# Patient Record
Sex: FEMALE | Race: BLACK OR AFRICAN AMERICAN | Employment: FULL TIME | URBAN - METROPOLITAN AREA
[De-identification: names, ages, dates, MRNs, and addresses within clinical notes are randomized per-mention and may not be internally consistent; named-entity substitution may affect disease eponyms.]

---

## 2017-10-27 ENCOUNTER — APPOINTMENT (OUTPATIENT)
Dept: GENERAL RADIOLOGY | Age: 32
End: 2017-10-27
Attending: EMERGENCY MEDICINE
Payer: SELF-PAY

## 2017-10-27 ENCOUNTER — HOSPITAL ENCOUNTER (EMERGENCY)
Age: 32
Discharge: HOME | End: 2017-10-27
Attending: EMERGENCY MEDICINE
Payer: SELF-PAY

## 2017-10-27 VITALS
HEART RATE: 83 BPM | WEIGHT: 260.58 LBS | SYSTOLIC BLOOD PRESSURE: 103 MMHG | BODY MASS INDEX: 40.9 KG/M2 | TEMPERATURE: 97.9 F | DIASTOLIC BLOOD PRESSURE: 71 MMHG | HEIGHT: 67 IN | RESPIRATION RATE: 16 BRPM | OXYGEN SATURATION: 100 %

## 2017-10-27 DIAGNOSIS — K59.00 CONSTIPATION, UNSPECIFIED CONSTIPATION TYPE: Primary | ICD-10-CM

## 2017-10-27 DIAGNOSIS — N30.00 ACUTE CYSTITIS WITHOUT HEMATURIA: ICD-10-CM

## 2017-10-27 DIAGNOSIS — D64.9 ANEMIA, UNSPECIFIED TYPE: ICD-10-CM

## 2017-10-27 DIAGNOSIS — R10.84 ABDOMINAL PAIN, GENERALIZED: ICD-10-CM

## 2017-10-27 LAB
ALBUMIN SERPL-MCNC: 3.7 G/DL (ref 3.5–5)
ALBUMIN/GLOB SERPL: 0.7 {RATIO} (ref 1.1–2.2)
ALP SERPL-CCNC: 68 U/L (ref 45–117)
ALT SERPL-CCNC: 25 U/L (ref 12–78)
AMORPH CRY URNS QL MICRO: ABNORMAL
ANION GAP SERPL CALC-SCNC: 5 MMOL/L (ref 5–15)
APPEARANCE UR: ABNORMAL
AST SERPL-CCNC: 17 U/L (ref 15–37)
BACTERIA URNS QL MICRO: ABNORMAL /HPF
BASOPHILS # BLD: 0.1 K/UL (ref 0–0.1)
BASOPHILS NFR BLD: 1 % (ref 0–1)
BILIRUB SERPL-MCNC: 0.8 MG/DL (ref 0.2–1)
BILIRUB UR QL: NEGATIVE
BUN SERPL-MCNC: 11 MG/DL (ref 6–20)
BUN/CREAT SERPL: 14 (ref 12–20)
CALCIUM SERPL-MCNC: 8.8 MG/DL (ref 8.5–10.1)
CHLORIDE SERPL-SCNC: 103 MMOL/L (ref 97–108)
CO2 SERPL-SCNC: 31 MMOL/L (ref 21–32)
COLOR UR: ABNORMAL
CREAT SERPL-MCNC: 0.77 MG/DL (ref 0.55–1.02)
DIFFERENTIAL METHOD BLD: ABNORMAL
EOSINOPHIL # BLD: 0.3 K/UL (ref 0–0.4)
EOSINOPHIL NFR BLD: 5 % (ref 0–7)
EPITH CASTS URNS QL MICRO: ABNORMAL /LPF
ERYTHROCYTE [DISTWIDTH] IN BLOOD BY AUTOMATED COUNT: 18.9 % (ref 11.5–14.5)
GLOBULIN SER CALC-MCNC: 5 G/DL (ref 2–4)
GLUCOSE SERPL-MCNC: 91 MG/DL (ref 65–100)
GLUCOSE UR STRIP.AUTO-MCNC: NEGATIVE MG/DL
HCG UR QL: NEGATIVE
HCT VFR BLD AUTO: 25.7 % (ref 35–47)
HGB BLD-MCNC: 7.4 G/DL (ref 11.5–16)
HGB UR QL STRIP: NEGATIVE
KETONES UR QL STRIP.AUTO: NEGATIVE MG/DL
LEUKOCYTE ESTERASE UR QL STRIP.AUTO: ABNORMAL
LIPASE SERPL-CCNC: 202 U/L (ref 73–393)
LYMPHOCYTES # BLD: 2.6 K/UL (ref 0.8–3.5)
LYMPHOCYTES NFR BLD: 40 % (ref 12–49)
MCH RBC QN AUTO: 19.3 PG (ref 26–34)
MCHC RBC AUTO-ENTMCNC: 28.8 G/DL (ref 30–36.5)
MCV RBC AUTO: 66.9 FL (ref 80–99)
MONOCYTES # BLD: 0.6 K/UL (ref 0–1)
MONOCYTES NFR BLD: 10 % (ref 5–13)
NEUTS SEG # BLD: 2.8 K/UL (ref 1.8–8)
NEUTS SEG NFR BLD: 44 % (ref 32–75)
NITRITE UR QL STRIP.AUTO: NEGATIVE
PH UR STRIP: 6 [PH] (ref 5–8)
PLATELET # BLD AUTO: 306 K/UL (ref 150–400)
POTASSIUM SERPL-SCNC: 3.6 MMOL/L (ref 3.5–5.1)
PROT SERPL-MCNC: 8.7 G/DL (ref 6.4–8.2)
PROT UR STRIP-MCNC: NEGATIVE MG/DL
RBC # BLD AUTO: 3.84 M/UL (ref 3.8–5.2)
RBC #/AREA URNS HPF: ABNORMAL /HPF (ref 0–5)
RBC MORPH BLD: ABNORMAL
SODIUM SERPL-SCNC: 139 MMOL/L (ref 136–145)
SP GR UR REFRACTOMETRY: 1.02 (ref 1–1.03)
UA: UC IF INDICATED,UAUC: ABNORMAL
UROBILINOGEN UR QL STRIP.AUTO: 0.2 EU/DL (ref 0.2–1)
WBC # BLD AUTO: 6.4 K/UL (ref 3.6–11)
WBC URNS QL MICRO: ABNORMAL /HPF (ref 0–4)

## 2017-10-27 PROCEDURE — 80053 COMPREHEN METABOLIC PANEL: CPT | Performed by: EMERGENCY MEDICINE

## 2017-10-27 PROCEDURE — 74011000250 HC RX REV CODE- 250: Performed by: EMERGENCY MEDICINE

## 2017-10-27 PROCEDURE — 74011250636 HC RX REV CODE- 250/636: Performed by: EMERGENCY MEDICINE

## 2017-10-27 PROCEDURE — 77030029684 HC NEB SM VOL KT MONA -A

## 2017-10-27 PROCEDURE — 94640 AIRWAY INHALATION TREATMENT: CPT

## 2017-10-27 PROCEDURE — 74022 RADEX COMPL AQT ABD SERIES: CPT

## 2017-10-27 PROCEDURE — 96360 HYDRATION IV INFUSION INIT: CPT

## 2017-10-27 PROCEDURE — 36415 COLL VENOUS BLD VENIPUNCTURE: CPT | Performed by: EMERGENCY MEDICINE

## 2017-10-27 PROCEDURE — 83690 ASSAY OF LIPASE: CPT | Performed by: EMERGENCY MEDICINE

## 2017-10-27 PROCEDURE — 87086 URINE CULTURE/COLONY COUNT: CPT | Performed by: EMERGENCY MEDICINE

## 2017-10-27 PROCEDURE — 74011250637 HC RX REV CODE- 250/637: Performed by: EMERGENCY MEDICINE

## 2017-10-27 PROCEDURE — 85025 COMPLETE CBC W/AUTO DIFF WBC: CPT | Performed by: EMERGENCY MEDICINE

## 2017-10-27 PROCEDURE — 99284 EMERGENCY DEPT VISIT MOD MDM: CPT

## 2017-10-27 PROCEDURE — 81025 URINE PREGNANCY TEST: CPT | Performed by: EMERGENCY MEDICINE

## 2017-10-27 PROCEDURE — 81001 URINALYSIS AUTO W/SCOPE: CPT | Performed by: EMERGENCY MEDICINE

## 2017-10-27 RX ORDER — PROCHLORPERAZINE MALEATE 10 MG
10 TABLET ORAL
Qty: 15 TAB | Refills: 0 | Status: SHIPPED | OUTPATIENT
Start: 2017-10-27 | End: 2017-11-01

## 2017-10-27 RX ORDER — IPRATROPIUM BROMIDE AND ALBUTEROL SULFATE 2.5; .5 MG/3ML; MG/3ML
3 SOLUTION RESPIRATORY (INHALATION)
Status: COMPLETED | OUTPATIENT
Start: 2017-10-27 | End: 2017-10-27

## 2017-10-27 RX ORDER — FERROUS SULFATE 325(65) MG
325 TABLET, DELAYED RELEASE (ENTERIC COATED) ORAL
Qty: 42 TAB | Refills: 0 | Status: SHIPPED | OUTPATIENT
Start: 2017-10-27 | End: 2017-11-10

## 2017-10-27 RX ORDER — CEPHALEXIN 500 MG/1
500 CAPSULE ORAL 3 TIMES DAILY
Qty: 21 CAP | Refills: 0 | Status: SHIPPED | OUTPATIENT
Start: 2017-10-27 | End: 2017-11-03

## 2017-10-27 RX ORDER — POLYETHYLENE GLYCOL 3350 17 G/17G
17 POWDER, FOR SOLUTION ORAL DAILY
Qty: 510 G | Refills: 0 | Status: SHIPPED | OUTPATIENT
Start: 2017-10-27 | End: 2019-05-28

## 2017-10-27 RX ORDER — SODIUM CHLORIDE 9 MG/ML
1000 INJECTION, SOLUTION INTRAVENOUS ONCE
Status: COMPLETED | OUTPATIENT
Start: 2017-10-27 | End: 2017-10-27

## 2017-10-27 RX ORDER — CEPHALEXIN 250 MG/1
500 CAPSULE ORAL
Status: COMPLETED | OUTPATIENT
Start: 2017-10-27 | End: 2017-10-27

## 2017-10-27 RX ADMIN — CEPHALEXIN 500 MG: 250 CAPSULE ORAL at 22:31

## 2017-10-27 RX ADMIN — IPRATROPIUM BROMIDE AND ALBUTEROL SULFATE 3 ML: .5; 3 SOLUTION RESPIRATORY (INHALATION) at 20:28

## 2017-10-27 RX ADMIN — SODIUM CHLORIDE 1000 ML: 900 INJECTION, SOLUTION INTRAVENOUS at 20:48

## 2017-10-27 NOTE — ED NOTES
Pt arrived ambulation to room #39 from triage. Pt with c/o of constipation. Patient stated that she has not had a bowel movement successfully in \"almost a month. \" Patient has has small BM that has experienced this past month are hard and \"looks like derrell. \" Patient states she is having abdominal pain that radiates up to throat. Patient is distended and experiencing nausea and vomiting. Patient stated that she is sometimes not able to button pants due to being so distended. Pt resting in position of comfort. Call bell within reach.

## 2017-10-28 NOTE — DISCHARGE INSTRUCTIONS
Abdominal Pain: Care Instructions  Your Care Instructions    Abdominal pain has many possible causes. Some aren't serious and get better on their own in a few days. Others need more testing and treatment. If your pain continues or gets worse, you need to be rechecked and may need more tests to find out what is wrong. You may need surgery to correct the problem. Don't ignore new symptoms, such as fever, nausea and vomiting, urination problems, pain that gets worse, and dizziness. These may be signs of a more serious problem. Your doctor may have recommended a follow-up visit in the next 8 to 12 hours. If you are not getting better, you may need more tests or treatment. The doctor has checked you carefully, but problems can develop later. If you notice any problems or new symptoms, get medical treatment right away. Follow-up care is a key part of your treatment and safety. Be sure to make and go to all appointments, and call your doctor if you are having problems. It's also a good idea to know your test results and keep a list of the medicines you take. How can you care for yourself at home? · Rest until you feel better. · To prevent dehydration, drink plenty of fluids, enough so that your urine is light yellow or clear like water. Choose water and other caffeine-free clear liquids until you feel better. If you have kidney, heart, or liver disease and have to limit fluids, talk with your doctor before you increase the amount of fluids you drink. · If your stomach is upset, eat mild foods, such as rice, dry toast or crackers, bananas, and applesauce. Try eating several small meals instead of two or three large ones. · Wait until 48 hours after all symptoms have gone away before you have spicy foods, alcohol, and drinks that contain caffeine. · Do not eat foods that are high in fat. · Avoid anti-inflammatory medicines such as aspirin, ibuprofen (Advil, Motrin), and naproxen (Aleve).  These can cause stomach upset. Talk to your doctor if you take daily aspirin for another health problem. When should you call for help? Call 911 anytime you think you may need emergency care. For example, call if:  ? · You passed out (lost consciousness). ? · You pass maroon or very bloody stools. ? · You vomit blood or what looks like coffee grounds. ? · You have new, severe belly pain. ?Call your doctor now or seek immediate medical care if:  ? · Your pain gets worse, especially if it becomes focused in one area of your belly. ? · You have a new or higher fever. ? · Your stools are black and look like tar, or they have streaks of blood. ? · You have unexpected vaginal bleeding. ? · You have symptoms of a urinary tract infection. These may include:  ¨ Pain when you urinate. ¨ Urinating more often than usual.  ¨ Blood in your urine. ? · You are dizzy or lightheaded, or you feel like you may faint. ? Watch closely for changes in your health, and be sure to contact your doctor if:  ? · You are not getting better after 1 day (24 hours). Where can you learn more? Go to http://merry-joseluis.info/. Enter X756 in the search box to learn more about \"Abdominal Pain: Care Instructions. \"  Current as of: March 20, 2017  Content Version: 11.4  © 4865-8081 [employer]. Care instructions adapted under license by [employer] (which disclaims liability or warranty for this information). If you have questions about a medical condition or this instruction, always ask your healthcare professional. [place] [address] any warranty or liability for your use of this information. Constipation: Care Instructions  Your Care Instructions    Constipation means that you have a hard time passing stools (bowel movements). People pass stools from 3 times a day to once every 3 days. What is normal for you may be different.  Constipation may occur with pain in the rectum and cramping. The pain may get worse when you try to pass stools. Sometimes there are small amounts of bright red blood on toilet paper or the surface of stools. This is because of enlarged veins near the rectum (hemorrhoids). A few changes in your diet and lifestyle may help you avoid ongoing constipation. Your doctor may also prescribe medicine to help loosen your stool. Some medicines can cause constipation. These include pain medicines and antidepressants. Tell your doctor about all the medicines you take. Your doctor may want to make a medicine change to ease your symptoms. Follow-up care is a key part of your treatment and safety. Be sure to make and go to all appointments, and call your doctor if you are having problems. It's also a good idea to know your test results and keep a list of the medicines you take. How can you care for yourself at home? · Drink plenty of fluids, enough so that your urine is light yellow or clear like water. If you have kidney, heart, or liver disease and have to limit fluids, talk with your doctor before you increase the amount of fluids you drink. · Include high-fiber foods in your diet each day. These include fruits, vegetables, beans, and whole grains. · Get at least 30 minutes of exercise on most days of the week. Walking is a good choice. You also may want to do other activities, such as running, swimming, cycling, or playing tennis or team sports. · Take a fiber supplement, such as Citrucel or Metamucil, every day. Read and follow all instructions on the label. · Schedule time each day for a bowel movement. A daily routine may help. Take your time having your bowel movement. · Support your feet with a small step stool when you sit on the toilet. This helps flex your hips and places your pelvis in a squatting position. · Your doctor may recommend an over-the-counter laxative to relieve your constipation. Examples are Milk of Magnesia and MiraLax.  Read and follow all instructions on the label. Do not use laxatives on a long-term basis. When should you call for help? Call your doctor now or seek immediate medical care if:  ? · You have new or worse belly pain. ? · You have new or worse nausea or vomiting. ? · You have blood in your stools. ? Watch closely for changes in your health, and be sure to contact your doctor if:  ? · Your constipation is getting worse. ? · You do not get better as expected. Where can you learn more? Go to http://merry-joseluis.info/. Enter 21  in the search box to learn more about \"Constipation: Care Instructions. \"  Current as of: March 20, 2017  Content Version: 11.4  © 9454-1055 [employer]. Care instructions adapted under license by [employer] (which disclaims liability or warranty for this information). If you have questions about a medical condition or this instruction, always ask your healthcare professional. [place] [address] any warranty or liability for your use of this information. Anemia: Care Instructions  Your Care Instructions    Anemia is a low level of red blood cells, which carry oxygen throughout your body. Many things can cause anemia. Lack of iron is one of the most common causes. Your body needs iron to make hemoglobin, a substance in red blood cells that carries oxygen from the lungs to your body's cells. Without enough iron, the body produces fewer and smaller red blood cells. As a result, your body's cells do not get enough oxygen, and you feel tired and weak. And you may have trouble concentrating. Bleeding is the most common cause of a lack of iron. You may have heavy menstrual bleeding or bleeding caused by conditions such as ulcers, hemorrhoids, or cancer. Regular use of aspirin or other anti-inflammatory medicines (such as ibuprofen) also can cause bleeding in some people.  A lack of iron in your diet also can cause anemia, especially at times when the body needs more iron, such as during pregnancy, infancy, and the teen years. Your doctor may have prescribed iron pills. It may take several months of treatment for your iron levels to return to normal. Your doctor also may suggest that you eat foods that are rich in iron, such as meat and beans. There are many other causes of anemia. It is not always due to a lack of iron. Finding the specific cause of your anemia will help your doctor find the right treatment for you. Follow-up care is a key part of your treatment and safety. Be sure to make and go to all appointments, and call your doctor if you are having problems. It's also a good idea to know your test results and keep a list of the medicines you take. How can you care for yourself at home? · Take your medicines exactly as prescribed. Call your doctor if you think you are having a problem with your medicine. · If your doctor recommends iron pills, take them as directed:  ¨ Try to take the pills on an empty stomach about 1 hour before or 2 hours after meals. But you may need to take iron with food to avoid an upset stomach. ¨ Do not take antacids or drink milk or caffeine drinks (such as coffee, tea, or cola) at the same time or within 2 hours of the time that you take your iron. They can make it hard for your body to absorb the iron. ¨ Vitamin C (from food or supplements) helps your body absorb iron. Try taking iron pills with a glass of orange juice or some other food that is high in vitamin C, such as citrus fruits. ¨ Iron pills may cause stomach problems, such as heartburn, nausea, diarrhea, constipation, and cramps. Be sure to drink plenty of fluids, and include fruits, vegetables, and fiber in your diet each day. Iron pills often make your bowel movements dark or green. ¨ If you forget to take an iron pill, do not take a double dose of iron the next time you take a pill. ¨ Keep iron pills out of the reach of small children.  An overdose of iron can be very dangerous. · Follow your doctor's advice about eating iron-rich foods. These include red meat, shellfish, poultry, eggs, beans, raisins, whole-grain bread, and leafy green vegetables. · Steam vegetables to help them keep their iron content. When should you call for help? Call 911 anytime you think you may need emergency care. For example, call if:  ? · You have symptoms of a heart attack. These may include:  ¨ Chest pain or pressure, or a strange feeling in the chest.  ¨ Sweating. ¨ Shortness of breath. ¨ Nausea or vomiting. ¨ Pain, pressure, or a strange feeling in the back, neck, jaw, or upper belly or in one or both shoulders or arms. ¨ Lightheadedness or sudden weakness. ¨ A fast or irregular heartbeat. After you call 911, the  may tell you to chew 1 adult-strength or 2 to 4 low-dose aspirin. Wait for an ambulance. Do not try to drive yourself. ? · You passed out (lost consciousness). ?Call your doctor now or seek immediate medical care if:  ? · You have new or increased shortness of breath. ? · You are dizzy or lightheaded, or you feel like you may faint. ? · Your fatigue and weakness continue or get worse. ? · You have any abnormal bleeding, such as:  ¨ Nosebleeds. ¨ Vaginal bleeding that is different (heavier, more frequent, at a different time of the month) than what you are used to. ¨ Bloody or black stools, or rectal bleeding. ¨ Bloody or pink urine. ? Watch closely for changes in your health, and be sure to contact your doctor if:  ? · You do not get better as expected. Where can you learn more? Go to http://merry-joseluis.info/. Enter R301 in the search box to learn more about \"Anemia: Care Instructions. \"  Current as of: October 13, 2016  Content Version: 11.4  © 9308-5343 [employer]. Care instructions adapted under license by [employer] (which disclaims liability or warranty for this information).  If you have questions about a medical condition or this instruction, always ask your healthcare professional. [place] [address] any warranty or liability for your use of this information.

## 2017-10-28 NOTE — ED PROVIDER NOTES
[facility]  EMERGENCY DEPARTMENT HISTORY AND PHYSICAL EXAM       Date of Service: 10/27/2017   Patient Name: Kerry Londono   YOB: 1985  Medical Record Number: [id number]    History of Presenting Illness     Chief Complaint   Patient presents with    Constipation     pt reports to ed complaining of constipation \"for awhile\", pt reports small bowel movement yesterday         History Provided By:  patient    Additional History:   Kerry Londono is a 28 y.o. female with PMhx significant for asthma, and anemia, who presents ambulatory to the ED with cc of acute on chronic constipation. Pt states few BM's is her baseline, but notes recently her constipation has worsened. She states the last BM she had was several days ago and it was \"small derrell\". Pt notes when she has her menses, she usually has normal BM. Pt reports additional sx's of flatulence, dysuria, productive cough with mucous. Pt notes she eats lots of fruits and vegetables but states she needs to drink more water. Of note, pt states her LMP was \"the second week of October\". Pt specifically denies fever, chills, blood in stool, CP, hematuria. Social Hx: - Tobacco, - EtOH, - Illicit Drugs    There are no other complaints, changes or physical findings at this time. Primary Care Provider: None     Past History     Past Medical History:   Past Medical History:   Diagnosis Date    Anemia     Anemia     Anemia NEC     ASCUS (atypical squamous cells of undetermined significance) on Pap smear 6/1/2012    Asthma     extrinsic as a child     Psychiatric disorder     Pt reports having anxiety.         Past Surgical History:   Past Surgical History:   Procedure Laterality Date    BREAST SURGERY PROCEDURE UNLISTED  11/27/12    Incision and Drainage Left Breast Abscess    HX BREAST REDUCTION  2003    HX OTHER SURGICAL      abscess removed from left breast        Family History:   Family History Problem Relation Age of Onset    Hypertension Mother     Elevated Lipids Mother     Diabetes Sister         Social History:   Social History   Substance Use Topics    Smoking status: Never Smoker    Smokeless tobacco: Never Used    Alcohol use Yes      Comment: occasional        Allergies: Allergies   Allergen Reactions    Bactrim [Sulfamethoprim Ds] Nausea Only    Zofran [Ondansetron Hcl (Pf)] Rash         Review of Systems   Review of Systems   Constitutional: Negative for chills, diaphoresis, fever and unexpected weight change. HENT: Negative for rhinorrhea and sore throat. Eyes: Negative for pain. Respiratory: Positive for cough (mucous). Negative for shortness of breath, wheezing and stridor. Cardiovascular: Negative for chest pain and leg swelling. Gastrointestinal: Positive for constipation. Negative for abdominal pain, blood in stool, diarrhea, nausea and vomiting. Positive for flatulence   Genitourinary: Positive for dysuria. Negative for difficulty urinating, flank pain and hematuria. Musculoskeletal: Negative for back pain and neck stiffness. Skin: Negative for rash. Neurological: Negative for seizures, syncope, weakness, light-headedness and headaches. Psychiatric/Behavioral: Negative for confusion. Physical Exam  Physical Exam  Nursing note and vitals reviewed.   General appearance: non-toxic, NAD  Eyes: PERRL, EOMI, conjunctiva normal, anicteric sclera  HEENT: mucous membranes moist, oropharynx is clear  Pulmonary: scant expiratory wheezes, normal cough with FEV1  Cardiac: normal rate and regular rhythm, no murmurs, gallops, or rubs, 2+DP pulses, 2+ radial pulses  Abdomen: soft, mildly tender to palpation to upper abd, nondistended, bowel sounds present, negative Calix's sign, negative McBurney's sign, no rebound, no guarding  MSK: no pre-tibial edema  Neuro: Alert, answers questions appropriately  Skin: capillary refill brisk     Medical Decision Making   I am the first provider for this patient. I reviewed the vital signs, available nursing notes, past medical history, past surgical history, family history and social history. Old Medical Records: Old medical records. Provider Notes:   DDx: constipation, dehydration, URI, PNA, bronchitis, low suspicion for intraabdominal catastrophe      ED Course:  8:00 PM   Initial assessment performed. The patients presenting problems have been discussed, and they are in agreement with the care plan formulated and outlined with them. I have encouraged them to ask questions as they arise throughout their visit. Progress Notes:   9:50 PM   Pt has been re-evaluated. Updated pt on lab results. Will perform rectal exam.      Procedures:   PROCEDURE NOTE - RECTAL EXAM:   10:15 PM  Performed by: Joo Arroyo. Adriana Sutherland MD  Rectal exam performed. brown stool was collected. No blood, no melena, no fecal impaction. She notes heavy menstrual periods, suspects menstrual bloodloss as source of anemia, advised close follow-up with Gokul VILLALOBOS (OBGYN). The procedure took 1-15 minutes, and pt tolerated well. Written by Tom Oneil ED Scribe, as dictated by Brannon Sutherland MD.       Diagnostic Study Results     Labs -      Recent Results (from the past 12 hour(s))   URINALYSIS W/ REFLEX CULTURE    Collection Time: 10/27/17  6:20 PM   Result Value Ref Range    Color YELLOW/STRAW      Appearance CLOUDY (A) CLEAR      Specific gravity 1.022 1.003 - 1.030      pH (UA) 6.0 5.0 - 8.0      Protein NEGATIVE  NEG mg/dL    Glucose NEGATIVE  NEG mg/dL    Ketone NEGATIVE  NEG mg/dL    Bilirubin NEGATIVE  NEG      Blood NEGATIVE  NEG      Urobilinogen 0.2 0.2 - 1.0 EU/dL    Nitrites NEGATIVE  NEG      Leukocyte Esterase SMALL (A) NEG      WBC 0-4 0 - 4 /hpf    RBC 0-5 0 - 5 /hpf    Epithelial cells MODERATE (A) FEW /lpf    Bacteria 3+ (A) NEG /hpf    UA:UC IF INDICATED URINE CULTURE ORDERED (A) CNI      Amorphous Crystals FEW (A) NEG HCG URINE, QL    Collection Time: 10/27/17  6:20 PM   Result Value Ref Range    HCG urine, Ql. NEGATIVE  NEG     CBC WITH AUTOMATED DIFF    Collection Time: 10/27/17  8:28 PM   Result Value Ref Range    WBC 6.4 3.6 - 11.0 K/uL    RBC 3.84 3.80 - 5.20 M/uL    HGB 7.4 (L) 11.5 - 16.0 g/dL    HCT 25.7 (L) 35.0 - 47.0 %    MCV 66.9 (L) 80.0 - 99.0 FL    MCH 19.3 (L) 26.0 - 34.0 PG    MCHC 28.8 (L) 30.0 - 36.5 g/dL    RDW 18.9 (H) 11.5 - 14.5 %    PLATELET 013 138 - 781 K/uL    NEUTROPHILS 44 32 - 75 %    LYMPHOCYTES 40 12 - 49 %    MONOCYTES 10 5 - 13 %    EOSINOPHILS 5 0 - 7 %    BASOPHILS 1 0 - 1 %    ABS. NEUTROPHILS 2.8 1.8 - 8.0 K/UL    ABS. LYMPHOCYTES 2.6 0.8 - 3.5 K/UL    ABS. MONOCYTES 0.6 0.0 - 1.0 K/UL    ABS. EOSINOPHILS 0.3 0.0 - 0.4 K/UL    ABS. BASOPHILS 0.1 0.0 - 0.1 K/UL    DF SMEAR SCANNED      RBC COMMENTS ANISOCYTOSIS  1+        RBC COMMENTS OVALOCYTES  PRESENT        RBC COMMENTS POIKILOCYTOSIS  PRESENT        RBC COMMENTS RBC FRAGMENTS  MICROCYTOSIS  2+        RBC COMMENTS HYPOCHROMIA  2+       METABOLIC PANEL, COMPREHENSIVE    Collection Time: 10/27/17  8:28 PM   Result Value Ref Range    Sodium 139 136 - 145 mmol/L    Potassium 3.6 3.5 - 5.1 mmol/L    Chloride 103 97 - 108 mmol/L    CO2 31 21 - 32 mmol/L    Anion gap 5 5 - 15 mmol/L    Glucose 91 65 - 100 mg/dL    BUN 11 6 - 20 MG/DL    Creatinine 0.77 0.55 - 1.02 MG/DL    BUN/Creatinine ratio 14 12 - 20      GFR est AA >60 >60 ml/min/1.73m2    GFR est non-AA >60 >60 ml/min/1.73m2    Calcium 8.8 8.5 - 10.1 MG/DL    Bilirubin, total 0.8 0.2 - 1.0 MG/DL    ALT (SGPT) 25 12 - 78 U/L    AST (SGOT) 17 15 - 37 U/L    Alk.  phosphatase 68 45 - 117 U/L    Protein, total 8.7 (H) 6.4 - 8.2 g/dL    Albumin 3.7 3.5 - 5.0 g/dL    Globulin 5.0 (H) 2.0 - 4.0 g/dL    A-G Ratio 0.7 (L) 1.1 - 2.2     LIPASE    Collection Time: 10/27/17  8:28 PM   Result Value Ref Range    Lipase 202 73 - 393 U/L       Radiologic Studies -  The following have been ordered and reviewed:  XR ABD ACUTE W 1 V CHEST   Final Result   EXAM:  XR ABD ACUTE W 1 V CHEST     INDICATION:  cough and constipation     COMPARISON: 8/25/2016.     FINDINGS: The upright chest radiograph demonstrates clear lungs and normal  cardiac and mediastinal contours. There is no pleural effusion or free air under  the diaphragm.     Supine and upright views of the abdomen demonstrate a nonobstructive bowel gas  pattern. There is no free intraperitoneal air. Mild fecal stasis is noted. The  bones are within normal limits.     IMPRESSION  IMPRESSION: No acute abnormality. CXR Results  (Last 48 hours)               10/27/17 2110  XR ABD ACUTE W 1 V CHEST Final result    Impression:  IMPRESSION: No acute abnormality. Narrative:  EXAM:  XR ABD ACUTE W 1 V CHEST       INDICATION:  cough and constipation       COMPARISON: 8/25/2016. FINDINGS: The upright chest radiograph demonstrates clear lungs and normal   cardiac and mediastinal contours. There is no pleural effusion or free air under   the diaphragm. Supine and upright views of the abdomen demonstrate a nonobstructive bowel gas   pattern. There is no free intraperitoneal air. Mild fecal stasis is noted. The   bones are within normal limits. Vital Signs-Reviewed the patient's vital signs. Patient Vitals for the past 12 hrs:   Temp Pulse Resp BP SpO2   10/27/17 1815 97.9 °F (36.6 °C) 83 16 131/76 100 %       Medications Given in the ED:  Medications   albuterol-ipratropium (DUO-NEB) 2.5 MG-0.5 MG/3 ML (3 mL Nebulization Given 10/27/17 2028)   0.9% sodium chloride infusion 1,000 mL (0 mL IntraVENous IV Completed 10/27/17 2149)   cephALEXin (KEFLEX) capsule 500 mg (500 mg Oral Given 10/27/17 2231)         Diagnosis   Clinical Impression:   1. Constipation, unspecified constipation type    2. Abdominal pain, generalized    3. Acute cystitis without hematuria    4.  Anemia, unspecified type         Plan:  1: Follow-up Information     Follow up With Details Comments MD Pj Schedule an appointment as soon as possible for a visit in 4 days FOR FOLLOW UP OF YOUR ANEMIA RELATED TO MENSTRUATION [address]  [facility] [place]  [phone]      [facility] EMERGENCY DEPT Go in 1 day If symptoms worsen [address] [facility] [address]  [phone]    PRIMARY CARE DOCTOR Schedule an appointment as soon as possible for a visit in 1 week  SEE ATTACHED LIST        2:   Current Discharge Medication List      START taking these medications    Details   polyethylene glycol (MIRALAX) 17 gram/dose powder Take 17 g by mouth daily. 1 tablespoon with 8 oz of water daily  Qty: 510 g, Refills: 0      cephALEXin (KEFLEX) 500 mg capsule Take 1 Cap by mouth three (3) times daily for 7 days. Indications: BACTERIAL URINARY TRACT INFECTION  Qty: 21 Cap, Refills: 0      prochlorperazine (COMPAZINE) 10 mg tablet Take 1 Tab by mouth every eight (8) hours as needed for Nausea for up to 5 days. Indications: Nausea and Vomiting, STOP TAKING IF YOU DEVELOP UNCONTROLLABLE MOVEMENTS  Qty: 15 Tab, Refills: 0      ferrous sulfate (IRON) 325 mg (65 mg iron) EC tablet Take 1 Tab by mouth three (3) times daily (with meals) for 14 days. Indications: May cause abdominal pain, constipation  Qty: 42 Tab, Refills: 0           Return to ED if Worse    Disposition Note:  DISCHARGE NOTE  10:22 PM  The patient has been re-evaluated and is ready for discharge. Reviewed available results with patient. Counseled pt on diagnosis and care plan. Pt has expressed understanding, and all questions have been answered. Pt agrees with plan and agrees to F/U as recommended, or return to the ED if their sxs worsen. Discharge instructions have been provided and explained to the pt, along with reasons to return to the ED. Written by EMILY Castañeda, as dictated by Mayte Gibbs MD. _______________________________   Attestations: This note is prepared by Georges Davila, acting as Scribe for Tony Torres. MD Minda Horne MD: The scribe's documentation has been prepared under my direction and personally reviewed by me in its entirety.  I confirm that the note above accurately reflects all work, treatment, procedures, and medical decision making performed by me.  _______________________________

## 2017-10-28 NOTE — ED NOTES
Discharge instructions given to patient by Aníbal Ott. Adriana Sutherland MD. patient verbalized understanding of discharge instructions. Pt discharged without difficulty. Pt discharged in stable condition via ambulation, accompanied by .

## 2017-10-29 LAB
BACTERIA SPEC CULT: NORMAL
CC UR VC: NORMAL
SERVICE CMNT-IMP: NORMAL

## 2017-11-09 ENCOUNTER — APPOINTMENT (OUTPATIENT)
Dept: CT IMAGING | Age: 32
End: 2017-11-09
Attending: EMERGENCY MEDICINE
Payer: COMMERCIAL

## 2017-11-09 ENCOUNTER — HOSPITAL ENCOUNTER (EMERGENCY)
Age: 32
Discharge: HOME | End: 2017-11-09
Attending: EMERGENCY MEDICINE
Payer: COMMERCIAL

## 2017-11-09 VITALS
HEART RATE: 81 BPM | OXYGEN SATURATION: 100 % | DIASTOLIC BLOOD PRESSURE: 70 MMHG | TEMPERATURE: 98.6 F | SYSTOLIC BLOOD PRESSURE: 105 MMHG | BODY MASS INDEX: 41.73 KG/M2 | HEIGHT: 67 IN | RESPIRATION RATE: 16 BRPM | WEIGHT: 265.88 LBS

## 2017-11-09 DIAGNOSIS — K59.00 CONSTIPATION, UNSPECIFIED CONSTIPATION TYPE: ICD-10-CM

## 2017-11-09 DIAGNOSIS — R10.84 ABDOMINAL PAIN, GENERALIZED: Primary | ICD-10-CM

## 2017-11-09 DIAGNOSIS — D64.9 ANEMIA, UNSPECIFIED TYPE: ICD-10-CM

## 2017-11-09 DIAGNOSIS — N63.0 BREAST MASS IN FEMALE: ICD-10-CM

## 2017-11-09 DIAGNOSIS — R91.1 LUNG NODULE: ICD-10-CM

## 2017-11-09 LAB
ALBUMIN SERPL-MCNC: 3.8 G/DL (ref 3.5–5)
ALBUMIN/GLOB SERPL: 0.7 {RATIO} (ref 1.1–2.2)
ALP SERPL-CCNC: 69 U/L (ref 45–117)
ALT SERPL-CCNC: 19 U/L (ref 12–78)
ANION GAP SERPL CALC-SCNC: 5 MMOL/L (ref 5–15)
APPEARANCE UR: ABNORMAL
AST SERPL-CCNC: 15 U/L (ref 15–37)
BACTERIA URNS QL MICRO: ABNORMAL /HPF
BASOPHILS # BLD: 0 K/UL (ref 0–0.1)
BASOPHILS NFR BLD: 0 % (ref 0–1)
BILIRUB SERPL-MCNC: 0.8 MG/DL (ref 0.2–1)
BILIRUB UR QL: NEGATIVE
BUN SERPL-MCNC: 9 MG/DL (ref 6–20)
BUN/CREAT SERPL: 11 (ref 12–20)
CALCIUM SERPL-MCNC: 8.8 MG/DL (ref 8.5–10.1)
CHLORIDE SERPL-SCNC: 103 MMOL/L (ref 97–108)
CO2 SERPL-SCNC: 28 MMOL/L (ref 21–32)
COLOR UR: ABNORMAL
CREAT SERPL-MCNC: 0.79 MG/DL (ref 0.55–1.02)
EOSINOPHIL # BLD: 0.5 K/UL (ref 0–0.4)
EOSINOPHIL NFR BLD: 7 % (ref 0–7)
EPITH CASTS URNS QL MICRO: ABNORMAL /LPF
ERYTHROCYTE [DISTWIDTH] IN BLOOD BY AUTOMATED COUNT: 19.2 % (ref 11.5–14.5)
GLOBULIN SER CALC-MCNC: 5.2 G/DL (ref 2–4)
GLUCOSE SERPL-MCNC: 100 MG/DL (ref 65–100)
GLUCOSE UR STRIP.AUTO-MCNC: NEGATIVE MG/DL
HCG UR QL: NEGATIVE
HCT VFR BLD AUTO: 25.8 % (ref 35–47)
HEMOCCULT STL QL: NEGATIVE
HGB BLD-MCNC: 7.6 G/DL (ref 11.5–16)
HGB UR QL STRIP: NEGATIVE
HYALINE CASTS URNS QL MICRO: ABNORMAL /LPF (ref 0–5)
KETONES UR QL STRIP.AUTO: NEGATIVE MG/DL
LEUKOCYTE ESTERASE UR QL STRIP.AUTO: NEGATIVE
LIPASE SERPL-CCNC: 226 U/L (ref 73–393)
LYMPHOCYTES # BLD: 3.2 K/UL (ref 0.8–3.5)
LYMPHOCYTES NFR BLD: 42 % (ref 12–49)
MCH RBC QN AUTO: 19.5 PG (ref 26–34)
MCHC RBC AUTO-ENTMCNC: 29.5 G/DL (ref 30–36.5)
MCV RBC AUTO: 66.3 FL (ref 80–99)
MONOCYTES # BLD: 0.6 K/UL (ref 0–1)
MONOCYTES NFR BLD: 8 % (ref 5–13)
NEUTS SEG # BLD: 3.3 K/UL (ref 1.8–8)
NEUTS SEG NFR BLD: 43 % (ref 32–75)
NITRITE UR QL STRIP.AUTO: NEGATIVE
PH UR STRIP: 6 [PH] (ref 5–8)
PLATELET # BLD AUTO: 340 K/UL (ref 150–400)
POTASSIUM SERPL-SCNC: 3.5 MMOL/L (ref 3.5–5.1)
PROT SERPL-MCNC: 9 G/DL (ref 6.4–8.2)
PROT UR STRIP-MCNC: NEGATIVE MG/DL
RBC # BLD AUTO: 3.89 M/UL (ref 3.8–5.2)
RBC #/AREA URNS HPF: ABNORMAL /HPF (ref 0–5)
SODIUM SERPL-SCNC: 136 MMOL/L (ref 136–145)
SP GR UR REFRACTOMETRY: 1.01 (ref 1–1.03)
UROBILINOGEN UR QL STRIP.AUTO: 1 EU/DL (ref 0.2–1)
WBC # BLD AUTO: 7.6 K/UL (ref 3.6–11)
WBC URNS QL MICRO: ABNORMAL /HPF (ref 0–4)

## 2017-11-09 PROCEDURE — 83690 ASSAY OF LIPASE: CPT | Performed by: EMERGENCY MEDICINE

## 2017-11-09 PROCEDURE — 74011000250 HC RX REV CODE- 250: Performed by: EMERGENCY MEDICINE

## 2017-11-09 PROCEDURE — 81001 URINALYSIS AUTO W/SCOPE: CPT | Performed by: EMERGENCY MEDICINE

## 2017-11-09 PROCEDURE — 96361 HYDRATE IV INFUSION ADD-ON: CPT

## 2017-11-09 PROCEDURE — 74011250636 HC RX REV CODE- 250/636: Performed by: EMERGENCY MEDICINE

## 2017-11-09 PROCEDURE — 85025 COMPLETE CBC W/AUTO DIFF WBC: CPT | Performed by: EMERGENCY MEDICINE

## 2017-11-09 PROCEDURE — 74011636320 HC RX REV CODE- 636/320: Performed by: EMERGENCY MEDICINE

## 2017-11-09 PROCEDURE — 74177 CT ABD & PELVIS W/CONTRAST: CPT

## 2017-11-09 PROCEDURE — 82272 OCCULT BLD FECES 1-3 TESTS: CPT | Performed by: EMERGENCY MEDICINE

## 2017-11-09 PROCEDURE — 80053 COMPREHEN METABOLIC PANEL: CPT | Performed by: EMERGENCY MEDICINE

## 2017-11-09 PROCEDURE — 74011250637 HC RX REV CODE- 250/637: Performed by: EMERGENCY MEDICINE

## 2017-11-09 PROCEDURE — 36415 COLL VENOUS BLD VENIPUNCTURE: CPT | Performed by: EMERGENCY MEDICINE

## 2017-11-09 PROCEDURE — 99284 EMERGENCY DEPT VISIT MOD MDM: CPT

## 2017-11-09 PROCEDURE — 81025 URINE PREGNANCY TEST: CPT | Performed by: EMERGENCY MEDICINE

## 2017-11-09 PROCEDURE — 96374 THER/PROPH/DIAG INJ IV PUSH: CPT

## 2017-11-09 RX ORDER — SODIUM CHLORIDE 9 MG/ML
50 INJECTION, SOLUTION INTRAVENOUS
Status: COMPLETED | OUTPATIENT
Start: 2017-11-09 | End: 2017-11-09

## 2017-11-09 RX ORDER — PANTOPRAZOLE SODIUM 40 MG/1
40 TABLET, DELAYED RELEASE ORAL
Status: COMPLETED | OUTPATIENT
Start: 2017-11-09 | End: 2017-11-09

## 2017-11-09 RX ORDER — SODIUM CHLORIDE 9 MG/ML
1000 INJECTION, SOLUTION INTRAVENOUS ONCE
Status: COMPLETED | OUTPATIENT
Start: 2017-11-09 | End: 2017-11-09

## 2017-11-09 RX ORDER — LACTULOSE 10 G/15ML
10 SOLUTION ORAL; RECTAL 2 TIMES DAILY
Qty: 236 ML | Refills: 0 | Status: SHIPPED | OUTPATIENT
Start: 2017-11-09 | End: 2017-11-17

## 2017-11-09 RX ORDER — SODIUM CHLORIDE 0.9 % (FLUSH) 0.9 %
10 SYRINGE (ML) INJECTION
Status: COMPLETED | OUTPATIENT
Start: 2017-11-09 | End: 2017-11-09

## 2017-11-09 RX ADMIN — SODIUM CHLORIDE 50 ML/HR: 900 INJECTION, SOLUTION INTRAVENOUS at 21:25

## 2017-11-09 RX ADMIN — SODIUM CHLORIDE 1000 ML: 900 INJECTION, SOLUTION INTRAVENOUS at 19:40

## 2017-11-09 RX ADMIN — LACTULOSE 20 G: 20 SOLUTION ORAL at 22:34

## 2017-11-09 RX ADMIN — IOPAMIDOL 100 ML: 755 INJECTION, SOLUTION INTRAVENOUS at 21:25

## 2017-11-09 RX ADMIN — PANTOPRAZOLE SODIUM 40 MG: 40 TABLET, DELAYED RELEASE ORAL at 19:42

## 2017-11-09 RX ADMIN — Medication 10 ML: at 21:25

## 2017-11-09 RX ADMIN — DIATRIZOATE MEGLUMINE AND DIATRIZOATE SODIUM 30 ML: 600; 100 SOLUTION ORAL; RECTAL at 19:39

## 2017-11-09 RX ADMIN — FAMOTIDINE 20 MG: 10 INJECTION, SOLUTION INTRAVENOUS at 19:40

## 2017-11-09 NOTE — LETTER
[address] 
[facility] EMERGENCY DEPT 
[address] [place] [address] [address] [id number] [phone] Work/School Note Date: 11/9/2017 To Whom It May concern: Kerry Londono was seen and treated today in the emergency room by the following provider(s): 
No providers found. Kerry Londono may return to work on 11/11/17. Sincerely, [employer].  Agusto Aldana MD

## 2017-11-10 NOTE — ED NOTES
Assumed care of pt from triage. Pt c/o bright red blood in stools x \"months\". Pt reports she was recently seen her for same c/c. Pt also reports diffuse abdominal pain. Pt placed on monitor x2. VSS. Pt in position of comfort with call bell within reach and no signs of acute distress noted.

## 2017-11-10 NOTE — ED PROVIDER NOTES
[facility].  EMERGENCY DEPARTMENT HISTORY AND PHYSICAL EXAM         Date of Service: 11/9/2017   Patient Name: Brittany Gutierrez   YOB: 1985  Medical Record Number: [id number]    History of Presenting Illness     Chief Complaint   Patient presents with    Melena     x 2 days; reports that she was seen in ED several weeks ago for constipation        History Provided By:  patient    Additional History:   Brittany Gutierrez is a 28 y.o. female with PMhx significant for Asthma and Anemia who presents ambulatory to the ED with cc of constant blood in the stool since 11/7/2017. She describes that she's also been passing clots of blood that she's noticed when she wipes after a BM. She notes it's similar to when she's on her menstrual cycle. She reports associated sx's of generalized abd pain and sharp pain towards to her rectum with nausea and vomiting. She states she was concerned she had hemorrhoids so she came to the ED for further evaluation. She reports taking Aleve for the pain, noting mild relief from this medication. She reports additional sx's of constipation for about the last two weeks. She notes she was seen in the ED for this sx on onset and states she was prescribed Miralax. However she denies any relief from this medication. She adds that she can't remember the last time she had a normal BM. She reports her LMP was a month ago. She denies experiencing similar sx's in the past. She denies taking any new medications. She denies being on birth control. She denies sx of fever. Social Hx: - Tobacco, - EtOH, - Illicit Drugs    There are no other complaints, changes or physical findings at this time.     Primary Care Provider: None     Past History     Past Medical History:   Past Medical History:   Diagnosis Date    Anemia     Anemia     Anemia NEC     ASCUS (atypical squamous cells of undetermined significance) on Pap smear 6/1/2012    Asthma     extrinsic as a child     Psychiatric disorder     Pt reports having anxiety. Past Surgical History:   Past Surgical History:   Procedure Laterality Date    BREAST SURGERY PROCEDURE UNLISTED  11/27/12    Incision and Drainage Left Breast Abscess    HX BREAST REDUCTION  2003    HX OTHER SURGICAL      abscess removed from left breast        Family History:   Family History   Problem Relation Age of Onset    Hypertension Mother     Elevated Lipids Mother     Diabetes Sister         Social History:   Social History   Substance Use Topics    Smoking status: Never Smoker    Smokeless tobacco: Never Used    Alcohol use No        Allergies: Allergies   Allergen Reactions    Bactrim [Sulfamethoprim Ds] Nausea Only    Zofran [Ondansetron Hcl (Pf)] Rash        Review of Systems   Review of Systems   Constitutional: Negative for chills and fever. Eyes: Negative for visual disturbance. Respiratory: Negative for chest tightness. Cardiovascular: Negative for chest pain and leg swelling. Gastrointestinal: Positive for abdominal pain, blood in stool, constipation, nausea, rectal pain and vomiting. Endocrine: Negative for polyuria. Genitourinary: Negative for dysuria and frequency. Musculoskeletal: Negative for myalgias. Skin: Negative for color change. Allergic/Immunologic: Negative for immunocompromised state. Neurological: Negative for numbness. All other systems reviewed and are negative. Physical Exam  Physical Exam   Nursing note and vitals reviewed.   General appearance: non-toxic, lying on stretcher w/ normal respirations  Eyes: PERRL, EOMI, conjunctiva normal, anicteric sclera  HEENT: mucous membranes moist, oropharynx is clear  Pulmonary: clear to auscultation bilaterally  Cardiac: normal rate and regular rhythm, no murmurs, gallops, or rubs, 2+DP pulses, 2+ radial pulses  Abdomen: soft, minimal upper abdominal TTP, nondistended, no rebound, no guarding, bowel sounds present  MSK: no pre-tibial edema  Neuro: Alert, answers questions appropriately  Skin: capillary refill brisk      Medical Decision Making   I am the first provider for this patient. I reviewed the vital signs, available nursing notes, past medical history, past surgical history, family history and social history. Old Medical Records: Old medical records. Nursing notes. Provider Notes:   DDx: constipation, internal hemorrhoids, gastritis, pancreatitis, lower suspicion for intra abdominal catastrophe      Anemia stable, no drop in H&H from last evaluation. Occult stool negative. Suspect some degree of Fe deficiency. Pt unfortunately will likely benefit from Fe supplementation which may worsen constipation. Will give trial of lactulose and encouraged close GI f/u. Return precautions reviewed. Pt explicitly instructed in need for f/u for incidental findings of CT tonight. ED Course:  7:50 PM   Initial assessment performed. The patients presenting problems have been discussed, and they are in agreement with the care plan formulated and outlined with them. I have encouraged them to ask questions as they arise throughout their visit. Diagnostic Study Results   Labs -      Recent Results (from the past 12 hour(s))   CBC WITH AUTOMATED DIFF    Collection Time: 11/09/17  6:53 PM   Result Value Ref Range    WBC 7.6 3.6 - 11.0 K/uL    RBC 3.89 3.80 - 5.20 M/uL    HGB 7.6 (L) 11.5 - 16.0 g/dL    HCT 25.8 (L) 35.0 - 47.0 %    MCV 66.3 (L) 80.0 - 99.0 FL    MCH 19.5 (L) 26.0 - 34.0 PG    MCHC 29.5 (L) 30.0 - 36.5 g/dL    RDW 19.2 (H) 11.5 - 14.5 %    PLATELET 330 325 - 547 K/uL    NEUTROPHILS 43 32 - 75 %    LYMPHOCYTES 42 12 - 49 %    MONOCYTES 8 5 - 13 %    EOSINOPHILS 7 0 - 7 %    BASOPHILS 0 0 - 1 %    ABS. NEUTROPHILS 3.3 1.8 - 8.0 K/UL    ABS. LYMPHOCYTES 3.2 0.8 - 3.5 K/UL    ABS. MONOCYTES 0.6 0.0 - 1.0 K/UL    ABS. EOSINOPHILS 0.5 (H) 0.0 - 0.4 K/UL    ABS.  BASOPHILS 0.0 0.0 - 0.1 K/UL   METABOLIC PANEL, COMPREHENSIVE Collection Time: 11/09/17  6:53 PM   Result Value Ref Range    Sodium 136 136 - 145 mmol/L    Potassium 3.5 3.5 - 5.1 mmol/L    Chloride 103 97 - 108 mmol/L    CO2 28 21 - 32 mmol/L    Anion gap 5 5 - 15 mmol/L    Glucose 100 65 - 100 mg/dL    BUN 9 6 - 20 MG/DL    Creatinine 0.79 0.55 - 1.02 MG/DL    BUN/Creatinine ratio 11 (L) 12 - 20      GFR est AA >60 >60 ml/min/1.73m2    GFR est non-AA >60 >60 ml/min/1.73m2    Calcium 8.8 8.5 - 10.1 MG/DL    Bilirubin, total 0.8 0.2 - 1.0 MG/DL    ALT (SGPT) 19 12 - 78 U/L    AST (SGOT) 15 15 - 37 U/L    Alk. phosphatase 69 45 - 117 U/L    Protein, total 9.0 (H) 6.4 - 8.2 g/dL    Albumin 3.8 3.5 - 5.0 g/dL    Globulin 5.2 (H) 2.0 - 4.0 g/dL    A-G Ratio 0.7 (L) 1.1 - 2.2     LIPASE    Collection Time: 11/09/17  6:53 PM   Result Value Ref Range    Lipase 226 73 - 393 U/L   OCCULT BLOOD, STOOL    Collection Time: 11/09/17  6:53 PM   Result Value Ref Range    Occult blood, stool NEGATIVE  NEG     URINALYSIS W/MICROSCOPIC    Collection Time: 11/09/17  8:04 PM   Result Value Ref Range    Color YELLOW/STRAW      Appearance CLOUDY (A) CLEAR      Specific gravity 1.012 1.003 - 1.030      pH (UA) 6.0 5.0 - 8.0      Protein NEGATIVE  NEG mg/dL    Glucose NEGATIVE  NEG mg/dL    Ketone NEGATIVE  NEG mg/dL    Bilirubin NEGATIVE  NEG      Blood NEGATIVE  NEG      Urobilinogen 1.0 0.2 - 1.0 EU/dL    Nitrites NEGATIVE  NEG      Leukocyte Esterase NEGATIVE  NEG      WBC 0-4 0 - 4 /hpf    RBC 0-5 0 - 5 /hpf    Epithelial cells FEW FEW /lpf    Bacteria 1+ (A) NEG /hpf    Hyaline cast 0-2 0 - 5 /lpf   HCG URINE, QL    Collection Time: 11/09/17  8:04 PM   Result Value Ref Range    HCG urine, Ql. NEGATIVE  NEG         Radiologic Studies -  The following have been ordered and reviewed:  CT Results  (Last 48 hours)               11/09/17 2124  CT ABD PELV W CONT Final result    Impression:  IMPRESSION:   No acute abdominal or pelvic process seen. Stable left lower lobe nodule.     Right breast mass for which elective ultrasound is recommended. Guidelines for Management of Incidental Pulmonary Nodules Detected on CT Images:   from the Fleischner Society 2017       LOW-RISK PATIENTS:       LESS THAN OR EQUAL TO 6 MM:  No routine follow-up needed. GREATER THAN 6-8 MM: CT at 6-12 months, if multiple at 3-6 months, then consider   CT at 18-24 months. GREATER THAN 8 MM:  Consider CT at 3 months, PET/CT, or tissue sampling. If   multiple CT at 3-6 months, then consider CT at 18-24 months. HIGH-RISK PATIENTS:       LESS THAN OR EQUAL TO 6 MM:  Optional CT at 12 months. GREATER THAN 6-8 MM: CT at 6-12 months, if multiple at 3-6 months, then CT at   18-24 months. GREATER THAN 8 MM:  Consider CT at 3 months, PET/CT, or tissue sampling. If   multiple CT at 3-6 months, then at 18-24 months. Guidelines for Management of Incidental Pulmonary Nodules Detected on CT : A   Statement from the [address] [place] [address] 2017\" Sara July. Radiology   2017; 000:1-16                   Narrative:  EXAM:  CT ABD PELV W CONT       INDICATION: abd pain, blood in stool       COMPARISON: 2015       CONTRAST:  100 mL of Isovue-370. TECHNIQUE:    Following the uneventful intravenous administration of contrast, thin axial   images were obtained through the abdomen and pelvis. Coronal and sagittal   reconstructions were generated. Oral contrast wasadministered. CT dose reduction   was achieved through use of a standardized protocol tailored for this   examination and automatic exposure control for dose modulation. FINDINGS:    Chest wall: Right breast macrolobulated 2.6 cm mass (image 14). LUNG BASES: Left lower lobe 4.9 x 8.7 mm oval nodular structure (image 10),   probably stable in retrospect. INCIDENTALLY IMAGED HEART AND MEDIASTINUM: Unremarkable. LIVER: No mass or biliary dilatation. GALLBLADDER: Unremarkable. SPLEEN: No mass. PANCREAS: No mass or ductal dilatation.    ADRENALS: Unremarkable. KIDNEYS: No mass, calculus, or hydronephrosis. STOMACH: Unremarkable. SMALL BOWEL: No dilatation or wall thickening. COLON: No dilatation or wall thickening. APPENDIX: Unremarkable. Normal axial image 68   PERITONEUM: No ascites or pneumoperitoneum. RETROPERITONEUM: No lymphadenopathy or aortic aneurysm. REPRODUCTIVE ORGANS: Normal uterus   URINARY BLADDER: No mass or calculus. BONES: No destructive bone lesion. ADDITIONAL COMMENTS: N/A                 Vital Signs-Reviewed the patient's vital signs. Patient Vitals for the past 12 hrs:   Temp Pulse Resp BP SpO2   11/09/17 2230 - 81 16 105/70 100 %   11/09/17 2215 - - - 111/76 100 %   11/09/17 2200 - - - 108/69 100 %   11/09/17 2141 - - - - 100 %   11/09/17 2140 - - - 109/72 -   11/09/17 2030 - - - 122/79 100 %   11/09/17 2015 - - - (!) 125/95 100 %   11/09/17 1945 - - - 124/86 100 %   11/09/17 1930 - - - 118/68 100 %   11/09/17 1900 - - - 134/83 100 %   11/09/17 1757 98.6 °F (37 °C) 97 16 127/75 100 %       Medications Given in the ED:  Medications   0.9% sodium chloride infusion 1,000 mL (0 mL IntraVENous IV Completed 11/9/17 2234)   diatrizoate meglumine-d.sodium (MD-GASTROVIEW,GASTROGRAFIN) 66-10 % contrast solution 30 mL (30 mL Oral Given 11/9/17 1939)   famotidine (PF) (PEPCID) 20 mg in sodium chloride 0.9 % 10 mL injection (20 mg IntraVENous Given 11/9/17 1940)   pantoprazole (PROTONIX) tablet 40 mg (40 mg Oral Given 11/9/17 1942)   0.9% sodium chloride infusion (0 mL/hr IntraVENous IV Completed 11/9/17 2234)   iopamidol (ISOVUE-370) 76 % injection 100 mL (100 mL IntraVENous Given 11/9/17 2125)   sodium chloride (NS) flush 10 mL (10 mL IntraVENous Given 11/9/17 2125)   lactulose (CHRONULAC) solution 20 g (20 g Oral Given 11/9/17 2646)       Diagnosis:  Clinical Impression:   1. Abdominal pain, generalized    2. Constipation, unspecified constipation type    3. Anemia, unspecified type    4. Breast mass in female    5.  Lung nodule         Plan:  1:   Follow-up Information     Follow up With Details Comments Contact Info    PRIMARY CARE DOCTOR Schedule an appointment as soon as possible for a visit in 1 week  SEE ATTACHED LIST    [facility] EMER[facility]Y [facility]PT Go in 1 day If symptoms worsen [address] [facility] [address] Rudolph Stokes MD Schedule an appointment as soon as possible for a visit in 1 day [address]  [phone]      Ivin Leyden, MD Schedule an appointment as soon as possible for a visit in 1 week FOR FOLLOW UP OF YOUR LUNG NODULE SEEN ON CT SCAN [address] [facility]  [address]  Pulmonary [facility] [address]  [phone]      YOUR OB/GYN DOCTOR Schedule an appointment as soon as possible for a visit in 1 week FOR FOLLOW UP OF YOUR BREAST MASS SEEN ON CT           2:   Discharge Medication List as of 11/9/2017 10:33 PM      START taking these medications    Details   lactulose (CHRONULAC) 10 gram/15 mL solution Take 15 mL by mouth two (2) times a day for 8 days. Indications: constipation, Normal, Disp-236 mL, R-0         CONTINUE these medications which have NOT CHANGED    Details   polyethylene glycol (MIRALAX) 17 gram/dose powder Take 17 g by mouth daily. 1 tablespoon with 8 oz of water daily, Normal, Disp-510 g, R-0      ferrous sulfate (IRON) 325 mg (65 mg iron) EC tablet Take 1 Tab by mouth three (3) times daily (with meals) for 14 days. Indications: May cause abdominal pain, constipation, Normal, Disp-42 Tab, R-0           Return to ED if worse. Disposition:  Discharge Note:  10:33 PM  The patient is ready for discharge. The patient's signs, symptoms, diagnosis, and discharge instruction have been discussed and the patient has conveyed their understanding. The patient is to follow up as recommended or return to the ER should their symptoms worsen.  Plan has been discussed and the patient is in agreement. Written by EMILY Rodgerse, as dictated by Laura Jose. Rickey Membreno MD.    _______________________________   Attestations: This is note is prepared by Gely Yu, acting as Scribe for Laura Jose. Rickey Membreno MD.     Laura Jose. Rickey Membreno MD The scribe's documentation has been prepared under my direction and personally reviewed by me in its entirety.  I confirm that the note above accurately reflects all work, treatment, procedures, and medical decision making performed by me.   _______________________________

## 2017-11-10 NOTE — DISCHARGE INSTRUCTIONS
Abdominal Pain: Care Instructions  Your Care Instructions    Abdominal pain has many possible causes. Some aren't serious and get better on their own in a few days. Others need more testing and treatment. If your pain continues or gets worse, you need to be rechecked and may need more tests to find out what is wrong. You may need surgery to correct the problem. Don't ignore new symptoms, such as fever, nausea and vomiting, urination problems, pain that gets worse, and dizziness. These may be signs of a more serious problem. Your doctor may have recommended a follow-up visit in the next 8 to 12 hours. If you are not getting better, you may need more tests or treatment. The doctor has checked you carefully, but problems can develop later. If you notice any problems or new symptoms, get medical treatment right away. Follow-up care is a key part of your treatment and safety. Be sure to make and go to all appointments, and call your doctor if you are having problems. It's also a good idea to know your test results and keep a list of the medicines you take. How can you care for yourself at home? · Rest until you feel better. · To prevent dehydration, drink plenty of fluids, enough so that your urine is light yellow or clear like water. Choose water and other caffeine-free clear liquids until you feel better. If you have kidney, heart, or liver disease and have to limit fluids, talk with your doctor before you increase the amount of fluids you drink. · If your stomach is upset, eat mild foods, such as rice, dry toast or crackers, bananas, and applesauce. Try eating several small meals instead of two or three large ones. · Wait until 48 hours after all symptoms have gone away before you have spicy foods, alcohol, and drinks that contain caffeine. · Do not eat foods that are high in fat. · Avoid anti-inflammatory medicines such as aspirin, ibuprofen (Advil, Motrin), and naproxen (Aleve).  These can cause stomach upset. Talk to your doctor if you take daily aspirin for another health problem. When should you call for help? Call 911 anytime you think you may need emergency care. For example, call if:  ? · You passed out (lost consciousness). ? · You pass maroon or very bloody stools. ? · You vomit blood or what looks like coffee grounds. ? · You have new, severe belly pain. ?Call your doctor now or seek immediate medical care if:  ? · Your pain gets worse, especially if it becomes focused in one area of your belly. ? · You have a new or higher fever. ? · Your stools are black and look like tar, or they have streaks of blood. ? · You have unexpected vaginal bleeding. ? · You have symptoms of a urinary tract infection. These may include:  ¨ Pain when you urinate. ¨ Urinating more often than usual.  ¨ Blood in your urine. ? · You are dizzy or lightheaded, or you feel like you may faint. ? Watch closely for changes in your health, and be sure to contact your doctor if:  ? · You are not getting better after 1 day (24 hours). Where can you learn more? Go to http://merry-joseluis.info/. Enter Z166 in the search box to learn more about \"Abdominal Pain: Care Instructions. \"  Current as of: March 20, 2017  Content Version: 11.4  © 9821-1855 [employer]. Care instructions adapted under license by [employer] (which disclaims liability or warranty for this information). If you have questions about a medical condition or this instruction, always ask your healthcare professional. [place] [address] any warranty or liability for your use of this information. Anemia: Care Instructions  Your Care Instructions    Anemia is a low level of red blood cells, which carry oxygen throughout your body. Many things can cause anemia. Lack of iron is one of the most common causes.  Your body needs iron to make hemoglobin, a substance in red blood cells that carries oxygen from the lungs to your body's cells. Without enough iron, the body produces fewer and smaller red blood cells. As a result, your body's cells do not get enough oxygen, and you feel tired and weak. And you may have trouble concentrating. Bleeding is the most common cause of a lack of iron. You may have heavy menstrual bleeding or bleeding caused by conditions such as ulcers, hemorrhoids, or cancer. Regular use of aspirin or other anti-inflammatory medicines (such as ibuprofen) also can cause bleeding in some people. A lack of iron in your diet also can cause anemia, especially at times when the body needs more iron, such as during pregnancy, infancy, and the teen years. Your doctor may have prescribed iron pills. It may take several months of treatment for your iron levels to return to normal. Your doctor also may suggest that you eat foods that are rich in iron, such as meat and beans. There are many other causes of anemia. It is not always due to a lack of iron. Finding the specific cause of your anemia will help your doctor find the right treatment for you. Follow-up care is a key part of your treatment and safety. Be sure to make and go to all appointments, and call your doctor if you are having problems. It's also a good idea to know your test results and keep a list of the medicines you take. How can you care for yourself at home? · Take your medicines exactly as prescribed. Call your doctor if you think you are having a problem with your medicine. · If your doctor recommends iron pills, take them as directed:  ¨ Try to take the pills on an empty stomach about 1 hour before or 2 hours after meals. But you may need to take iron with food to avoid an upset stomach. ¨ Do not take antacids or drink milk or caffeine drinks (such as coffee, tea, or cola) at the same time or within 2 hours of the time that you take your iron. They can make it hard for your body to absorb the iron.   ¨ Vitamin C (from food or supplements) helps your body absorb iron. Try taking iron pills with a glass of orange juice or some other food that is high in vitamin C, such as citrus fruits. ¨ Iron pills may cause stomach problems, such as heartburn, nausea, diarrhea, constipation, and cramps. Be sure to drink plenty of fluids, and include fruits, vegetables, and fiber in your diet each day. Iron pills often make your bowel movements dark or green. ¨ If you forget to take an iron pill, do not take a double dose of iron the next time you take a pill. ¨ Keep iron pills out of the reach of small children. An overdose of iron can be very dangerous. · Follow your doctor's advice about eating iron-rich foods. These include red meat, shellfish, poultry, eggs, beans, raisins, whole-grain bread, and leafy green vegetables. · Steam vegetables to help them keep their iron content. When should you call for help? Call 911 anytime you think you may need emergency care. For example, call if:  ? · You have symptoms of a heart attack. These may include:  ¨ Chest pain or pressure, or a strange feeling in the chest.  ¨ Sweating. ¨ Shortness of breath. ¨ Nausea or vomiting. ¨ Pain, pressure, or a strange feeling in the back, neck, jaw, or upper belly or in one or both shoulders or arms. ¨ Lightheadedness or sudden weakness. ¨ A fast or irregular heartbeat. After you call 911, the  may tell you to chew 1 adult-strength or 2 to 4 low-dose aspirin. Wait for an ambulance. Do not try to drive yourself. ? · You passed out (lost consciousness). ?Call your doctor now or seek immediate medical care if:  ? · You have new or increased shortness of breath. ? · You are dizzy or lightheaded, or you feel like you may faint. ? · Your fatigue and weakness continue or get worse. ? · You have any abnormal bleeding, such as:  ¨ Nosebleeds.   ¨ Vaginal bleeding that is different (heavier, more frequent, at a different time of the month) than what you are used to. ¨ Bloody or black stools, or rectal bleeding. ¨ Bloody or pink urine. ? Watch closely for changes in your health, and be sure to contact your doctor if:  ? · You do not get better as expected. Where can you learn more? Go to http://merry-joseluis.info/. Enter R301 in the search box to learn more about \"Anemia: Care Instructions. \"  Current as of: October 13, 2016  Content Version: 11.4  © 3429-1231 [employer]. Care instructions adapted under license by [employer] (which disclaims liability or warranty for this information). If you have questions about a medical condition or this instruction, always ask your healthcare professional. [address] any warranty or liability for your use of this information.

## 2017-11-10 NOTE — ED NOTES
Dr. Palak Molina reviewed discharge instructions with the patient. The patient verbalized understanding. All questions and concerns were addressed. The patient declined a wheelchair and is discharged ambulatory in the care of family members with instructions and prescriptions in hand. Pt is alert and oriented x 4. Respirations are clear and unlabored.

## 2017-11-17 ENCOUNTER — HOSPITAL ENCOUNTER (OUTPATIENT)
Dept: MAMMOGRAPHY | Age: 32
Discharge: HOME | End: 2017-11-17
Attending: OBSTETRICS & GYNECOLOGY
Payer: COMMERCIAL

## 2017-11-17 DIAGNOSIS — R92.8 ABNORMAL MAMMOGRAM: ICD-10-CM

## 2017-11-17 DIAGNOSIS — N63.0 LUMP OR MASS IN BREAST: ICD-10-CM

## 2017-11-17 PROCEDURE — 77066 DX MAMMO INCL CAD BI: CPT

## 2017-11-17 PROCEDURE — 76642 ULTRASOUND BREAST LIMITED: CPT

## 2018-10-01 ENCOUNTER — HOSPITAL ENCOUNTER (EMERGENCY)
Age: 33
Discharge: HOME | End: 2018-10-01
Attending: EMERGENCY MEDICINE | Admitting: EMERGENCY MEDICINE
Payer: COMMERCIAL

## 2018-10-01 VITALS
WEIGHT: 268.52 LBS | HEART RATE: 83 BPM | BODY MASS INDEX: 42.15 KG/M2 | TEMPERATURE: 98.4 F | OXYGEN SATURATION: 100 % | SYSTOLIC BLOOD PRESSURE: 122 MMHG | DIASTOLIC BLOOD PRESSURE: 81 MMHG | HEIGHT: 67 IN | RESPIRATION RATE: 18 BRPM

## 2018-10-01 DIAGNOSIS — R35.0 URINARY FREQUENCY: Primary | ICD-10-CM

## 2018-10-01 LAB
APPEARANCE UR: CLEAR
BACTERIA URNS QL MICRO: NEGATIVE /HPF
BILIRUB UR QL: NEGATIVE
COLOR UR: NORMAL
EPITH CASTS URNS QL MICRO: NORMAL /LPF
GLUCOSE UR STRIP.AUTO-MCNC: NEGATIVE MG/DL
HGB UR QL STRIP: NEGATIVE
HYALINE CASTS URNS QL MICRO: NORMAL /LPF (ref 0–5)
KETONES UR QL STRIP.AUTO: NEGATIVE MG/DL
LEUKOCYTE ESTERASE UR QL STRIP.AUTO: NEGATIVE
NITRITE UR QL STRIP.AUTO: NEGATIVE
PH UR STRIP: 6 [PH] (ref 5–8)
PROT UR STRIP-MCNC: NEGATIVE MG/DL
RBC #/AREA URNS HPF: NORMAL /HPF (ref 0–5)
SP GR UR REFRACTOMETRY: 1.03 (ref 1–1.03)
UR CULT HOLD, URHOLD: NORMAL
UROBILINOGEN UR QL STRIP.AUTO: 1 EU/DL (ref 0.2–1)
WBC URNS QL MICRO: NORMAL /HPF (ref 0–4)

## 2018-10-01 PROCEDURE — 81001 URINALYSIS AUTO W/SCOPE: CPT | Performed by: EMERGENCY MEDICINE

## 2018-10-01 PROCEDURE — 99283 EMERGENCY DEPT VISIT LOW MDM: CPT

## 2018-10-01 RX ORDER — PHENAZOPYRIDINE HYDROCHLORIDE 200 MG/1
200 TABLET, FILM COATED ORAL 3 TIMES DAILY
Qty: 6 TAB | Refills: 0 | Status: SHIPPED | OUTPATIENT
Start: 2018-10-01 | End: 2018-10-03

## 2018-10-02 NOTE — ED PROVIDER NOTES
HPI Comments: 35 y.o. female with past medical history significant for anxiety, anemia, asthma, who presents to the ED accompanied by significant other, with chief complaint of persistent urinary urgency and frequency x 2 months. Pt states that she thought her sx might dissipate over time, but instead they persisted. Pt notes that she has experienced similar sx in the past as a result of UTI; these sx are similar but \"much worse\". Her insurance became active today, prompting her visit to the ED tonight. States that she has continued to have urinary urgency and frequency, as well as dysuria with each void. Pt additionally complains of 8/10 right flank pain the radiates to the right side of the abdomen. The quality of pain \"sharp\". She denies any history of kidney stones, and also denies hematuria. Patient specifically denies fevers, nausea, vomiting, or diarrhea. There are no other acute medical concerns at this time. Negative for Tobacco use; Negative for EtOH use; Negative for Illicit Drug Abuse   PCP: None    Note written by Chaitanya Cope. Palak Yu, [address] [place] [address], as dictated by Cash Bryan PA-C 9:14 PM      The history is provided by the patient. No  was used. Past Medical History:   Diagnosis Date    Anemia     Anemia     Anemia NEC     ASCUS (atypical squamous cells of undetermined significance) on Pap smear 6/1/2012    Asthma     extrinsic as a child     Psychiatric disorder     Pt reports having anxiety.        Past Surgical History:   Procedure Laterality Date    BREAST SURGERY PROCEDURE UNLISTED  11/27/12    Incision and Drainage Left Breast Abscess    HX BREAST BIOPSY Bilateral     abscesses removed 2014 and 2015    HX BREAST REDUCTION  2003    HX OTHER SURGICAL      abscess removed from left breast         Family History:   Problem Relation Age of Onset    Hypertension Mother     Elevated Lipids Mother     Diabetes Sister        Social History     Social History    Marital status: SINGLE     Spouse name: N/A    Number of children: N/A    Years of education: N/A     Occupational History    Not on file. Social History Main Topics    Smoking status: Never Smoker    Smokeless tobacco: Never Used    Alcohol use No    Drug use: No    Sexual activity: Yes     Partners: Male     Birth control/ protection: Condom     Other Topics Concern    Not on file     Social History Narrative         ALLERGIES: Bactrim [sulfamethoprim ds] and Zofran [ondansetron hcl (pf)]    Review of Systems   Constitutional: Negative for activity change, appetite change, diaphoresis and fever. HENT: Negative for ear discharge, ear pain, facial swelling, rhinorrhea, sore throat, tinnitus, trouble swallowing and voice change. Eyes: Negative for photophobia, pain, discharge, redness and visual disturbance. Respiratory: Negative for cough, chest tightness, shortness of breath, wheezing and stridor. Cardiovascular: Negative for chest pain and palpitations. Gastrointestinal: Positive for abdominal pain. Negative for constipation, diarrhea, nausea and vomiting. Endocrine: Negative for polydipsia and polyuria. Genitourinary: Positive for dysuria, flank pain, frequency and urgency. Negative for hematuria. Musculoskeletal: Negative for arthralgias, back pain and myalgias. Skin: Negative for color change and rash. Neurological: Negative for dizziness, syncope, speech difficulty, light-headedness and numbness. Psychiatric/Behavioral: Negative for behavioral problems. All other systems reviewed and are negative. Vitals:    10/01/18 2100   BP: 122/81   Pulse: 83   Resp: 18   Temp: 98.4 °F (36.9 °C)   SpO2: 100%   Weight: 121.8 kg (268 lb 8.3 oz)   Height: 5' 7\" (1.702 m)            Physical Exam   Constitutional: She is oriented to person, place, and time. She appears well-developed and well-nourished. HENT:   Head: Normocephalic and atraumatic.    Eyes: Conjunctivae are normal. Pupils are equal, round, and reactive to light. Right eye exhibits no discharge. Left eye exhibits no discharge. Neck: Normal range of motion. Neck supple. No thyromegaly present. Cardiovascular: Normal rate, regular rhythm and normal heart sounds. Exam reveals no gallop and no friction rub. No murmur heard. Pulmonary/Chest: Effort normal and breath sounds normal. No respiratory distress. She has no wheezes. Abdominal: Soft. Bowel sounds are normal. She exhibits no distension. There is no tenderness. There is no rebound. Abdomen NTTP. Musculoskeletal: Normal range of motion. No CVA tenderness. Neurological: She is alert and oriented to person, place, and time. Skin: Skin is warm. Psychiatric: She has a normal mood and affect. MDM  Number of Diagnoses or Management Options  Urinary frequency:   Diagnosis management comments: Pt afebrile and non toxic appearing. Tolerating PO. UA negative for infection. Will start pyridium for likely ureteral spasm. Pt advised to follow up with her family doctor for further evaluation of symptoms. Reviewed treatment plan with attending and they agree. Halina Dunbar PA-C        ED Course       Procedures    9:55 PM  Patient's results have been reviewed with them. Patient and/or family have verbally conveyed their understanding and agreement of the patient's signs, symptoms, diagnosis, treatment and prognosis and additionally agree to follow up as recommended or return to the Emergency Room should their condition change prior to follow-up. Discharge instructions have also been provided to the patient with some educational information regarding their diagnosis as well a list of reasons why they would want to return to the ER prior to their follow-up appointment should their condition change.

## 2018-10-02 NOTE — DISCHARGE INSTRUCTIONS
We hope that we have addressed all of your medical concerns. The examination and treatment you received in the Emergency Department were for an emergent problem and were not intended as complete care. It is important that you follow up with your healthcare provider(s) for ongoing care. If your symptoms worsen or do not improve as expected, and you are unable to reach your usual health care provider(s), you should return to the Emergency Department. Today's healthcare is undergoing tremendous change, and patient satisfaction surveys are one of the many tools to assess the quality of medical care. You may receive a survey from the [employer] regarding your experience in the Emergency Department. I hope that your experience has been completely positive, particularly the medical care that I provided. As such, please participate in the survey; anything less than excellent does not meet my expectations or intentions. [facility] and [address] [place] [address] participate in nationally recognized quality of care measures. If your blood pressure is greater than 120/80, as reported below, we urge that you seek medical care to address the potential of high blood pressure, commonly known as hypertension. Hypertension can be hereditary or can be caused by certain medical conditions, pain, stress, or \"white coat syndrome. \"       Please make an appointment with your health care provider(s) for follow up of your Emergency Department visit. VITALS:   Patient Vitals for the past 8 hrs:   Temp Pulse Resp BP SpO2   10/01/18 2100 98.4 °F (36.9 °C) 83 18 122/81 100 %          Thank you for allowing us to provide you with medical care today. We realize that you have many choices for your emergency care needs. Please choose us in the future for any continued health care needs. Johnny Rubio, [facility].   Office: [phone]            Recent Results (from the past 24 hour(s))   URINALYSIS W/MICROSCOPIC    Collection Time: 10/01/18  9:19 PM   Result Value Ref Range    Color YELLOW/STRAW      Appearance CLEAR CLEAR      Specific gravity 1.027 1.003 - 1.030      pH (UA) 6.0 5.0 - 8.0      Protein NEGATIVE  NEG mg/dL    Glucose NEGATIVE  NEG mg/dL    Ketone NEGATIVE  NEG mg/dL    Bilirubin NEGATIVE  NEG      Blood NEGATIVE  NEG      Urobilinogen 1.0 0.2 - 1.0 EU/dL    Nitrites NEGATIVE  NEG      Leukocyte Esterase NEGATIVE  NEG      WBC 0-4 0 - 4 /hpf    RBC 0-5 0 - 5 /hpf    Epithelial cells FEW FEW /lpf    Bacteria NEGATIVE  NEG /hpf    Hyaline cast 0-2 0 - 5 /lpf   URINE CULTURE HOLD SAMPLE    Collection Time: 10/01/18  9:19 PM   Result Value Ref Range    Urine culture hold        URINE ON HOLD IN MICROBIOLOGY DEPT FOR 3 DAYS. IF UNPRESERVED URINE IS SUBMITTED, IT CANNOT BE USED FOR ADDITIONAL TESTING AFTER 24 HRS, RECOLLECTION WILL BE REQUIRED. No results found.

## 2019-05-16 ENCOUNTER — APPOINTMENT (OUTPATIENT)
Dept: GENERAL RADIOLOGY | Age: 34
End: 2019-05-16
Attending: PHYSICIAN ASSISTANT
Payer: MEDICAID

## 2019-05-16 ENCOUNTER — HOSPITAL ENCOUNTER (EMERGENCY)
Age: 34
Discharge: HOME | End: 2019-05-16
Attending: EMERGENCY MEDICINE
Payer: MEDICAID

## 2019-05-16 VITALS
DIASTOLIC BLOOD PRESSURE: 84 MMHG | SYSTOLIC BLOOD PRESSURE: 117 MMHG | WEIGHT: 270.5 LBS | TEMPERATURE: 98.4 F | OXYGEN SATURATION: 99 % | HEIGHT: 67 IN | RESPIRATION RATE: 14 BRPM | BODY MASS INDEX: 42.46 KG/M2 | HEART RATE: 100 BPM

## 2019-05-16 DIAGNOSIS — S39.012A STRAIN OF LUMBAR REGION, INITIAL ENCOUNTER: Primary | ICD-10-CM

## 2019-05-16 PROCEDURE — 74011250637 HC RX REV CODE- 250/637: Performed by: PHYSICIAN ASSISTANT

## 2019-05-16 PROCEDURE — 72100 X-RAY EXAM L-S SPINE 2/3 VWS: CPT

## 2019-05-16 PROCEDURE — 99283 EMERGENCY DEPT VISIT LOW MDM: CPT

## 2019-05-16 RX ORDER — MELOXICAM 15 MG/1
15 TABLET ORAL DAILY
Qty: 30 TAB | Refills: 0 | Status: SHIPPED | OUTPATIENT
Start: 2019-05-16 | End: 2019-05-29

## 2019-05-16 RX ORDER — OXYCODONE AND ACETAMINOPHEN 5; 325 MG/1; MG/1
2 TABLET ORAL
Status: COMPLETED | OUTPATIENT
Start: 2019-05-16 | End: 2019-05-16

## 2019-05-16 RX ORDER — CYCLOBENZAPRINE HCL 10 MG
10 TABLET ORAL
Qty: 20 TAB | Refills: 0 | Status: SHIPPED | OUTPATIENT
Start: 2019-05-16 | End: 2019-05-26

## 2019-05-16 RX ADMIN — OXYCODONE HYDROCHLORIDE AND ACETAMINOPHEN 2 TABLET: 5; 325 TABLET ORAL at 14:06

## 2019-05-16 NOTE — ED PROVIDER NOTES
EMERGENCY DEPARTMENT HISTORY AND PHYSICAL EXAM      Date: 5/16/2019  Patient Name: Terrance Beard    History of Presenting Illness     Chief Complaint   Patient presents with    Back Pain     Ambulatory with c/o diffuse back pain that started when she got out of bed this morning. Reports, \"I heard my back crack and now it hurts. \" Denies fall. History Provided By: Patient    HPI: Terrance Beard, 35 y.o. female with PMHx significant for anemia, anxiety, obesity, presents ambulatory to the ED with cc of diffuse back pain that is mostly in her lower back. 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 Patient denies any other known injury. Patient states that her pain mostly bothers her when she bends over or when she twists. Patient has not taken any medication prior to arrival for her symptoms. She states she has a history of muscle spasms in her back but that this pain feels differently. Pt denies any numbness, tingling, saddle paresthesia, incontinence. PCP: None    There are no other complaints, changes, or physical findings at this time. Current Outpatient Medications   Medication Sig Dispense Refill    cyclobenzaprine (FLEXERIL) 10 mg tablet Take 1 Tab by mouth three (3) times daily as needed for Muscle Spasm(s). 20 Tab 0    meloxicam (MOBIC) 15 mg tablet Take 1 Tab by mouth daily. 30 Tab 0    polyethylene glycol (MIRALAX) 17 gram/dose powder Take 17 g by mouth daily. 1 tablespoon with 8 oz of water daily 510 g 0       Past History     Past Medical History:  Past Medical History:   Diagnosis Date    Anemia     Anemia     Anemia NEC     ASCUS (atypical squamous cells of undetermined significance) on Pap smear 6/1/2012    Asthma     extrinsic as a child     Psychiatric disorder     Pt reports having anxiety.        Past Surgical History:  Past Surgical History:   Procedure Laterality Date    BREAST SURGERY PROCEDURE UNLISTED 11/27/12    Incision and Drainage Left Breast Abscess    HX BREAST BIOPSY Bilateral     abscesses removed 2014 and 2015    HX BREAST REDUCTION  2003    HX OTHER SURGICAL      abscess removed from left breast       Family History:  Family History   Problem Relation Age of Onset    Hypertension Mother     Elevated Lipids Mother     Diabetes Sister        Social History:  Social History     Tobacco Use    Smoking status: Never Smoker    Smokeless tobacco: Never Used   Substance Use Topics    Alcohol use: No    Drug use: No       Allergies: Allergies   Allergen Reactions    Bactrim [Sulfamethoprim Ds] Nausea Only    Zofran [Ondansetron Hcl (Pf)] Rash         Review of Systems   Review of Systems   Constitutional: Negative. Negative for activity change, appetite change, chills and fever. HENT: Negative for rhinorrhea and sore throat. Eyes: Negative for pain and visual disturbance. Respiratory: Negative for cough, shortness of breath and wheezing. Cardiovascular: Negative for chest pain, palpitations and leg swelling. Gastrointestinal: Negative for abdominal pain, diarrhea, nausea and vomiting. Genitourinary: Negative for dysuria and hematuria. Musculoskeletal: Positive for arthralgias and back pain. Negative for myalgias. Skin: Negative for color change, rash and wound. Neurological: Negative for dizziness and headaches. All other systems reviewed and are negative. Physical Exam   Physical Exam   Constitutional: She is oriented to person, place, and time. She appears well-developed and well-nourished. No distress. 35 y.o.  female in NAD  Communicates appropriately and in full sentences   HENT:   Head: Normocephalic and atraumatic. Eyes: Pupils are equal, round, and reactive to light. Conjunctivae are normal. Right eye exhibits no discharge. Left eye exhibits no discharge. Neck: Normal range of motion. Neck supple.    No nuchal rigidity or meningeal signs Pulmonary/Chest: Effort normal. No respiratory distress. Abdominal: Soft. She exhibits no distension. There is no tenderness. Musculoskeletal: Normal range of motion. She exhibits tenderness (Diffusely to the lumbar musculature. ). She exhibits no edema or deformity. No neurologic, motor, vascular, or compartment embarrassment observed on exam. No focal neurologic deficits. Neurological: She is alert and oriented to person, place, and time. No focal neuro deficits. NVI. Neurologically intact of UE and LE B/L  Sensation intact and symmetrical of UE and LE B/L. Strength 5/5 of UE B/L, Strength 5/5 of LE B/L. Skin: Skin is warm and dry. No rash noted. She is not diaphoretic. No erythema. Nursing note and vitals reviewed. Diagnostic Study Results     Labs -   No results found for this or any previous visit (from the past 12 hour(s)). Radiologic Studies -   XR SPINE LUMB 2 OR 3 V   Final Result   IMPRESSION: No acute fracture or subluxation. Medical Decision Making   I am the first provider for this patient. I reviewed the vital signs, available nursing notes, past medical history, past surgical history, family history and social history. Vital Signs-Reviewed the patient's vital signs. Patient Vitals for the past 12 hrs:   Temp Pulse Resp BP SpO2   05/16/19 1306 98.4 °F (36.9 °C) 100 14 117/84 99 %         Records Reviewed: Nursing Notes and Old Medical Records    Provider Notes (Medical Decision Making):   Differential diagnosis includes strain, sprain, spasm, DJD, DDD. ED Course:   Initial assessment performed. The patients presenting problems have been discussed, and they are in agreement with the care plan formulated and outlined with them. I have encouraged them to ask questions as they arise throughout their visit. DISCHARGE NOTE:  Ximena Mcgill's  results have been reviewed with her. She has been counseled regarding her diagnosis.   She verbally conveys understanding and agreement of the signs, symptoms, diagnosis, treatment and prognosis and additionally agrees to follow up as recommended with Dr. None in 24 - 48 hours. She also agrees with the care-plan and conveys that all of her questions have been answered. I have also put together some discharge instructions for her that include: 1) educational information regarding their diagnosis, 2) how to care for their diagnosis at home, as well a 3) list of reasons why they would want to return to the ED prior to their follow-up appointment, should their condition change. She and/or family's questions have been answered. I have encouraged them to see the official results in [facility]\" or to retrieve the specifics of their results from medical records. PLAN:  1. Return precautions as discussed  2. Follow-up with providers as directed  3. Medications as prescribed    Return to ED if worse     Diagnosis     Clinical Impression:   1. Strain of lumbar region, initial encounter        Discharge Medication List as of 5/16/2019  3:03 PM      START taking these medications    Details   cyclobenzaprine (FLEXERIL) 10 mg tablet Take 1 Tab by mouth three (3) times daily as needed for Muscle Spasm(s). , Normal, Disp-20 Tab, R-0      meloxicam (MOBIC) 15 mg tablet Take 1 Tab by mouth daily. , Normal, Disp-30 Tab, R-0         CONTINUE these medications which have NOT CHANGED    Details   polyethylene glycol (MIRALAX) 17 gram/dose powder Take 17 g by mouth daily. 1 tablespoon with 8 oz of water daily, Normal, Disp-510 g, R-0             Follow-up Information     Follow up With Specialties Details Why Contact Info    None  Call today  None [phone] Patient stated that they have no PCP      Rafaela Miller MD Orthopedic Surgery Call today Possible further evaluation and treatment [facility]  [address]  [facility].  [phone]                This note will not be viewable in [address]

## 2019-05-16 NOTE — DISCHARGE INSTRUCTIONS
Patient Education        Back Strain: Care Instructions  Overview    A back strain happens when you overstretch, or pull, a muscle in your back. You may hurt your back in an accident or when you exercise or lift something. Sometimes you may not know how you hurt your back. Most back pain will get better with rest and time. You can take care of yourself at home to help your back heal.  Follow-up care is a key part of your treatment and safety. Be sure to make and go to all appointments, and call your doctor if you are having problems. It's also a good idea to know your test results and keep a list of the medicines you take. How can you care for yourself at home? · Try to stay as active as you can, but stop or reduce any activity that causes pain. · Put ice or a cold pack on the sore muscle for 10 to 20 minutes at a time to stop swelling. Try this every 1 to 2 hours for 3 days (when you are awake) or until the swelling goes down. Put a thin cloth between the ice pack and your skin. · After 2 or 3 days, apply a heating pad on low or a warm cloth to your back. Some doctors suggest that you go back and forth between hot and cold treatments. · Take pain medicines exactly as directed. ? If the doctor gave you a prescription medicine for pain, take it as prescribed. ? If you are not taking a prescription pain medicine, ask your doctor if you can take an over-the-counter medicine. · Try sleeping on your side with a pillow between your legs. Or put a pillow under your knees when you lie on your back. These measures can ease pain in your lower back. · Return to your usual level of activity slowly. When should you call for help? Call 911 anytime you think you may need emergency care. For example, call if:    · You are unable to move a leg at all.   [facility] your doctor now or seek immediate medical care if:    · You have new or worse symptoms in your legs, belly, or buttocks.  Symptoms may include:  ? Numbness or tingling. ? Weakness. ? Pain.     · You lose bladder or bowel control.    Watch closely for changes in your health, and be sure to contact your doctor if:    · You have a fever, lose weight, or don't feel well.     · You are not getting better as expected. Where can you learn more? Go to http://merry-joseluis.info/. Enter I325 in the search box to learn more about \"Back Strain: Care Instructions. \"  Current as of: September 20, 2018  Content Version: 11.9  © 6555-6910 [employer]. Care instructions adapted under license by [employer] (which disclaims liability or warranty for this information). If you have questions about a medical condition or this instruction, always ask your healthcare professional. [address] any warranty or liability for your use of this information. Patient Education        Back Care and Preventing Injuries: Care Instructions  Your Care Instructions    You can hurt your back doing many everyday activities: lifting a heavy box, bending down to garden, exercising at the gym, and even getting out of bed. But you can keep your back strong and healthy by doing some exercises. You also can follow a few tips for sitting, sleeping, and lifting to avoid hurting your back again. Talk to your doctor before you start an exercise program. Ask for help if you want to learn more about keeping your back healthy. Follow-up care is a key part of your treatment and safety. Be sure to make and go to all appointments, and call your doctor if you are having problems. It's also a good idea to know your test results and keep a list of the medicines you take. How can you care for yourself at home? · Stay at a healthy weight to avoid strain on your lower back. · Do not smoke. Smoking increases the risk of osteoporosis, which weakens the spine. If you need help quitting, talk to your doctor about stop-smoking programs and medicines. These can increase your chances of quitting for good. · Make sure you sleep in a position that maintains your back's normal curves and on a mattress that feels comfortable. Sleep on your side with a pillow between your knees, or sleep on your back with a pillow under your knees. These positions can reduce strain on your back. · When you get out of bed, lie on your side and bend both knees. Drop your feet over the edge of the bed as you push up with both arms. Scoot to the edge of the bed. Make sure your feet are in line with your rear end (buttocks), and then stand up. · If you must stand for a long time, put one foot on a stool, ledge, or box. Exercise to strengthen your back and other muscles  · Get at least 30 minutes of exercise on most days of the week. Walking is a good choice. You also may want to do other activities, such as running, swimming, cycling, or playing tennis or team sports. · Stretch your back muscles. Here are few exercises to try:  ? Lie on your back with your knees bent and your feet flat on the floor. Gently pull one bent knee to your chest. Put that foot back on the floor, and then pull the other knee to your chest. Hold for 15 to 30 seconds. Repeat 2 to 4 times. ? Do pelvic tilts. Lie on your back with your knees bent. Tighten your stomach muscles. Pull your belly button (navel) in and up toward your ribs. You should feel like your back is pressing to the floor and your hips and pelvis are slightly lifting off the floor. Hold for 6 seconds while breathing smoothly. · Keep your core muscles strong. The muscles of your back, belly (abdomen), and buttocks support your spine. ? Pull in your belly, and imagine pulling your navel toward your spine. Hold this for 6 seconds, then relax. Remember to keep breathing normally as you tense your muscles. ? Do curl-ups. Always do them with your knees bent.  Keep your low back on the floor, and curl your shoulders toward your knees using a smooth, slow motion. Keep your arms folded across your chest. If this bothers your neck, try putting your hands behind your neck (not your head), with your elbows spread apart. ? Lie on your back with your knees bent and your feet flat on the floor. Tighten your belly muscles, and then push with your feet and raise your buttocks up a few inches. Hold this position 6 seconds as you continue to breathe normally, then lower yourself slowly to the floor. Repeat 8 to 12 times. ? If you like group exercise, try Pilates or yoga. These classes have poses that strengthen the core muscles. Protect your back when you sit  · Place a small pillow, a rolled-up towel, or a lumbar roll in the curve of your back if you need extra support. · Sit in a chair that is low enough to let you place both feet flat on the floor with both knees nearly level with your hips. If your chair or desk is too high, use a foot rest to raise your knees. · When driving, keep your knees nearly level with your hips. Sit straight, and drive with both hands on the steering wheel. Your arms should be in a slightly bent position. · Try a kneeling chair, which helps tilt your hips forward. This takes pressure off your lower back. · Try sitting on an exercise ball. It can rock from side to side, which helps keep your back loose. Lift properly  · Squat down, bending at the hips and knees only. If you need to, put one knee to the floor and extend your other knee in front of you, bent at a right angle (half kneeling). · Press your chest straight forward. This helps keep your upper back straight while keeping a slight arch in your low back. · Hold the load as close to your body as possible, at the level of your navel. · Use your feet to change direction, taking small steps. · Lead with your hips as you change direction. Keep your shoulders in line with your hips as you move. Do not twist your body.   · Set down your load carefully, squatting with your knees and hips only. When should you call for help? Watch closely for changes in your health, and be sure to contact your doctor if you have any problems. Where can you learn more? Go to http://merry-joseluis.info/. Enter S810 in the search box to learn more about \"Back Care and Preventing Injuries: Care Instructions. \"  Current as of: September 20, 2018  Content Version: 11.9  © 2006-2018 [employer]. Care instructions adapted under license by [employer] (which disclaims liability or warranty for this information). If you have questions about a medical condition or this instruction, always ask your healthcare professional. [place] [address] any warranty or liability for your use of this information. Patient Education        Strain or Sprain: Care Instructions  Your Care Instructions    A strain happens when you overstretch, or pull, a muscle. A sprain occurs when you stretch or tear a ligament, the tough tissue that connects one bone to another. These problems can happen when you exercise or lift something or when you are in an accident. Rest and other home care can help strains and sprains heal.  The doctor has checked you carefully, but problems can develop later. If you notice any problems or new symptoms,  get medical treatment right away. Follow-up care is a key part of your treatment and safety. Be sure to make and go to all appointments, and call your doctor if you are having problems. It's also a good idea to know your test results and keep a list of the medicines you take. How can you care for yourself at home? · If your doctor gave you a sling, splint, brace, or immobilizer, use it exactly as directed. · Rest the strained or sprained area, and follow your doctor's advice about when you can be active again. · Put ice or a cold pack on the sore area for 10 to 20 minutes at a time to stop swelling.  Try this every 1 to 2 hours for 3 days (when you are awake) or until the swelling goes down. Put a thin cloth between the ice pack and your skin. Keep your splint or brace dry. · Prop up a sore arm or leg on a pillow when you ice it or anytime you sit or lie down. Try to keep it higher than the level of your heart. This will help reduce swelling. · Take pain medicines exactly as directed. ? If the doctor gave you a prescription medicine for pain, take it as prescribed. ? If you are not taking a prescription pain medicine, ask your doctor if you can take an over-the-counter medicine. · Do exercises as directed by your doctor or physical therapist.  · Return to your usual level of activity slowly. · Do not do anything that makes the pain worse. When should you call for help? Call your doctor now or seek immediate medical care if:    · You have severe or increasing pain.     · You have tingling, weakness, or numbness in the area.     · The area turns cold or changes color.     · Your cast or splint feels too tight.     · You have symptoms of a blood clot, such as:  ? Pain in your calf, back of the knee, thigh, or groin. ? Redness and swelling in your leg or groin.     · You cannot move the strained part of your body.    Watch closely for changes in your health, and be sure to contact your doctor if:    · You do not get better as expected. Where can you learn more? Go to http://merry-joseluis.info/. Enter W222 in the search box to learn more about \"Strain or Sprain: Care Instructions. \"  Current as of: September 20, 2018  Content Version: 11.9  © 7599-0564 [employer]. Care instructions adapted under license by [employer] (which disclaims liability or warranty for this information). If you have questions about a medical condition or this instruction, always ask your healthcare professional. [address] any warranty or liability for your use of this information.

## 2019-05-16 NOTE — LETTER
[address] 
[facility] EMERGENCY DEPT 
[address] [place] [address] [address] [id number] [phone] Work/School Note Date: 5/16/2019 To Whom It May concern: Lance Crews was seen and treated today in the emergency room by the following provider(s): 
Attending Provider: Elba Liriano MD 
Physician Assistant: BESSIE Escoto. Lance Crews may return to work on 05/19/2019. Sincerely, 
 
 
 
 
Song Neil.  [place], [place]

## 2019-05-26 ENCOUNTER — HOSPITAL ENCOUNTER (EMERGENCY)
Age: 34
Discharge: HOME | End: 2019-05-26
Attending: EMERGENCY MEDICINE
Payer: MEDICAID

## 2019-05-26 ENCOUNTER — APPOINTMENT (OUTPATIENT)
Dept: GENERAL RADIOLOGY | Age: 34
End: 2019-05-26
Attending: EMERGENCY MEDICINE
Payer: MEDICAID

## 2019-05-26 VITALS
OXYGEN SATURATION: 100 % | WEIGHT: 280.87 LBS | HEART RATE: 84 BPM | BODY MASS INDEX: 44.08 KG/M2 | TEMPERATURE: 98.2 F | HEIGHT: 67 IN | SYSTOLIC BLOOD PRESSURE: 113 MMHG | DIASTOLIC BLOOD PRESSURE: 80 MMHG | RESPIRATION RATE: 16 BRPM

## 2019-05-26 DIAGNOSIS — N30.00 ACUTE CYSTITIS WITHOUT HEMATURIA: ICD-10-CM

## 2019-05-26 DIAGNOSIS — D64.9 ANEMIA, UNSPECIFIED TYPE: ICD-10-CM

## 2019-05-26 DIAGNOSIS — M54.6 ACUTE THORACIC BACK PAIN, UNSPECIFIED BACK PAIN LATERALITY: Primary | ICD-10-CM

## 2019-05-26 LAB
ALBUMIN SERPL-MCNC: 3.3 G/DL (ref 3.5–5)
ALBUMIN/GLOB SERPL: 0.8 {RATIO} (ref 1.1–2.2)
ALP SERPL-CCNC: 82 U/L (ref 45–117)
ALT SERPL-CCNC: 52 U/L (ref 12–78)
ANION GAP SERPL CALC-SCNC: 3 MMOL/L (ref 5–15)
APPEARANCE UR: ABNORMAL
AST SERPL-CCNC: 53 U/L (ref 15–37)
BACTERIA URNS QL MICRO: ABNORMAL /HPF
BASOPHILS # BLD: 0 K/UL (ref 0–0.1)
BASOPHILS NFR BLD: 0 % (ref 0–1)
BILIRUB SERPL-MCNC: 0.9 MG/DL (ref 0.2–1)
BILIRUB UR QL: NEGATIVE
BUN SERPL-MCNC: 10 MG/DL (ref 6–20)
BUN/CREAT SERPL: 12 (ref 12–20)
CALCIUM SERPL-MCNC: 7.8 MG/DL (ref 8.5–10.1)
CHLORIDE SERPL-SCNC: 108 MMOL/L (ref 97–108)
CO2 SERPL-SCNC: 29 MMOL/L (ref 21–32)
COLOR UR: ABNORMAL
CREAT SERPL-MCNC: 0.81 MG/DL (ref 0.55–1.02)
DIFFERENTIAL METHOD BLD: ABNORMAL
EOSINOPHIL # BLD: 0.5 K/UL (ref 0–0.4)
EOSINOPHIL NFR BLD: 7 % (ref 0–7)
EPITH CASTS URNS QL MICRO: ABNORMAL /LPF
ERYTHROCYTE [DISTWIDTH] IN BLOOD BY AUTOMATED COUNT: 20.8 % (ref 11.5–14.5)
GLOBULIN SER CALC-MCNC: 4.3 G/DL (ref 2–4)
GLUCOSE SERPL-MCNC: 91 MG/DL (ref 65–100)
GLUCOSE UR STRIP.AUTO-MCNC: NEGATIVE MG/DL
HCT VFR BLD AUTO: 22.8 % (ref 35–47)
HEMOCCULT STL QL: NEGATIVE
HGB BLD-MCNC: 6.1 G/DL (ref 11.5–16)
HGB UR QL STRIP: NEGATIVE
IMM GRANULOCYTES # BLD AUTO: 0 K/UL (ref 0–0.04)
IMM GRANULOCYTES NFR BLD AUTO: 0 % (ref 0–0.5)
KETONES UR QL STRIP.AUTO: NEGATIVE MG/DL
LEUKOCYTE ESTERASE UR QL STRIP.AUTO: NEGATIVE
LIPASE SERPL-CCNC: 96 U/L (ref 73–393)
LYMPHOCYTES # BLD: 2.3 K/UL (ref 0.8–3.5)
LYMPHOCYTES NFR BLD: 35 % (ref 12–49)
MCH RBC QN AUTO: 16.9 PG (ref 26–34)
MCHC RBC AUTO-ENTMCNC: 26.8 G/DL (ref 30–36.5)
MCV RBC AUTO: 63 FL (ref 80–99)
MONOCYTES # BLD: 0.7 K/UL (ref 0–1)
MONOCYTES NFR BLD: 10 % (ref 5–13)
NEUTS SEG # BLD: 3.2 K/UL (ref 1.8–8)
NEUTS SEG NFR BLD: 48 % (ref 32–75)
NITRITE UR QL STRIP.AUTO: NEGATIVE
NRBC # BLD: 0.02 K/UL (ref 0–0.01)
NRBC BLD-RTO: 0.3 PER 100 WBC
PH UR STRIP: 7 [PH] (ref 5–8)
PLATELET # BLD AUTO: 304 K/UL (ref 150–400)
PMV BLD AUTO: 9.7 FL (ref 8.9–12.9)
POTASSIUM SERPL-SCNC: 3.5 MMOL/L (ref 3.5–5.1)
PROT SERPL-MCNC: 7.6 G/DL (ref 6.4–8.2)
PROT UR STRIP-MCNC: ABNORMAL MG/DL
RBC # BLD AUTO: 3.62 M/UL (ref 3.8–5.2)
RBC #/AREA URNS HPF: ABNORMAL /HPF (ref 0–5)
RBC MORPH BLD: ABNORMAL
SODIUM SERPL-SCNC: 140 MMOL/L (ref 136–145)
SP GR UR REFRACTOMETRY: 1.03 (ref 1–1.03)
UROBILINOGEN UR QL STRIP.AUTO: 1 EU/DL (ref 0.2–1)
WBC # BLD AUTO: 6.7 K/UL (ref 3.6–11)
WBC URNS QL MICRO: ABNORMAL /HPF (ref 0–4)

## 2019-05-26 PROCEDURE — 83690 ASSAY OF LIPASE: CPT

## 2019-05-26 PROCEDURE — 74011250636 HC RX REV CODE- 250/636: Performed by: EMERGENCY MEDICINE

## 2019-05-26 PROCEDURE — 80053 COMPREHEN METABOLIC PANEL: CPT

## 2019-05-26 PROCEDURE — 85025 COMPLETE CBC W/AUTO DIFF WBC: CPT

## 2019-05-26 PROCEDURE — 36415 COLL VENOUS BLD VENIPUNCTURE: CPT

## 2019-05-26 PROCEDURE — 81001 URINALYSIS AUTO W/SCOPE: CPT

## 2019-05-26 PROCEDURE — 99283 EMERGENCY DEPT VISIT LOW MDM: CPT

## 2019-05-26 PROCEDURE — 82272 OCCULT BLD FECES 1-3 TESTS: CPT

## 2019-05-26 PROCEDURE — 74011250637 HC RX REV CODE- 250/637: Performed by: EMERGENCY MEDICINE

## 2019-05-26 PROCEDURE — 72072 X-RAY EXAM THORAC SPINE 3VWS: CPT

## 2019-05-26 PROCEDURE — 96360 HYDRATION IV INFUSION INIT: CPT

## 2019-05-26 RX ORDER — OXYCODONE AND ACETAMINOPHEN 5; 325 MG/1; MG/1
1 TABLET ORAL
Status: COMPLETED | OUTPATIENT
Start: 2019-05-26 | End: 2019-05-26

## 2019-05-26 RX ORDER — OXYCODONE AND ACETAMINOPHEN 5; 325 MG/1; MG/1
1 TABLET ORAL
Qty: 10 TAB | Refills: 0 | Status: SHIPPED | OUTPATIENT
Start: 2019-05-26 | End: 2019-05-28

## 2019-05-26 RX ORDER — SODIUM CHLORIDE 9 MG/ML
250 INJECTION, SOLUTION INTRAVENOUS AS NEEDED
Status: DISCONTINUED | OUTPATIENT
Start: 2019-05-26 | End: 2019-05-26 | Stop reason: HOSPADM

## 2019-05-26 RX ORDER — PROMETHAZINE HYDROCHLORIDE 25 MG/1
12.5 TABLET ORAL
Status: COMPLETED | OUTPATIENT
Start: 2019-05-26 | End: 2019-05-26

## 2019-05-26 RX ORDER — DIAZEPAM 10 MG/2ML
5 INJECTION INTRAMUSCULAR
Status: COMPLETED | OUTPATIENT
Start: 2019-05-26 | End: 2019-05-26

## 2019-05-26 RX ORDER — DIAZEPAM 10 MG/2ML
INJECTION INTRAMUSCULAR
Status: DISCONTINUED
Start: 2019-05-26 | End: 2019-05-26 | Stop reason: HOSPADM

## 2019-05-26 RX ORDER — LANOLIN ALCOHOL/MO/W.PET/CERES
325 CREAM (GRAM) TOPICAL
Qty: 60 TAB | Refills: 0 | Status: ON HOLD | OUTPATIENT
Start: 2019-05-26 | End: 2019-05-29 | Stop reason: SDUPTHER

## 2019-05-26 RX ORDER — CEPHALEXIN 500 MG/1
500 CAPSULE ORAL 3 TIMES DAILY
Qty: 15 CAP | Refills: 0 | Status: SHIPPED | OUTPATIENT
Start: 2019-05-26 | End: 2019-07-05

## 2019-05-26 RX ADMIN — SODIUM CHLORIDE 1000 ML: 900 INJECTION, SOLUTION INTRAVENOUS at 02:20

## 2019-05-26 RX ADMIN — DIAZEPAM 5 MG: 5 INJECTION, SOLUTION INTRAMUSCULAR; INTRAVENOUS at 02:21

## 2019-05-26 RX ADMIN — OXYCODONE AND ACETAMINOPHEN 1 TABLET: 5; 325 TABLET ORAL at 00:52

## 2019-05-26 RX ADMIN — PROMETHAZINE HYDROCHLORIDE 12.5 MG: 25 TABLET ORAL at 00:52

## 2019-05-26 NOTE — ED PROVIDER NOTES
EMERGENCY DEPARTMENT HISTORY AND PHYSICAL EXAM      Date: 5/26/2019  Patient Name: Марина Muñoz    History of Presenting Illness     Chief Complaint   Patient presents with    Back Pain     was seen in ED for same last week without relief       History Provided By: Patient    HPI: Марина Muñoz, 35 y.o. female with PMHx significant for asthma, presents    to the ED with cc of back pain. The patient was seen here on May 16 for back pain. She states that at the time, she had been lifting at work and felt a pop in her back. She received prescriptions for Flexeril and Mobic. She states that they helped somewhat, but not completely. She also indicates that her pain radiates up to the upper back primarily at night. The pain level is currently an 8 out of 10. She denies any numbness or tingling in her legs, she denies incontinence, but does states she has urinary frequency. She denies chest pain or shortness of breath. There are no other complaints, changes, or physical findings at this time. PCP: None    No current facility-administered medications on file prior to encounter. Current Outpatient Medications on File Prior to Encounter   Medication Sig Dispense Refill    cyclobenzaprine (FLEXERIL) 10 mg tablet Take 1 Tab by mouth three (3) times daily as needed for Muscle Spasm(s). 20 Tab 0    meloxicam (MOBIC) 15 mg tablet Take 1 Tab by mouth daily. 30 Tab 0    polyethylene glycol (MIRALAX) 17 gram/dose powder Take 17 g by mouth daily. 1 tablespoon with 8 oz of water daily 510 g 0       Past History     Past Medical History:  Past Medical History:   Diagnosis Date    Anemia     Anemia     Anemia NEC     ASCUS (atypical squamous cells of undetermined significance) on Pap smear 6/1/2012    Asthma     extrinsic as a child     Psychiatric disorder     Pt reports having anxiety.        Past Surgical History:  Past Surgical History:   Procedure Laterality Date    BREAST SURGERY PROCEDURE UNLISTED  11/27/12    Incision and Drainage Left Breast Abscess    HX BREAST BIOPSY Bilateral     abscesses removed 2014 and 2015    HX BREAST REDUCTION  2003    HX OTHER SURGICAL      abscess removed from left breast       Family History:  Family History   Problem Relation Age of Onset    Hypertension Mother     Elevated Lipids Mother     Diabetes Sister        Social History:  Social History     Tobacco Use    Smoking status: Never Smoker    Smokeless tobacco: Never Used   Substance Use Topics    Alcohol use: No    Drug use: No       Allergies: Allergies   Allergen Reactions    Bactrim [Sulfamethoprim Ds] Nausea Only    Zofran [Ondansetron Hcl (Pf)] Rash         Review of Systems   Review of Systems   Constitutional: Negative for chills and fever. Eyes: Negative. Respiratory: Negative for cough and shortness of breath. Cardiovascular: Negative for chest pain. Gastrointestinal: Negative for abdominal pain. Endocrine: Negative for heat intolerance. Genitourinary: Positive for frequency. Musculoskeletal: Positive for back pain. Skin: Negative for rash. Allergic/Immunologic: Negative for immunocompromised state. Neurological: Negative for light-headedness. Hematological: Does not bruise/bleed easily. Psychiatric/Behavioral: Negative. All other systems reviewed and are negative. Physical Exam   Physical Exam   Constitutional: She is oriented to person, place, and time. She appears well-developed and well-nourished. She appears distressed. HENT:   Head: Normocephalic. Eyes: Pupils are equal, round, and reactive to light. EOM are normal.   Neck: Normal range of motion. Neck supple. Cardiovascular: Normal rate, regular rhythm, normal heart sounds and intact distal pulses. Pulmonary/Chest: Effort normal and breath sounds normal.   Abdominal: Soft. Bowel sounds are normal. There is no tenderness.    Lumbar and thoracic tenderness   Musculoskeletal: Normal range of motion. She exhibits no edema or tenderness. Neurological: She is alert and oriented to person, place, and time. Sensory and motor intact   Skin: Skin is warm and dry. Psychiatric: She has a normal mood and affect. Her behavior is normal.   Nursing note and vitals reviewed. Diagnostic Study Results     Labs -   No results found for this or any previous visit (from the past 12 hour(s)). Radiologic Studies -   XR SPINE THORAC 3 V   Final Result   Normal thoracic spine. CT Results  (Last 48 hours)    None        CXR Results  (Last 48 hours)    None            Medical Decision Making   I am the first provider for this patient. I reviewed the vital signs, available nursing notes, past medical history, past surgical history, family history and social history. Vital Signs-Reviewed the patient's vital signs. Patient Vitals for the past 12 hrs:   Temp Pulse Resp BP SpO2   05/26/19 0006 98.2 °F (36.8 °C) 84 16 113/80 100 %       Pulse Oximetry Analysis - 100% on room air    Cardiac Monitor:   Rate: 84 bpm  Rhythm: Normal Sinus Rhythm          Records Reviewed: Nursing Notes, Old Medical Records, Previous Radiology Studies and Previous Laboratory Studies    Provider Notes (Medical Decision Making):   Sprain, fracture, contusion, UTI, herniated disc    ED Course:   Initial assessment performed. The patients presenting problems have been discussed, and they are in agreement with the care plan formulated and outlined with them. I have encouraged them to ask questions as they arise throughout their visit. Critical Care Time:   0    Disposition:  Home    PLAN:  1. Discharge Medication List as of 5/26/2019  3:44 AM      START taking these medications    Details   oxyCODONE-acetaminophen (PERCOCET) 5-325 mg per tablet Take 1 Tab by mouth every eight (8) hours as needed for Pain for up to 3 days. Max Daily Amount: 3 Tabs.  Indications: Pain, Print, Disp-10 Tab, R-0      cephALEXin (KEFLEX) 500 mg capsule Take 1 Cap by mouth three (3) times daily. , Normal, Disp-15 Cap, R-0      ferrous sulfate 325 mg (65 mg iron) tablet Take 1 Tab by mouth Daily (before breakfast). , Normal, Disp-60 Tab, R-0         CONTINUE these medications which have NOT CHANGED    Details   meloxicam (MOBIC) 15 mg tablet Take 1 Tab by mouth daily. , Normal, Disp-30 Tab, R-0      polyethylene glycol (MIRALAX) 17 gram/dose powder Take 17 g by mouth daily. 1 tablespoon with 8 oz of water daily, Normal, Disp-510 g, R-0         STOP taking these medications       cyclobenzaprine (FLEXERIL) 10 mg tablet Comments:   Reason for Stoppin.   Follow-up Information     Follow up With Specialties Details Why Contact Info    Duy Kraus MD Obstetrics & Gynecology, Gynecology, Obstetrics Call in 1 day  [facility]  [phone]      Debi Franklin MD Orthopedic Surgery In 2 days Regarding your back pain [address]  [address] [facility] [address]  [phone]      [facility] EMERGENCY DEPT Emergency Medicine  If symptoms worsen [address] [place] [address] Drive  [address] [facility]  [phone]        Return to ED if worse     Diagnosis     Clinical Impression:   1. Acute thoracic back pain, unspecified back pain laterality    2. Anemia, unspecified type    3. Acute cystitis without hematuria        Attestations:     This chart was completed by myself, Dr. Gerard Méndez

## 2019-05-26 NOTE — LETTER
[address] 
[facility] EMERGENCY DEPT 
[address] [place] [address] [address] [phone] Work/School Note Date: 5/25/2019 To Whom It May concern: Eduardo Salgado was seen and treated today in the emergency room by the following provider(s): 
Attending Provider: Katherine Reese MD. Eduardo Salgado {Return to work: 5/28/19 Sincerely, 
 
 
 
 
Lizz Up RN

## 2019-05-26 NOTE — ED NOTES
Patient reports that her back has been hurting since she came in last week, and that it has not improved. States she was prescribed flexeril and mobic which did not help her. Patient also states she was given percocet in the ER which later made her vomit. 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

## 2019-05-26 NOTE — DISCHARGE INSTRUCTIONS
Patient Education        Anemia: Care Instructions  Your Care Instructions    Anemia is a low level of red blood cells, which carry oxygen throughout your body. Many things can cause anemia. Lack of iron is one of the most common causes. Your body needs iron to make hemoglobin, a substance in red blood cells that carries oxygen from the lungs to your body's cells. Without enough iron, the body produces fewer and smaller red blood cells. As a result, your body's cells do not get enough oxygen, and you feel tired and weak. And you may have trouble concentrating. Bleeding is the most common cause of a lack of iron. You may have heavy menstrual bleeding or bleeding caused by conditions such as ulcers, hemorrhoids, or cancer. Regular use of aspirin or other anti-inflammatory medicines (such as ibuprofen) also can cause bleeding in some people. A lack of iron in your diet also can cause anemia, especially at times when the body needs more iron, such as during pregnancy, infancy, and the teen years. Your doctor may have prescribed iron pills. It may take several months of treatment for your iron levels to return to normal. Your doctor also may suggest that you eat foods that are rich in iron, such as meat and beans. There are many other causes of anemia. It is not always due to a lack of iron. Finding the specific cause of your anemia will help your doctor find the right treatment for you. Follow-up care is a key part of your treatment and safety. Be sure to make and go to all appointments, and call your doctor if you are having problems. It's also a good idea to know your test results and keep a list of the medicines you take. How can you care for yourself at home? · Take your medicines exactly as prescribed. Call your doctor if you think you are having a problem with your medicine.   · If your doctor recommends iron pills, take them as directed:  ? Try to take the pills on an empty stomach about 1 hour before or 2 hours after meals. But you may need to take iron with food to avoid an upset stomach. ? Do not take antacids or drink milk or caffeine drinks (such as coffee, tea, or cola) at the same time or within 2 hours of the time that you take your iron. They can make it hard for your body to absorb the iron. ? Vitamin C (from food or supplements) helps your body absorb iron. Try taking iron pills with a glass of orange juice or some other food that is high in vitamin C, such as citrus fruits. ? Iron pills may cause stomach problems, such as heartburn, nausea, diarrhea, constipation, and cramps. Be sure to drink plenty of fluids, and include fruits, vegetables, and fiber in your diet each day. Iron pills often make your bowel movements dark or green. ? If you forget to take an iron pill, do not take a double dose of iron the next time you take a pill. ? Keep iron pills out of the reach of small children. An overdose of iron can be very dangerous. · Follow your doctor's advice about eating iron-rich foods. These include red meat, shellfish, poultry, eggs, beans, raisins, whole-grain bread, and leafy green vegetables. · Steam vegetables to help them keep their iron content. When should you call for help? Call 911 anytime you think you may need emergency care. For example, call if:    · You have symptoms of a heart attack. These may include:  ? Chest pain or pressure, or a strange feeling in the chest.  ? Sweating. ? Shortness of breath. ? Nausea or vomiting. ? Pain, pressure, or a strange feeling in the back, neck, jaw, or upper belly or in one or both shoulders or arms. ? Lightheadedness or sudden weakness. ? A fast or irregular heartbeat. After you call 911, the  may tell you to chew 1 adult-strength or 2 to 4 low-dose aspirin. Wait for an ambulance.  Do not try to drive yourself.     · You passed out (lost consciousness).    Call your doctor now or seek immediate medical care if:    · You have new or increased shortness of breath.     · You are dizzy or lightheaded, or you feel like you may faint.     · Your fatigue and weakness continue or get worse.     · You have any abnormal bleeding, such as:  ? Nosebleeds. ? Vaginal bleeding that is different (heavier, more frequent, at a different time of the month) than what you are used to.  ? Bloody or black stools, or rectal bleeding. ? Bloody or pink urine.    Watch closely for changes in your health, and be sure to contact your doctor if:    · You do not get better as expected. Where can you learn more? Go to http://merryStaff Rankerjoseluis.info/. Enter R301 in the search box to learn more about \"Anemia: Care Instructions. \"  Current as of: May 6, 2018  Content Version: 11.9  © 8954-5195 [employer]. Care instructions adapted under license by [employer] (which disclaims liability or warranty for this information). If you have questions about a medical condition or this instruction, always ask your healthcare professional. [place] [address] any warranty or liability for your use of this information. Patient Education        Back Pain: Care Instructions  Your Care Instructions    Back pain has many possible causes. It is often related to problems with muscles and ligaments of the back. It may also be related to problems with the nerves, discs, or bones of the back. Moving, lifting, standing, sitting, or sleeping in an awkward way can strain the back. Sometimes you don't notice the injury until later. Arthritis is another common cause of back pain. Although it may hurt a lot, back pain usually improves on its own within several weeks. Most people recover in 12 weeks or less. Using good home treatment and being careful not to stress your back can help you feel better sooner. Follow-up care is a key part of your treatment and safety.  Be sure to make and go to all appointments, and call your doctor if you are having problems. It's also a good idea to know your test results and keep a list of the medicines you take. How can you care for yourself at home? · Sit or lie in positions that are most comfortable and reduce your pain. Try one of these positions when you lie down:  ? Lie on your back with your knees bent and supported by large pillows. ? Lie on the floor with your legs on the seat of a sofa or chair. ? Lie on your side with your knees and hips bent and a pillow between your legs. ? Lie on your stomach if it does not make pain worse. · Do not sit up in bed, and avoid soft couches and twisted positions. Bed rest can help relieve pain at first, but it delays healing. Avoid bed rest after the first day of back pain. · Change positions every 30 minutes. If you must sit for long periods of time, take breaks from sitting. Get up and walk around, or lie in a comfortable position. · Try using a heating pad on a low or medium setting for 15 to 20 minutes every 2 or 3 hours. Try a warm shower in place of one session with the heating pad. · You can also try an ice pack for 10 to 15 minutes every 2 to 3 hours. Put a thin cloth between the ice pack and your skin. · Take pain medicines exactly as directed. ? If the doctor gave you a prescription medicine for pain, take it as prescribed. ? If you are not taking a prescription pain medicine, ask your doctor if you can take an over-the-counter medicine. · Take short walks several times a day. You can start with 5 to 10 minutes, 3 or 4 times a day, and work up to longer walks. Walk on level surfaces and avoid hills and stairs until your back is better. · Return to work and other activities as soon as you can. Continued rest without activity is usually not good for your back. · To prevent future back pain, do exercises to stretch and strengthen your back and stomach. Learn how to use good posture, safe lifting techniques, and proper body mechanics.   When should you call for help? Call your doctor now or seek immediate medical care if:    · You have new or worsening numbness in your legs.     · You have new or worsening weakness in your legs. (This could make it hard to stand up.)     · You lose control of your bladder or bowels.    Watch closely for changes in your health, and be sure to contact your doctor if:    · You have a fever, lose weight, or don't feel well.     · You do not get better as expected. Where can you learn more? Go to http://merry-joseluis.info/. Enter P823 in the search box to learn more about \"Back Pain: Care Instructions. \"  Current as of: September 20, 2018  Content Version: 11.9  © 0246-0574 [employer]. Care instructions adapted under license by [employer] (which disclaims liability or warranty for this information). If you have questions about a medical condition or this instruction, always ask your healthcare professional. [place] [address] any warranty or liability for your use of this information. Patient Education        Urinary Tract Infection in Women: Care Instructions  Your Care Instructions    A urinary tract infection, or UTI, is a general term for an infection anywhere between the kidneys and the urethra (where urine comes out). Most UTIs are bladder infections. They often cause pain or burning when you urinate. UTIs are caused by bacteria and can be cured with antibiotics. Be sure to complete your treatment so that the infection goes away. Follow-up care is a key part of your treatment and safety. Be sure to make and go to all appointments, and call your doctor if you are having problems. It's also a good idea to know your test results and keep a list of the medicines you take. How can you care for yourself at home? · Take your antibiotics as directed. Do not stop taking them just because you feel better. You need to take the full course of antibiotics.   · Drink extra water and other fluids for the next day or two. This may help wash out the bacteria that are causing the infection. (If you have kidney, heart, or liver disease and have to limit fluids, talk with your doctor before you increase your fluid intake.)  · Avoid drinks that are carbonated or have caffeine. They can irritate the bladder. · Urinate often. Try to empty your bladder each time. · To relieve pain, take a hot bath or lay a heating pad set on low over your lower belly or genital area. Never go to sleep with a heating pad in place. To prevent UTIs  · Drink plenty of water each day. This helps you urinate often, which clears bacteria from your system. (If you have kidney, heart, or liver disease and have to limit fluids, talk with your doctor before you increase your fluid intake.)  · Urinate when you need to. · Urinate right after you have sex. · Change sanitary pads often. · Avoid douches, bubble baths, feminine hygiene sprays, and other feminine hygiene products that have deodorants. · After going to the bathroom, wipe from front to back. When should you call for help? Call your doctor now or seek immediate medical care if:    · Symptoms such as fever, chills, nausea, or vomiting get worse or appear for the first time.     · You have new pain in your back just below your rib cage. This is called flank pain.     · There is new blood or pus in your urine.     · You have any problems with your antibiotic medicine.    Watch closely for changes in your health, and be sure to contact your doctor if:    · You are not getting better after taking an antibiotic for 2 days.     · Your symptoms go away but then come back. Where can you learn more? Go to http://merry-joseluis.info/. Enter C229 in the search box to learn more about \"Urinary Tract Infection in Women: Care Instructions. \"  Current as of: March 20, 2018  Content Version: 11.9  © 5541-4670 [employer], [facility].  Care instructions adapted under license by [employer] (which disclaims liability or warranty for this information). If you have questions about a medical condition or this instruction, always ask your healthcare professional. [address] any warranty or liability for your use of this information.

## 2019-05-28 ENCOUNTER — HOSPITAL ENCOUNTER (INPATIENT)
Age: 34
LOS: 1 days | Discharge: HOME | DRG: 532 | End: 2019-05-29
Attending: EMERGENCY MEDICINE | Admitting: INTERNAL MEDICINE
Payer: MEDICAID

## 2019-05-28 DIAGNOSIS — E87.6 HYPOKALEMIA: Primary | ICD-10-CM

## 2019-05-28 DIAGNOSIS — D64.9 ANEMIA, UNSPECIFIED TYPE: ICD-10-CM

## 2019-05-28 PROBLEM — R55 SYNCOPE: Status: ACTIVE | Noted: 2019-05-28

## 2019-05-28 LAB
ALBUMIN SERPL-MCNC: 3.5 G/DL (ref 3.5–5)
ALBUMIN/GLOB SERPL: 0.8 {RATIO} (ref 1.1–2.2)
ALP SERPL-CCNC: 86 U/L (ref 45–117)
ALT SERPL-CCNC: 63 U/L (ref 12–78)
ANION GAP SERPL CALC-SCNC: 4 MMOL/L (ref 5–15)
AST SERPL-CCNC: 58 U/L (ref 15–37)
BASOPHILS # BLD: 0 K/UL (ref 0–0.1)
BASOPHILS NFR BLD: 0 % (ref 0–1)
BILIRUB SERPL-MCNC: 1.2 MG/DL (ref 0.2–1)
BUN SERPL-MCNC: 10 MG/DL (ref 6–20)
BUN/CREAT SERPL: 11 (ref 12–20)
CALCIUM SERPL-MCNC: 8 MG/DL (ref 8.5–10.1)
CHLORIDE SERPL-SCNC: 109 MMOL/L (ref 97–108)
CO2 SERPL-SCNC: 25 MMOL/L (ref 21–32)
CREAT SERPL-MCNC: 0.87 MG/DL (ref 0.55–1.02)
DIFFERENTIAL METHOD BLD: ABNORMAL
EOSINOPHIL # BLD: 0.5 K/UL (ref 0–0.4)
EOSINOPHIL NFR BLD: 8 % (ref 0–7)
ERYTHROCYTE [DISTWIDTH] IN BLOOD BY AUTOMATED COUNT: 20.5 % (ref 11.5–14.5)
FERRITIN SERPL-MCNC: 2 NG/ML (ref 26–388)
FOLATE SERPL-MCNC: 9.6 NG/ML (ref 5–21)
GLOBULIN SER CALC-MCNC: 4.4 G/DL (ref 2–4)
GLUCOSE SERPL-MCNC: 96 MG/DL (ref 65–100)
HAPTOGLOB SERPL-MCNC: 175 MG/DL (ref 30–200)
HCT VFR BLD AUTO: 23.1 % (ref 35–47)
HGB BLD-MCNC: 6.1 G/DL (ref 11.5–16)
IMM GRANULOCYTES # BLD AUTO: 0.1 K/UL (ref 0–0.04)
IMM GRANULOCYTES NFR BLD AUTO: 1 % (ref 0–0.5)
IRON SATN MFR SERPL: 3 % (ref 20–50)
IRON SERPL-MCNC: 15 UG/DL (ref 35–150)
LDH SERPL L TO P-CCNC: 213 U/L (ref 81–246)
LYMPHOCYTES # BLD: 1.4 K/UL (ref 0.8–3.5)
LYMPHOCYTES NFR BLD: 23 % (ref 12–49)
MCH RBC QN AUTO: 17.1 PG (ref 26–34)
MCHC RBC AUTO-ENTMCNC: 26.4 G/DL (ref 30–36.5)
MCV RBC AUTO: 64.7 FL (ref 80–99)
MONOCYTES # BLD: 0.7 K/UL (ref 0–1)
MONOCYTES NFR BLD: 11 % (ref 5–13)
NEUTS SEG # BLD: 3.4 K/UL (ref 1.8–8)
NEUTS SEG NFR BLD: 57 % (ref 32–75)
NRBC # BLD: 0.03 K/UL (ref 0–0.01)
NRBC BLD-RTO: 0.5 PER 100 WBC
PLATELET # BLD AUTO: 309 K/UL (ref 150–400)
PMV BLD AUTO: 9.4 FL (ref 8.9–12.9)
POTASSIUM SERPL-SCNC: 3.1 MMOL/L (ref 3.5–5.1)
PROT SERPL-MCNC: 7.9 G/DL (ref 6.4–8.2)
RBC # BLD AUTO: 3.57 M/UL (ref 3.8–5.2)
RBC MORPH BLD: ABNORMAL
RETICS # AUTO: 0.06 M/UL (ref 0.02–0.08)
RETICS/RBC NFR AUTO: 1.7 % (ref 0.7–2.1)
SODIUM SERPL-SCNC: 138 MMOL/L (ref 136–145)
TIBC SERPL-MCNC: 450 UG/DL (ref 250–450)
TROPONIN I SERPL-MCNC: <0.05 NG/ML
VIT B12 SERPL-MCNC: 99 PG/ML (ref 193–986)
WBC # BLD AUTO: 6.1 K/UL (ref 3.6–11)

## 2019-05-28 PROCEDURE — 81025 URINE PREGNANCY TEST: CPT

## 2019-05-28 PROCEDURE — 82607 VITAMIN B-12: CPT

## 2019-05-28 PROCEDURE — 86900 BLOOD TYPING SEROLOGIC ABO: CPT

## 2019-05-28 PROCEDURE — 83540 ASSAY OF IRON: CPT

## 2019-05-28 PROCEDURE — 82728 ASSAY OF FERRITIN: CPT

## 2019-05-28 PROCEDURE — 30233N1 TRANSFUSION OF NONAUTOLOGOUS RED BLOOD CELLS INTO PERIPHERAL VEIN, PERCUTANEOUS APPROACH: ICD-10-PCS | Performed by: INTERNAL MEDICINE

## 2019-05-28 PROCEDURE — 36415 COLL VENOUS BLD VENIPUNCTURE: CPT

## 2019-05-28 PROCEDURE — 99283 EMERGENCY DEPT VISIT LOW MDM: CPT

## 2019-05-28 PROCEDURE — 93005 ELECTROCARDIOGRAM TRACING: CPT

## 2019-05-28 PROCEDURE — 74011250636 HC RX REV CODE- 250/636: Performed by: EMERGENCY MEDICINE

## 2019-05-28 PROCEDURE — 80053 COMPREHEN METABOLIC PANEL: CPT

## 2019-05-28 PROCEDURE — 36430 TRANSFUSION BLD/BLD COMPNT: CPT

## 2019-05-28 PROCEDURE — 65270000029 HC RM PRIVATE

## 2019-05-28 PROCEDURE — 85045 AUTOMATED RETICULOCYTE COUNT: CPT

## 2019-05-28 PROCEDURE — 84484 ASSAY OF TROPONIN QUANT: CPT

## 2019-05-28 PROCEDURE — 86923 COMPATIBILITY TEST ELECTRIC: CPT

## 2019-05-28 PROCEDURE — 85025 COMPLETE CBC W/AUTO DIFF WBC: CPT

## 2019-05-28 PROCEDURE — 83010 ASSAY OF HAPTOGLOBIN QUANT: CPT

## 2019-05-28 PROCEDURE — P9016 RBC LEUKOCYTES REDUCED: HCPCS

## 2019-05-28 PROCEDURE — 82746 ASSAY OF FOLIC ACID SERUM: CPT

## 2019-05-28 PROCEDURE — 83615 LACTATE (LD) (LDH) ENZYME: CPT

## 2019-05-28 PROCEDURE — 74011250637 HC RX REV CODE- 250/637: Performed by: INTERNAL MEDICINE

## 2019-05-28 RX ORDER — IBUPROFEN 200 MG
200 TABLET ORAL
COMMUNITY
End: 2019-05-29

## 2019-05-28 RX ORDER — PROCHLORPERAZINE EDISYLATE 5 MG/ML
5 INJECTION INTRAMUSCULAR; INTRAVENOUS
Status: DISCONTINUED | OUTPATIENT
Start: 2019-05-28 | End: 2019-05-29 | Stop reason: HOSPADM

## 2019-05-28 RX ORDER — CYCLOBENZAPRINE HCL 10 MG
10 TABLET ORAL
COMMUNITY
End: 2019-09-09

## 2019-05-28 RX ORDER — SODIUM CHLORIDE 0.9 % (FLUSH) 0.9 %
5-40 SYRINGE (ML) INJECTION AS NEEDED
Status: DISCONTINUED | OUTPATIENT
Start: 2019-05-28 | End: 2019-05-29 | Stop reason: HOSPADM

## 2019-05-28 RX ORDER — DIPHENHYDRAMINE HCL 25 MG
25 CAPSULE ORAL
Status: DISCONTINUED | OUTPATIENT
Start: 2019-05-28 | End: 2019-05-29 | Stop reason: HOSPADM

## 2019-05-28 RX ORDER — ACETAMINOPHEN 325 MG/1
650 TABLET ORAL
Status: DISCONTINUED | OUTPATIENT
Start: 2019-05-28 | End: 2019-05-29 | Stop reason: HOSPADM

## 2019-05-28 RX ORDER — POTASSIUM CHLORIDE 750 MG/1
40 TABLET, FILM COATED, EXTENDED RELEASE ORAL EVERY 4 HOURS
Status: DISPENSED | OUTPATIENT
Start: 2019-05-28 | End: 2019-05-29

## 2019-05-28 RX ORDER — SODIUM CHLORIDE 9 MG/ML
250 INJECTION, SOLUTION INTRAVENOUS AS NEEDED
Status: DISCONTINUED | OUTPATIENT
Start: 2019-05-28 | End: 2019-05-29 | Stop reason: HOSPADM

## 2019-05-28 RX ORDER — NAPROXEN SODIUM 220 MG
220 TABLET ORAL
COMMUNITY
End: 2019-05-29

## 2019-05-28 RX ORDER — SODIUM CHLORIDE 0.9 % (FLUSH) 0.9 %
5-40 SYRINGE (ML) INJECTION EVERY 8 HOURS
Status: DISCONTINUED | OUTPATIENT
Start: 2019-05-28 | End: 2019-05-29 | Stop reason: HOSPADM

## 2019-05-28 RX ADMIN — POTASSIUM CHLORIDE 40 MEQ: 750 TABLET, FILM COATED, EXTENDED RELEASE ORAL at 18:55

## 2019-05-28 RX ADMIN — POTASSIUM CHLORIDE 40 MEQ: 750 TABLET, FILM COATED, EXTENDED RELEASE ORAL at 23:58

## 2019-05-28 RX ADMIN — SODIUM CHLORIDE 500 ML: 900 INJECTION, SOLUTION INTRAVENOUS at 18:57

## 2019-05-28 RX ADMIN — Medication 10 ML: at 23:58

## 2019-05-28 NOTE — PROGRESS NOTES
[facility]: MED RECONCILIATION    Comments/Recommendations:   Patient reports compliance with recently prescribed meloxicam and cyclobenzaprine for back pain. These were prescribed on 5/16/19 per [address] Patient also notes that she has been taking Aleve and Ibuprofen PRN in addition to the meloxicam which she now realizes is duplicate therapy and can cause bleeding. Per [address], she has prescriptions that were called in 5/26/19 for a 5-day supply of Keflex (for UTI) and ferrous sulfate. Patient says that she has not yet picked these up yet. Medications added:     Cyclobenzaprine 10 mg PO TID PRN - last dose was last night  Aleve PRN  Ibuprofen PRN    Medications removed:    Percocet - appears to be an old Rx (no recent fills per [facility])  Miralax - old Rx    Medications adjusted:    none    Information obtained from: patient, Rx query, [address] [place] [address], Prescription Monitoring Program ()    Significant PMH/Disease States:   Past Medical History:   Diagnosis Date    Anemia     Anxiety     ASCUS (atypical squamous cells of undetermined significance) on Pap smear 6/1/2012    Asthma     extrinsic as a child     Morbid obesity ([facility] [address]      Chief Complaint for this Admission:   Chief Complaint   Patient presents with    Back Pain    Fatigue     Allergies: Bactrim [sulfamethoprim ds] and Zofran [ondansetron hcl (pf)]    Prior to Admission Medications:     Medication Documentation Review Audit       Reviewed by YANETH EdmondsonD (Pharmacist) on 05/28/19 at [place] [id number]      Medication Sig Documenting Provider Last Dose Status Taking? cephALEXin (KEFLEX) 500 mg capsule Take 1 Cap by mouth three (3) times daily. Katherine Reese MD  Active            Med Note (April Sheerer   Tue May 28, 2019  6:32 PM) Prescribed 5/26/19 but has not yet picked up   cyclobenzaprine (FLEXERIL) 10 mg tablet Take 10 mg by mouth three (3) times daily as needed for Muscle Spasm(s). Provider, Historical 5/27/2019 Unknown time Active Yes   ferrous sulfate 325 mg (65 mg iron) tablet Take 1 Tab by mouth Daily (before breakfast). Margoth Cano MD  Active            Med Note (Marcel Holstein Tue May 28, 2019  6:33 PM) Prescribed 5/26/19 but has not yet picked up   ibuprofen (MOTRIN) 200 mg tablet Take 200 mg by mouth every eight (8) hours as needed for Pain. Provider, Historical 5/27/2019 pm Active Yes   meloxicam (MOBIC) 15 mg tablet Take 1 Tab by mouth daily. [address] [place] [address] 5/27/2019 am Active Yes   naproxen sodium (ALEVE) 220 mg tablet Take 220 mg by mouth two (2) times daily as needed. Provider, Historical 5/27/2019 pm Active Yes                  Thank you for the consult,  Maximiliano VILLALOBOS, [facility]

## 2019-05-28 NOTE — ED NOTES
Patient Throughput:  Charge nurse on 5th floor made aware of patient's room assignment, room [facility], RN  Shift Resource Nurse  Emergency Department

## 2019-05-28 NOTE — ED TRIAGE NOTES
The patient complains of lower back pain for the past two weeks, Seen at [facility] for same two weeks ago and was told her Hgb was low and she needed a blood transfusion. She states she became weak at work and called EMS.

## 2019-05-28 NOTE — H&P
[facility]    Hospitalist Admission Note      NAME:  Sonia Arias   :   1985   MRN:  [id number]     PCP:  None     Date/Time:  2019 6:21 PM          Subjective:     CHIEF COMPLAINT: syncope     HISTORY OF PRESENT ILLNESS:     Ms. Carolina Velez is a 35 y.o.  female who presented to the Emergency Department complaining of weakness and pre syncope. Worsening for days. Visited outside ER (due to fall and back pain) and found to be anemia, refused transfusion and left. Presents today with persistent anemia. She had been taking NSAIDS regularly for years. She has had heavy menstral period for years, and was on OCP and iron, until she lost insurance years ago. She has no PCP nor insurance now. We will admit her for management. Past Medical History:   Diagnosis Date    Anemia     Anxiety     ASCUS (atypical squamous cells of undetermined significance) on Pap smear 2012    Asthma     extrinsic as a child     Morbid obesity ([facility] [address]         Past Surgical History:   Procedure Laterality Date    BREAST SURGERY PROCEDURE UNLISTED  12    Incision and Drainage Left Breast Abscess    HX BREAST BIOPSY Bilateral     abscesses removed  and     HX BREAST REDUCTION      HX OTHER SURGICAL      abscess removed from left breast       Social History     Tobacco Use    Smoking status: Never Smoker    Smokeless tobacco: Never Used   Substance Use Topics    Alcohol use: No        Family History   Problem Relation Age of Onset    Hypertension Mother     Elevated Lipids Mother     Diabetes Sister       Allergies   Allergen Reactions    Bactrim [Sulfamethoprim Ds] Nausea Only    Zofran [Ondansetron Hcl (Pf)] Rash        Prior to Admission medications    Medication Sig Start Date End Date Taking? Authorizing Provider   oxyCODONE-acetaminophen (PERCOCET) 5-325 mg per tablet Take 1 Tab by mouth every eight (8) hours as needed for Pain for up to 3 days.  Max Daily Amount: 3 Tabs. Indications: Pain 5/26/19 5/29/19  Vanessa Shields MD   cephALEXin (KEFLEX) 500 mg capsule Take 1 Cap by mouth three (3) times daily. 5/26/19   Vanessa Shields MD   ferrous sulfate 325 mg (65 mg iron) tablet Take 1 Tab by mouth Daily (before breakfast). 5/26/19   Vanessa Shields MD   meloxicam (MOBIC) 15 mg tablet Take 1 Tab by mouth daily. 5/16/19   Jorge L Suarez PA   polyethylene glycol (MIRALAX) 17 gram/dose powder Take 17 g by mouth daily.  1 tablespoon with 8 oz of water daily 10/27/17   Severo Dark., MD       Review of Systems:  (bold if positive, if negative)    Gen:  fatigueEyes:  ENT:  CVS:  dizziness, syncope,Pulm:  GI:  :  MS:  Skin:  Psych:  Endo:  Hem:  Renal:  Neuro:        Objective:      VITALS:    Vital signs reviewed; most recent are:    Visit Vitals  /82 (BP 1 Location: Right arm, BP Patient Position: At rest)   Pulse (!) 103   Temp 99.3 °F (37.4 °C)   Resp 18   Ht 5' 7\" (1.702 m)   Wt 127 kg (279 lb 15.8 oz)   SpO2 100%   BMI 43.85 kg/m²     SpO2 Readings from Last 6 Encounters:   05/28/19 100%   05/26/19 100%   05/16/19 99%   10/01/18 100%   11/09/17 100%   10/27/17 100%        No intake or output data in the 24 hours ending 05/28/19 1821     Exam:     Physical Exam:    Gen:  Morbid obese, in no acute distress  HEENT:  Pale conjunctivae, PERRL, hearing intact to voice, moist mucous membranes  Neck:  Supple, without masses, thyroid non-tender  Resp:  No accessory muscle use, clear breath sounds without wheezes rales or rhonchi  Card:  No murmurs, tachycardic S1, S2 without thrills, bruits or peripheral edema  Abd:  Soft, non-tender, non-distended, normoactive bowel sounds are present, no mass  Lymph:  No cervical or inguinal adenopathy  Musc:  No cyanosis or clubbing  Skin:  No rashes or ulcers, skin turgor is good  Neuro:  Cranial nerves are grossly intact, no focal motor weakness, follows commands   Psych:  Poor insight, oriented to person, place and time, alert     Labs:    Recent Labs     05/28/19  1702   WBC 6.1   HGB 6.1*   HCT 23.1*        Recent Labs     05/28/19  1702      K 3.1*   *   CO2 25   GLU 96   BUN 10   CREA 0.87   CA 8.0*   ALB 3.5   TBILI 1.2*   SGOT 58*   ALT 63     Lab Results   Component Value Date/Time    Glucose (POC) 87 12/18/2014 06:34 PM    Glucose (POC) 93 09/23/2014 06:15 PM     No results for input(s): PH, PCO2, PO2, HCO3, FIO2 in the last 72 hours. No results for input(s): INR in the last 72 hours. No lab exists for component: INREXT  All Micro Results     None          I have reviewed previous records       Assessment and Plan:      Anemia - POA, unclear etiology, but likely menorrhagia, and/or gastric ulcer. Check serologies and hemoccult. Rx supplements based on serologies. OB consult. GI consult if needed. Syncope / Sinus tachycardia - POA, due to anemia, likely. After transfusion, check orthostatics and ECHO. Morbid obesity - Advise weight loss. Would benefit from outpatient BULMARO testing. Hypokalemia - Noted on admit. Replete and re check    Elevated cholesterol - Statin    Asthma - No symptoms. Unclear if true, may have GERD, etc.    Anxiety - Not on SSRI, would benefit from counseling and meds    Telemetry reviewed:   normal sinus rhythm    Risk of deterioration: high      Total time spent with patient: 79 Minutes I reviewed chart, notes, data and current medications in the medical record. I have examined and treated the patient at bedside during this period.                  Care Plan discussed with: Patient, Family, Nursing Staff and >50% of time spent in counseling and coordination of care    Discussed:  Care Plan       ___________________________________________________    Attending Physician: Michelle Banerjee MD

## 2019-05-28 NOTE — ED PROVIDER NOTES
4:53 PM  I have evaluated the patient as the Provider in Triage. I have reviewed Her vital signs and the triage nurse assessment. I have talked with the patient and any available family and advised that I am the provider in triage and have ordered the appropriate study to initiate their work up based on the clinical presentation during my assessment. I have advised that the patient will be accommodated in the Main ED as soon as possible. I have also requested to contact the triage nurse or myself immediately if the patient experiences any changes in their condition during this brief waiting period. Nick Carrera MD  -------------------------------------------------------------------------------------------------      C/o near syncope, back pain, lightheadedness. Denies n/v/d, abdominal pain, leg swelling, or any other acute sx. Pt denies any recent travel, known sick contacts, or recent illness. Pt states that a couple of weeks ago, she Pulled muscle, strained back a couple of weeks, heard noise in back. Seen Saturday at  [facility], did blood work, iron 6.1. Didn't get  transfused because parents not here. Weakness, back pain (worse with back pain). Hx of anemia, never been transfused. Pt reports that she has not been having normal bowel movements since her cycle. Pt reports that she had sudden onset generalized weakness while she was at work, and they called EMS. and states that she got very lightheaded and almost passed out. C/o near syncope, back pain, lightheadedness. Denies n/v/d, abdominal pain, leg swelling, or any other acute sx. Pt denies any recent travel, known sick contacts, or recent illness. Signed by: Gerardo Nation, scribjudy for Renee Carrera MD  on May 28th, 2019 at 4:53pm.      Bernardo Alfaro is a 35 y.o. female  who presents by EMS to ER with c/o Patient presents with:  Back Pain  Fatigue  Patient seen at [address] [place] [address] on Saturday and had hemoglobin of 6.1, patient was not willing to stay at that time. Patient comes to ED today with fatigue and pre-syncope after work. Patient reports history of heavy periods. Denies history of having a blood transfusion. She specifically denies any fevers, chills, nausea, vomiting, chest pain, shortness of breath, headache, rash, diarrhea, abdominal pain, urinary/bowel changes, sweating or weight loss. PCP: None   PMHx significant for: Past Medical History:  No date: Anemia  No date: Anemia  No date: Anemia NEC  6/1/2012: ASCUS (atypical squamous cells of undetermined   significance) on Pap smear  No date: Asthma      Comment:  extrinsic as a child   No date: Psychiatric disorder      Comment:  Pt reports having anxiety. PSHx significant for: Past Surgical History:  11/27/12: BREAST SURGERY PROCEDURE UNLISTED      Comment:  Incision and Drainage Left Breast Abscess  No date: HX BREAST BIOPSY; Bilateral      Comment:  abscesses removed 2014 and 2015  2003: HX BREAST REDUCTION  No date: HX OTHER SURGICAL      Comment:  abscess removed from left breast  Social Hx: Tobacco use: Social History    Tobacco Use      Smoking status: Never Smoker      Smokeless tobacco: Never Used  ; EtOH use: The patient states she drinks 0 per week.; Illicit Drug use: Allergies:   -- Bactrim (Sulfamethoprim Ds) -- Nausea Only   -- Zofran (Ondansetron Hcl (Pf)) -- Rash    There are no other complaints, changes or physical findings at this time. Past Medical History:   Diagnosis Date    Anemia     Anemia     Anemia NEC     ASCUS (atypical squamous cells of undetermined significance) on Pap smear 6/1/2012    Asthma     extrinsic as a child     Psychiatric disorder     Pt reports having anxiety.        Past Surgical History:   Procedure Laterality Date    BREAST SURGERY PROCEDURE UNLISTED  11/27/12    Incision and Drainage Left Breast Abscess    HX BREAST BIOPSY Bilateral     abscesses removed 2014 and 2015    HX BREAST REDUCTION  2003    HX OTHER SURGICAL      abscess removed from left breast         Family History:   Problem Relation Age of Onset    Hypertension Mother     Elevated Lipids Mother     Diabetes Sister        Social History     Socioeconomic History    Marital status: SINGLE     Spouse name: Not on file    Number of children: Not on file    Years of education: Not on file    Highest education level: Not on file   Occupational History    Not on file   Social Needs    Financial resource strain: Not on file    Food insecurity:     Worry: Not on file     Inability: Not on file    Transportation needs:     Medical: Not on file     Non-medical: Not on file   Tobacco Use    Smoking status: Never Smoker    Smokeless tobacco: Never Used   Substance and Sexual Activity    Alcohol use: No    Drug use: No    Sexual activity: Yes     Partners: Male     Birth control/protection: Condom   Lifestyle    Physical activity:     Days per week: Not on file     Minutes per session: Not on file    Stress: Not on file   Relationships    Social connections:     Talks on phone: Not on file     Gets together: Not on file     Attends Yarsani service: Not on file     Active member of club or organization: Not on file     Attends meetings of clubs or organizations: Not on file     Relationship status: Not on file    Intimate partner violence:     Fear of current or ex partner: Not on file     Emotionally abused: Not on file     Physically abused: Not on file     Forced sexual activity: Not on file   Other Topics Concern    Not on file   Social History Narrative    Not on file         ALLERGIES: Bactrim [sulfamethoprim ds] and Zofran [ondansetron hcl (pf)]    Review of Systems   Constitutional: Negative for chills and fever. Respiratory: Negative for cough and shortness of breath. Cardiovascular: Negative for chest pain. Gastrointestinal: Positive for constipation.  Negative for abdominal pain, diarrhea, nausea and vomiting. Musculoskeletal: Positive for back pain. Negative for neck pain. Neurological: Positive for weakness and light-headedness. All other systems reviewed and are negative. There were no vitals filed for this visit. Physical Exam   Constitutional: She is oriented to person, place, and time. She appears well-developed. She appears ill. HENT:   Head: Normocephalic and atraumatic. Right Ear: External ear normal.   Left Ear: External ear normal.   Nose: Nose normal.   Mouth/Throat: Oropharynx is clear and moist. Mucous membranes are pale. No oropharyngeal exudate. Eyes: Conjunctivae, EOM and lids are normal. Right eye exhibits no discharge. Left eye exhibits no discharge. Neck: Normal range of motion. No tracheal deviation present. No thyromegaly present. Cardiovascular: Normal rate, regular rhythm, normal heart sounds and intact distal pulses. Pulmonary/Chest: Effort normal and breath sounds normal.   Abdominal: Soft. Normal appearance and bowel sounds are normal.   Musculoskeletal: Normal range of motion. Neurological: She is alert and oriented to person, place, and time. Skin: Skin is warm and dry. Psychiatric: She has a normal mood and affect. Judgment normal.        MDM       Procedures        CONSULT NOTE:   6:20 PM  Emerita Alarcon PA-C spoke with Dr. Stephanie Robles,   Specialty: Hospitalist  Discussed pt's hx, disposition, and available diagnostic and imaging results. Reviewed care plans. Consultant agrees with plans as outlined. Will see for admission. 6:20 PM  Patient is being admitted to the hospital.  The results of their tests and reasons for their admission have been discussed with them and/or available family. They convey agreement and understanding for the need to be admitted and for their admission diagnosis. Consultation has been made with the inpatient physician specialist for hospitalization.     LABORATORY TESTS:  Recent Results (from the past 12 hour(s))   CBC WITH AUTOMATED DIFF    Collection Time: 05/28/19  5:02 PM   Result Value Ref Range    WBC 6.1 3.6 - 11.0 K/uL    RBC 3.57 (L) 3.80 - 5.20 M/uL    HGB 6.1 (L) 11.5 - 16.0 g/dL    HCT 23.1 (L) 35.0 - 47.0 %    MCV 64.7 (L) 80.0 - 99.0 FL    MCH 17.1 (L) 26.0 - 34.0 PG    MCHC 26.4 (L) 30.0 - 36.5 g/dL    RDW 20.5 (H) 11.5 - 14.5 %    PLATELET 617 488 - 422 K/uL    MPV 9.4 8.9 - 12.9 FL    NRBC 0.5 (H) 0  WBC    ABSOLUTE NRBC 0.03 (H) 0.00 - 0.01 K/uL    NEUTROPHILS 57 32 - 75 %    LYMPHOCYTES 23 12 - 49 %    MONOCYTES 11 5 - 13 %    EOSINOPHILS 8 (H) 0 - 7 %    BASOPHILS 0 0 - 1 %    IMMATURE GRANULOCYTES 1 (H) 0.0 - 0.5 %    ABS. NEUTROPHILS 3.4 1.8 - 8.0 K/UL    ABS. LYMPHOCYTES 1.4 0.8 - 3.5 K/UL    ABS. MONOCYTES 0.7 0.0 - 1.0 K/UL    ABS. EOSINOPHILS 0.5 (H) 0.0 - 0.4 K/UL    ABS. BASOPHILS 0.0 0.0 - 0.1 K/UL    ABS. IMM. GRANS. 0.1 (H) 0.00 - 0.04 K/UL    DF SMEAR SCANNED      RBC COMMENTS ANISOCYTOSIS  2+        RBC COMMENTS MICROCYTOSIS  PRESENT        RBC COMMENTS OVALOCYTES  PRESENT        RBC COMMENTS HYPOCHROMIA  3+       METABOLIC PANEL, COMPREHENSIVE    Collection Time: 05/28/19  5:02 PM   Result Value Ref Range    Sodium 138 136 - 145 mmol/L    Potassium 3.1 (L) 3.5 - 5.1 mmol/L    Chloride 109 (H) 97 - 108 mmol/L    CO2 25 21 - 32 mmol/L    Anion gap 4 (L) 5 - 15 mmol/L    Glucose 96 65 - 100 mg/dL    BUN 10 6 - 20 MG/DL    Creatinine 0.87 0.55 - 1.02 MG/DL    BUN/Creatinine ratio 11 (L) 12 - 20      GFR est AA >60 >60 ml/min/1.73m2    GFR est non-AA >60 >60 ml/min/1.73m2    Calcium 8.0 (L) 8.5 - 10.1 MG/DL    Bilirubin, total 1.2 (H) 0.2 - 1.0 MG/DL    ALT (SGPT) 63 12 - 78 U/L    AST (SGOT) 58 (H) 15 - 37 U/L    Alk.  phosphatase 86 45 - 117 U/L    Protein, total 7.9 6.4 - 8.2 g/dL    Albumin 3.5 3.5 - 5.0 g/dL    Globulin 4.4 (H) 2.0 - 4.0 g/dL    A-G Ratio 0.8 (L) 1.1 - 2.2     TROPONIN I    Collection Time: 05/28/19  5:02 PM   Result Value Ref Range    Troponin-I, Qt. <0.05 <0.05 ng/mL       IMAGING RESULTS:  none    MEDICATIONS GIVEN:  Medications   sodium chloride 0.9 % bolus infusion 500 mL (has no administration in time range)   0.9% sodium chloride infusion 250 mL (has no administration in time range)       IMPRESSION:  1. Hypokalemia    2. Anemia, unspecified type        PLAN:  1.  Admit to hospital

## 2019-05-29 ENCOUNTER — APPOINTMENT (OUTPATIENT)
Dept: GENERAL RADIOLOGY | Age: 34
DRG: 532 | End: 2019-05-29
Attending: INTERNAL MEDICINE
Payer: MEDICAID

## 2019-05-29 ENCOUNTER — APPOINTMENT (OUTPATIENT)
Dept: NON INVASIVE DIAGNOSTICS | Age: 34
DRG: 532 | End: 2019-05-29
Attending: INTERNAL MEDICINE
Payer: MEDICAID

## 2019-05-29 ENCOUNTER — TELEPHONE (OUTPATIENT)
Dept: OBGYN CLINIC | Age: 34
End: 2019-05-29

## 2019-05-29 VITALS
WEIGHT: 279 LBS | RESPIRATION RATE: 16 BRPM | HEART RATE: 61 BPM | BODY MASS INDEX: 43.79 KG/M2 | DIASTOLIC BLOOD PRESSURE: 70 MMHG | SYSTOLIC BLOOD PRESSURE: 112 MMHG | OXYGEN SATURATION: 99 % | HEIGHT: 67 IN | TEMPERATURE: 98.2 F

## 2019-05-29 LAB
ABO + RH BLD: NORMAL
ANION GAP SERPL CALC-SCNC: 4 MMOL/L (ref 5–15)
ATRIAL RATE: 71 BPM
ATRIAL RATE: 91 BPM
BASOPHILS # BLD: 0.1 K/UL (ref 0–0.1)
BASOPHILS NFR BLD: 1 % (ref 0–1)
BLD PROD TYP BPU: NORMAL
BLOOD BAG HEMOLYSIS,BLBAG: NORMAL
BUN SERPL-MCNC: 7 MG/DL (ref 6–20)
BUN/CREAT SERPL: 8 (ref 12–20)
CALCIUM SERPL-MCNC: 8.1 MG/DL (ref 8.5–10.1)
CALCULATED P AXIS, ECG09: 21 DEGREES
CALCULATED P AXIS, ECG09: 8 DEGREES
CALCULATED R AXIS, ECG10: 47 DEGREES
CALCULATED R AXIS, ECG10: 5 DEGREES
CALCULATED T AXIS, ECG11: -8 DEGREES
CALCULATED T AXIS, ECG11: 13 DEGREES
CHLORIDE SERPL-SCNC: 110 MMOL/L (ref 97–108)
CLERICAL ERRORS,CLERR: NORMAL
CO2 SERPL-SCNC: 25 MMOL/L (ref 21–32)
CREAT SERPL-MCNC: 0.84 MG/DL (ref 0.55–1.02)
DAT P TRANSF RBC QL: NORMAL
DAT RBC QL: NORMAL
DIAGNOSIS, 93000: NORMAL
DIAGNOSIS, 93000: NORMAL
DIFFERENTIAL METHOD BLD: ABNORMAL
ECHO AO ROOT DIAM: 3.35 CM
ECHO AV AREA PEAK VELOCITY: 4.5 CM2
ECHO AV AREA/BSA PEAK VELOCITY: 1.8 CM2/M2
ECHO AV PEAK GRADIENT: 5.7 MMHG
ECHO AV PEAK VELOCITY: 119.59 CM/S
ECHO LA MAJOR AXIS: 3.68 CM
ECHO LA TO AORTIC ROOT RATIO: 1.1
ECHO LA VOL 2C: 46.02 ML (ref 22–52)
ECHO LA VOL 4C: 59.33 ML (ref 22–52)
ECHO LA VOL BP: 63.54 ML (ref 22–52)
ECHO LA VOL/BSA BIPLANE: 27.28 ML/M2 (ref 16–28)
ECHO LA VOLUME INDEX A2C: 19.76 ML/M2 (ref 16–28)
ECHO LA VOLUME INDEX A4C: 25.47 ML/M2 (ref 16–28)
ECHO LV INTERNAL DIMENSION DIASTOLIC: 5.11 CM (ref 3.9–5.3)
ECHO LV INTERNAL DIMENSION SYSTOLIC: 3.23 CM
ECHO LV IVSD: 1.08 CM (ref 0.6–0.9)
ECHO LV MASS 2D: 202.1 G (ref 67–162)
ECHO LV MASS INDEX 2D: 70.8 G/M2 (ref 43–95)
ECHO LV POSTERIOR WALL DIASTOLIC: 0.8 CM (ref 0.6–0.9)
ECHO LVOT DIAM: 2.48 CM
ECHO LVOT PEAK GRADIENT: 4.9 MMHG
ECHO LVOT PEAK VELOCITY: 110.97 CM/S
ECHO MV A VELOCITY: 56.16 CM/S
ECHO MV E DECELERATION TIME (DT): 108.1 MS
ECHO MV E VELOCITY: 89.23 CM/S
ECHO MV E/A RATIO: 1.6
ECHO MV REGURGITANT PEAK GRADIENT: 60.6 MMHG
ECHO MV REGURGITANT PEAK VELOCITY: 389.38 CM/S
ECHO PV MAX VELOCITY: 82.4 CM/S
ECHO PV PEAK GRADIENT: 2.7 MMHG
ECHO RV INTERNAL DIMENSION: 4.89 CM
ECHO TV REGURGITANT MAX VELOCITY: 285.76 CM/S
ECHO TV REGURGITANT PEAK GRADIENT: 32.7 MMHG
EOSINOPHIL # BLD: 0.4 K/UL (ref 0–0.4)
EOSINOPHIL NFR BLD: 8 % (ref 0–7)
ERYTHROCYTE [DISTWIDTH] IN BLOOD BY AUTOMATED COUNT: 22.8 % (ref 11.5–14.5)
ERYTHROCYTE [DISTWIDTH] IN BLOOD BY AUTOMATED COUNT: 24.4 % (ref 11.5–14.5)
GLUCOSE SERPL-MCNC: 85 MG/DL (ref 65–100)
HCG UR QL: NEGATIVE
HCT VFR BLD AUTO: 25 % (ref 35–47)
HCT VFR BLD AUTO: 27.3 % (ref 35–47)
HEMOCCULT STL QL: NEGATIVE
HEMOLYSIS, POST TXN,PSTHE: NORMAL
HEMOLYSIS,PRE TXN,PREHE: NORMAL
HGB BLD-MCNC: 6.9 G/DL (ref 11.5–16)
HGB BLD-MCNC: 7.7 G/DL (ref 11.5–16)
IMM GRANULOCYTES # BLD AUTO: 0.1 K/UL (ref 0–0.04)
IMM GRANULOCYTES NFR BLD AUTO: 1 % (ref 0–0.5)
LYMPHOCYTES # BLD: 1.8 K/UL (ref 0.8–3.5)
LYMPHOCYTES NFR BLD: 32 % (ref 12–49)
MAGNESIUM SERPL-MCNC: 2.1 MG/DL (ref 1.6–2.4)
MCH RBC QN AUTO: 18.4 PG (ref 26–34)
MCH RBC QN AUTO: 19 PG (ref 26–34)
MCHC RBC AUTO-ENTMCNC: 27.6 G/DL (ref 30–36.5)
MCHC RBC AUTO-ENTMCNC: 28.2 G/DL (ref 30–36.5)
MCV RBC AUTO: 66.5 FL (ref 80–99)
MCV RBC AUTO: 67.4 FL (ref 80–99)
MD INTERPRETATION: NORMAL
MONOCYTES # BLD: 0.6 K/UL (ref 0–1)
MONOCYTES NFR BLD: 11 % (ref 5–13)
NEUTS SEG # BLD: 2.5 K/UL (ref 1.8–8)
NEUTS SEG NFR BLD: 47 % (ref 32–75)
NRBC # BLD: 0.02 K/UL (ref 0–0.01)
NRBC # BLD: 0.05 K/UL (ref 0–0.01)
NRBC BLD-RTO: 0.3 PER 100 WBC
NRBC BLD-RTO: 0.9 PER 100 WBC
P-R INTERVAL, ECG05: 166 MS
P-R INTERVAL, ECG05: 168 MS
PATH REV BLD -IMP: NORMAL
PLATELET # BLD AUTO: 285 K/UL (ref 150–400)
PLATELET # BLD AUTO: 295 K/UL (ref 150–400)
PMV BLD AUTO: 9.6 FL (ref 8.9–12.9)
PMV BLD AUTO: 9.6 FL (ref 8.9–12.9)
POTASSIUM SERPL-SCNC: 3.8 MMOL/L (ref 3.5–5.1)
Q-T INTERVAL, ECG07: 364 MS
Q-T INTERVAL, ECG07: 388 MS
QRS DURATION, ECG06: 78 MS
QRS DURATION, ECG06: 80 MS
QTC CALCULATION (BEZET), ECG08: 421 MS
QTC CALCULATION (BEZET), ECG08: 447 MS
RBC # BLD AUTO: 3.76 M/UL (ref 3.8–5.2)
RBC # BLD AUTO: 4.05 M/UL (ref 3.8–5.2)
RBC MORPH BLD: ABNORMAL
SODIUM SERPL-SCNC: 139 MMOL/L (ref 136–145)
UNIT NUMBER,UN: NORMAL
VENTRICULAR RATE, ECG03: 71 BPM
VENTRICULAR RATE, ECG03: 91 BPM
WBC # BLD AUTO: 5.5 K/UL (ref 3.6–11)
WBC # BLD AUTO: 7.6 K/UL (ref 3.6–11)

## 2019-05-29 PROCEDURE — 80048 BASIC METABOLIC PNL TOTAL CA: CPT

## 2019-05-29 PROCEDURE — 83735 ASSAY OF MAGNESIUM: CPT

## 2019-05-29 PROCEDURE — 74011250637 HC RX REV CODE- 250/637: Performed by: STUDENT IN AN ORGANIZED HEALTH CARE EDUCATION/TRAINING PROGRAM

## 2019-05-29 PROCEDURE — 36430 TRANSFUSION BLD/BLD COMPNT: CPT

## 2019-05-29 PROCEDURE — 93306 TTE W/DOPPLER COMPLETE: CPT

## 2019-05-29 PROCEDURE — 74011250637 HC RX REV CODE- 250/637: Performed by: INTERNAL MEDICINE

## 2019-05-29 PROCEDURE — 85027 COMPLETE CBC AUTOMATED: CPT

## 2019-05-29 PROCEDURE — 71045 X-RAY EXAM CHEST 1 VIEW: CPT

## 2019-05-29 PROCEDURE — 36415 COLL VENOUS BLD VENIPUNCTURE: CPT

## 2019-05-29 PROCEDURE — 85025 COMPLETE CBC W/AUTO DIFF WBC: CPT

## 2019-05-29 PROCEDURE — 86078 PHYS BLOOD BANK SERV REACTJ: CPT

## 2019-05-29 PROCEDURE — 74011250636 HC RX REV CODE- 250/636: Performed by: INTERNAL MEDICINE

## 2019-05-29 PROCEDURE — 86900 BLOOD TYPING SEROLOGIC ABO: CPT

## 2019-05-29 PROCEDURE — 86923 COMPATIBILITY TEST ELECTRIC: CPT

## 2019-05-29 PROCEDURE — 82272 OCCULT BLD FECES 1-3 TESTS: CPT

## 2019-05-29 PROCEDURE — P9016 RBC LEUKOCYTES REDUCED: HCPCS

## 2019-05-29 PROCEDURE — 93005 ELECTROCARDIOGRAM TRACING: CPT

## 2019-05-29 RX ORDER — SODIUM CHLORIDE 9 MG/ML
250 INJECTION, SOLUTION INTRAVENOUS AS NEEDED
Status: DISCONTINUED | OUTPATIENT
Start: 2019-05-29 | End: 2019-05-29 | Stop reason: HOSPADM

## 2019-05-29 RX ORDER — CYCLOBENZAPRINE HCL 10 MG
10 TABLET ORAL
Status: CANCELLED | OUTPATIENT
Start: 2019-05-29

## 2019-05-29 RX ORDER — HYDROCODONE BITARTRATE AND ACETAMINOPHEN 5; 325 MG/1; MG/1
1 TABLET ORAL
Status: DISCONTINUED | OUTPATIENT
Start: 2019-05-29 | End: 2019-05-29 | Stop reason: HOSPADM

## 2019-05-29 RX ORDER — DOCUSATE SODIUM 100 MG/1
100 CAPSULE, LIQUID FILLED ORAL 2 TIMES DAILY
Status: DISCONTINUED | OUTPATIENT
Start: 2019-05-29 | End: 2019-05-29 | Stop reason: HOSPADM

## 2019-05-29 RX ORDER — LANOLIN ALCOHOL/MO/W.PET/CERES
1 CREAM (GRAM) TOPICAL
Status: DISCONTINUED | OUTPATIENT
Start: 2019-05-29 | End: 2019-05-29 | Stop reason: HOSPADM

## 2019-05-29 RX ORDER — LANOLIN ALCOHOL/MO/W.PET/CERES
325 CREAM (GRAM) TOPICAL
Qty: 90 TAB | Refills: 0 | Status: SHIPPED
Start: 2019-05-29 | End: 2019-09-09

## 2019-05-29 RX ADMIN — FERROUS SULFATE TAB 325 MG (65 MG ELEMENTAL FE) 325 MG: 325 (65 FE) TAB at 17:06

## 2019-05-29 RX ADMIN — DOCUSATE SODIUM 100 MG: 100 CAPSULE, LIQUID FILLED ORAL at 17:06

## 2019-05-29 RX ADMIN — IRON SUCROSE 100 MG: 20 INJECTION, SOLUTION INTRAVENOUS at 17:06

## 2019-05-29 RX ADMIN — DOCUSATE SODIUM 100 MG: 100 CAPSULE, LIQUID FILLED ORAL at 10:09

## 2019-05-29 RX ADMIN — FERROUS SULFATE TAB 325 MG (65 MG ELEMENTAL FE) 325 MG: 325 (65 FE) TAB at 12:59

## 2019-05-29 RX ADMIN — FERROUS SULFATE TAB 325 MG (65 MG ELEMENTAL FE) 325 MG: 325 (65 FE) TAB at 10:09

## 2019-05-29 RX ADMIN — Medication 10 ML: at 13:00

## 2019-05-29 RX ADMIN — PROCHLORPERAZINE EDISYLATE 5 MG: 5 INJECTION INTRAMUSCULAR; INTRAVENOUS at 01:17

## 2019-05-29 RX ADMIN — ACETAMINOPHEN 650 MG: 325 TABLET ORAL at 00:50

## 2019-05-29 NOTE — ED NOTES
TRANSFER - OUT REPORT:    Verbal report given to joanna(name) on Kasie Leung  being transferred to 5th floor(unit) for routine progression of care       Report consisted of patients Situation, Background, Assessment and   Recommendations(SBAR). Information from the following report(s) SBAR, ED Summary and MAR was reviewed with the receiving nurse. Lines:   Peripheral IV 05/28/19 Left Antecubital (Active)   Site Assessment Clean, dry, & intact 5/28/2019  5:03 PM   Phlebitis Assessment 0 5/28/2019  5:03 PM   Infiltration Assessment 0 5/28/2019  5:03 PM   Dressing Status Clean, dry, & intact 5/28/2019  5:03 PM   Dressing Type Transparent;Tape 5/28/2019  5:03 PM   Hub Color/Line Status Pink;Flushed;Patent 5/28/2019  5:03 PM   Action Taken Blood drawn 5/28/2019  5:03 PM       Peripheral IV 05/28/19 Right Antecubital (Active)   Site Assessment Clean, dry, & intact 5/28/2019  7:06 PM   Phlebitis Assessment 0 5/28/2019  7:06 PM   Infiltration Assessment 0 5/28/2019  7:06 PM   Dressing Status Clean, dry, & intact 5/28/2019  7:06 PM   Dressing Type Transparent 5/28/2019  7:06 PM   Hub Color/Line Status Pink 5/28/2019  7:06 PM        Opportunity for questions and clarification was provided.       Patient transported with:   tech

## 2019-05-29 NOTE — TELEPHONE ENCOUNTER
ER MD calling to set up follow up appointment. See notes from ER    Currently on the schedule for visit only on 6/4/19 at 8:40am    ? Do you want the patient to have an ultrasound at that first appointment as well.     Please advise

## 2019-05-29 NOTE — PROGRESS NOTES
0730  Bedside and Verbal shift change report given to Abbie Kwong RN (oncoming nurse) by Carlitos Max RN (offgoing nurse). Report included the following information SBAR, Kardex, MAR, Accordion, Recent Results and Med Rec Status. 0234  Within 15 minutes patient began experiencing chest pain that was burning and radiating, pt's back pain that she stated she had been having from two weeks began worsening as well. Transfusion stopped. EKG obtained and showed NSR. Dr. Abilio Ramirez was notified by Gianluca Valencia, [address] [place] [address]. Nursing supervisor made aware. STAT chest x ray obtained. Transfusion reaction protocol followed. Unit, tubing, and pink slip sent to blood bank. Labs collected. NS KVO running at 20 with new tubing running per protocol. Patient repositioned to recliner and stated she is feeling a lot better. Will continue to monitor.

## 2019-05-29 NOTE — ROUTINE PROCESS
Pt experiencing chest pain during 2nd unit of blood. Transfusion stopped. Called Dr. Angel Pond to notify. EKG ordered.

## 2019-05-29 NOTE — ROUTINE PROCESS
Interdisciplinary team rounds were held 5/29/2019 with the following team members:Care Management, Nursing, Nutrition, Pharmacy, Physical Therapy and Physician. Plan of care discussed. See clinical pathway and/or care plan for interventions and desired outcomes. Plan: post transfusion CBC and monitor labs.

## 2019-05-29 NOTE — CONSULTS
Gynecology Consult    Name: Katy Orona MRN: [id number] SSN: [id number]    YOB: 1985  Age: 35 y.o. Sex: female       Subjective:      Chief complaint:  Anemia, Denisse Thorne is a 35 y.o.  female with a PMH of menorrhagia, anemia, and morbid obesity who was admitted 5/28/19 for symptomatic anemia. Pt went to ED on 5/26/19 due to back strain at work, was found to have low Hgb at 6.1. Pt declined transfusion at the time then came back to ED 5/28 \"to get transfusion taken care of\". Hgb 6.1 POA. Pt has never been transfused before, reports hx of needing iron and OCPs as teenager for heavy periods, has not been taking iron or OCPs for past 4 years \"I was told I didn't need iron anymore\". Pt with no GYN f/u since. Menstrual periods occur every 4 weeks on average, but per pt are unpredictable, last 4-5 days, needs 3-4 heavy pads on first day, required 1.5 boxes of heavy pads with each period. Family hx of anemia in mother, aunts, and uncles, pt does not know cause of anemia, denies any known FMHx of blood disorders. Reports intermitted blood in stool when \"straining\" over past 3 weeks, denies any history of GI bleeding or GI abnormalities in the past. Denies any hx of uterine abnormalities including fibroids, chest pain, abdominal pain, nausea, vomiting, constipation, diarrhea. Pt is feeling better this morning s/p 1 unit pRBCs, Hgb up from 6.1 to 6.9. The current method of family planning is none. Pt states she is trying to get pregnant. Pt has no biological children.      OB History    None       Past Medical History:   Diagnosis Date    Anemia     Anxiety     ASCUS (atypical squamous cells of undetermined significance) on Pap smear 6/1/2012    Asthma     extrinsic as a child     Morbid obesity ([address]      Past Surgical History:   Procedure Laterality Date    BREAST SURGERY PROCEDURE UNLISTED  11/27/12    Incision and Drainage Left Breast Abscess    HX BREAST BIOPSY Bilateral     abscesses removed 2014 and 2015    HX BREAST REDUCTION  2003    HX OTHER SURGICAL      abscess removed from left breast     Social History     Occupational History    Not on file   Tobacco Use    Smoking status: Never Smoker    Smokeless tobacco: Never Used   Substance and Sexual Activity    Alcohol use: No    Drug use: No    Sexual activity: Yes     Partners: Male     Birth control/protection: Condom     Family History   Problem Relation Age of Onset    Hypertension Mother     Elevated Lipids Mother     Diabetes Sister         Allergies   Allergen Reactions    Bactrim [Sulfamethoprim Ds] Nausea Only    Zofran [Ondansetron Hcl (Pf)] Rash     Prior to Admission medications    Medication Sig Start Date End Date Taking? Authorizing Provider   cyclobenzaprine (FLEXERIL) 10 mg tablet Take 10 mg by mouth three (3) times daily as needed for Muscle Spasm(s). Yes Provider, Historical   naproxen sodium (ALEVE) 220 mg tablet Take 220 mg by mouth two (2) times daily as needed. Yes Provider, Historical   ibuprofen (MOTRIN) 200 mg tablet Take 200 mg by mouth every eight (8) hours as needed for Pain. Yes Provider, Historical   meloxicam (MOBIC) 15 mg tablet Take 1 Tab by mouth daily. 5/16/19  Yes BESSIE Ramos   cephALEXin (KEFLEX) 500 mg capsule Take 1 Cap by mouth three (3) times daily. 5/26/19   Blinda Ahumada, MD   ferrous sulfate 325 mg (65 mg iron) tablet Take 1 Tab by mouth Daily (before breakfast). 5/26/19   Blinda Ahumada, MD        Review of Systems  A comprehensive review of systems was negative except for that written in the History of Present Illness.     Objective:     Vitals:    05/29/19 0233 05/29/19 0245 05/29/19 0330 05/29/19 0757   BP: 119/78 130/82 119/69 119/69   Pulse: 75 82 67    Resp: 16 18 18    Temp: 98 °F (36.7 °C) 98.2 °F (36.8 °C) 98.3 °F (36.8 °C)    SpO2: 97% 95% 99%    Weight:    126.6 kg (279 lb)   Height:    5' 7\" (1.702 m)       Physical Exam:  Patient without distress. Heart: Regular rate and rhythm, S1S2 present or without murmur or extra heart sounds  Lung: clear to auscultation throughout lung fields, no wheezes, no rales, no rhonchi and normal respiratory effort  Abdomen: soft, nontender, obese  Genitalia: normal general appearance, no blood or clots seen  Adnexa: normal bimanual exam  Extremities: no edema or tenderness      Assessment/Plan:     Dmitry Momin is a 35 y.o.  female with a PMH of menorrhagia, anemia, and morbid obesity admitted for symptomatic anemia. 1. Anemia: pt feeling better s/p 1 unit pRBC, Hgb up from 6.1 to 6.9  -Agree with second unit of blood and iron TID  -Will add colace BID  2.  Menorrhagia: Pt not actively bleeding at this time, menorrhagia likely cause of iron deficiency anemia  -Agree with UA and urine pregnancy test  -Will make f/u appointment with [facility] for outpatient endometrial biopsy and further medical management    Patient seen and discussed with Dr. Chrissy Chavis By:  Geno Palacio MD    Family Medicine Resident

## 2019-05-29 NOTE — PROGRESS NOTES
Reason for Admission:    anemia                   RRAT Score:    4-low                 Plan for utilizing home health:      No needs at this time. Pt is ambulatory and is independent in managing self care. Current Advanced Directive/Advance Care Plan:  Not on file at this time. Likelihood of Readmission:  low                         Transition of Care Plan:  Pt to discharge home with family. Pt is a 36 yo female admitted for anemia. Pt was accompanied by her boyfriend who was sitting at bedside. Pt was alert and oriented X4 during initial assessment. Pt lives in a 1 story home with 3-4 steps. Pt reports to get adequate support from her parents and boyfriend. No anticipated discharge needs at this time. Cm will continue to follow for discharge planning. Care Management Interventions  PCP Verified by CM: (Pt has no PCP due to being uninsured)  Mode of Transport at Discharge: Other (see comment)(private vehicle family)  Transition of Care Consult (CM Consult): Discharge Planning  Current Support Network:  Other(fiance and parents. )  Discharge Location  Discharge Placement: [address] [facility], [place], [place], [address] [facility]

## 2019-05-29 NOTE — PROGRESS NOTES
2040  TRANSFER - IN REPORT:    Verbal report received from [address] [place] [address], [address] [place] [address] (name) on [facility]  being received from ED (unit) for routine progression of care      Report consisted of patients Situation, Background, Assessment and   Recommendations(SBAR). Information from the following report(s) SBAR, Kardex, Intake/Output, MAR, Recent Results and Med Rec Status was reviewed with the receiving nurse. Opportunity for questions and clarification was provided. Assessment completed upon patients arrival to unit and care assumed.

## 2019-05-29 NOTE — DISCHARGE INSTRUCTIONS
HOSPITALIST DISCHARGE INSTRUCTIONS  NAME: Марина Muñoz   :  1985   MRN:  [id number]     Date/Time:  2019 4:34 PM    ADMIT DATE: 2019     DISCHARGE DATE: 2019     ADMITTING DIAGNOSIS:  Anemia     DISCHARGE DIAGNOSIS:  As above    MEDICATIONS:     · It is important that you take the medication exactly as they are prescribed. · Keep your medication in the bottles provided by the pharmacist and keep a list of the medication names, dosages, and times to be taken in your wallet. · Do not take other medications without consulting your doctor. Pain Management: per above medications    What to do at Home    Recommended diet:  Regular Diet    Recommended activity: Activity as tolerated    If you experience any of the following symptoms then please call your primary care physician or return to the emergency room if you cannot get hold of your doctor:  Fever, chills, nausea, vomiting, diarrhea, change in mentation, falling, bleeding, shortness of breath, chest pain     Follow Up:  Please f/u with GYN as advised within one week     Information obtained by :  I understand that if any problems occur once I am at home I am to contact my physician. I understand and acknowledge receipt of the instructions indicated above. Physician's or R.N.'s Signature                                                                  Date/Time                                                                                                                                              Patient or Representative Signature                                                          Date/Time      Patient Education        Anemia From Heavy Bleeding: Care Instructions  Your Care Instructions    Anemia means that your body does not have enough red blood cells.  Red blood cells carry oxygen around the body. When you have anemia, you may feel dizzy, tired, and weak. You may also feel your heart pounding. For some people, it's hard to focus and think clearly. One common cause of anemia is bleeding. Bleeding from ulcers, hemorrhoids, cancer, or other problems can cause anemia. It may also be caused by heavy menstrual periods. Your treatment may include iron pills. Iron helps your body make hemoglobin. Hemoglobin is the part of the red blood cell that carries oxygen. If you have severe anemia, you may need a blood transfusion to give you red blood cells as quickly as possible. Sometimes it takes several months to get iron levels back to normal.  Follow-up care is a key part of your treatment and safety. Be sure to make and go to all appointments, and call your doctor if you are having problems. It's also a good idea to know your test results and keep a list of the medicines you take. How can you care for yourself at home? · Be safe with medicines. Take your medicines exactly as prescribed. Call your doctor if you think you are having a problem with your medicine. · Follow your doctor's advice about eating foods that have a lot of iron in them. These include red meat, shellfish, poultry, and eggs. They also include beans, raisins, whole-grain bread, and leafy green vegetables. · Steam your vegetables. This is the best way to prepare them if you want to get as much iron as possible. · Iron pills can cause constipation. If you take them, there are things you can do to avoid constipation. Drink plenty of fluids, eat foods with a lot of fiber, and exercise every day. When should you call for help? Call 911 anytime you think you may need emergency care.  For example, call if:    · You passed out (lost consciousness).     · Your stools are maroon or very bloody.    Call your doctor now or seek immediate medical care if:    · You are short of breath.     · You have new or worse bleeding.     · You are dizzy or light-headed, or you feel like you may faint.    Watch closely for changes in your health, and be sure to contact your doctor if:    · You feel weaker or more tired than usual.     · You do not get better as expected. Where can you learn more? Go to http://merry-joseluis.info/. Enter G711 in the search box to learn more about \"Anemia From Heavy Bleeding: Care Instructions. \"  Current as of: May 6, 2018  Content Version: 11.9  © 6486-4883 [employer]. Care instructions adapted under license by [employer] (which disclaims liability or warranty for this information). If you have questions about a medical condition or this instruction, always ask your healthcare professional. [address] any warranty or liability for your use of this information. Patient Education        Anemia: Care Instructions  Your Care Instructions    Anemia is a low level of red blood cells, which carry oxygen throughout your body. Many things can cause anemia. Lack of iron is one of the most common causes. Your body needs iron to make hemoglobin, a substance in red blood cells that carries oxygen from the lungs to your body's cells. Without enough iron, the body produces fewer and smaller red blood cells. As a result, your body's cells do not get enough oxygen, and you feel tired and weak. And you may have trouble concentrating. Bleeding is the most common cause of a lack of iron. You may have heavy menstrual bleeding or bleeding caused by conditions such as ulcers, hemorrhoids, or cancer. Regular use of aspirin or other anti-inflammatory medicines (such as ibuprofen) also can cause bleeding in some people. A lack of iron in your diet also can cause anemia, especially at times when the body needs more iron, such as during pregnancy, infancy, and the teen years. Your doctor may have prescribed iron pills.  It may take several months of treatment for your iron levels to return to normal. Your doctor also may suggest that you eat foods that are rich in iron, such as meat and beans. There are many other causes of anemia. It is not always due to a lack of iron. Finding the specific cause of your anemia will help your doctor find the right treatment for you. Follow-up care is a key part of your treatment and safety. Be sure to make and go to all appointments, and call your doctor if you are having problems. It's also a good idea to know your test results and keep a list of the medicines you take. How can you care for yourself at home? · Take your medicines exactly as prescribed. Call your doctor if you think you are having a problem with your medicine. · If your doctor recommends iron pills, take them as directed:  ? Try to take the pills on an empty stomach about 1 hour before or 2 hours after meals. But you may need to take iron with food to avoid an upset stomach. ? Do not take antacids or drink milk or caffeine drinks (such as coffee, tea, or cola) at the same time or within 2 hours of the time that you take your iron. They can make it hard for your body to absorb the iron. ? Vitamin C (from food or supplements) helps your body absorb iron. Try taking iron pills with a glass of orange juice or some other food that is high in vitamin C, such as citrus fruits. ? Iron pills may cause stomach problems, such as heartburn, nausea, diarrhea, constipation, and cramps. Be sure to drink plenty of fluids, and include fruits, vegetables, and fiber in your diet each day. Iron pills often make your bowel movements dark or green. ? If you forget to take an iron pill, do not take a double dose of iron the next time you take a pill. ? Keep iron pills out of the reach of small children. An overdose of iron can be very dangerous. · Follow your doctor's advice about eating iron-rich foods.  These include red meat, shellfish, poultry, eggs, beans, raisins, whole-grain bread, and leafy green vegetables. · Steam vegetables to help them keep their iron content. When should you call for help? Call 911 anytime you think you may need emergency care. For example, call if:    · You have symptoms of a heart attack. These may include:  ? Chest pain or pressure, or a strange feeling in the chest.  ? Sweating. ? Shortness of breath. ? Nausea or vomiting. ? Pain, pressure, or a strange feeling in the back, neck, jaw, or upper belly or in one or both shoulders or arms. ? Lightheadedness or sudden weakness. ? A fast or irregular heartbeat. After you call 911, the  may tell you to chew 1 adult-strength or 2 to 4 low-dose aspirin. Wait for an ambulance. Do not try to drive yourself.     · You passed out (lost consciousness).    Call your doctor now or seek immediate medical care if:    · You have new or increased shortness of breath.     · You are dizzy or lightheaded, or you feel like you may faint.     · Your fatigue and weakness continue or get worse.     · You have any abnormal bleeding, such as:  ? Nosebleeds. ? Vaginal bleeding that is different (heavier, more frequent, at a different time of the month) than what you are used to.  ? Bloody or black stools, or rectal bleeding. ? Bloody or pink urine.    Watch closely for changes in your health, and be sure to contact your doctor if:    · You do not get better as expected. Where can you learn more? Go to http://merry-joseluis.info/. Enter R301 in the search box to learn more about \"Anemia: Care Instructions. \"  Current as of: May 6, 2018  Content Version: 11.9  © 7994-6455 [employer]. Care instructions adapted under license by [employer] (which disclaims liability or warranty for this information).  If you have questions about a medical condition or this instruction, always ask your healthcare professional. [address] any warranty or liability for your use of this information.

## 2019-05-30 LAB
ABO + RH BLD: NORMAL
ABO + RH BLD: NORMAL
BLD PROD TYP BPU: NORMAL
BLOOD GROUP ANTIBODIES SERPL: NORMAL
BLOOD GROUP ANTIBODIES SERPL: NORMAL
BPU ID: NORMAL
CROSSMATCH RESULT,%XM: NORMAL
SPECIMEN EXP DATE BLD: NORMAL
SPECIMEN EXP DATE BLD: NORMAL
STATUS OF UNIT,%ST: NORMAL
UNIT DIVISION, %UDIV: 0

## 2019-06-16 NOTE — DISCHARGE SUMMARY
[facility]    [address]     [place] [address]    Phone:  [phone]           Danielle Lucas   MRN: [id number]    Department:  [facility]   Date of Visit:  1/2/2017           Diagnosis     Calculus of gallbladder without cholecystitis without obstruction        You were seen by Fay L Fritsch, APNP.      Disclaimer     Follow-up Care:  It is your responsibility to arrange for follow-up care with your healthcare provider or as instructed. Call to get an appointment time.           Contact your doctor for follow-up appointment if not already scheduled.     Follow up with Peter Rodriguez MD.    Specialty:  General Surgery    Comments:  If symptoms worsen    Contact information    [address]  [place] [address]  [phone]        Preventive care and screening     Your blood pressure was 122/61 today. If your blood pressure is higher than 120/80, we recommend follow up with your primary care provider to obtain basic health screening, including reassessment of your blood pressure, within three months.          Medications you received while in the ED through 01/02/2017  3:26 PM     Date/Time Order Dose Route Action    01/02/2017  2:26 PM ondansetron (ZOFRAN) injection 4 mg 4 mg Intravenous Given    01/02/2017  2:28 PM morphine injection 4 mg 4 mg Intravenous Given         What to Do with Your Medications      Notice     No changes were made to your prescriptions during this visit.            Your To Do List     Future Appointments Provider Department Dept Phone    1/19/2017 2:00 PM Peter [facility] MD [facility] [phone]    1/27/2017 3:30 PM Derik Chinchilla MD [facility] [phone]    2/21/2017 1:00 PM [facility] OB NURSE [facility] [phone]    8/31/2017 2:00 PM Quin Schafer MD [facility] [phone]      Procedures  Physician Discharge Summary     Patient ID:  Bernardo Alfaro  [id number]  85 y.o.  1985    Admit date: 5/28/2019    Discharge date and time: 5/29/2019     Admission Diagnoses: Anemia [D64.9]    Discharge Diagnoses/Hospital Course   Anemia - likely 2/2 menorrhagia. Stool blood negative. Ferritin c/w IZAIAH. S/p 2u PRBC's while in house and IV iron. She will need PO iron at discharge and close GYN f/u. Was seen by GYN in house.      Syncope / Sinus tachycardia - POA, due to anemia. Improved after transfusions. TTE without WMA      Morbid obesity - Advise weight loss. Would benefit from outpatient BULMARO testing. Bacteruria - poor collection. No urine culture sent. On Keflex    PCP: None     Consults: Gyn    Discharge Exam:  Visit Vitals  /70 (BP 1 Location: Left arm, BP Patient Position: At rest)   Pulse 61   Temp 98.2 °F (36.8 °C)   Resp 16   Ht 5' 7\" (1.702 m)   Wt 126.6 kg (279 lb)   SpO2 99%   BMI 43.70 kg/m²     Gen:  Morbid obese, in no acute distress  HEENT:  Pale conjunctivae, PERRL, hearing intact to voice, moist mucous membranes  Neck:  Supple, without masses, thyroid non-tender  Resp:  No accessory muscle use, clear breath sounds without wheezes rales or rhonchi  Card:  No murmurs, tachycardic S1, S2 without thrills, bruits or peripheral edema  Abd:  Soft, non-tender, non-distended, normoactive bowel sounds are present, no mass  Lymph:  No cervical or inguinal adenopathy  Musc:  No cyanosis or clubbing  Skin:  No rashes or ulcers, skin turgor is good  Neuro:  Cranial nerves are grossly intact, no focal motor weakness, follows commands   Psych:  Good insight, oriented to person, place and time, alert    Disposition: home    Patient Instructions:   Discharge Medication List as of 5/29/2019  6:04 PM      CONTINUE these medications which have CHANGED    Details   ferrous sulfate 325 mg (65 mg iron) tablet Take 1 Tab by mouth three (3) times daily (with meals). , No Print, Disp-90 Tab, R-0 CONTINUE these medications which have NOT CHANGED    Details   cyclobenzaprine (FLEXERIL) 10 mg tablet Take 10 mg by mouth three (3) times daily as needed for Muscle Spasm(s). , Historical Med      cephALEXin (KEFLEX) 500 mg capsule Take 1 Cap by mouth three (3) times daily. , Normal, Disp-15 Cap, R-0         STOP taking these medications       naproxen sodium (ALEVE) 220 mg tablet Comments:   Reason for Stopping:         ibuprofen (MOTRIN) 200 mg tablet Comments:   Reason for Stopping:         meloxicam (MOBIC) 15 mg tablet Comments:   Reason for Stopping:             Activity: Activity as tolerated  Diet: Regular Diet  Wound Care: None needed    Follow-up with gyn as soon as possible    Approximate time spent in patient care on day of discharge: 35 minutes     Signed:  Terrell Addison MD  6/15/2019  8:14 PM and tests performed during your visit     CBC & Auto Differential    Comprehensive Metabolic Panel    Lipase Level      Procedures     None      Imaging Results     None        Discharge Instructions       If you develop an increase in your pain or nausea, you may use your pain medications and Zofran as needed.        Gallstones with Biliary Colic    You have abdominal pain due to irritation and spasm of the gallbladder. This is called biliary colic. The gallbladder is a small sac under the liver, which stores and releases a fluid that aids in the digestion of fat. A collection of crystals may form stones inside the gallbladder (gallstones). Gallstones can cause the gallbladder to spasm. If they block the duct out of the gallbladder, they can cause pain and even an infection.   A number of factors increase the risk for having gallstones:  · Being female  · Being severely overweight (obese)  · Older age  · Losing or gaining weight quickly  · Eating a high-calorie diet  · Being pregnant  · Taking hormone therapy  · Having diabetes  Home care  · Rest in bed.  · Drink only clear liquids until you feel better.  · You may have been prescribed medicine for pain or nausea. Take these as directed.  · Fat in your diet makes the gallbladder contract and may cause increased pain. Avoid foods that are high in fat (such as full-fat dairy, fried foods, and fatty meats) for at least two days.  · If you are overweight, talk to your healthcare provider about losing weight.  Follow-up care  Follow up with your healthcare provider or as advise. There is a chance that you will have another episode of pain from your gallstones at some point. Removal of the gallbladder is an option to prevent this. Talk with your healthcare provider about your treatment options.  When to seek medical advice  Call your healthcare provider if any of the following occur:  · Worsening pain or pain lasting for longer than 6 hours  · Pain moving to the right  lower abdomen  · Repeated vomiting  · Swollen abdomen  · Fever of 100.4ºF (38ºC) or higher, or as directed by your healthcare provider  · Very dark urine, light colored stools, or yellow color of the skin or eyes  · Chest, arm, back, neck or jaw pain  © 0100-0138 [employer]. [address] [place] [address]. All rights reserved. This information is not intended as a substitute for professional medical care. Always follow your healthcare professional's instructions.          Discharge References/Attachments     None

## 2019-07-05 ENCOUNTER — OFFICE VISIT (OUTPATIENT)
Dept: OBGYN CLINIC | Age: 34
End: 2019-07-05

## 2019-07-05 VITALS
BODY MASS INDEX: 43.16 KG/M2 | WEIGHT: 275 LBS | SYSTOLIC BLOOD PRESSURE: 132 MMHG | DIASTOLIC BLOOD PRESSURE: 93 MMHG | HEIGHT: 67 IN

## 2019-07-05 DIAGNOSIS — D64.9 ANEMIA, UNSPECIFIED TYPE: Primary | ICD-10-CM

## 2019-07-05 LAB — HGB BLD-MCNC: 7.6 G/DL

## 2019-07-05 RX ORDER — NORETHINDRONE ACETATE AND ETHINYL ESTRADIOL 1; .02 MG/1; MG/1
1 TABLET ORAL DAILY
Qty: 3 PACKAGE | Refills: 4 | Status: SHIPPED | OUTPATIENT
Start: 2019-07-05 | End: 2019-10-03

## 2019-07-05 NOTE — PROGRESS NOTES
Abnormal bleeding note      Alda Medina is a 35 y.o. female who complains of vaginal bleeding problems. Patient states her periods are irregular. She has skipped no more than 1 month before. Periods do not come sooner than they are supposed to either. She was seen in the ER for the bleeding and had an iron transfusion. Her current method of family planning is none. She developed this problem approximately several years ago. She has had vaginal bleeding which she describes as heavy lasting up to 6 days. Pad or tampon count: changes every 1 hours. Associated symptoms include pelvic pain. Patient states she has terrible cramps. Alleviating factors: none    Aggravating factors: none      The patient is sexually active. Last Pap smear:was normal \"many years ago\" per patient. Her past medical hx says ASCUS pap 2012, but it is not in CC. She is also concerned about her last mammo:  2017 IMPRESSION:     1. BI-RADS Assessment Category 3: Probably benign finding. Short-interval  follow-up suggested. Right breast mass and left breast asymmetry. The right  breast mass represents a solid mass, probable fibroadenoma, and explains the  recent CT scan finding. The left breast finding is stable dating back to a chest  CT from 2012, allowing for differences in modality.     A six-month follow-up bilateral breast ultrasound is recommended to assess for  short-term size stability. Alternatively, biopsy could be performed, but the  patient prefers imaging follow-up at this time. She lost her insurance and has not followed up.     Her relevant past medical history:   Past Medical History:   Diagnosis Date    Anemia     Anxiety     ASCUS (atypical squamous cells of undetermined significance) on Pap smear 6/1/2012    Asthma     extrinsic as a child     Morbid obesity ([facility] [address]         Past Surgical History:   Procedure Laterality Date    BREAST SURGERY PROCEDURE UNLISTED  11/27/12    Incision and Drainage Left Breast Abscess    HX BREAST BIOPSY Bilateral     abscesses removed 2014 and 2015    HX BREAST REDUCTION  2003    HX OTHER SURGICAL      abscess removed from left breast     Social History     Occupational History    Not on file   Tobacco Use    Smoking status: Never Smoker    Smokeless tobacco: Never Used   Substance and Sexual Activity    Alcohol use: No    Drug use: No    Sexual activity: Yes     Partners: Male     Birth control/protection: None     Family History   Problem Relation Age of Onset    Hypertension Mother     Elevated Lipids Mother     Diabetes Sister        Allergies   Allergen Reactions    Bactrim [Sulfamethoprim Ds] Nausea Only    Zofran [Ondansetron Hcl (Pf)] Rash     Prior to Admission medications    Medication Sig Start Date End Date Taking? Authorizing Provider   norethindrone-ethinyl estradiol (LOESTRIN 1/20, 21,) 1-20 mg-mcg tab Take 1 Tab by mouth daily for 90 days. 7/5/19 10/3/19 Yes Viktoriya Dewey MD   ferrous sulfate 325 mg (65 mg iron) tablet Take 1 Tab by mouth three (3) times daily (with meals). 5/29/19  Yes Kyle Goss MD   cyclobenzaprine (FLEXERIL) 10 mg tablet Take 10 mg by mouth three (3) times daily as needed for Muscle Spasm(s).    Yes Provider, Historical        Review of Systems - History obtained from the patient  Constitutional: negative for weight loss, fever, night sweats  HEENT: negative for hearing loss, earache, congestion, snoring, sorethroat  CV: negative for chest pain, palpitations, edema  Resp: negative for cough, shortness of breath, wheezing  Breast: negative for breast lumps, nipple discharge, galactorrhea  GI: negative for change in bowel habits, abdominal pain, black or bloody stools  : negative for frequency, dysuria, hematuria  MSK: negative for back pain, joint pain, muscle pain  Skin: negative for itching, rash, hives  Neuro: negative for dizziness, headache, confusion, weakness  Psych: negative for anxiety, depression, change in mood  Heme/lymph: negative for bleeding, bruising, pallor      Objective:    Visit Vitals  BP (!) 132/93 (BP 1 Location: Left arm, BP Patient Position: Sitting)   Ht 5' 7\" (1.702 m)   Wt 275 lb (124.7 kg)   LMP 06/25/2019 (Approximate)   BMI 43.07 kg/m²          PHYSICAL EXAMINATION    Constitutional  · Appearance: well-nourished, well developed, alert, in no acute distress    HENT  · Head and Face: appears normal    Neck  · Inspection/Palpation: normal appearance, no masses or tenderness  · Lymph Nodes: no lymphadenopathy present  · Thyroid: gland size normal, nontender, no nodules or masses present on palpation  ·   Breasts  · Inspection of Breasts: breasts symmetrical, no skin changes, no discharge present, nipple appearance normal, no skin retraction present  · Palpation of Breasts and Axillae: no masses present on palpation, no breast tenderness  · Axillary Lymph Nodes: no lymphadenopathy present    Gastrointestinal  · Abdominal Examination: abdomen non-tender to palpation, normal bowel sounds, no masses present  · Liver and spleen: no hepatomegaly present, spleen not palpable  · Hernias: no hernias identified    Genitourinary  · External Genitalia: normal appearance for age, no discharge present, no tenderness present, no inflammatory lesions present, no masses present, no atrophy present  · Vagina: normal vaginal vault without central or paravaginal defects, no discharge present, no inflammatory lesions present, no masses present  · Bladder: non-tender to palpation  · Urethra: appears normal  · Cervix: normal   · Uterus: normal size, shape and consistency  · Adnexa: no adnexal tenderness present, no adnexal masses present  · Perineum: perineum within normal limits, no evidence of trauma, no rashes or skin lesions present  · Anus: anus within normal limits, no hemorrhoids present  · Inguinal Lymph Nodes: no lymphadenopathy present    Skin  · General Inspection: no rash, no lesions identified    Neurologic/Psychiatric  · Mental Status:  · Orientation: grossly oriented to person, place and time  Mood and Affect: mood normal, affect appropriate  Results for orders placed or performed in visit on 07/05/19   AMB POC HEMOGLOBIN (HGB)   Result Value Ref Range    Hemoglobin (POC) 7.6        Assessment:   Menorrhagia to anemia  BP recheck 111/72  Wants conception in 6 months    Plan:   Loestrin 1/20  Fu in 3 months ae check BP  See hematology for iron infusions  End bx done      Instructions given to pt. Handouts given to pt. [facility]  OFFICE PROCEDURE PROGRESS NOTE        Chart reviewed for the following:   Flor Hutchins MD, have reviewed the History, Physical and updated the Allergic reactions for [address] [place] [address] performed immediately prior to start of procedure:   Flor Hutchins MD, have performed the following reviews on [facility] prior to the start of the procedure:            * Patient was identified by name and date of birth   * Agreement on procedure being performed was verified  * Risks and Benefits explained to the patient  * Procedure site verified and marked as necessary  * Patient was positioned for comfort  * Consent was signed and verified     Time: 1:37pm      Date of procedure: 7/5/2019    Procedure performed by:  Ravi Cherry MD    Patient assisted by: self    How tolerated by patient: tolerated the procedure well with no complications    Post Procedural Pain Scale: 2 - Hurts Little Bit      Procedure note: Endometrial biopsy    [facility] [de-identified] P5,  35 y.o. female [address] whose Patient's last menstrual period was 06/25/2019 (approximate). The patient has a history of The encounter diagnosis was Anemia, unspecified type. and presents for an endometrial biopsy.    Indications:   After the indications, risks, benefits, and alternatives to performing an endometrial biopsy were explained to the patient, her questions were answered and informed consent was obtained. Procedure: The patient was placed on the table in the dorsal lithotomy position. A bimanual exam showed the uterus to be anterior. The uterus was normal size. Suezanne Lupillo A speculum was placed in the vagina. The cervix was visualized and prepped with zephrin. A tenaculum not was placed on the anterior lip of the cervix for traction. It was not necessary to dilate the cervix. A pipelle was passed through the endocervical canal without difficulty. The uterus was sounded to 7 cm's. A moderate amount of tissue was returned. This tissue was placed in formalin and sent to pathology. It was felt that an adequate sample was obtained. The patient tolerated the procedure well and she reported mild cramping. The  speculum were removed. Post Procedural Status: The patient was observed for 10  minutes. She had mild cramping at the time of discharge. There were no complications. The patient was discharged in stable condition.

## 2019-07-05 NOTE — PATIENT INSTRUCTIONS

## 2019-07-06 LAB
FERRITIN SERPL-MCNC: 14 NG/ML (ref 15–150)
IRON SATN MFR SERPL: 6 % (ref 15–55)
IRON SERPL-MCNC: 26 UG/DL (ref 27–159)
TIBC SERPL-MCNC: 401 UG/DL (ref 250–450)
UIBC SERPL-MCNC: 375 UG/DL (ref 131–425)

## 2019-07-09 ENCOUNTER — HOSPITAL ENCOUNTER (OUTPATIENT)
Dept: LAB | Age: 34
Discharge: HOME | End: 2019-07-09

## 2019-07-09 NOTE — TELEPHONE ENCOUNTER
Patient sent [employer] message that she wants the prenatal vitamin sent as a rx to her pharmacy on file so her insurance will pay. I called and spoke to pharmacist at [facility] JAZLYN HALL. She said she has 'Prenatal tabs 78NS iron, 0.7UY folic acid' on her shelf and that is usually what people send in for Medicaid patients.

## 2019-07-09 NOTE — PROGRESS NOTES
She was notified through [facility] that she is scheduled for 7/23 with Dr. Jasmin Valencia.   I closed the referral.

## 2019-07-26 ENCOUNTER — OFFICE VISIT (OUTPATIENT)
Dept: ONCOLOGY | Age: 34
End: 2019-07-26

## 2019-07-26 VITALS
TEMPERATURE: 98.1 F | SYSTOLIC BLOOD PRESSURE: 120 MMHG | HEIGHT: 67 IN | OXYGEN SATURATION: 98 % | DIASTOLIC BLOOD PRESSURE: 79 MMHG | HEART RATE: 72 BPM | BODY MASS INDEX: 42.38 KG/M2 | WEIGHT: 270 LBS | RESPIRATION RATE: 16 BRPM

## 2019-07-26 DIAGNOSIS — N92.1 MENORRHAGIA WITH IRREGULAR CYCLE: ICD-10-CM

## 2019-07-26 DIAGNOSIS — D50.0 IRON DEFICIENCY ANEMIA DUE TO CHRONIC BLOOD LOSS: Primary | ICD-10-CM

## 2019-07-26 DIAGNOSIS — K59.00 CONSTIPATION, UNSPECIFIED CONSTIPATION TYPE: ICD-10-CM

## 2019-07-26 RX ORDER — DIPHENHYDRAMINE HYDROCHLORIDE 50 MG/ML
50 INJECTION, SOLUTION INTRAMUSCULAR; INTRAVENOUS AS NEEDED
Status: CANCELLED
Start: 2019-08-09

## 2019-07-26 RX ORDER — SODIUM CHLORIDE 9 MG/ML
25 INJECTION, SOLUTION INTRAVENOUS CONTINUOUS
Status: CANCELLED
Start: 2019-08-09

## 2019-07-26 RX ORDER — ACETAMINOPHEN 325 MG/1
650 TABLET ORAL AS NEEDED
Status: CANCELLED
Start: 2019-08-09

## 2019-07-26 RX ORDER — ALBUTEROL SULFATE 0.83 MG/ML
2.5 SOLUTION RESPIRATORY (INHALATION) AS NEEDED
Status: CANCELLED
Start: 2019-08-16

## 2019-07-26 RX ORDER — SODIUM CHLORIDE 9 MG/ML
25 INJECTION, SOLUTION INTRAVENOUS CONTINUOUS
Status: CANCELLED
Start: 2019-08-16

## 2019-07-26 RX ORDER — DIPHENHYDRAMINE HYDROCHLORIDE 50 MG/ML
50 INJECTION, SOLUTION INTRAMUSCULAR; INTRAVENOUS AS NEEDED
Status: CANCELLED
Start: 2019-08-16

## 2019-07-26 RX ORDER — HYDROCORTISONE SODIUM SUCCINATE 100 MG/2ML
100 INJECTION, POWDER, FOR SOLUTION INTRAMUSCULAR; INTRAVENOUS AS NEEDED
Status: CANCELLED | OUTPATIENT
Start: 2019-08-16

## 2019-07-26 RX ORDER — EPINEPHRINE 1 MG/ML
0.3 INJECTION, SOLUTION, CONCENTRATE INTRAVENOUS AS NEEDED
Status: CANCELLED | OUTPATIENT
Start: 2019-08-16

## 2019-07-26 RX ORDER — SODIUM CHLORIDE 9 MG/ML
10 INJECTION INTRAMUSCULAR; INTRAVENOUS; SUBCUTANEOUS AS NEEDED
Status: CANCELLED | OUTPATIENT
Start: 2019-08-16

## 2019-07-26 RX ORDER — HEPARIN 100 UNIT/ML
300-500 SYRINGE INTRAVENOUS AS NEEDED
Status: CANCELLED
Start: 2019-08-16

## 2019-07-26 RX ORDER — SODIUM CHLORIDE 0.9 % (FLUSH) 0.9 %
10 SYRINGE (ML) INJECTION AS NEEDED
Status: CANCELLED
Start: 2019-08-16

## 2019-07-26 RX ORDER — EPINEPHRINE 1 MG/ML
0.3 INJECTION, SOLUTION, CONCENTRATE INTRAVENOUS AS NEEDED
Status: CANCELLED | OUTPATIENT
Start: 2019-08-09

## 2019-07-26 RX ORDER — SODIUM CHLORIDE 9 MG/ML
10 INJECTION INTRAMUSCULAR; INTRAVENOUS; SUBCUTANEOUS AS NEEDED
Status: CANCELLED | OUTPATIENT
Start: 2019-08-09

## 2019-07-26 RX ORDER — HEPARIN 100 UNIT/ML
300-500 SYRINGE INTRAVENOUS AS NEEDED
Status: CANCELLED
Start: 2019-08-09

## 2019-07-26 RX ORDER — ACETAMINOPHEN 325 MG/1
650 TABLET ORAL AS NEEDED
Status: CANCELLED
Start: 2019-08-16

## 2019-07-26 RX ORDER — ONDANSETRON 2 MG/ML
8 INJECTION INTRAMUSCULAR; INTRAVENOUS AS NEEDED
Status: CANCELLED | OUTPATIENT
Start: 2019-08-09

## 2019-07-26 RX ORDER — HYDROCORTISONE SODIUM SUCCINATE 100 MG/2ML
100 INJECTION, POWDER, FOR SOLUTION INTRAMUSCULAR; INTRAVENOUS AS NEEDED
Status: CANCELLED | OUTPATIENT
Start: 2019-08-09

## 2019-07-26 RX ORDER — ALBUTEROL SULFATE 0.83 MG/ML
2.5 SOLUTION RESPIRATORY (INHALATION) AS NEEDED
Status: CANCELLED
Start: 2019-08-09

## 2019-07-26 RX ORDER — SODIUM CHLORIDE 0.9 % (FLUSH) 0.9 %
10 SYRINGE (ML) INJECTION AS NEEDED
Status: CANCELLED
Start: 2019-08-09

## 2019-07-26 RX ORDER — AMOXICILLIN 250 MG
2 CAPSULE ORAL DAILY
Qty: 60 TAB | Refills: 3 | Status: SHIPPED | OUTPATIENT
Start: 2019-07-26 | End: 2022-07-07

## 2019-07-26 RX ORDER — ONDANSETRON 2 MG/ML
8 INJECTION INTRAMUSCULAR; INTRAVENOUS AS NEEDED
Status: CANCELLED | OUTPATIENT
Start: 2019-08-16

## 2019-07-26 NOTE — PROGRESS NOTES
[id number] [facility] at [address] [place] [address]  [phone]    Hematology / Oncology Consult    Reason for Visit:   Valeria Zamudio is a 35 y.o. female who is seen in consultation at the request of Dr. Irish Torres for evaluation of anemia. History of Present Illness:   Valeria Zamudio is a 35 y.o. female who comes in for evaluation of anemia. She has irregular and heavy periods, lasting 6-7 days, requiring new pad every 1-2 hours. Patient underwent endometrial biopsy. Was started on oral birth control pills, prenatal vitamins, iron supplements. She received a blood transfusion in May 2019. She sees blood in her stools at times and thinks it is related to constipation and hemorrhoids. The blood is not mixed in with the stool. She has not ever seen a GI doctor. She is not currently taking any stools softeners. She denies ice cravings. She has fatigue, DOVE, but no CP. Past Medical History:   Diagnosis Date    Anemia     Anxiety     ASCUS (atypical squamous cells of undetermined significance) on Pap smear 6/1/2012    Asthma     extrinsic as a child     Morbid obesity ([facility] [address]       Past Surgical History:   Procedure Laterality Date    BREAST SURGERY PROCEDURE UNLISTED  11/27/12    Incision and Drainage Left Breast Abscess    HX BREAST BIOPSY Bilateral     abscesses removed 2014 and 2015    HX BREAST REDUCTION  2003    HX OTHER SURGICAL      abscess removed from left breast      Social History     Tobacco Use    Smoking status: Never Smoker    Smokeless tobacco: Never Used   Substance Use Topics    Alcohol use: No      Family History   Problem Relation Age of Onset    Hypertension Mother     Elevated Lipids Mother     Diabetes Sister      Current Outpatient Medications   Medication Sig    prenatal no.137-iron-folic acd 25MW iron- 0.8 mg tab Take 1 Tab by mouth daily for 90 days.  norethindrone-ethinyl estradiol (LOESTRIN 1/20, 21,) 1-20 mg-mcg tab Take 1 Tab by mouth daily for 90 days.  ferrous sulfate 325 mg (65 mg iron) tablet Take 1 Tab by mouth three (3) times daily (with meals).  cyclobenzaprine (FLEXERIL) 10 mg tablet Take 10 mg by mouth three (3) times daily as needed for Muscle Spasm(s). No current facility-administered medications for this visit. Allergies   Allergen Reactions    Bactrim [Sulfamethoprim Ds] Nausea Only    Zofran [Ondansetron Hcl (Pf)] Rash        Review of Systems: A complete review of systems was obtained, negative except as described above. Physical Exam:     Visit Vitals  /79   Pulse 72   Temp 98.1 °F (36.7 °C) (Oral)   Resp 16   Ht 5' 7\" (1.702 m)   Wt 270 lb (122.5 kg)   SpO2 98%   BMI 42.29 kg/m²     ECOG PS: 1  General: Well developed, no acute distress, obese  Eyes: PERRLA, EOMI, anicteric sclerae  HENT: Atraumatic, OP clear, TMs intact without erythema  Neck: Supple  Lymphatic: No cervical, supraclavicular, axillary or inguinal adenopathy  Respiratory: CTAB, normal respiratory effort  CV: Normal rate, regular rhythm, no murmurs, no peripheral edema  GI: Soft, nontender, nondistended, no masses, no hepatomegaly, no splenomegaly  MS: Normal gait and station. Digits without clubbing or cyanosis. Skin: No rashes, ecchymoses, or petechiae. Normal temperature, turgor, and texture. Neuro/Psych: Alert, oriented. 5/5 strength in all 4 extremities. Appropriate affect, normal judgment/insight. Results:     Lab Results   Component Value Date/Time    WBC 5.5 05/29/2019 03:03 PM    HGB 7.7 (L) 05/29/2019 03:03 PM    HCT 27.3 (L) 05/29/2019 03:03 PM    PLATELET 035 15/97/3743 03:03 PM    MCV 67.4 (L) 05/29/2019 03:03 PM    ABS.  NEUTROPHILS 2.5 05/29/2019 03:03 PM    Hemoglobin (POC) 7.6 07/05/2019 02:16 PM    Hemoglobin (POC) 13.3 12/18/2014 06:34 PM    Hematocrit (POC) 39 12/18/2014 06:34 PM     Lab Results   Component Value Date/Time    Sodium 139 05/29/2019 03:06 AM    Potassium 3.8 05/29/2019 03:06 AM    Chloride 110 (H) 05/29/2019 03:06 AM CO2 25 05/29/2019 03:06 AM    Glucose 85 05/29/2019 03:06 AM    BUN 7 05/29/2019 03:06 AM    Creatinine 0.84 05/29/2019 03:06 AM    GFR est AA >60 05/29/2019 03:06 AM    GFR est non-AA >60 05/29/2019 03:06 AM    Calcium 8.1 (L) 05/29/2019 03:06 AM    Sodium (POC) 142 12/18/2014 06:34 PM    Potassium (POC) 3.6 12/18/2014 06:34 PM    Chloride (POC) 104 12/18/2014 06:34 PM    Glucose (POC) 87 12/18/2014 06:34 PM    BUN (POC) 13 12/18/2014 06:34 PM    Creatinine (POC) 1.0 12/18/2014 06:34 PM    Calcium, ionized (POC) 1.16 12/18/2014 06:34 PM     Lab Results   Component Value Date/Time    Bilirubin, total 1.2 (H) 05/28/2019 05:02 PM    ALT (SGPT) 63 05/28/2019 05:02 PM    AST (SGOT) 58 (H) 05/28/2019 05:02 PM    Alk. phosphatase 86 05/28/2019 05:02 PM    Protein, total 7.9 05/28/2019 05:02 PM    Albumin 3.5 05/28/2019 05:02 PM    Globulin 4.4 (H) 05/28/2019 05:02 PM     Lab Results   Component Value Date/Time    Iron 26 (L) 07/05/2019 02:50 PM    TIBC 401 07/05/2019 02:50 PM    Iron % saturation 6 (LL) 07/05/2019 02:50 PM    Ferritin 14 (L) 07/05/2019 02:50 PM       Lab Results   Component Value Date/Time    Vitamin B12 99 (L) 05/28/2019 07:07 PM    Folate 9.6 05/28/2019 07:07 PM     Lab Results   Component Value Date/Time    TSH 2.680 06/01/2012 09:01 AM     No results found for: HAMAT, HAAB, HABT, HAAT, HBSAG, HBSB, HBSAT, HBABN, HBCM, HBCAB, HBCAT, Daina Horton, [address], [address] [place] [address], [facility], [id number], [address] [place] [address], [address] [place] [address] Drive, [address] [place] [address], [id number], [id number], [address] [place] [address], [id number], [facility], [id number], HCGAT      Imaging:     Radiology report(s) reviewed. Assessment & Plan:   Subhash Marquez is a 35 y.o. female     1. Iron deficiency anemia: 2/2 menstrual blood loss refractory to oral iron supplementation. Had required blood transfusion in 5/2019 for Hgb 6.8. Patient would benefit from parenteral iron to replete stores quickly. Pt knows to call us if she is not feeling improvement in fatigue, DOVE.  I recommend regular follow with us to ensure anemia does not drop this low in the future. -- Injectafer x 2  -- Return in 2 months with repeat labs and follow up    2. Menorrhagia: Working with  [facility] in [place]. Will start on OCPs soon after her next menstrual cycle. Hopefully, this will decrease her menstrual blood loss and improve her anemia as well. 3. Constipation: 2/2 iron supplements. -- Recommend docusate/senna    I appreciate the opportunity to participate in Ms. Ximena Mcgill's care.     Signed By: Prabhu Rosas MD     July 26, 2019

## 2019-08-09 ENCOUNTER — HOSPITAL ENCOUNTER (OUTPATIENT)
Dept: INFUSION THERAPY | Age: 34
Discharge: HOME | End: 2019-08-09
Payer: MEDICAID

## 2019-08-09 VITALS
DIASTOLIC BLOOD PRESSURE: 66 MMHG | TEMPERATURE: 97.1 F | HEART RATE: 65 BPM | SYSTOLIC BLOOD PRESSURE: 109 MMHG | RESPIRATION RATE: 16 BRPM

## 2019-08-09 DIAGNOSIS — D50.9 IRON DEFICIENCY ANEMIA, UNSPECIFIED IRON DEFICIENCY ANEMIA TYPE: Primary | ICD-10-CM

## 2019-08-09 PROCEDURE — 96365 THER/PROPH/DIAG IV INF INIT: CPT

## 2019-08-09 PROCEDURE — 74011250636 HC RX REV CODE- 250/636: Performed by: NURSE PRACTITIONER

## 2019-08-09 RX ORDER — SODIUM CHLORIDE 0.9 % (FLUSH) 0.9 %
10 SYRINGE (ML) INJECTION AS NEEDED
Status: DISCONTINUED | OUTPATIENT
Start: 2019-08-09 | End: 2019-08-10 | Stop reason: HOSPADM

## 2019-08-09 RX ORDER — DIPHENHYDRAMINE HYDROCHLORIDE 50 MG/ML
50 INJECTION, SOLUTION INTRAMUSCULAR; INTRAVENOUS AS NEEDED
Status: DISCONTINUED | OUTPATIENT
Start: 2019-08-09 | End: 2019-08-10 | Stop reason: HOSPADM

## 2019-08-09 RX ORDER — HYDROCORTISONE SODIUM SUCCINATE 100 MG/2ML
100 INJECTION, POWDER, FOR SOLUTION INTRAMUSCULAR; INTRAVENOUS AS NEEDED
Status: DISCONTINUED | OUTPATIENT
Start: 2019-08-09 | End: 2019-08-10 | Stop reason: HOSPADM

## 2019-08-09 RX ORDER — SODIUM CHLORIDE 9 MG/ML
25 INJECTION, SOLUTION INTRAVENOUS CONTINUOUS
Status: DISCONTINUED | OUTPATIENT
Start: 2019-08-09 | End: 2019-08-10 | Stop reason: HOSPADM

## 2019-08-09 RX ADMIN — FERRIC CARBOXYMALTOSE INJECTION 750 MG: 50 INJECTION, SOLUTION INTRAVENOUS at 13:34

## 2019-08-09 RX ADMIN — Medication 10 ML: at 14:12

## 2019-08-09 RX ADMIN — SODIUM CHLORIDE 25 ML/HR: 900 INJECTION, SOLUTION INTRAVENOUS at 13:28

## 2019-08-16 ENCOUNTER — HOSPITAL ENCOUNTER (OUTPATIENT)
Dept: INFUSION THERAPY | Age: 34
Discharge: HOME | End: 2019-08-16
Payer: MEDICAID

## 2019-08-16 VITALS
SYSTOLIC BLOOD PRESSURE: 129 MMHG | DIASTOLIC BLOOD PRESSURE: 79 MMHG | TEMPERATURE: 96.8 F | RESPIRATION RATE: 16 BRPM | OXYGEN SATURATION: 99 % | HEART RATE: 71 BPM

## 2019-08-16 DIAGNOSIS — D50.9 IRON DEFICIENCY ANEMIA, UNSPECIFIED IRON DEFICIENCY ANEMIA TYPE: Primary | ICD-10-CM

## 2019-08-16 LAB
BASOPHILS # BLD: 0 K/UL (ref 0–0.1)
BASOPHILS NFR BLD: 0 % (ref 0–1)
DIFFERENTIAL METHOD BLD: ABNORMAL
EOSINOPHIL # BLD: 0.4 K/UL (ref 0–0.4)
EOSINOPHIL NFR BLD: 8 % (ref 0–7)
ERYTHROCYTE [DISTWIDTH] IN BLOOD BY AUTOMATED COUNT: 26.8 % (ref 11.5–14.5)
HCT VFR BLD AUTO: 32.1 % (ref 35–47)
HGB BLD-MCNC: 9.8 G/DL (ref 11.5–16)
IMM GRANULOCYTES # BLD AUTO: 0 K/UL (ref 0–0.04)
IMM GRANULOCYTES NFR BLD AUTO: 0 % (ref 0–0.5)
LYMPHOCYTES # BLD: 1.3 K/UL (ref 0.8–3.5)
LYMPHOCYTES NFR BLD: 28 % (ref 12–49)
MCH RBC QN AUTO: 24.9 PG (ref 26–34)
MCHC RBC AUTO-ENTMCNC: 30.5 G/DL (ref 30–36.5)
MCV RBC AUTO: 81.5 FL (ref 80–99)
MONOCYTES # BLD: 0.3 K/UL (ref 0–1)
MONOCYTES NFR BLD: 7 % (ref 5–13)
NEUTS SEG # BLD: 2.7 K/UL (ref 1.8–8)
NEUTS SEG NFR BLD: 57 % (ref 32–75)
NRBC # BLD: 0 K/UL (ref 0–0.01)
NRBC BLD-RTO: 0 PER 100 WBC
PLATELET # BLD AUTO: 259 K/UL (ref 150–400)
PMV BLD AUTO: 9.3 FL (ref 8.9–12.9)
RBC # BLD AUTO: 3.94 M/UL (ref 3.8–5.2)
RBC MORPH BLD: ABNORMAL
WBC # BLD AUTO: 4.7 K/UL (ref 3.6–11)

## 2019-08-16 PROCEDURE — 85025 COMPLETE CBC W/AUTO DIFF WBC: CPT

## 2019-08-16 PROCEDURE — 74011250636 HC RX REV CODE- 250/636: Performed by: NURSE PRACTITIONER

## 2019-08-16 PROCEDURE — 36415 COLL VENOUS BLD VENIPUNCTURE: CPT

## 2019-08-16 PROCEDURE — 96365 THER/PROPH/DIAG IV INF INIT: CPT

## 2019-08-16 RX ORDER — SODIUM CHLORIDE 0.9 % (FLUSH) 0.9 %
10 SYRINGE (ML) INJECTION AS NEEDED
Status: ACTIVE | OUTPATIENT
Start: 2019-08-16 | End: 2019-08-16

## 2019-08-16 RX ORDER — SODIUM CHLORIDE 9 MG/ML
10 INJECTION INTRAMUSCULAR; INTRAVENOUS; SUBCUTANEOUS AS NEEDED
Status: ACTIVE | OUTPATIENT
Start: 2019-08-16 | End: 2019-08-16

## 2019-08-16 RX ORDER — SODIUM CHLORIDE 9 MG/ML
25 INJECTION, SOLUTION INTRAVENOUS CONTINUOUS
Status: DISCONTINUED | OUTPATIENT
Start: 2019-08-16 | End: 2019-08-20 | Stop reason: HOSPADM

## 2019-08-16 RX ORDER — HEPARIN 100 UNIT/ML
300-500 SYRINGE INTRAVENOUS AS NEEDED
Status: ACTIVE | OUTPATIENT
Start: 2019-08-16 | End: 2019-08-16

## 2019-08-16 RX ADMIN — FERRIC CARBOXYMALTOSE INJECTION 750 MG: 50 INJECTION, SOLUTION INTRAVENOUS at 09:57

## 2019-08-16 RX ADMIN — SODIUM CHLORIDE 25 ML/HR: 900 INJECTION, SOLUTION INTRAVENOUS at 09:36

## 2019-08-16 NOTE — PROGRESS NOTES
[facility] VISIT NOTE      2931: Pt arrived at [facility] ambulatory and in no distress for Kinsey Nicholson therapy. Assessment completed, pt had no complaints. 24G IV placed and positive blood return noted. Patient Vitals for the past 12 hrs:   Temp Pulse Resp BP SpO2   08/16/19 0927 96.8 °F (36 °C) 71 16 129/79 99 %       Medications received:  Medications Administered     0.9% sodium chloride infusion     Admin Date  08/16/2019 Action  New Bag Dose  25 mL/hr Rate  25 mL/hr Route  IntraVENous Administered By  Jessica Small RN          ferric carboxymaltose (INJECTAFER) 750 mg in 0.9% sodium chloride 250 mL IVPB     Admin Date  08/16/2019 Action  Given Dose  750 mg Rate  750 mL/hr Route  IntraVENous Administered By  Jessica Small RN              6770: Tolerated treatment well, no adverse reaction noted. IV removed per protocol. Positive blood return noted. D/C'd from [facility] ambulatory and in no distress accompanied by .

## 2019-08-16 NOTE — PROGRESS NOTES
Problem: Knowledge Deficit  Goal: *Verbalizes understanding of procedures and medications  Outcome: Progressing Towards Goal     Problem: Patient Education:  Go to Education Activity  Goal: Patient/Family Education  Outcome: Progressing Towards Goal

## 2019-08-16 NOTE — PROGRESS NOTES
Let patient know her hgb increased from 7.7 to 9.8 with just one unit of injectafer, should continue to increase over the next few weeks, will see her back in clinic with repeat labs.

## 2019-09-05 ENCOUNTER — HOSPITAL ENCOUNTER (OUTPATIENT)
Dept: MAMMOGRAPHY | Age: 34
Discharge: HOME | End: 2019-09-05
Attending: NURSE PRACTITIONER
Payer: MEDICAID

## 2019-09-05 DIAGNOSIS — Z12.31 VISIT FOR SCREENING MAMMOGRAM: ICD-10-CM

## 2019-09-05 PROCEDURE — 77067 SCR MAMMO BI INCL CAD: CPT

## 2019-09-09 ENCOUNTER — OFFICE VISIT (OUTPATIENT)
Dept: OBGYN CLINIC | Age: 34
End: 2019-09-09

## 2019-09-09 VITALS
WEIGHT: 264 LBS | BODY MASS INDEX: 41.44 KG/M2 | HEIGHT: 67 IN | DIASTOLIC BLOOD PRESSURE: 66 MMHG | SYSTOLIC BLOOD PRESSURE: 112 MMHG

## 2019-09-09 DIAGNOSIS — Z01.419 ENCOUNTER FOR GYNECOLOGICAL EXAMINATION (GENERAL) (ROUTINE) WITHOUT ABNORMAL FINDINGS: Primary | ICD-10-CM

## 2019-09-09 DIAGNOSIS — Z11.51 SPECIAL SCREENING EXAMINATION FOR HUMAN PAPILLOMAVIRUS (HPV): ICD-10-CM

## 2019-09-09 NOTE — PROGRESS NOTES
Eliel Almonte is a [de-identified] ,  29 y.o. female [address] whose No LMP recorded. (Menstrual status: Chemically Induced). was on  who presents for her annual checkup. She is having no significant problems. Still seeing Dr. Patti Lyle for iron infusions, next appointment 10/4/19. With regard to the Gardisil vaccine, she is older than the FDA approved age to receive it. Menstrual status:    Her periods are light in flow. She is using essentially none pads or tampons per day, normal to none using extended contraceptive cycling. She denies dysmenorrhea. She reports no premenstrual symptoms. Contraception:    The current method of family planning is OCP (estrogen/progesterone) and She declines contraception and counseling. Sexual history:    She  reports that she currently engages in sexual activity and has had partner(s) who are Male. She reports using the following method of birth control/protection: None. Medical conditions:    Since her last annual GYN exam about one year ago per patient, she has not the following changes in her health history: none. Pap and Mammogram History:    Her most recent Pap smear wasnormal obtained 1 year(s) ago, per patient. The patient has never had a mammogram.    The patient does not have a family history of breast cancer.     Past Medical History:   Diagnosis Date    Anemia     Anxiety     ASCUS (atypical squamous cells of undetermined significance) on Pap smear 6/1/2012    Asthma     extrinsic as a child     Morbid obesity ([facility] [address]      Past Surgical History:   Procedure Laterality Date    BREAST SURGERY PROCEDURE UNLISTED  11/27/12    Incision and Drainage Left Breast Abscess    HX BREAST BIOPSY Bilateral     abscesses removed 2014 and 2015    HX BREAST REDUCTION  2003    HX OTHER SURGICAL      abscess removed from left breast       Current Outpatient Medications   Medication Sig Dispense Refill    senna-docusate (PERICOLACE) 8.6-50 mg per tablet Take 2 Tabs by mouth daily. 60 Tab 3    prenatal no.137-iron-folic acd 39XC iron- 0.8 mg tab Take 1 Tab by mouth daily for 90 days. 90 Tab 4    norethindrone-ethinyl estradiol (LOESTRIN 1/20, 21,) 1-20 mg-mcg tab Take 1 Tab by mouth daily for 90 days. 3 Package 4    ferrous sulfate 325 mg (65 mg iron) tablet Take 1 Tab by mouth three (3) times daily (with meals). 90 Tab 0    cyclobenzaprine (FLEXERIL) 10 mg tablet Take 10 mg by mouth three (3) times daily as needed for Muscle Spasm(s).        Allergies: Bactrim [sulfamethoprim ds] and Zofran [ondansetron hcl (pf)]   Social History     Socioeconomic History    Marital status: SINGLE     Spouse name: Not on file    Number of children: Not on file    Years of education: Not on file    Highest education level: Not on file   Occupational History    Not on file   Social Needs    Financial resource strain: Not on file    Food insecurity:     Worry: Not on file     Inability: Not on file    Transportation needs:     Medical: Not on file     Non-medical: Not on file   Tobacco Use    Smoking status: Never Smoker    Smokeless tobacco: Never Used   Substance and Sexual Activity    Alcohol use: No    Drug use: No    Sexual activity: Yes     Partners: Male     Birth control/protection: None   Lifestyle    Physical activity:     Days per week: Not on file     Minutes per session: Not on file    Stress: Not on file   Relationships    Social connections:     Talks on phone: Not on file     Gets together: Not on file     Attends [employer] service: Not on file     Active member of club or organization: Not on file     Attends meetings of clubs or organizations: Not on file     Relationship status: Not on file    Intimate partner violence:     Fear of current or ex partner: Not on file     Emotionally abused: Not on file     Physically abused: Not on file     Forced sexual activity: Not on file   Other Topics Concern    Not on file   Social History Narrative    Not on file     Tobacco History:  reports that she has never smoked. She has never used smokeless tobacco.  Alcohol Abuse:  reports that she does not drink alcohol. Drug Abuse:  reports that she does not use drugs.     Patient Active Problem List   Diagnosis Code    Elevated cholesterol E78.00    Asthma J45.909    Hx of breast reduction, elective Z98.890    Anemia D64.9    Anxiety F41.9    Morbid obesity ([facility] [address] E66.01    Syncope R55    Hypokalemia E87.6       Review of Systems - History obtained from the patient  Constitutional: negative for weight loss, fever, night sweats  HEENT: negative for hearing loss, earache, congestion, snoring, sorethroat  CV: negative for chest pain, palpitations, edema  Resp: negative for cough, shortness of breath, wheezing  GI: negative for change in bowel habits, abdominal pain, black or bloody stools  : negative for frequency, dysuria, hematuria, vaginal discharge  MSK: negative for back pain, joint pain, muscle pain  Breast: negative for breast lumps, nipple discharge, galactorrhea  Skin :negative for itching, rash, hives  Neuro: negative for dizziness, headache, confusion, weakness  Psych: negative for anxiety, depression, change in mood  Heme/lymph: negative for bleeding, bruising, pallor    Physical Exam    Visit Vitals  /66   Ht 5' 7\" (1.702 m)   Wt 264 lb (119.7 kg)   BMI 41.35 kg/m²       Constitutional  · Appearance: well-nourished, well developed, alert, in no acute distress    HENT  · Head and Face: appears normal    Neck  · Inspection/Palpation: normal appearance, no masses or tenderness  · Lymph Nodes: no lymphadenopathy present  · Thyroid: gland size normal, nontender, no nodules or masses present on palpation    Chest  · Respiratory Effort: breathing normal  · Auscultation: normal breath sounds    Cardiovascular  · Heart:  · Auscultation: regular rate and rhythm without murmur    Breasts  · Inspection of Breasts: breasts symmetrical, no skin changes, no discharge present, nipple appearance normal, no skin retraction present  · Palpation of Breasts and Axillae: no masses present on palpation, no breast tenderness  · Axillary Lymph Nodes: no lymphadenopathy present    Gastrointestinal  · Abdominal Examination: abdomen non-tender to palpation, normal bowel sounds, no masses present  · Liver and spleen: no hepatomegaly present, spleen not palpable  · Hernias: no hernias identified    Genitourinary  · External Genitalia: normal appearance for age, no discharge present, no tenderness present, no inflammatory lesions present, no masses present, no atrophy present  · Vagina: normal vaginal vault without central or paravaginal defects, no discharge present, no inflammatory lesions present, no masses present  · Bladder: non-tender to palpation  · Urethra: appears normal  · Cervix: normal   · Uterus: normal size, shape and consistency  · Adnexa: no adnexal tenderness present, no adnexal masses present  · Perineum: perineum within normal limits, no evidence of trauma, no rashes or skin lesions present  · Anus: anus within normal limits, no hemorrhoids present  · Inguinal Lymph Nodes: no lymphadenopathy present    Skin  · General Inspection: no rash, no lesions identified    Neurologic/Psychiatric  · Mental Status:  · Orientation: grossly oriented to person, place and time  · Mood and Affect: mood normal, affect appropriate    . Assessment:  Routine gynecologic examination  Hx of severe anemia - on iron infusions, hx of transfusion  Doing well on Loestrin 1/20  Desires conception      Plan:  Counseled re: diet, exercise, healthy lifestyle  Return for yearly wellness visits  Once hgb normal per Dr. Shad Molina, may finish pack and try for conception. Anovulatory by biopsy. FU in Jan 2020 - if not pregnant consider Femara. Will take gummy PNV for now - needs chewable with conception.  States had normal A1C at PCP

## 2019-09-14 LAB
CYTOLOGIST CVX/VAG CYTO: ABNORMAL
CYTOLOGY CVX/VAG DOC CYTO: ABNORMAL
CYTOLOGY CVX/VAG DOC THIN PREP: ABNORMAL
CYTOLOGY HISTORY:: ABNORMAL
DX ICD CODE: ABNORMAL
HPV I/H RISK 1 DNA CVX QL PROBE+SIG AMP: POSITIVE
HPV16 DNA CVX QL PROBE+SIG AMP: NEGATIVE
HPV18 DNA CVX QL PROBE+SIG AMP: NEGATIVE
Lab: ABNORMAL
OTHER STN SPEC: ABNORMAL
STAT OF ADQ CVX/VAG CYTO-IMP: ABNORMAL

## 2019-09-26 DIAGNOSIS — D50.0 IRON DEFICIENCY ANEMIA DUE TO CHRONIC BLOOD LOSS: ICD-10-CM

## 2019-09-27 LAB
BASOPHILS # BLD AUTO: 0 X10E3/UL (ref 0–0.2)
BASOPHILS NFR BLD AUTO: 0 %
EOSINOPHIL # BLD AUTO: 0.2 X10E3/UL (ref 0–0.4)
EOSINOPHIL NFR BLD AUTO: 4 %
ERYTHROCYTE [DISTWIDTH] IN BLOOD BY AUTOMATED COUNT: 20.5 % (ref 12.3–15.4)
FERRITIN SERPL-MCNC: 170 NG/ML (ref 15–150)
HCT VFR BLD AUTO: 35.9 % (ref 34–46.6)
HGB BLD-MCNC: 11.9 G/DL (ref 11.1–15.9)
IMM GRANULOCYTES # BLD AUTO: 0 X10E3/UL (ref 0–0.1)
IMM GRANULOCYTES NFR BLD AUTO: 0 %
IRON SATN MFR SERPL: 21 % (ref 15–55)
IRON SERPL-MCNC: 67 UG/DL (ref 27–159)
LYMPHOCYTES # BLD AUTO: 1.9 X10E3/UL (ref 0.7–3.1)
LYMPHOCYTES NFR BLD AUTO: 31 %
MCH RBC QN AUTO: 28.7 PG (ref 26.6–33)
MCHC RBC AUTO-ENTMCNC: 33.1 G/DL (ref 31.5–35.7)
MCV RBC AUTO: 87 FL (ref 79–97)
MONOCYTES # BLD AUTO: 0.4 X10E3/UL (ref 0.1–0.9)
MONOCYTES NFR BLD AUTO: 7 %
NEUTROPHILS # BLD AUTO: 3.7 X10E3/UL (ref 1.4–7)
NEUTROPHILS NFR BLD AUTO: 58 %
PLATELET # BLD AUTO: 210 X10E3/UL (ref 150–450)
RBC # BLD AUTO: 4.14 X10E6/UL (ref 3.77–5.28)
TIBC SERPL-MCNC: 316 UG/DL (ref 250–450)
UIBC SERPL-MCNC: 249 UG/DL (ref 131–425)
WBC # BLD AUTO: 6.3 X10E3/UL (ref 3.4–10.8)

## 2019-10-04 ENCOUNTER — OFFICE VISIT (OUTPATIENT)
Dept: ONCOLOGY | Age: 34
End: 2019-10-04

## 2019-10-04 VITALS
SYSTOLIC BLOOD PRESSURE: 117 MMHG | DIASTOLIC BLOOD PRESSURE: 78 MMHG | OXYGEN SATURATION: 98 % | WEIGHT: 263 LBS | HEART RATE: 60 BPM | HEIGHT: 67 IN | TEMPERATURE: 98.1 F | BODY MASS INDEX: 41.28 KG/M2 | RESPIRATION RATE: 16 BRPM

## 2019-10-04 DIAGNOSIS — D50.0 IRON DEFICIENCY ANEMIA DUE TO CHRONIC BLOOD LOSS: Primary | ICD-10-CM

## 2019-10-04 DIAGNOSIS — N92.1 MENORRHAGIA WITH IRREGULAR CYCLE: ICD-10-CM

## 2019-10-04 NOTE — PROGRESS NOTES
[address] [facility] Oncology at [address]  [phone]    Hematology / Oncology Consult    Reason for Visit:   Shlomo Looney is a 29 y.o. female who is seen in consultation at the request of Dr. Matt Lim for evaluation of anemia. History of Present Illness:   Shlomo Looney is a 29 y.o. female who comes in for evaluation of anemia. She has irregular and heavy periods, lasting 6-7 days, requiring new pad every 1-2 hours. Patient underwent endometrial biopsy. Was started on oral birth control pills, prenatal vitamins, iron supplements. She received a blood transfusion in May 2019. She sees blood in her stools at times and thinks it is related to constipation and hemorrhoids. The blood is not mixed in with the stool. She has not ever seen a GI doctor. She is not currently taking any stools softeners. She denies ice cravings. She has fatigue, DOVE, but no CP. Interval History: Patient her for follow up of anemia. She received injectafer x 2 early August 2019. Dr. Matt Lim started patient on loestrin since July 2019 and her periods have not returned since starting on the medication. She is asking if she can stop the birth control because she wants to conceive. Reports she still feels fatigue, she never had ice cravings. Reports her weakness has improved \"80 percent better. \" She is taking an iron supplement and prenatal gummies.      Past Medical History:   Diagnosis Date    Abnormal Pap smear of cervix 09/2019    Negative/+HPV    Anemia     Anxiety     ASCUS (atypical squamous cells of undetermined significance) on Pap smear 6/1/2012    Asthma     extrinsic as a child     Morbid obesity ([facility] [address]       Past Surgical History:   Procedure Laterality Date    BREAST SURGERY PROCEDURE UNLISTED  11/27/12    Incision and Drainage Left Breast Abscess    HX BREAST BIOPSY Bilateral     abscesses removed 2014 and 2015    HX BREAST REDUCTION  2003    HX OTHER SURGICAL      abscess removed from left breast      Social History     Tobacco Use    Smoking status: Never Smoker    Smokeless tobacco: Never Used   Substance Use Topics    Alcohol use: No      Family History   Problem Relation Age of Onset    Hypertension Mother     Elevated Lipids Mother     Diabetes Sister      Current Outpatient Medications   Medication Sig    PNV no.153/FA/om3/dha/epa/fish (PRENATAL GUMMIES PO) Take 1 Tab by mouth daily.  senna-docusate (PERICOLACE) 8.6-50 mg per tablet Take 2 Tabs by mouth daily.  prenatal no.137-iron-folic acd 69HJ iron- 0.8 mg tab Take 1 Tab by mouth daily for 90 days. No current facility-administered medications for this visit. Allergies   Allergen Reactions    Bactrim [Sulfamethoprim Ds] Nausea Only    Zofran [Ondansetron Hcl (Pf)] Rash        Review of Systems: A complete review of systems was obtained, negative except as described above. Physical Exam:     Visit Vitals  /78   Pulse 60   Temp 98.1 °F (36.7 °C) (Oral)   Resp 16   Ht 5' 7\" (1.702 m)   Wt 263 lb (119.3 kg)   SpO2 98%   BMI 41.19 kg/m²     ECOG PS: 1  General: Well developed, no acute distress, obese  Eyes: PERRLA, EOMI, anicteric sclerae  HENT: Atraumatic, OP clear, TMs intact without erythema  Neck: Supple  Lymphatic: No cervical, supraclavicular, axillary or inguinal adenopathy  Respiratory: CTAB, normal respiratory effort  CV: Normal rate, regular rhythm, no murmurs, no peripheral edema  GI: Soft, nontender, nondistended, no masses, no hepatomegaly, no splenomegaly  MS: Normal gait and station. Digits without clubbing or cyanosis. Skin: No rashes, ecchymoses, or petechiae. Normal temperature, turgor, and texture. Neuro/Psych: Alert, oriented. 5/5 strength in all 4 extremities. Appropriate affect, normal judgment/insight.     Results:     Lab Results   Component Value Date/Time    WBC 6.3 09/26/2019 03:31 PM    HGB 11.9 09/26/2019 03:31 PM    HCT 35.9 09/26/2019 03:31 PM    PLATELET 063 11/94/8588 03:31 PM MCV 87 09/26/2019 03:31 PM    ABS. NEUTROPHILS 3.7 09/26/2019 03:31 PM    Hemoglobin (POC) 7.6 07/05/2019 02:16 PM    Hemoglobin (POC) 13.3 12/18/2014 06:34 PM    Hematocrit (POC) 39 12/18/2014 06:34 PM     Lab Results   Component Value Date/Time    Sodium 139 05/29/2019 03:06 AM    Potassium 3.8 05/29/2019 03:06 AM    Chloride 110 (H) 05/29/2019 03:06 AM    CO2 25 05/29/2019 03:06 AM    Glucose 85 05/29/2019 03:06 AM    BUN 7 05/29/2019 03:06 AM    Creatinine 0.84 05/29/2019 03:06 AM    GFR est AA >60 05/29/2019 03:06 AM    GFR est non-AA >60 05/29/2019 03:06 AM    Calcium 8.1 (L) 05/29/2019 03:06 AM    Sodium (POC) 142 12/18/2014 06:34 PM    Potassium (POC) 3.6 12/18/2014 06:34 PM    Chloride (POC) 104 12/18/2014 06:34 PM    Glucose (POC) 87 12/18/2014 06:34 PM    BUN (POC) 13 12/18/2014 06:34 PM    Creatinine (POC) 1.0 12/18/2014 06:34 PM    Calcium, ionized (POC) 1.16 12/18/2014 06:34 PM     Lab Results   Component Value Date/Time    Bilirubin, total 1.2 (H) 05/28/2019 05:02 PM    ALT (SGPT) 63 05/28/2019 05:02 PM    AST (SGOT) 58 (H) 05/28/2019 05:02 PM    Alk. phosphatase 86 05/28/2019 05:02 PM    Protein, total 7.9 05/28/2019 05:02 PM    Albumin 3.5 05/28/2019 05:02 PM    Globulin 4.4 (H) 05/28/2019 05:02 PM     Lab Results   Component Value Date/Time    Iron 67 09/26/2019 03:31 PM    TIBC 316 09/26/2019 03:31 PM    Iron % saturation 21 09/26/2019 03:31 PM    Ferritin 170 (H) 09/26/2019 03:31 PM       Lab Results   Component Value Date/Time    Vitamin B12 99 (L) 05/28/2019 07:07 PM    Folate 9.6 05/28/2019 07:07 PM     Lab Results   Component Value Date/Time    TSH 2.680 06/01/2012 09:01 AM     No results found for: HAMAT, HAAB, HABT, HAAT, HBSAG, HBSB, HBSAT, HBABN, HBCM, HBCAB, HBCAT, Amadeo Akankshas, [address], [address] [place] [address], [facility], [id number], [facility], [address] [place] [address], [address] [place] [address], [id number], [id number], [address] [place] [address], [id number], HBCMLT, [id number], HCGAT      Imaging:     Radiology report(s) reviewed.     Assessment & Plan:   Debbie Valles is a 29 y.o. female comes in for f/u of anemia. 1. Iron deficiency anemia: 2/2 menstrual blood loss refractory to oral iron supplementation. Had required blood transfusion in 5/2019 for Hgb 6.8. Patient would benefit from parenteral iron to replete stores quickly. Pt knows to call us if she is not feeling improvement in fatigue, DOVE. I recommend regular follow with us to ensure anemia does not drop this low in the future. Injectafer x 2 completed early  August 2019. Repeat iron studies are normal.   -- Continue taking iron supplement. -- Return in 3 months with repeat labs and follow up    2. H/o Menorrhagia: Improved after starting on OCPs prescribed by Dr. Jazmin Vela in [place]. Pt is getting  and hopes to conceive a child. Therefore, it is safe to discontinue loestrin from a hematological standpoint. Patient knows to call us if she develops SOB or worsening fatigue. 3. Constipation: 2/2 iron supplements. -- Recommend docusate/senna    I appreciate the opportunity to participate in Ms. Ximena Mcgill's care.     Signed By: Rekha Jack MD     October 4, 2019

## 2019-12-30 DIAGNOSIS — D50.0 IRON DEFICIENCY ANEMIA DUE TO CHRONIC BLOOD LOSS: ICD-10-CM

## 2019-12-31 LAB
BASOPHILS # BLD AUTO: 0 X10E3/UL (ref 0–0.2)
BASOPHILS NFR BLD AUTO: 1 %
EOSINOPHIL # BLD AUTO: 0.5 X10E3/UL (ref 0–0.4)
EOSINOPHIL NFR BLD AUTO: 8 %
ERYTHROCYTE [DISTWIDTH] IN BLOOD BY AUTOMATED COUNT: 14 % (ref 12.3–15.4)
FERRITIN SERPL-MCNC: 87 NG/ML (ref 15–150)
HCT VFR BLD AUTO: 35.2 % (ref 34–46.6)
HGB BLD-MCNC: 11.8 G/DL (ref 11.1–15.9)
IMM GRANULOCYTES # BLD AUTO: 0 X10E3/UL (ref 0–0.1)
IMM GRANULOCYTES NFR BLD AUTO: 0 %
IRON SATN MFR SERPL: 25 % (ref 15–55)
IRON SERPL-MCNC: 77 UG/DL (ref 27–159)
LYMPHOCYTES # BLD AUTO: 2.2 X10E3/UL (ref 0.7–3.1)
LYMPHOCYTES NFR BLD AUTO: 35 %
MCH RBC QN AUTO: 31.5 PG (ref 26.6–33)
MCHC RBC AUTO-ENTMCNC: 33.5 G/DL (ref 31.5–35.7)
MCV RBC AUTO: 94 FL (ref 79–97)
MONOCYTES # BLD AUTO: 0.6 X10E3/UL (ref 0.1–0.9)
MONOCYTES NFR BLD AUTO: 10 %
NEUTROPHILS # BLD AUTO: 3 X10E3/UL (ref 1.4–7)
NEUTROPHILS NFR BLD AUTO: 46 %
PLATELET # BLD AUTO: 307 X10E3/UL (ref 150–450)
RBC # BLD AUTO: 3.75 X10E6/UL (ref 3.77–5.28)
TIBC SERPL-MCNC: 314 UG/DL (ref 250–450)
UIBC SERPL-MCNC: 237 UG/DL (ref 131–425)
WBC # BLD AUTO: 6.5 X10E3/UL (ref 3.4–10.8)

## 2020-01-09 NOTE — PROGRESS NOTES
[address] [facility] Oncology at [address] [place], [place] [id number]    Hematology / Oncology Established Visit    Reason for Visit:   Bandar Ding is a 29 y.o. female who comes in for f/u of anemia. Referred by Dr. Vicente Patient. History of Present Illness:   Bandar Ding is a 29 y.o. female who comes in for evaluation of anemia. She has irregular and heavy periods, lasting 6-7 days, requiring new pad every 1-2 hours. Patient underwent endometrial biopsy. Was started on oral birth control pills, prenatal vitamins, iron supplements. She received a blood transfusion in May 2019. She sees blood in her stools at times and thinks it is related to constipation and hemorrhoids. The blood is not mixed in with the stool. She has not ever seen a GI doctor. She is not currently taking any stools softeners. She denies ice cravings. She has fatigue, DOVE, but no CP. Interval History: Patient her for follow up of anemia. She received injectafer x 2 early August 2019. She stopped taking Loestrin in Oct 2019 due to desiring conception. Menstrual periods are heavy again. She is scheduled to see 1/27/20. She continues on the oral iron supplements once daily.     Past Medical History:   Diagnosis Date    Abnormal Pap smear of cervix 09/2019    Negative/+HPV    Anemia     Anxiety     ASCUS (atypical squamous cells of undetermined significance) on Pap smear 6/1/2012    Asthma     extrinsic as a child     Morbid obesity ([facility] [address]       Past Surgical History:   Procedure Laterality Date    BREAST SURGERY PROCEDURE UNLISTED  11/27/12    Incision and Drainage Left Breast Abscess    HX BREAST BIOPSY Bilateral     abscesses removed 2014 and 2015    HX BREAST REDUCTION  2003    HX OTHER SURGICAL      abscess removed from left breast      Social History     Tobacco Use    Smoking status: Never Smoker    Smokeless tobacco: Never Used   Substance Use Topics    Alcohol use: No      Family History   Problem Relation Age of Onset    Hypertension Mother     Elevated Lipids Mother     Diabetes Sister      Current Outpatient Medications   Medication Sig    senna-docusate (PERICOLACE) 8.6-50 mg per tablet Take 2 Tabs by mouth daily.  PNV no.153/FA/om3/dha/epa/fish (PRENATAL GUMMIES PO) Take 1 Tab by mouth daily. No current facility-administered medications for this visit. Allergies   Allergen Reactions    Bactrim [Sulfamethoprim Ds] Nausea Only    Zofran [Ondansetron Hcl (Pf)] Rash        Review of Systems: A complete review of systems was obtained, negative except as described above. Physical Exam:     Visit Vitals  /75   Pulse 63   Temp 98 °F (36.7 °C) (Oral)   Resp 16   Ht 5' 7\" (1.702 m)   Wt 266 lb (120.7 kg)   SpO2 98%   BMI 41.66 kg/m²     ECOG PS: 1  General: Well developed, no acute distress, obese  Eyes: PERRLA, EOMI, anicteric sclerae  HENT: Atraumatic, OP clear, TMs intact without erythema  Neck: Supple  Lymphatic: No cervical, supraclavicular, axillary or inguinal adenopathy  Respiratory: CTAB, normal respiratory effort  CV: Normal rate, regular rhythm, no murmurs, no peripheral edema  GI: Soft, nontender, nondistended, no masses, no hepatomegaly, no splenomegaly  MS: Normal gait and station. Digits without clubbing or cyanosis. Skin: No rashes, ecchymoses, or petechiae. Normal temperature, turgor, and texture. Neuro/Psych: Alert, oriented. 5/5 strength in all 4 extremities. Appropriate affect, normal judgment/insight. Results:     Lab Results   Component Value Date/Time    WBC 6.5 12/30/2019 12:51 PM    HGB 11.8 12/30/2019 12:51 PM    HCT 35.2 12/30/2019 12:51 PM    PLATELET 914 06/13/8645 12:51 PM    MCV 94 12/30/2019 12:51 PM    ABS.  NEUTROPHILS 3.0 12/30/2019 12:51 PM    Hemoglobin (POC) 7.6 07/05/2019 02:16 PM    Hemoglobin (POC) 13.3 12/18/2014 06:34 PM    Hematocrit (POC) 39 12/18/2014 06:34 PM     Lab Results   Component Value Date/Time    Sodium 139 05/29/2019 03:06 AM    Potassium 3.8 05/29/2019 03:06 AM    Chloride 110 (H) 05/29/2019 03:06 AM    CO2 25 05/29/2019 03:06 AM    Glucose 85 05/29/2019 03:06 AM    BUN 7 05/29/2019 03:06 AM    Creatinine 0.84 05/29/2019 03:06 AM    GFR est AA >60 05/29/2019 03:06 AM    GFR est non-AA >60 05/29/2019 03:06 AM    Calcium 8.1 (L) 05/29/2019 03:06 AM    Sodium (POC) 142 12/18/2014 06:34 PM    Potassium (POC) 3.6 12/18/2014 06:34 PM    Chloride (POC) 104 12/18/2014 06:34 PM    Glucose (POC) 87 12/18/2014 06:34 PM    BUN (POC) 13 12/18/2014 06:34 PM    Creatinine (POC) 1.0 12/18/2014 06:34 PM    Calcium, ionized (POC) 1.16 12/18/2014 06:34 PM     Lab Results   Component Value Date/Time    Bilirubin, total 1.2 (H) 05/28/2019 05:02 PM    ALT (SGPT) 63 05/28/2019 05:02 PM    AST (SGOT) 58 (H) 05/28/2019 05:02 PM    Alk. phosphatase 86 05/28/2019 05:02 PM    Protein, total 7.9 05/28/2019 05:02 PM    Albumin 3.5 05/28/2019 05:02 PM    Globulin 4.4 (H) 05/28/2019 05:02 PM     Lab Results   Component Value Date/Time    Iron 77 12/30/2019 12:51 PM    TIBC 314 12/30/2019 12:51 PM    Iron % saturation 25 12/30/2019 12:51 PM    Ferritin 87 12/30/2019 12:51 PM       Lab Results   Component Value Date/Time    Vitamin B12 99 (L) 05/28/2019 07:07 PM    Folate 9.6 05/28/2019 07:07 PM     Lab Results   Component Value Date/Time    TSH 2.680 06/01/2012 09:01 AM     No results found for: HAMAT, HAAB, HABT, HAAT, HBSAG, HBSB, HBSAT, HBABN, HBCM, HBCAB, HBCAT, Jamie Givens, [facility], [address] [place] [address], [facility], [id number], [facility], [address] [place] [address], [address] [place] [address], [id number], [id number], [address] [place] [address], [id number], HBCMLT, [id number], HCGAT      Imaging:     Radiology report(s) reviewed. Assessment & Plan:   Brunilda Brunner is a 29 y.o. female comes in for f/u of anemia. 1. History of Iron deficiency anemia: 2/2 menstrual blood loss refractory to oral iron supplementation. Had required blood transfusion in 5/2019 for Hgb 6.8. Pt was treated with parenteral iron - Injectafer x 2 completed early August 2019.  Repeat iron studies and Hgb normal in 9/2019 and 12/2019.  -- Continue taking iron supplement once daily  -- Return in 6 months with repeat labs and follow up    2. H/o Menorrhagia: Pt now off of OCPs as she is trying to conceive. Seeing Dr. Alena Downey on 1/27/20. Patient knows to call us if she develops SOB or worsening fatigue. 3. Constipation: 2/2 iron supplements. -- Continue docusate/senna    I appreciate the opportunity to participate in Ms. Ximena Mcgill's care.     Signed By: Sherry Paris MD     January 10, 2020

## 2020-01-10 ENCOUNTER — OFFICE VISIT (OUTPATIENT)
Dept: ONCOLOGY | Age: 35
End: 2020-01-10

## 2020-01-10 VITALS
OXYGEN SATURATION: 98 % | DIASTOLIC BLOOD PRESSURE: 75 MMHG | HEIGHT: 67 IN | SYSTOLIC BLOOD PRESSURE: 117 MMHG | TEMPERATURE: 98 F | HEART RATE: 63 BPM | WEIGHT: 266 LBS | RESPIRATION RATE: 16 BRPM | BODY MASS INDEX: 41.75 KG/M2

## 2020-01-10 DIAGNOSIS — N92.1 MENORRHAGIA WITH IRREGULAR CYCLE: ICD-10-CM

## 2020-01-10 DIAGNOSIS — Z86.2 HISTORY OF IRON DEFICIENCY ANEMIA: Primary | ICD-10-CM

## 2020-01-10 NOTE — PROGRESS NOTES
Lance Crews is a 29 y.o. female here today for follow up of anemia. Complains of mild abdominal pain, 3/10 today. States this is related to constipation & bloating to abdomen. Reports last bowel movement was early this morning.

## 2020-01-27 ENCOUNTER — OFFICE VISIT (OUTPATIENT)
Dept: OBGYN CLINIC | Age: 35
End: 2020-01-27

## 2020-01-27 VITALS
HEIGHT: 67 IN | WEIGHT: 269 LBS | DIASTOLIC BLOOD PRESSURE: 79 MMHG | SYSTOLIC BLOOD PRESSURE: 119 MMHG | BODY MASS INDEX: 42.22 KG/M2

## 2020-01-27 DIAGNOSIS — N92.6 IRREGULAR PERIODS/MENSTRUAL CYCLES: Primary | ICD-10-CM

## 2020-01-27 NOTE — PATIENT INSTRUCTIONS
Learning About Planning for Future Pregnancy  How can you plan for pregnancy? Even before you get pregnant, you can help make your pregnancy as healthy as possible. Take these steps:  · See a doctor or certified nurse-midwife for an exam. Talk about the medicines, vitamins, and herbs you take. Discuss any health problems or concerns you have. · Don't take nonsteroidal anti-inflammatory drugs (NSAIDs). Examples of these are ibuprofen and aspirin. They can raise your risk of miscarriage. This risk is higher around the time you conceive or if you use them for more than a week. · Take a daily multivitamin or prenatal vitamin. Make sure it has folic acid. This will lower the chance of having a baby with a birth defect. · Keep track of your menstrual cycle. This is a good idea for a few reasons. It helps you know the best time to try to get pregnant. And it can help your doctor or midwife figure out when your baby is due and how it is growing. · Make healthy choices. Eat well. Avoid caffeine. Or cut back and only have 1 cup of coffee or tea a day. Avoid alcohol, cigarettes, and illegal drugs. Take only the medicines your doctor or midwife says are okay. · Get plenty of exercise. A strong body will make pregnancy and birth easier. It will also help you recover after the birth. And exercise can help improve your mood. What other exams or tests should you have? Before trying to get pregnant, take care of any other health concerns you might have. · If you have diabetes or high blood pressure or you are obese, talk to your doctor before you get pregnant. Together, you can make a plan about how to control these health problems. · Talk with your doctor about any medicines you take. Find out if it is safe to keep taking them while you are pregnant. · Get any vaccines you might need. They can help prevent birth defects, miscarriage, or stillbirth that can be caused by infections such as rubella or measles.  Ask your doctor how long you should wait after you get a vaccine before you try to get pregnant. · Talk with your doctor about whether to have screening tests for diseases that are passed down through families (genetic conditions). These diseases include:  ? Cystic fibrosis. ? Sickle cell disease. ? Smith-Sachs disease. If you think you might be pregnant  · You can use a home pregnancy test as soon as the first day of your first missed menstrual period. · As soon as you know you're pregnant, make an appointment with your doctor or certified nurse-midwife. Your first prenatal visit will provide information that can be used to check for any problems as your pregnancy progresses. Where can you learn more? Go to http://merry-joseluis.info/. Enter W132 in the search box to learn more about \"Learning About Planning for Future Pregnancy. \"  Current as of: May 29, 2019  Content Version: 12.2  © 2797-4334 [employer]. Care instructions adapted under license by [employer] (which disclaims liability or warranty for this information). If you have questions about a medical condition or this instruction, always ask your healthcare professional. [address] any warranty or liability for your use of this information.

## 2020-01-27 NOTE — PROGRESS NOTES
Chief Complaint   Follow-up (Anovulatory by biopsy) and Family Planning      HPI  Alexandra Webb is a 29 y.o. female who presents for the evaluation of anovulatory by biopsy. Per patient she saw Dr. Jayleen Wheat this month, she was advised her hgb was in normal range. Patient will follow up with Dr. Jayleen Wheat in June. Patient states her menstrual cycles are heavy and her cycle last 5 days. She stopped Loestrin October 25, 2019. She would like to conceive and is interested in taking femara. Patient's last menstrual period was 01/064/2020    She has hx of irregular cycles - now has been regular monthly since stopping OC  Has been trying since November for conception  Last hgb 11.8 - followed by heme/Onc and s/p iron infusions              Past Medical History:   Diagnosis Date    Abnormal Pap smear of cervix 09/2019    Negative/+HPV    Anemia     Anxiety     ASCUS (atypical squamous cells of undetermined significance) on Pap smear 6/1/2012    Asthma     extrinsic as a child     Morbid obesity ([facility] [address]      Past Surgical History:   Procedure Laterality Date    BREAST SURGERY PROCEDURE UNLISTED  11/27/12    Incision and Drainage Left Breast Abscess    HX BREAST BIOPSY Bilateral     abscesses removed 2014 and 2015    HX BREAST REDUCTION  2003    HX OTHER SURGICAL      abscess removed from left breast     Social History     Occupational History    Not on file   Tobacco Use    Smoking status: Never Smoker    Smokeless tobacco: Never Used   Substance and Sexual Activity    Alcohol use: No    Drug use: No    Sexual activity: Yes     Partners: Male     Birth control/protection: None     Family History   Problem Relation Age of Onset    Hypertension Mother     Elevated Lipids Mother     Diabetes Sister        Allergies   Allergen Reactions    Bactrim [Sulfamethoprim Ds] Nausea Only    Zofran [Ondansetron Hcl (Pf)] Rash     Prior to Admission medications    Medication Sig Start Date End Date Taking?  Authorizing Provider   senna-docusate (PERICOLACE) 8.6-50 mg per tablet Take 2 Tabs by mouth daily. 7/26/19  Yes Tiara Pizarro MD   PNV no.153/FA/om3/dha/epa/fish (PRENATAL GUMMIES PO) Take 1 Tab by mouth daily. Provider, Historical        Review of Systems: History obtained from the patient  Constitutional: negative for weight loss, fever, night sweats  HEENT: negative for hearing loss, earache, congestion, snoring, sorethroat  CV: negative for chest pain, palpitations, edema  Resp: negative for cough, shortness of breath, wheezing  Breast: negative for breast lumps, nipple discharge, galactorrhea  GI: negative for change in bowel habits, abdominal pain, black or bloody stools  : negative for frequency, dysuria, hematuria, vaginal discharge  MSK: negative for back pain, joint pain, muscle pain  Skin: negative for itching, rash, hives  Neuro: negative for dizziness, headache, confusion, weakness  Psych: negative for anxiety, depression, change in mood  Heme/lymph: negative for bleeding, bruising, pallor    Objective:  Visit Vitals  /79 (BP 1 Location: Left arm, BP Patient Position: Sitting)   Ht 5' 7\" (1.702 m)   Wt 269 lb (122 kg)   LMP 01/06/2020 (Approximate)   BMI 42.13 kg/m²       Physical Exam:   PHYSICAL EXAMINATION    Constitutional  · Appearance: well-nourished, well developed, alert, in no acute distress    HENT  · Head and Face: appears normal    ·     Skin  · General Inspection: no rash, no lesions identified    Neurologic/Psychiatric  · Mental Status:  · Orientation: grossly oriented to person, place and time  · Mood and Affect: mood normal, affect appropriate    Assessment:     Menorrhagia to anemia - s/p iron infusions, now normal  Hx of irregular cycles - more regular last 4 months    Plan: Will check progesterone level - if negative will proceed with femara - disc today  Taking vits        RTO prn if symptoms persist or worsen. Instructions given to pt. Handouts given to pt.

## 2020-01-28 ENCOUNTER — TELEPHONE (OUTPATIENT)
Dept: OBGYN CLINIC | Age: 35
End: 2020-01-28

## 2020-01-28 LAB — PROGEST SERPL-MCNC: 1.1 NG/ML

## 2020-01-28 NOTE — TELEPHONE ENCOUNTER
Call received at 315PM    29year old patient seen in the office yesterday. Patient calling to check on her lab results from yesterday     Please review and advise      Plan:    Will check progesterone level - if negative will proceed with femara - disc today  Taking vits      Pharmacy confirmed for the prescription for femara    Please advise

## 2020-01-31 RX ORDER — LETROZOLE 2.5 MG/1
TABLET, FILM COATED ORAL
Qty: 10 TAB | Refills: 0 | Status: SHIPPED | OUTPATIENT
Start: 2020-01-31 | End: 2020-10-15

## 2020-01-31 RX ORDER — MEDROXYPROGESTERONE ACETATE 10 MG/1
10 TABLET ORAL DAILY
Qty: 10 TAB | Refills: 0 | Status: SHIPPED | OUTPATIENT
Start: 2020-01-31 | End: 2020-10-15

## 2020-02-21 ENCOUNTER — HOSPITAL ENCOUNTER (EMERGENCY)
Age: 35
Discharge: HOME | End: 2020-02-21
Attending: EMERGENCY MEDICINE
Payer: MEDICAID

## 2020-02-21 VITALS
HEART RATE: 96 BPM | HEIGHT: 68 IN | OXYGEN SATURATION: 98 % | DIASTOLIC BLOOD PRESSURE: 82 MMHG | SYSTOLIC BLOOD PRESSURE: 117 MMHG | RESPIRATION RATE: 14 BRPM | WEIGHT: 261.91 LBS | TEMPERATURE: 97.9 F | BODY MASS INDEX: 39.69 KG/M2

## 2020-02-21 DIAGNOSIS — Z92.89 HISTORY OF BLOOD TRANSFUSION: ICD-10-CM

## 2020-02-21 DIAGNOSIS — Z86.2 HISTORY OF ANEMIA: ICD-10-CM

## 2020-02-21 DIAGNOSIS — Z34.91 FIRST TRIMESTER PREGNANCY: Primary | ICD-10-CM

## 2020-02-21 LAB
BASOPHILS # BLD: 0 K/UL (ref 0–0.1)
BASOPHILS NFR BLD: 0 % (ref 0–1)
DIFFERENTIAL METHOD BLD: NORMAL
EOSINOPHIL # BLD: 0.4 K/UL (ref 0–0.4)
EOSINOPHIL NFR BLD: 5 % (ref 0–7)
ERYTHROCYTE [DISTWIDTH] IN BLOOD BY AUTOMATED COUNT: 12.3 % (ref 11.5–14.5)
HCT VFR BLD AUTO: 35.6 % (ref 35–47)
HGB BLD-MCNC: 12 G/DL (ref 11.5–16)
IMM GRANULOCYTES # BLD AUTO: 0 K/UL (ref 0–0.04)
IMM GRANULOCYTES NFR BLD AUTO: 0 % (ref 0–0.5)
LYMPHOCYTES # BLD: 2 K/UL (ref 0.8–3.5)
LYMPHOCYTES NFR BLD: 31 % (ref 12–49)
MCH RBC QN AUTO: 31.3 PG (ref 26–34)
MCHC RBC AUTO-ENTMCNC: 33.7 G/DL (ref 30–36.5)
MCV RBC AUTO: 92.7 FL (ref 80–99)
MONOCYTES # BLD: 0.7 K/UL (ref 0–1)
MONOCYTES NFR BLD: 11 % (ref 5–13)
NEUTS SEG # BLD: 3.4 K/UL (ref 1.8–8)
NEUTS SEG NFR BLD: 53 % (ref 32–75)
NRBC # BLD: 0 K/UL (ref 0–0.01)
NRBC BLD-RTO: 0 PER 100 WBC
PLATELET # BLD AUTO: 224 K/UL (ref 150–400)
PMV BLD AUTO: 10.3 FL (ref 8.9–12.9)
RBC # BLD AUTO: 3.84 M/UL (ref 3.8–5.2)
WBC # BLD AUTO: 6.5 K/UL (ref 3.6–11)

## 2020-02-21 PROCEDURE — 85025 COMPLETE CBC W/AUTO DIFF WBC: CPT

## 2020-02-21 PROCEDURE — 99282 EMERGENCY DEPT VISIT SF MDM: CPT

## 2020-02-21 PROCEDURE — 36415 COLL VENOUS BLD VENIPUNCTURE: CPT

## 2020-02-22 NOTE — ED NOTES
Alexandra LA reviewed discharge instructions with the patient. The patient verbalized understanding. All questions and concerns were addressed. The patient declined a wheelchair and is discharged ambulatory in the care of family members with instructions and prescriptions in hand. Pt is alert and oriented x 4. Respirations are clear and unlabored.
Straight stick obtained by tech Wail Davenport. Pt unable to urinate, PA [employer] notified.
0 = independent

## 2020-02-22 NOTE — ED PROVIDER NOTES
EMERGENCY DEPARTMENT HISTORY AND PHYSICAL EXAM      Date: 2/21/2020  Patient Name: Katy Orona    History of Presenting Illness     Chief Complaint   Patient presents with    Pregnancy Problem     Pt denies any problems but would like to be checked out states this is her 1st pregnancy. History Provided By: Patient    HPI: Katy Orona, 29 y.o. female with PMHx significant for G1, P0. Patient presents in her first trimester pregnancy as this is her first pregnancy she has had several questions she is needed answer and also want to check for her history of anemia as she has had a history of blood transfusions. She does state that her last menstrual period was sometime approximately 5 to 6 weeks ago and estimated delivery date should be expected to be in October. She has not had any nausea, vomiting, chest pain, abdominal pain, headaches, fatigue, syncope, shortness of breath, leg swelling, pelvic pains or any other acute complications at this time and just need the information is her gynecologist has left the country and will not return for a couple of weeks. She states that she tried to schedule appointment with someone else in the group and they were not able to see her as of yet. She denies any burning urination, urinary frequency coital was no discharge, vaginal bleeding or any other acute complications at this time. Please note for examination and HPI were completed I did introduce myself as the physician assistant. Please note that this dictation was completed with [employer], the computer voice recognition software. Quite often unanticipated grammatical, syntax, homophones, and other interpretive errors are inadvertently transcribed by the computer software. Please disregard these errors. Please excuse any errors that have escaped final proofreading. For further clarification on any chart please contact myself. Thank you.   There are no other complaints, changes, or physical findings at this time. PCP: Other, MD Ivana    No current facility-administered medications on file prior to encounter. Current Outpatient Medications on File Prior to Encounter   Medication Sig Dispense Refill    medroxyPROGESTERone (PROVERA) 10 mg tablet Take 1 Tab by mouth daily. 10 Tab 0    letrozole (FEMARA) 2.5 mg tablet Take 2 pills by mouth daily days 3-7 10 Tab 0    PNV no.153/FA/om3/dha/epa/fish (PRENATAL GUMMIES PO) Take 1 Tab by mouth daily.  senna-docusate (PERICOLACE) 8.6-50 mg per tablet Take 2 Tabs by mouth daily. 60 Tab 3       Past History     Past Medical History:  Past Medical History:   Diagnosis Date    Abnormal Pap smear of cervix 09/2019    Negative/+HPV    Anemia     Anxiety     ASCUS (atypical squamous cells of undetermined significance) on Pap smear 6/1/2012    Asthma     extrinsic as a child     Morbid obesity ([facility] [address]        Past Surgical History:  Past Surgical History:   Procedure Laterality Date    BREAST SURGERY PROCEDURE UNLISTED  11/27/12    Incision and Drainage Left Breast Abscess    HX BREAST BIOPSY Bilateral     abscesses removed 2014 and 2015    HX BREAST REDUCTION  2003    HX OTHER SURGICAL      abscess removed from left breast       Family History:  Family History   Problem Relation Age of Onset    Hypertension Mother     Elevated Lipids Mother     Diabetes Sister        Social History:  Social History     Tobacco Use    Smoking status: Never Smoker    Smokeless tobacco: Never Used   Substance Use Topics    Alcohol use: No    Drug use: No       Allergies: Allergies   Allergen Reactions    Bactrim [Sulfamethoprim Ds] Nausea Only    Zofran [Ondansetron Hcl (Pf)] Rash         Review of Systems   Review of Systems   All other systems reviewed and are negative. Physical Exam   Physical Exam  Vitals signs and nursing note reviewed. Constitutional:       Appearance: She is well-developed. HENT:      Head: Normocephalic and atraumatic.    Eyes: Conjunctiva/sclera: Conjunctivae normal.      Pupils: Pupils are equal, round, and reactive to light. Neck:      Musculoskeletal: Normal range of motion and neck supple. Thyroid: No thyromegaly. Cardiovascular:      Rate and Rhythm: Normal rate and regular rhythm. Heart sounds: Normal heart sounds. No murmur. Pulmonary:      Effort: Pulmonary effort is normal. No respiratory distress. Breath sounds: Normal breath sounds. No stridor. No wheezing. Abdominal:      General: Bowel sounds are normal.      Palpations: Abdomen is soft. Tenderness: There is no abdominal tenderness. Genitourinary:     Comments: Patient declined pelvic examination today. Musculoskeletal: Normal range of motion. General: No tenderness. Lymphadenopathy:      Cervical: No cervical adenopathy. Skin:     General: Skin is warm. Neurological:      Mental Status: She is alert and oriented to person, place, and time. Deep Tendon Reflexes: Reflexes are normal and symmetric. Psychiatric:         Judgment: Judgment normal.         Diagnostic Study Results     Labs -     No results found for this or any previous visit (from the past 12 hour(s)). Radiologic Studies -   No orders to display     CT Results  (Last 48 hours)    None        CXR Results  (Last 48 hours)    None            Medical Decision Making   I am the first provider for this patient. I reviewed the vital signs, available nursing notes, past medical history, past surgical history, family history and social history. Vital Signs-Reviewed the patient's vital signs. No data found. Records Reviewed: Nursing Notes    Provider Notes (Medical Decision Making):   Patient does have a iron level of 12. I did advise her however the emergency department would not be the best place for all other OB/GYN evaluations and she should keep her regularly scheduled appointment March 12.   At this time as she is not having any acute symptoms or urinary tract pains clinical suspicion for any UTI. I did advise her that any ultrasound evaluation would be too early and also advised her to take Tylenol for her pains and complications and do not take any NSAID medications. I did give her information on dietary supplements and she has already had prenatal vitamins prescribed to her that she has yet obtained at the pharmacy. She did not have any signs of anemia noted on CBC and at this time she will be discharged in stable condition. ED Course:   Initial assessment performed. The patients presenting problems have been discussed, and they are in agreement with the care plan formulated and outlined with them. I have encouraged them to ask questions as they arise throughout their visit. Critical Care Time: None    Disposition:  Dispo     PLAN:  1. Discharge Medication List as of 2/21/2020 11:17 PM        2. Follow-up Information     Follow up With Specialties Details Why Contact Info    [facility]    [address] [place] [phone] [place] [address]  [address] [phone]        Return to ED if worse     Diagnosis     Clinical Impression:   1. First trimester pregnancy    2. History of anemia    3. History of blood transfusion          Please note that this dictation was completed with [employer], the computer voice recognition software. Quite often unanticipated grammatical, syntax, homophones, and other interpretive errors are inadvertently transcribed by the computer software. Please disregards these errors. Please excuse any errors that have escaped final proofreading. This note will not be viewable in [address]

## 2020-02-22 NOTE — DISCHARGE INSTRUCTIONS
Patient Education        Weeks 6 to 10 of Your Pregnancy: Care Instructions  Your Care Instructions    Congratulations on your pregnancy. This is an exciting and important time for you. During the first 6 to 10 weeks of your pregnancy, your body goes through many changes. Your baby grows very fast, even though you cannot feel it yet. You may start to notice that you feel different, both in your body and your emotions. Because each woman's pregnancy is unique, there is no right way to feel. You may feel the healthiest you have ever been, or you may feel tired or sick to your stomach (\"morning sickness\"). These early weeks are a time to make healthy choices and to eat the best foods for you and your baby. This care sheet will give you some ideas. This is also a good time to think about birth defects testing. These are tests done during pregnancy to look for possible problems with the baby. First trimester tests for birth defects can be done between 8 and 17 weeks of pregnancy, depending on the test. Talk with your doctor about what kinds of tests are available. Follow-up care is a key part of your treatment and safety. Be sure to make and go to all appointments, and call your doctor if you are having problems. It's also a good idea to know your test results and keep a list of the medicines you take. How can you care for yourself at home? Eat well  · Eat at least 3 meals and 2 healthy snacks every day. Eat fresh, whole foods, including:  ? 7 or more servings of bread, tortillas, cereal, rice, pasta, or oatmeal.  ? 3 or more servings of vegetables, especially leafy green vegetables. ? 2 or more servings of fruits. ? 3 or more servings of milk, yogurt, or cheese. ? 2 or more servings of meat, turkey, chicken, fish, eggs, or dried beans. · Drink plenty of fluids, especially water. Avoid sodas and other sweetened drinks.   · Choose foods that have important vitamins for your baby, such as calcium, iron, and folate. ? Dairy products, tofu, canned fish with bones, almonds, broccoli, dark leafy greens, corn tortillas, and fortified orange juice are good sources of calcium. ? Beef, poultry, liver, spinach, lentils, dried beans, fortified cereals, and dried fruits are rich in iron. ? Dark leafy greens, broccoli, asparagus, liver, fortified cereals, orange juice, peanuts, and almonds are good sources of folate. · Avoid foods that could harm your baby. ? Do not eat raw or undercooked meat, chicken, or fish (such as sushi or raw oysters). ? Do not eat raw eggs or foods that contain raw eggs, such as Caesar dressing. ? Do not eat soft cheeses and unpasteurized dairy foods, such as Brie, feta, or blue cheese. ? Do not eat fish that contains a lot of mercury, such as shark, swordfish, tilefish, or vipin mackerel. Do not eat more than 6 ounces of tuna each week. ? Do not eat raw sprouts, especially alfalfa sprouts. ? Cut down on caffeine, such as coffee, tea, and cola. Protect yourself and your baby  · Do not touch terry litter or cat feces. They can cause an infection that could harm your baby. · High body temperature can be harmful to your baby. So if you want to use a sauna or hot tub, be sure to talk to your doctor about how to use it safely. [place] with morning sickness  · Sip small amounts of water, juices, or shakes. Try drinking between meals, not with meals. · Eat 5 or 6 small meals a day. Try dry toast or crackers when you first get up, and eat breakfast a little later. · Avoid spicy, greasy, and fatty foods. · When you feel sick, open your windows or go for a short walk to get fresh air. · Try nausea wristbands. These help some women. · Tell your doctor if you think your prenatal vitamins make you sick. Where can you learn more? Go to http://faisal.info/. Enter G112 in the search box to learn more about \"Weeks 6 to 10 of Your Pregnancy: Care Instructions. \"  Current as of:  May 29, 2019  Content Version: 12.2  © 1553-5930 [employer]. Care instructions adapted under license by [employer] (which disclaims liability or warranty for this information). If you have questions about a medical condition or this instruction, always ask your healthcare professional. [address] any warranty or liability for your use of this information.

## 2020-02-28 ENCOUNTER — OFFICE VISIT (OUTPATIENT)
Dept: OBGYN CLINIC | Age: 35
End: 2020-02-28

## 2020-02-28 VITALS
HEIGHT: 68 IN | WEIGHT: 260 LBS | BODY MASS INDEX: 39.4 KG/M2 | DIASTOLIC BLOOD PRESSURE: 72 MMHG | SYSTOLIC BLOOD PRESSURE: 116 MMHG

## 2020-02-28 DIAGNOSIS — N92.6 MISSED MENSES: Primary | ICD-10-CM

## 2020-02-28 RX ORDER — PROMETHAZINE HYDROCHLORIDE 25 MG/1
25 TABLET ORAL
Qty: 30 TAB | Refills: 3 | Status: SHIPPED | OUTPATIENT
Start: 2020-02-28 | End: 2020-10-15

## 2020-02-28 NOTE — PROGRESS NOTES
Torri Carter is a ,  29 y.o. female [address]. Patient's last menstrual period was 2020 (exact date). making her 7 weeks pregnant. She has not had prenatal care. Patient was seen in the ER 7 days ago. She denies vaginal bleeding. She c/o cramping   The cramps are described as the same. She has not passed tissue. She had a positive pregnancy test 1 week ago. She has not had a recent pelvic ultrasound     Her past medical history is not significant for risk factors for ectopic pregnancy. She does not have a history of a spontaneous . She has not had a recent injury or trauma. She is having quite a bit of nausea                                                                                                                                        Additional complaints: none.      Past Medical History:   Diagnosis Date    Abnormal Pap smear of cervix 2019    Negative/+HPV    Anemia     Anxiety     ASCUS (atypical squamous cells of undetermined significance) on Pap smear 2012    Asthma     extrinsic as a child     Morbid obesity ([address]      Past Surgical History:   Procedure Laterality Date    BREAST SURGERY PROCEDURE UNLISTED  12    Incision and Drainage Left Breast Abscess    HX BREAST BIOPSY Bilateral     abscesses removed  and     HX BREAST REDUCTION      HX OTHER SURGICAL      abscess removed from left breast     Social History     Occupational History    Not on file   Tobacco Use    Smoking status: Never Smoker    Smokeless tobacco: Never Used   Substance and Sexual Activity    Alcohol use: No    Drug use: No    Sexual activity: Yes     Partners: Male     Birth control/protection: None     Family History   Problem Relation Age of Onset    Hypertension Mother     Elevated Lipids Mother     Diabetes Sister        Allergies   Allergen Reactions    Bactrim [Sulfamethoprim Ds] Nausea Only    Zofran [Ondansetron Hcl (Pf)] Rash     Prior to Admission medications    Medication Sig Start Date End Date Taking? Authorizing Provider   medroxyPROGESTERone (PROVERA) 10 mg tablet Take 1 Tab by mouth daily. 1/31/20   Jade Fairbanks MD   letrozole [facility]) 2.5 mg tablet Take 2 pills by mouth daily days 3-7 1/31/20   Jade Fairbanks MD   PNV no.153/FA/om3/dha/epa/fish (PRENATAL GUMMIES PO) Take 1 Tab by mouth daily. Provider, Historical   senna-docusate (PERICOLACE) 8.6-50 mg per tablet Take 2 Tabs by mouth daily.  7/26/19   Anjel Willis MD        Review of Systems: History obtained from the patient  Constitutional: negative for weight loss, fever, night sweats  Breast: negative for breast lumps, nipple discharge, galactorrhea  GI: negative for change in bowel habits, abdominal pain, black or bloody stools  : negative for frequency, dysuria, hematuria, vaginal discharge  MSK: negative for back pain, joint pain, muscle pain  Skin: negative for itching, rash, hives  Psych: negative for anxiety, depression, change in mood      Objective:  Visit Vitals  /72   Ht 5' 7.5\" (1.715 m)   Wt 260 lb (117.9 kg)   LMP 01/05/2020 (Exact Date)   BMI 40.12 kg/m²       Physical Exam:   PHYSICAL EXAMINATION    Constitutional  · Appearance: well-nourished, well developed, alert, in no acute distress      Skin  · General Inspection: no rash, no lesions identified    Neurologic/Psychiatric  · Mental Status:  · Orientation: grossly oriented to person, place and time  · Mood and Affect: mood normal, affect appropriate    Assessment:   Early pregnancy    Plan:   Keep EOB appt  Phenergan and bonjesta sent to pharmacy  Questions addressed

## 2020-03-13 ENCOUNTER — OFFICE VISIT (OUTPATIENT)
Dept: OBGYN CLINIC | Age: 35
End: 2020-03-13

## 2020-03-13 ENCOUNTER — TELEPHONE (OUTPATIENT)
Dept: OBGYN CLINIC | Age: 35
End: 2020-03-13

## 2020-03-13 VITALS
SYSTOLIC BLOOD PRESSURE: 111 MMHG | DIASTOLIC BLOOD PRESSURE: 66 MMHG | BODY MASS INDEX: 39.62 KG/M2 | WEIGHT: 261.4 LBS | HEIGHT: 68 IN

## 2020-03-13 DIAGNOSIS — Z13.79 ENCOUNTER FOR OTHER SCREENING FOR GENETIC AND CHROMOSOMAL ANOMALIES: ICD-10-CM

## 2020-03-13 DIAGNOSIS — O09.519 SUPERVISION OF PRIMIGRAVIDA OF ADVANCED MATERNAL AGE, ANTEPARTUM: Primary | ICD-10-CM

## 2020-03-13 LAB
ANTIBODY SCREEN, EXTERNAL: POSITIVE
CHLAMYDIA, EXTERNAL: NEGATIVE
HBSAG, EXTERNAL: NEGATIVE
HCT, EXTERNAL: 38.3
HGB, EXTERNAL: 13.2
HIV, EXTERNAL: NEGATIVE
N. GONORRHEA, EXTERNAL: NEGATIVE
PLATELET CNT,   EXTERNAL: 216
RUBELLA, EXTERNAL: NORMAL
T. PALLIDUM, EXTERNAL: NEGATIVE
TYPE, ABO & RH, EXTERNAL: NORMAL
URINALYSIS, EXTERNAL: NEGATIVE

## 2020-03-13 NOTE — PATIENT INSTRUCTIONS
Learning About Pregnancy  Your Care Instructions    Your health in the early weeks of your pregnancy is particularly important for your baby's health. Take good care of yourself. Anything you do that harms your body can also harm your baby. Make sure to go to all of your doctor appointments. Regular checkups will help keep you and your baby healthy. How can you care for yourself at home? Diet    · Eat a balanced diet. Make sure your diet includes plenty of beans, peas, and leafy green vegetables.     · Do not skip meals or go for many hours without eating. If you are nauseated, try to eat a small, healthy snack every 2 to 3 hours.     · Do not eat fish that has a high level of mercury, such as shark, swordfish, or mackerel. Do not eat more than one can of tuna each week.     · Drink plenty of fluids, enough so that your urine is light yellow or clear like water. If you have kidney, heart, or liver disease and have to limit fluids, talk with your doctor before you increase the amount of fluids you drink.     · Cut down on caffeine, such as coffee, tea, and cola.     · Do not drink alcohol, such as beer, wine, or hard liquor.     · Take a multivitamin that contains at least 400 micrograms (mcg) of folic acid to help prevent birth defects. Fortified cereal and whole wheat bread are good additional sources of folic acid.     · Increase the calcium in your diet. Try to drink a quart of skim milk each day. You may also take calcium supplements and choose foods such as cheese and yogurt.    Lifestyle    · Make sure you go to your follow-up appointments.     · Get plenty of rest. You may be unusually tired while you are pregnant.     · Get at least 30 minutes of exercise on most days of the week. Walking is a good choice. If you have not exercised in the past, start out slowly. Take several short walks each day.     · Do not smoke. If you need help quitting, talk to your doctor about stop-smoking programs.  These can increase your chances of quitting for good.     · Do not touch cat feces or litter boxes. Also, wash your hands after you handle raw meat, and fully cook all meat before you eat it. Wear gloves when you work in the yard or garden, and wash your hands well when you are done. Cat feces, raw or undercooked meat, and contaminated dirt can cause an infection that may harm your baby or lead to a miscarriage.     · Do not use saunas or hot tubs. Raising your body temperature may harm your baby.     · Avoid chemical fumes, paint fumes, or poisons.     · Do not use illegal drugs or alcohol. Medicines    · Review all of your medicines with your doctor. Some of your routine medicines may need to be changed to protect your baby.     · Use acetaminophen (Tylenol) to relieve minor problems, such as a mild headache or backache or a mild fever with cold symptoms. Do not use nonsteroidal anti-inflammatory drugs (NSAIDs), such as ibuprofen (Advil, Motrin) or naproxen (Aleve), unless your doctor says it is okay.     · Do not take two or more pain medicines at the same time unless the doctor told you to. Many pain medicines have acetaminophen, which is Tylenol. Too much acetaminophen (Tylenol) can be harmful.     · Take your medicines exactly as prescribed. Call your doctor if you think you are having a problem with your medicine.    To manage morning sickness    · If you feel sick when you first wake up, try eating a small snack (such as crackers) before you get out of bed. Allow some time to digest the snack, and then get out of bed slowly.     · Do not skip meals or go for long periods without eating. An empty stomach can make nausea worse.     · Eat small, frequent meals instead of three large meals each day.     · Drink plenty of fluids.  Sports drinks, such as Gatorade or Powerade, are good choices.     · Eat foods that are high in protein but low in fat.     · If you are taking iron supplements, ask your doctor if they are necessary. Iron can make nausea worse.     · Avoid any smells, such as coffee, that make you feel sick.     · Get lots of rest. Morning sickness may be worse when you are tired. Follow-up care is a key part of your treatment and safety. Be sure to make and go to all appointments, and call your doctor if you are having problems. It's also a good idea to know your test results and keep a list of the medicines you take. Where can you learn more? Go to http://merry-joseluis.info/  Enter M959 in the search box to learn more about \"Learning About Pregnancy. \"  Current as of: May 29, 2019Content Version: 12.4  © 4824-8055 Healthwise, Incorporated. Care instructions adapted under license by [employer] (which disclaims liability or warranty for this information). If you have questions about a medical condition or this instruction, always ask your healthcare professional. Mayo Oas any warranty or liability for your use of this information.

## 2020-03-13 NOTE — PROGRESS NOTES
Current pregnancy history:    Judy Nunez is a 29 y.o. female who presents for the evaluation of pregnancy. Patient's last menstrual period was 2020. LMP history:  The date of her LMP is certain. Her last menstrual period was normal and lasted for 4 to 5 days. A urine pregnancy test was positive 3 weeks ago. She was not on the pill at conception. Based on her LMP, her EDC is 2020 and her EGA is 9 weeks,5 days. Her menstrual cycles are regular and occur approximately every 28 days  and range from 3 to 5 days. The last menses lasted  the usual number of days. Ultrasound data:  She had an  ultrasound done by the ultrasound tech today which revealed a viable lamas pregnancy with a gestational age of 11 weeks and 5 days giving an [address] of 10-. TA ULTRASOUND PERFORMED. A SINGLE VIABLE 8W5D IUP IS SEEN WITH NORMAL CARDIAC RHYTHM. GESTATIONAL AGE BASED ON TODAYS US.  A NORMAL APPEARING YOLK [address] IS SEEN. RIGHT & LEFT ADNEXA APPEAR WITHIN NORMAL LIMITS. NO FREE FLUID SEEN IN THE CDS. Pregnancy symptoms:    Since her LMP she has experienced  urinary frequency and nausea. She has not been vomiting over the last few weeks. Associated signs and symptoms which she denies: dysuria, discharge, vaginal bleeding. She states she has not gained or lost weight. Relevant past pregnancy history:   She has the following pregnancy history: This is her first pregnancy. She has no history of  delivery. Relevant past medical history:(relevant to this pregnancy): noncontributory. Flu: Received . Genetic testing: would like to discuss. Pap/Occupational history:  Last pap smear: last year Results: 2019 +HPV. 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 Substance history: negative for alcohol, tobacco and street drugs. Positive for nothing. Exposure history: There are no indoor cat/s in the home.    She denies close contact with children on a regular basis.   She has had chicken pox or the vaccine in the past.   Patient denies issues with domestic violence. Genetic Screening/Teratology Counseling: (Includes patient, baby's father, or anyone in either family with:)  3.  Patient's age >/= 28 at [facility]?-- yes 28.   2.  Thalassemia ([facility], [place], [address], or  background): MCV<80?--no.     3.  Neural tube defect (meningomyelocele, spina bifida, anencephaly)?--no.   4.  Congenital heart defect?--no.  5.  Down syndrome?--no.   6.  Smith-Sachs (Latter day, Western [place] Citizen of [place])?--no.   7.  Canavan's Disease?--no.   8.  Familial Dysautonomia?--no.   9.  Sickle cell disease or trait ()? --no   The patient has not been tested for sickle trait  10. Hemophilia or other blood disorders?--no. 11.  Muscular dystrophy?--no. 12.  Cystic fibrosis?--no. 13.  [place]'s Chorea?--no. 14.  Mental retardation/autism (if yes was person tested for Fragile X)?--no. 15.  Other inherited genetic or chromosomal disorder?--no. 12.  Maternal metabolic disorder (DM, PKU, etc)?--no. 17.  Patient or FOB with a child with a birth defect not listed above?--no.  17a. Patient or FOB with a birth defect themselves?--no. 18.  Recurrent pregnancy loss, or stillbirth?--no. 19.  Any medications since LMP other than prenatal vitamins (include vitamins,  supplements, OTC meds, drugs, alcohol)?--no. 20.  Any other genetic/environmental exposure to discuss?--no. Infection History:  1. Lives with someone with TB or TB exposed?--no.   2.  Patient or partner has history of genital herpes?--no.  3.  Rash or viral illness since LMP?--no.    4.  History of STD (GC, CT, HPV, syphilis, HIV)? --no   5.  Other: OTHER?      OB History    Para Term  AB Living   1 0 0 0 0 0   SAB TAB Ectopic Molar Multiple Live Births   0 0 0 0 0 0      # Outcome Date GA Lbr Denzel/2nd Weight Sex Delivery Anes PTL Lv   1 Current                Past Medical History:   Diagnosis Date    Abnormal Pap smear of cervix 09/2019    Negative/+HPV    Anemia     Anxiety     ASCUS (atypical squamous cells of undetermined significance) on Pap smear 6/1/2012    Asthma     extrinsic as a child     Morbid obesity ([address]      Past Surgical History:   Procedure Laterality Date    BREAST SURGERY PROCEDURE UNLISTED  11/27/12    Incision and Drainage Left Breast Abscess    HX BREAST BIOPSY Bilateral     abscesses removed 2014 and 2015    HX BREAST REDUCTION  2003    HX OTHER SURGICAL      abscess removed from left breast     Social History     Occupational History    Not on file   Tobacco Use    Smoking status: Never Smoker    Smokeless tobacco: Never Used   Substance and Sexual Activity    Alcohol use: No    Drug use: No    Sexual activity: Yes     Partners: Male     Birth control/protection: None     Family History   Problem Relation Age of Onset    Hypertension Mother     Elevated Lipids Mother     Diabetes Sister        Allergies   Allergen Reactions    Bactrim [Sulfamethoprim Ds] Nausea Only    Zofran [Ondansetron Hcl (Pf)] Rash     Prior to Admission medications    Medication Sig Start Date End Date Taking? Authorizing Provider   promethazine (PHENERGAN) 25 mg tablet Take 1 Tab by mouth every six (6) hours as needed for Nausea. 2/28/20  Yes Donte Ye MD   doxylamine-pyridoxine, vit B6, (BONJESTA) 20-20 mg TbID Take 1 Tab by mouth two (2) times a day. 2/28/20  Yes Donte Ye MD   pnv w/o calcium-iron fum-fa 29 mg iron- 1 mg chew Take 1 Tab by mouth daily. 2/28/20  Yes Donte Ye MD   medroxyPROGESTERone (PROVERA) 10 mg tablet Take 1 Tab by mouth daily. 1/31/20  Yes Donte Ye MD   PNV no.153/FA/om3/dha/epa/fish (PRENATAL GUMMIES PO) Take 1 Tab by mouth daily. Yes Provider, Historical   letrozole [facility]) 2.5 mg tablet Take 2 pills by mouth daily days 3-7 1/31/20   Donte Ye MD   senna-docusate (PERICOLACE) 8.6-50 mg per tablet Take 2 Tabs by mouth daily.  7/26/19   Britta Bee MD Review of Systems: History obtained from the patient  Constitutional: negative for weight loss, fever, night sweats  HEENT: negative for hearing loss, earache, congestion, snoring, sorethroat  CV: negative for chest pain, palpitations, edema  Resp: negative for cough, shortness of breath, wheezing  Breast: negative for breast lumps, nipple discharge, galactorrhea  GI: negative for change in bowel habits, abdominal pain, black or bloody stools  : negative for frequency, dysuria, hematuria, vaginal discharge  MSK: negative for back pain, joint pain, muscle pain  Skin: negative for itching, rash, hives  Neuro: negative for dizziness, headache, confusion, weakness  Psych: negative for anxiety, depression, change in mood  Heme/lymph: negative for bleeding, bruising, pallor    Objective:  Visit Vitals  /66   Ht 5' 7.5\" (1.715 m)   Wt 261 lb 6.4 oz (118.6 kg)   LMP 01/05/2020   BMI 40.34 kg/m²       Physical Exam:   PHYSICAL EXAMINATION    Constitutional  · Appearance: well-nourished, well developed, alert, in no acute distress    HENT  · Head  · Face: appears normal  · Eyes: appear normal  · Ears: normal  · Mouth: normal  · Lips: no lesions    Neck  · Inspection/Palpation: normal appearance, no masses or tenderness  · Lymph Nodes: no lymphadenopathy present  · Thyroid: gland size normal, nontender, no nodules or masses present on palpation    Chest  · Respiratory Effort: breathing unlabored  · Auscultation: normal breath sounds    Cardiovascular  · Heart:  · Auscultation: regular rate and rhythm without murmur    Breasts  · Inspection of Breasts: breasts symmetrical, no skin changes, no discharge present, nipple appearance normal, no skin retraction present  · Palpation of Breasts and Axillae: no masses present on palpation, no breast tenderness  · Axillary Lymph Nodes: no lymphadenopathy present    Gastrointestinal  · Abdominal Examination: abdomen non-tender to palpation, normal bowel sounds, no masses present  · Liver and spleen: no hepatomegaly present, spleen not palpable  · Hernias: no hernias identified    Genitourinary  · External Genitalia: normal appearance for age, no discharge present, no tenderness present, no inflammatory lesions present, no masses present, no atrophy present  · Vagina: normal vaginal vault without central or paravaginal defects, no discharge present, no inflammatory lesions present, no masses present  · Bladder: non-tender to palpation  · Urethra: appears normal  · Cervix: normal   · Uterus: enlarged, normal shape, soft  · Adnexa: no adnexal tenderness present, no adnexal masses present  · Perineum: perineum within normal limits, no evidence of trauma, no rashes or skin lesions present  · Anus: anus within normal limits, no hemorrhoids present  · Inguinal Lymph Nodes: no lymphadenopathy present    Skin  · General Inspection: no rash, no lesions identified    Neurologic/Psychiatric  · Mental Status:  · Orientation: grossly oriented to person, place and time  · Mood and Affect: mood normal, affect appropriate    Assessment:   Intrauterine pregnancy with the following problems identified:   EDC 10/18/2020 by US  AMA 35 at delivery  BMI >40  Early glucola 12 weeks  [facility]  NIPTS  Baby ASA 12 weeks  MFM/genetics 20 weeks  Had flu vaccine      Plan:     Offered CF testing, CVS, Nuchal Translucency, MSAFP, amnio, and discussed NIPT  Course of pregnancy discussed including visit schedule, routine U/S, glucola testing, etc.  Avoid alcoholic beverages and illicit/recreational drugs use  Take prenatal vitamins or folic acid daily. Hospital and practice style discussed with coverage system. Discussed nutrition, toxoplasmosis precautions, sexual activity, exercise, need for influenza vaccine, environmental and work hazards, travel advice, screen for domestic violence, need for seat belts.   Discussed seafood, unpasteurized dairy products, deli meat, artificial sweeteners, and caffeine. Information on prenatal classes/breastfeeding given. Information on circumcision given  Patient encouraged not to smoke. Discussed current prescription drug use. Given medication list.  Discussed the use of over the counter medications and chemicals. Route of delivery discussed, including risks, benefits, and alternatives of  versus repeat LTCS. Pt understands risk of hemorrhage during pregnancy and post delivery and would accept blood products if necessary in life-threatening emergencies      Handouts given to pt.

## 2020-03-13 NOTE — TELEPHONE ENCOUNTER
This nurse called the [facility] and spoke to the pharmacist and was advised that no prenatal vitamins that are chewable are covered by her insurance and that there no otc prenatal gummies that have enough iron and folic acid in .     Patient had previous prescription sent on 7/9/2019 for po prenatal vitamin that is covered    Pharmacy will refill that prescription       FYI

## 2020-03-15 LAB — BACTERIA UR CULT: NORMAL

## 2020-03-16 ENCOUNTER — HOSPITAL ENCOUNTER (EMERGENCY)
Age: 35
Discharge: HOME | End: 2020-03-17
Attending: EMERGENCY MEDICINE | Admitting: EMERGENCY MEDICINE
Payer: MEDICAID

## 2020-03-16 DIAGNOSIS — N39.0 URINARY TRACT INFECTION WITHOUT HEMATURIA, SITE UNSPECIFIED: Primary | ICD-10-CM

## 2020-03-16 DIAGNOSIS — R11.10 VOMITING, INTRACTABILITY OF VOMITING NOT SPECIFIED, PRESENCE OF NAUSEA NOT SPECIFIED, UNSPECIFIED VOMITING TYPE: ICD-10-CM

## 2020-03-16 LAB
ALBUMIN SERPL-MCNC: 3.5 G/DL (ref 3.5–5)
ALBUMIN/GLOB SERPL: 0.8 {RATIO} (ref 1.1–2.2)
ALP SERPL-CCNC: 50 U/L (ref 45–117)
ALT SERPL-CCNC: 23 U/L (ref 12–78)
ANION GAP SERPL CALC-SCNC: 7 MMOL/L (ref 5–15)
APPEARANCE UR: ABNORMAL
AST SERPL-CCNC: 17 U/L (ref 15–37)
BACTERIA URNS QL MICRO: ABNORMAL /HPF
BASOPHILS # BLD: 0 K/UL (ref 0–0.1)
BASOPHILS NFR BLD: 0 % (ref 0–1)
BILIRUB SERPL-MCNC: 1.3 MG/DL (ref 0.2–1)
BILIRUB UR QL: NEGATIVE
BUN SERPL-MCNC: 8 MG/DL (ref 6–20)
BUN/CREAT SERPL: 11 (ref 12–20)
CALCIUM SERPL-MCNC: 8.6 MG/DL (ref 8.5–10.1)
CHLORIDE SERPL-SCNC: 104 MMOL/L (ref 97–108)
CO2 SERPL-SCNC: 23 MMOL/L (ref 21–32)
COLOR UR: ABNORMAL
CREAT SERPL-MCNC: 0.7 MG/DL (ref 0.55–1.02)
DIFFERENTIAL METHOD BLD: NORMAL
EOSINOPHIL # BLD: 0.3 K/UL (ref 0–0.4)
EOSINOPHIL NFR BLD: 4 % (ref 0–7)
EPITH CASTS URNS QL MICRO: ABNORMAL /LPF
ERYTHROCYTE [DISTWIDTH] IN BLOOD BY AUTOMATED COUNT: 12.2 % (ref 11.5–14.5)
FLUAV AG NPH QL IA: NEGATIVE
FLUBV AG NOSE QL IA: NEGATIVE
GLOBULIN SER CALC-MCNC: 4.5 G/DL (ref 2–4)
GLUCOSE SERPL-MCNC: 83 MG/DL (ref 65–100)
GLUCOSE UR STRIP.AUTO-MCNC: NEGATIVE MG/DL
HCT VFR BLD AUTO: 39 % (ref 35–47)
HGB BLD-MCNC: 13.1 G/DL (ref 11.5–16)
HGB UR QL STRIP: NEGATIVE
HYALINE CASTS URNS QL MICRO: ABNORMAL /LPF (ref 0–5)
IMM GRANULOCYTES # BLD AUTO: 0 K/UL (ref 0–0.04)
IMM GRANULOCYTES NFR BLD AUTO: 0 % (ref 0–0.5)
KETONES UR QL STRIP.AUTO: ABNORMAL MG/DL
LEUKOCYTE ESTERASE UR QL STRIP.AUTO: ABNORMAL
LIPASE SERPL-CCNC: 131 U/L (ref 73–393)
LYMPHOCYTES # BLD: 1.7 K/UL (ref 0.8–3.5)
LYMPHOCYTES NFR BLD: 23 % (ref 12–49)
MCH RBC QN AUTO: 30.7 PG (ref 26–34)
MCHC RBC AUTO-ENTMCNC: 33.6 G/DL (ref 30–36.5)
MCV RBC AUTO: 91.3 FL (ref 80–99)
MONOCYTES # BLD: 0.8 K/UL (ref 0–1)
MONOCYTES NFR BLD: 11 % (ref 5–13)
NEUTS SEG # BLD: 4.6 K/UL (ref 1.8–8)
NEUTS SEG NFR BLD: 62 % (ref 32–75)
NITRITE UR QL STRIP.AUTO: NEGATIVE
NRBC # BLD: 0 K/UL (ref 0–0.01)
NRBC BLD-RTO: 0 PER 100 WBC
PH UR STRIP: 6.5 [PH] (ref 5–8)
PLATELET # BLD AUTO: 210 K/UL (ref 150–400)
PMV BLD AUTO: 10.2 FL (ref 8.9–12.9)
POTASSIUM SERPL-SCNC: 3.7 MMOL/L (ref 3.5–5.1)
PROT SERPL-MCNC: 8 G/DL (ref 6.4–8.2)
PROT UR STRIP-MCNC: NEGATIVE MG/DL
RBC # BLD AUTO: 4.27 M/UL (ref 3.8–5.2)
RBC #/AREA URNS HPF: ABNORMAL /HPF (ref 0–5)
SODIUM SERPL-SCNC: 134 MMOL/L (ref 136–145)
SP GR UR REFRACTOMETRY: 1.02 (ref 1–1.03)
UR CULT HOLD, URHOLD: NORMAL
UROBILINOGEN UR QL STRIP.AUTO: 1 EU/DL (ref 0.2–1)
WBC # BLD AUTO: 7.5 K/UL (ref 3.6–11)
WBC URNS QL MICRO: ABNORMAL /HPF (ref 0–4)

## 2020-03-16 PROCEDURE — 85025 COMPLETE CBC W/AUTO DIFF WBC: CPT

## 2020-03-16 PROCEDURE — 96365 THER/PROPH/DIAG IV INF INIT: CPT

## 2020-03-16 PROCEDURE — 81001 URINALYSIS AUTO W/SCOPE: CPT

## 2020-03-16 PROCEDURE — 99284 EMERGENCY DEPT VISIT MOD MDM: CPT

## 2020-03-16 PROCEDURE — 96361 HYDRATE IV INFUSION ADD-ON: CPT

## 2020-03-16 PROCEDURE — 83690 ASSAY OF LIPASE: CPT

## 2020-03-16 PROCEDURE — 74011250636 HC RX REV CODE- 250/636: Performed by: EMERGENCY MEDICINE

## 2020-03-16 PROCEDURE — 36415 COLL VENOUS BLD VENIPUNCTURE: CPT

## 2020-03-16 PROCEDURE — 96375 TX/PRO/DX INJ NEW DRUG ADDON: CPT

## 2020-03-16 PROCEDURE — 80053 COMPREHEN METABOLIC PANEL: CPT

## 2020-03-16 PROCEDURE — 87086 URINE CULTURE/COLONY COUNT: CPT

## 2020-03-16 PROCEDURE — 87804 INFLUENZA ASSAY W/OPTIC: CPT

## 2020-03-16 PROCEDURE — 74011000258 HC RX REV CODE- 258: Performed by: EMERGENCY MEDICINE

## 2020-03-16 RX ORDER — CEPHALEXIN 250 MG/1
500 CAPSULE ORAL
Status: COMPLETED | OUTPATIENT
Start: 2020-03-16 | End: 2020-03-17

## 2020-03-16 RX ORDER — DIPHENHYDRAMINE HYDROCHLORIDE 50 MG/ML
25 INJECTION, SOLUTION INTRAMUSCULAR; INTRAVENOUS
Status: COMPLETED | OUTPATIENT
Start: 2020-03-16 | End: 2020-03-16

## 2020-03-16 RX ADMIN — SODIUM CHLORIDE 12.5 MG: 900 INJECTION, SOLUTION INTRAVENOUS at 23:35

## 2020-03-16 RX ADMIN — SODIUM CHLORIDE 1000 ML: 900 INJECTION, SOLUTION INTRAVENOUS at 23:35

## 2020-03-16 RX ADMIN — DIPHENHYDRAMINE HYDROCHLORIDE 25 MG: 50 INJECTION, SOLUTION INTRAMUSCULAR; INTRAVENOUS at 23:35

## 2020-03-16 NOTE — LETTER
[address] 
[facility] EMERGENCY DEPT 
[address] [facility] [id number] [phone] Work/School Note Date: 3/16/2020 To Whom It May concern: Ha Flores was seen and treated today in the emergency room by the following provider(s): 
Attending Provider: Stevan Brady MD. Ha Flores may return to work on 3/18/2020. Sincerely, Irvin Lai RN

## 2020-03-17 VITALS
SYSTOLIC BLOOD PRESSURE: 116 MMHG | HEIGHT: 67 IN | DIASTOLIC BLOOD PRESSURE: 84 MMHG | TEMPERATURE: 98.4 F | WEIGHT: 260 LBS | HEART RATE: 79 BPM | RESPIRATION RATE: 17 BRPM | BODY MASS INDEX: 40.81 KG/M2 | OXYGEN SATURATION: 98 %

## 2020-03-17 DIAGNOSIS — O09.511 SUPERVISION OF HIGH RISK PRIMIGRAVIDA IN FIRST TRIMESTER IN PATIENT 35 YEARS OR OLDER AT TIME OF DELIVERY: ICD-10-CM

## 2020-03-17 LAB
ABO GROUP BLD: NORMAL
BLD GP AB SCN SERPL QL: NORMAL
BLD GP AB SCN SERPL QL: POSITIVE
BLOOD GROUP ANTIBODIES SERPL: ABNORMAL
C TRACH RRNA SPEC QL NAA+PROBE: NEGATIVE
ERYTHROCYTE [DISTWIDTH] IN BLOOD BY AUTOMATED COUNT: 12.4 % (ref 11.7–15.4)
HBV SURFACE AG SERPL QL IA: NEGATIVE
HCT VFR BLD AUTO: 38.3 % (ref 34–46.6)
HGB BLD-MCNC: 13.2 G/DL (ref 11.1–15.9)
HIV 1+2 AB+HIV1 P24 AG SERPL QL IA: NON REACTIVE
MCH RBC QN AUTO: 30.9 PG (ref 26.6–33)
MCHC RBC AUTO-ENTMCNC: 34.5 G/DL (ref 31.5–35.7)
MCV RBC AUTO: 90 FL (ref 79–97)
N GONORRHOEA RRNA SPEC QL NAA+PROBE: NEGATIVE
PLATELET # BLD AUTO: 216 X10E3/UL (ref 150–450)
RBC # BLD AUTO: 4.27 X10E6/UL (ref 3.77–5.28)
RH BLD: POSITIVE
RUBV IGG SERPL IA-ACNC: 1.55 INDEX
T VAGINALIS DNA SPEC QL NAA+PROBE: NEGATIVE
TREPONEMA PALLIDUM IGG+IGM AB [PRESENCE] IN SERUM OR PLASMA BY IMMUNOASSAY: NON REACTIVE
WBC # BLD AUTO: 7.3 X10E3/UL (ref 3.4–10.8)
XXX BLOOD GROUP AB TITR SERPL AHG: ABNORMAL {TITER}

## 2020-03-17 PROCEDURE — 74011250637 HC RX REV CODE- 250/637: Performed by: EMERGENCY MEDICINE

## 2020-03-17 RX ORDER — CEPHALEXIN 500 MG/1
500 CAPSULE ORAL 2 TIMES DAILY
Qty: 14 CAP | Refills: 0 | Status: SHIPPED | OUTPATIENT
Start: 2020-03-17 | End: 2020-03-24

## 2020-03-17 RX ADMIN — CEPHALEXIN 500 MG: 250 CAPSULE ORAL at 00:50

## 2020-03-17 NOTE — ED NOTES
Dr. Wilder Mazariegos gave and reviewed discharge instructions with the patient. The patient verbalized understanding. The patient was given opportunity for questions. Patient discharged in stable condition to the waiting room via ambulatory.

## 2020-03-17 NOTE — ED TRIAGE NOTES
Patient arrives for complaints of chills,  lower abdominal pain, nausea and vomiting x 4 since this morning

## 2020-03-17 NOTE — ED PROVIDER NOTES
29year-old G1, P0 female at approximately 10 weeks gestation presents with nausea, vomiting, headache, lower abdominal pain that started this morning. She had subjective fevers at home. She has not been able to keep anything down. She recently had an ultrasound showing an IUP. She has vomited approximately 4 times. Chills    Associated symptoms include vomiting. Pertinent negatives include no chest pain, no headaches, no sore throat, no shortness of breath and no rash. Vomiting    Associated symptoms include chills. Pertinent negatives include no fever, no abdominal pain, no headaches and no headaches. Past Medical History:   Diagnosis Date    Abnormal Pap smear of cervix 09/2019    Negative/+HPV    Anemia     Anxiety     ASCUS (atypical squamous cells of undetermined significance) on Pap smear 6/1/2012    Asthma     extrinsic as a child     Morbid obesity ([facility] [address]        Past Surgical History:   Procedure Laterality Date    BREAST SURGERY PROCEDURE UNLISTED  11/27/12    Incision and Drainage Left Breast Abscess    HX BREAST BIOPSY Bilateral     abscesses removed 2014 and 2015    HX BREAST REDUCTION  2003    HX OTHER SURGICAL      abscess removed from left breast         Family History:   Problem Relation Age of Onset    Hypertension Mother    Ayala Elevated Lipids Mother     Diabetes Sister        Social History     Socioeconomic History    Marital status: SINGLE     Spouse name: Not on file    Number of children: Not on file    Years of education: Not on file    Highest education level: Not on file   Occupational History    Not on file   Social Needs    Financial resource strain: Not on file    Food insecurity     Worry: Not on file     Inability: Not on file    Transportation needs     Medical: Not on file     Non-medical: Not on file   Tobacco Use    Smoking status: Never Smoker    Smokeless tobacco: Never Used   Substance and Sexual Activity    Alcohol use: No    Drug use:  No  Sexual activity: Yes     Partners: Male     Birth control/protection: None   Lifestyle    Physical activity     Days per week: Not on file     Minutes per session: Not on file    Stress: Not on file   Relationships    Social connections     Talks on phone: Not on file     Gets together: Not on file     Attends Jehovah's witness service: Not on file     Active member of club or organization: Not on file     Attends meetings of clubs or organizations: Not on file     Relationship status: Not on file    Intimate partner violence     Fear of current or ex partner: Not on file     Emotionally abused: Not on file     Physically abused: Not on file     Forced sexual activity: Not on file   Other Topics Concern    Not on file   Social History Narrative    Not on file         ALLERGIES: Bactrim [sulfamethoprim ds] and Zofran [ondansetron hcl (pf)]    Review of Systems   Constitutional: Positive for chills. Negative for fever. HENT: Negative for ear pain and sore throat. Eyes: Negative for pain. Respiratory: Negative for chest tightness and shortness of breath. Cardiovascular: Negative for chest pain. Gastrointestinal: Positive for vomiting. Negative for abdominal pain. Genitourinary: Negative for flank pain. Musculoskeletal: Negative for back pain. Skin: Negative for rash. Neurological: Negative for headaches. All other systems reviewed and are negative. Vitals:    03/16/20 2222   BP: (!) 150/94   Pulse: 83   Resp: 16   Temp: 98.9 °F (37.2 °C)   SpO2: 98%   Weight: 117.9 kg (260 lb)   Height: 5' 7\" (1.702 m)            Physical Exam  Vitals signs and nursing note reviewed. Constitutional:       General: She is not in acute distress. HENT:      Head: Normocephalic and atraumatic. Mouth/Throat:      Mouth: Mucous membranes are moist.   Eyes:      General: No scleral icterus. Conjunctiva/sclera: Conjunctivae normal.      Pupils: Pupils are equal, round, and reactive to light.    Neck: Musculoskeletal: Neck supple. Trachea: No tracheal deviation. Cardiovascular:      Rate and Rhythm: Normal rate and regular rhythm. Pulmonary:      Effort: Pulmonary effort is normal. No respiratory distress. Breath sounds: No wheezing or rales. Abdominal:      General: There is no distension. Palpations: Abdomen is soft. Tenderness: There is no abdominal tenderness. Genitourinary:     Comments: deferred  Musculoskeletal:         General: No deformity. Skin:     General: Skin is warm and dry. Neurological:      General: No focal deficit present. Mental Status: She is alert. Psychiatric:         Mood and Affect: Mood normal.          MDM       Procedures      12:45 AM  Patient re-evaluated. All questions answered. Patient appropriate for discharge. Given return precautions and follow up instructions.      LABORATORY TESTS:  Labs Reviewed   METABOLIC PANEL, COMPREHENSIVE - Abnormal; Notable for the following components:       Result Value    Sodium 134 (*)     BUN/Creatinine ratio 11 (*)     Bilirubin, total 1.3 (*)     Globulin 4.5 (*)     A-G Ratio 0.8 (*)     All other components within normal limits   URINALYSIS W/MICROSCOPIC - Abnormal; Notable for the following components:    Appearance CLOUDY (*)     Ketone TRACE (*)     Leukocyte Esterase MODERATE (*)     Epithelial cells MODERATE (*)     Bacteria 1+ (*)     All other components within normal limits   URINE CULTURE HOLD SAMPLE   CULTURE, URINE   CBC WITH AUTOMATED DIFF   LIPASE   INFLUENZA A+B VIRAL AGS       IMAGING RESULTS:  No orders to display       MEDICATIONS GIVEN:  Medications   cephALEXin (KEFLEX) capsule 500 mg (has no administration in time range)   promethazine (PHENERGAN) 12.5 mg in 0.9% sodium chloride 50 mL IVPB (12.5 mg IntraVENous New Bag 3/16/20 2335)   diphenhydrAMINE (BENADRYL) injection 25 mg (25 mg IntraVENous Given 3/16/20 2335)   sodium chloride 0.9 % bolus infusion 1,000 mL (1,000 mL IntraVENous New Bag 3/16/20 6631)       IMPRESSION:  1. Urinary tract infection without hematuria, site unspecified    2. Vomiting, intractability of vomiting not specified, presence of nausea not specified, unspecified vomiting type        PLAN:  1. Current Discharge Medication List      START taking these medications    Details   cephALEXin (Keflex) 500 mg capsule Take 1 Cap by mouth two (2) times a day for 7 days. Qty: 14 Cap, Refills: 0           2. Follow-up Information     Follow up With Specialties Details Why Aravind Villar MD Obstetrics & Gynecology, Gynecology, Obstetrics Schedule an appointment as soon as possible for a visit  [address] [place] [phone]      [facility] EMERGENCY DEPT Emergency Medicine  If symptoms worsen or new concerns [address] [place] [address]  [phone]        3. Return to ED for new or worsening symptoms       Joy Peng.  Erika Glasgow MD

## 2020-03-19 LAB
BACTERIA SPEC CULT: NORMAL
CC UR VC: NORMAL
SERVICE CMNT-IMP: NORMAL

## 2020-04-08 ENCOUNTER — TELEPHONE (OUTPATIENT)
Dept: OBGYN CLINIC | Age: 35
End: 2020-04-08

## 2020-04-08 ENCOUNTER — ROUTINE PRENATAL (OUTPATIENT)
Dept: OBGYN CLINIC | Age: 35
End: 2020-04-08

## 2020-04-08 VITALS — SYSTOLIC BLOOD PRESSURE: 144 MMHG | DIASTOLIC BLOOD PRESSURE: 85 MMHG | WEIGHT: 257.2 LBS | BODY MASS INDEX: 40.28 KG/M2

## 2020-04-08 DIAGNOSIS — O09.519 SUPERVISION OF PRIMIGRAVIDA OF ADVANCED MATERNAL AGE, ANTEPARTUM: ICD-10-CM

## 2020-04-08 DIAGNOSIS — Z13.79 ENCOUNTER FOR OTHER SCREENING FOR GENETIC AND CHROMOSOMAL ANOMALIES: ICD-10-CM

## 2020-04-08 DIAGNOSIS — O09.511 SUPERVISION OF HIGH RISK PRIMIGRAVIDA IN FIRST TRIMESTER IN PATIENT 35 YEARS OR OLDER AT TIME OF DELIVERY: Primary | ICD-10-CM

## 2020-04-08 DIAGNOSIS — Z3A.12 12 WEEKS GESTATION OF PREGNANCY: ICD-10-CM

## 2020-04-08 LAB — GTT, 1 HR, GLUCOLA, EXTERNAL: 132

## 2020-04-08 NOTE — PROGRESS NOTES
Pos FHT  Complains of sudden sharp pain in abdomen and back.  Now gone  Early glucola today  FOB (Jonathan) to get Horizon today  NIPTS today

## 2020-04-08 NOTE — TELEPHONE ENCOUNTER
Per Dr. Ilene Chin can be seen today. Set for 11:10 today. She mentioned she has appt tomorrow for FOB. I did not say that we would do the FOB visit, as this is for a problem visit. Spoke with Greg Vaughn to make her aware. Patient advised must come alone.

## 2020-04-08 NOTE — PATIENT INSTRUCTIONS
Weeks 10 to 14 of Your Pregnancy: Care Instructions  Your Care Instructions    By weeks 10 to 14 of your pregnancy, the placenta has formed inside your uterus. It is possible to hear your baby's heartbeat with a special ultrasound device. Your baby's eyes can and do move. The arms and legs can bend. This is a good time to think about testing for birth defects. There are two types of tests: screening and diagnostic. Screening tests show the chance that a baby has a certain birth defect. They can't tell you for sure that your baby has a problem. Diagnostic tests show if a baby has a certain birth defect. It's your choice whether to have these tests. You and your partner can talk to your doctor or midwife about birth defects tests. Follow-up care is a key part of your treatment and safety. Be sure to make and go to all appointments, and call your doctor if you are having problems. It's also a good idea to know your test results and keep a list of the medicines you take. How can you care for yourself at home? Decide about tests  · You can have screening tests and diagnostic tests to check for birth defects. The decision to have a test for birth defects is personal. Think about your age, your chance of passing on a family disease, your need to know about any problems, and what you might do after you have the test results. ? Triple or quadruple (quad) blood tests. These screening tests can be done between 15 and 20 weeks of pregnancy. They check the amounts of three or four substances in your blood. The doctor looks at these test results, along with your age and other factors, to find out the chance that your baby may have certain problems. ? Amniocentesis. This diagnostic test is used to look for chromosomal problems in the baby's cells.  It can be done between 15 and 20 weeks of pregnancy, usually around week 16.  ? Nuchal translucency test. This test uses ultrasound to measure the thickness of the area at the back of the baby's neck. An increase in the thickness can be an early sign of Down syndrome. ? Chorionic villus sampling (CVS). This is a test that looks for certain genetic problems with your baby. The same genes that are in your baby are in the placenta. A small piece of the placenta is taken out and tested. This test is done when you are 10 to 13 weeks pregnant. Ease discomfort  · Slow down and take naps when you feel tired. · If your emotions swing, talk to someone. Crying, anxiety, and concentration problems are common. · If your gums bleed, try a softer toothbrush. If your gums are puffy and bleed a lot, see your dentist.  · If you feel dizzy:  ? Get up slowly after sitting or lying down. ? Drink plenty of fluids. ? Eat small snacks to keep your blood sugar stable. ? Put your head between your legs as though you were tying your shoelaces. ? Lie down with your legs higher than your head. Use pillows to prop up your feet. · If you have a headache:  ? Lie down. ? Ask your partner or a good friend for a neck massage. ? Try cool cloths over your forehead or across the back of your neck. ? Use acetaminophen (Tylenol) for pain relief. Do not use nonsteroidal anti-inflammatory drugs (NSAIDs), such as ibuprofen (Advil, Motrin) or naproxen (Aleve), unless your doctor says it is okay. · If you have a nosebleed, pinch your nose gently, and hold it for a short while. To prevent nosebleeds, try massaging a small dab of petroleum jelly, such as Vaseline, in your nostrils. · If your nose is stuffed up, try saline (saltwater) nose sprays. Do not use decongestant sprays. Care for your breasts  · Wear a bra that gives you good support. · Know that changes in your breasts are normal.  ? Your breasts may get larger and more tender. Tenderness usually gets better by 12 weeks. ? Your nipples may get darker and larger, and small bumps around your nipples may show more. ?  The veins in your chest and breasts may show more. · Don't worry about \"toughening'\" your nipples. Breastfeeding will naturally do this. Where can you learn more? Go to http://merry-joseluis.info/  Enter X215 in the search box to learn more about \"Weeks 10 to 14 of Your Pregnancy: Care Instructions. \"  Current as of: May 29, 2019Content Version: 12.4  © 3364-8122 [employer], [employer]orporated. Care instructions adapted under license by [employer] (which disclaims liability or warranty for this information). If you have questions about a medical condition or this instruction, always ask your healthcare professional. [address] any warranty or liability for your use of this information.

## 2020-04-08 NOTE — PROGRESS NOTES
Problem List  Date Reviewed: 3/13/2020 Codes Class Noted Supervision of high risk primigravida in first trimester in patient 28 years or older at time of delivery ICD-10-CM: O36.200 ICD-9-CM: V23.81  3/17/2020 Overview Addendum 4/8/2020 11:42 AM by Julián Zuluaga LPN Intrauterine pregnancy with the following problems identified: EDC 10/18/2020 by [address] 35 at delivery BMI >40 Early glucola 12 weeks- done 4/8 [facility]- SMA carrier- Test FOB (maritza Castillo)- Drawn 4/8/20 NIPTS- Drawn 4/8/20 Baby ASA 12 weeks MFM/genetics 20 weeks Had flu vaccine [place] antibody positive-Titer next visit Anemia ICD-10-CM: D64.9 ICD-9-CM: 922. 9  Unknown Anxiety ICD-10-CM: F41.9 ICD-9-CM: 300.00  Unknown Morbid obesity ([address] ICD-10-CM: E66.01 
ICD-9-CM: 278.01  Unknown Syncope ICD-10-CM: R55 
ICD-9-CM: 780.2  5/28/2019 Hypokalemia ICD-10-CM: E87.6 ICD-9-CM: 276.8  5/28/2019 Asthma ICD-10-CM: H19.262 ICD-9-CM: 493.90  11/27/2012 Hx of breast reduction, elective ICD-10-CM: Z98.890 ICD-9-CM: V45.89  11/27/2012 Elevated cholesterol ICD-10-CM: E78.00 ICD-9-CM: 272.0  6/1/2012 Pelvic Pain evaluation Elena Gant is a 29 y.o. female who complains of pelvic pain. The pain is described as cramping. She denies vaginal bleeding, dysuria, and vaginal odor/itching Pain is located in the RLQ with radiation to low back. The pain started this morning at 10:00am  
Her symptoms have been gradually worsening since. Aggravating factors: none. Alleviating factors: none. Associated symptoms: none. The patient denies chills, dysuria, fever and urinary frequency. Past Medical History:  
Diagnosis Date  Abnormal Pap smear of cervix 09/2019 Negative/+HPV  Anemia  Anxiety  ASCUS (atypical squamous cells of undetermined significance) on Pap smear 6/1/2012  Asthma   
 extrinsic as a child  Morbid obesity ([facility] [address] Past Surgical History:  
Procedure Laterality Date  BREAST SURGERY PROCEDURE UNLISTED  11/27/12 Incision and Drainage Left Breast Abscess  HX BREAST BIOPSY Bilateral   
 abscesses removed 2014 and 2015  HX BREAST REDUCTION  2003  HX OTHER SURGICAL    
 abscess removed from left breast  
 
Social History Occupational History  Not on file Tobacco Use  Smoking status: Never Smoker  Smokeless tobacco: Never Used Substance and Sexual Activity  Alcohol use: No  
 Drug use: No  
 Sexual activity: Yes  
  Partners: Male Birth control/protection: None Family History Problem Relation Age of Onset  Hypertension Mother  Elevated Lipids Mother  Diabetes Sister Allergies Allergen Reactions  Bactrim [Sulfamethoprim Ds] Nausea Only  Zofran [Ondansetron Hcl (Pf)] Rash Prior to Admission medications Medication Sig Start Date End Date Taking? Authorizing Provider  
promethazine (PHENERGAN) 25 mg tablet Take 1 Tab by mouth every six (6) hours as needed for Nausea. 2/28/20  Yes Jane Perez MD  
doxylamine-pyridoxine, vit B6, (BONJESTA) 20-20 mg TbID Take 1 Tab by mouth two (2) times a day. 2/28/20  Yes Jane Perez MD  
pnv w/o calcium-iron fum-fa 29 mg iron- 1 mg chew Take 1 Tab by mouth daily. 2/28/20  Yes Jane Perez MD  
senna-docusate (PERICOLACE) 8.6-50 mg per tablet Take 2 Tabs by mouth daily. 7/26/19  Yes Mu Lucero MD  
medroxyPROGESTERone (PROVERA) 10 mg tablet Take 1 Tab by mouth daily. 1/31/20   Jane Perez MD  
letrozole [facility]) 2.5 mg tablet Take 2 pills by mouth daily days 3-7 1/31/20   Jane Perez MD  
PNV no.153/FA/om3/dha/epa/fish (PRENATAL GUMMIES PO) Take 1 Tab by mouth daily. Provider, Historical  
  
 
Review of Systems: History obtained from the patient Constitutional: negative for weight loss, fever, night sweats Breast: negative for breast lumps, nipple discharge, galactorrhea GI: negative for change in bowel habits, abdominal pain, black or bloody stools : negative for frequency, dysuria, hematuria, vaginal discharge MSK: negative for back pain, joint pain, muscle pain Skin: negative for itching, rash, hives Psych: negative for anxiety, depression, change in mood Objective: 
 
Visit Vitals LMP 01/06/2020 (Approximate) Physical Exam:  
 
Constitutional 
· Appearance: well-nourished, well developed, alert, in no acute distress Gastrointestinal 
· Abdominal Examination: abdomen non-tender to palpation, normal bowel sounds, no masses present · Liver and spleen: no hepatomegaly present, spleen not palpable · Hernias: no hernias identified Genitourinary · External Genitalia: normal appearance for age, no discharge present, no tenderness present, no inflammatory lesions present, no masses present, no atrophy present · Vagina: normal vaginal vault without central or paravaginal defects, no discharge present, no inflammatory lesions present, no masses present · Bladder: non-tender to palpation · Urethra: appears normal 
· Cervix: normal  
· Uterus: normal size, shape and consistency · Adnexa: no adnexal tenderness present, no adnexal masses present · Perineum: perineum within normal limits, no evidence of trauma, no rashes or skin lesions present · Anus: anus within normal limits, no hemorrhoids present · Inguinal Lymph Nodes: no lymphadenopathy present Skin · General Inspection: no rash, no lesions identified Neurologic/Psychiatric · Mental Status: · Orientation: grossly oriented to person, place and time · Mood and Affect: mood normal, affect appropriate Assessment: 
***. Plan:  
***. 
 
 
RTO prn if symptoms persist or worsen. Instructions given to pt. Handouts given to pt.

## 2020-04-08 NOTE — TELEPHONE ENCOUNTER
Patient of [address] [place] [address] Rd    Calling to say that she works at [employer]. Started about 20 minutes ago and has been coming in waves of sharp pain 8/10 across entire abdomen. No bleeding or unusual for her discharge. Drinking plenty of water today . Hurts to urinate and get urine flowing.

## 2020-04-09 LAB — GLUCOSE 1H P 50 G GLC PO SERPL-MCNC: 132 MG/DL (ref 65–139)

## 2020-05-07 ENCOUNTER — VIRTUAL VISIT (OUTPATIENT)
Dept: OBGYN CLINIC | Age: 35
End: 2020-05-07

## 2020-05-07 ENCOUNTER — TELEPHONE (OUTPATIENT)
Dept: OBGYN CLINIC | Age: 35
End: 2020-05-07

## 2020-05-07 ENCOUNTER — ROUTINE PRENATAL (OUTPATIENT)
Dept: OBGYN CLINIC | Age: 35
End: 2020-05-07

## 2020-05-07 VITALS — BODY MASS INDEX: 39.47 KG/M2 | WEIGHT: 252 LBS

## 2020-05-07 DIAGNOSIS — O09.519 SUPERVISION OF PRIMIGRAVIDA OF ADVANCED MATERNAL AGE, ANTEPARTUM: Primary | ICD-10-CM

## 2020-05-07 DIAGNOSIS — Z3A.16 16 WEEKS GESTATION OF PREGNANCY: ICD-10-CM

## 2020-05-07 NOTE — PROGRESS NOTES
IMedExchange Video visit    Michelle Portillo is a 29 y.o. female who was seen by synchronous (real-time) audio-video technology on 5/7/2020. Consent: Michelle Portillo, who was seen by synchronous (real-time) audio-video technology, and/or her healthcare decision maker, is aware that this patient-initiated, Telehealth encounter on 5/7/2020 is a billable service, with coverage as determined by her insurance carrier. She is aware that she may receive a bill and has provided verbal consent to proceed: Yes. Pt complains of vomiting after eating. She is allergic to zofran (rash).  Has rx for bonjesta - was not taking correctly so it did not work  Advised again she needs to take every 12 hours around the clock for it to work    Disc nini again - she had blood transfusion in April  Need to recheck at next visit  Has RADHA on 6/3 - see me same day

## 2020-05-07 NOTE — TELEPHONE ENCOUNTER
Call received at 812am    29year old patient  16w4d pregnant patient last seen in the office on 2020. Patient states she has a telemedicine appointment today and some one had called her. This nurse transferred the patient to speak to [facility] to complete the check in process for the visit. Patient verbalized understanding.

## 2020-05-07 NOTE — PATIENT INSTRUCTIONS

## 2020-05-07 NOTE — PROGRESS NOTES
Zarfo Video visit  Triston Taylor is a 29 y.o. female who was seen by synchronous (real-time) audio-video technology on 5/7/2020. Please see OB flowsheet for documentation. We discussed the expected course, resolution and complications of the diagnosis(es) in detail. Medication risks, benefits, costs, interactions, and alternatives were discussed as indicated. I advised her to contact the office if her condition worsens, changes or fails to improve as anticipated. She expressed understanding with the diagnosis(es) and plan. Pursuant to the emergency declaration under the [facility] waiver authority and the [employer] General Act, this Virtual  Visit was conducted, with patient's consent, to reduce the patient's risk of exposure to COVID-19 and provide continuity of care for an established patient. Services were provided through a video synchronous discussion virtually to substitute for in-person clinic visit.

## 2020-05-07 NOTE — PROGRESS NOTES
Problem List  Date Reviewed: 4/8/2020          Codes Class Noted    Supervision of high risk primigravida in first trimester in patient 28 years or older at time of delivery ICD-10-CM: O09.511  ICD-9-CM: V23.81  3/17/2020    Overview Addendum 4/21/2020  1:29 PM by Nathan Soliz LPN     Intrauterine pregnancy with the following problems identified:   EDC 10/18/2020 by [address]  AMA 35 at delivery  BMI >40  Early glucola 12 weeks- done 4/8- 132, wnl  [facility]- SMA carrier- Test FOB (maritza Castillo)- Drawn 4/8/20- normal  NIPTS- normal male- Pt notified  Baby ASA 12 weeks  MFM/genetics 20 weeks  Had flu vaccine  Pat antibody positive-Titer next visit               Anemia ICD-10-CM: D64.9  ICD-9-CM: 800. 9  Unknown        Anxiety ICD-10-CM: F41.9  ICD-9-CM: 300.00  Unknown        Morbid obesity ([address] ICD-10-CM: E66.01  ICD-9-CM: 278.01  Unknown        Syncope ICD-10-CM: R55  ICD-9-CM: 780.2  5/28/2019        Hypokalemia ICD-10-CM: E87.6  ICD-9-CM: 276.8  5/28/2019        Asthma ICD-10-CM: J45.909  ICD-9-CM: 493.90  11/27/2012        Hx of breast reduction, elective ICD-10-CM: Z98.890  ICD-9-CM: V45.89  11/27/2012        Elevated cholesterol ICD-10-CM: E78.00  ICD-9-CM: 272.0  6/1/2012

## 2020-05-20 ENCOUNTER — TELEPHONE (OUTPATIENT)
Dept: ONCOLOGY | Age: 35
End: 2020-05-20

## 2020-05-20 NOTE — TELEPHONE ENCOUNTER
LVM for patient about a virtual appointment and need to move patients appointment since Dr. Pooja Grimaldo is out of the office the week of Brooke 15.

## 2020-05-22 ENCOUNTER — TELEPHONE (OUTPATIENT)
Dept: ONCOLOGY | Age: 35
End: 2020-05-22

## 2020-05-22 NOTE — TELEPHONE ENCOUNTER
LVM for patient about a virtual appointment and need to move patients appointment since Dr. [employer] is out of the office the week of Brooke 15.

## 2020-05-26 ENCOUNTER — TELEPHONE (OUTPATIENT)
Dept: ONCOLOGY | Age: 35
End: 2020-05-26

## 2020-05-26 NOTE — TELEPHONE ENCOUNTER
Called patient about a virtual appointment she wants to some in and see Dr. Jarrett Atwood because she is 6 month pregnant. She is coming in on June 2.

## 2020-05-29 LAB
BASOPHILS # BLD AUTO: 0 X10E3/UL (ref 0–0.2)
BASOPHILS NFR BLD AUTO: 0 %
EOSINOPHIL # BLD AUTO: 0.3 X10E3/UL (ref 0–0.4)
EOSINOPHIL NFR BLD AUTO: 5 %
ERYTHROCYTE [DISTWIDTH] IN BLOOD BY AUTOMATED COUNT: 15 % (ref 11.7–15.4)
FERRITIN SERPL-MCNC: 37 NG/ML (ref 15–150)
HCT VFR BLD AUTO: 32.8 % (ref 34–46.6)
HGB BLD-MCNC: 11.3 G/DL (ref 11.1–15.9)
IMM GRANULOCYTES # BLD AUTO: 0 X10E3/UL (ref 0–0.1)
IMM GRANULOCYTES NFR BLD AUTO: 0 %
IRON SATN MFR SERPL: 26 % (ref 15–55)
IRON SERPL-MCNC: 98 UG/DL (ref 27–159)
LYMPHOCYTES # BLD AUTO: 1.6 X10E3/UL (ref 0.7–3.1)
LYMPHOCYTES NFR BLD AUTO: 21 %
MCH RBC QN AUTO: 31 PG (ref 26.6–33)
MCHC RBC AUTO-ENTMCNC: 34.5 G/DL (ref 31.5–35.7)
MCV RBC AUTO: 90 FL (ref 79–97)
MONOCYTES # BLD AUTO: 0.5 X10E3/UL (ref 0.1–0.9)
MONOCYTES NFR BLD AUTO: 7 %
NEUTROPHILS # BLD AUTO: 5 X10E3/UL (ref 1.4–7)
NEUTROPHILS NFR BLD AUTO: 67 %
PLATELET # BLD AUTO: 182 X10E3/UL (ref 150–450)
RBC # BLD AUTO: 3.65 X10E6/UL (ref 3.77–5.28)
TIBC SERPL-MCNC: 379 UG/DL (ref 250–450)
UIBC SERPL-MCNC: 281 UG/DL (ref 131–425)
WBC # BLD AUTO: 7.5 X10E3/UL (ref 3.4–10.8)

## 2020-06-02 ENCOUNTER — OFFICE VISIT (OUTPATIENT)
Dept: ONCOLOGY | Age: 35
End: 2020-06-02

## 2020-06-02 VITALS
WEIGHT: 257.6 LBS | HEIGHT: 67 IN | TEMPERATURE: 97.7 F | DIASTOLIC BLOOD PRESSURE: 71 MMHG | OXYGEN SATURATION: 98 % | SYSTOLIC BLOOD PRESSURE: 113 MMHG | BODY MASS INDEX: 40.43 KG/M2 | HEART RATE: 66 BPM

## 2020-06-02 DIAGNOSIS — Z3A.21 21 WEEKS GESTATION OF PREGNANCY: ICD-10-CM

## 2020-06-02 DIAGNOSIS — Z86.2 HISTORY OF IRON DEFICIENCY ANEMIA: Primary | ICD-10-CM

## 2020-06-02 DIAGNOSIS — O99.012 ANEMIA DURING PREGNANCY IN SECOND TRIMESTER: ICD-10-CM

## 2020-06-02 DIAGNOSIS — Z87.42 HISTORY OF MENORRHAGIA: ICD-10-CM

## 2020-06-02 NOTE — PROGRESS NOTES
[id number] [facility] Oncology at [facility]  [phone]    Hematology / Oncology Established Visit    Reason for Visit:   Triston Taylor is a 29 y.o. female who comes in for f/u of anemia. Referred by Dr. Lilo Parry. History of Present Illness:   Triston Taylor is a 29 y.o. female who comes in for evaluation of anemia. She has irregular and heavy periods, lasting 6-7 days, requiring new pad every 1-2 hours. Patient underwent endometrial biopsy. Was started on oral birth control pills, prenatal vitamins, iron supplements. She received a blood transfusion in May 2019. She sees blood in her stools at times and thinks it is related to constipation and hemorrhoids. The blood is not mixed in with the stool. She has not ever seen a GI doctor. She is not currently taking any stools softeners. She denies ice cravings. She has fatigue, DOVE, but no CP. Interval History: Patient her for follow up of anemia. She is 6 months pregnant and due Oct 18th, 2020. She received injectafer x 2 early August 2019. She continues on the oral iron supplements once daily. Denies ice cravings. Reports fatigue and DOVE r/t to pregnancy. gricelda ready - 21 weeks pregnant, labs normal but noticed 2 gram drop in hgb, advised increase iron supplements to twice daily and follow up in 10-12 weeks w repeat labs. No ice cravings. DOVE and fatigue 2/2 to pregnancy.     Past Medical History:   Diagnosis Date    Abnormal Pap smear of cervix 09/2019    Negative/+HPV    Anemia     Anxiety     ASCUS (atypical squamous cells of undetermined significance) on Pap smear 6/1/2012    Asthma     extrinsic as a child     Morbid obesity ([address]       Past Surgical History:   Procedure Laterality Date    BREAST SURGERY PROCEDURE UNLISTED  11/27/12    Incision and Drainage Left Breast Abscess    HX BREAST BIOPSY Bilateral     abscesses removed 2014 and 2015    HX BREAST REDUCTION  2003    HX OTHER SURGICAL      abscess removed from left breast      Social History     Tobacco Use    Smoking status: Never Smoker    Smokeless tobacco: Never Used   Substance Use Topics    Alcohol use: No      Family History   Problem Relation Age of Onset    Hypertension Mother     Elevated Lipids Mother     Diabetes Sister      Current Outpatient Medications   Medication Sig    promethazine (PHENERGAN) 25 mg tablet Take 1 Tab by mouth every six (6) hours as needed for Nausea.  doxylamine-pyridoxine, vit B6, (BONJESTA) 20-20 mg TbID Take 1 Tab by mouth two (2) times a day.  pnv w/o calcium-iron fum-fa 29 mg iron- 1 mg chew Take 1 Tab by mouth daily.  PNV no.153/FA/om3/dha/epa/fish (PRENATAL GUMMIES PO) Take 1 Tab by mouth daily.  senna-docusate (PERICOLACE) 8.6-50 mg per tablet Take 2 Tabs by mouth daily.  medroxyPROGESTERone (PROVERA) 10 mg tablet Take 1 Tab by mouth daily.  letrozole (FEMARA) 2.5 mg tablet Take 2 pills by mouth daily days 3-7     No current facility-administered medications for this visit. Allergies   Allergen Reactions    Bactrim [Sulfamethoprim Ds] Nausea Only    Zofran [Ondansetron Hcl (Pf)] Rash        Review of Systems: A complete review of systems was obtained, negative except as described above. Physical Exam:     Visit Vitals  /71 (BP 1 Location: Left arm, BP Patient Position: Sitting)   Pulse 66   Temp 97.7 °F (36.5 °C) (Oral)   Ht 5' 7\" (1.702 m)   Wt 257 lb 9.6 oz (116.8 kg)   LMP 01/06/2020 (Approximate)   SpO2 98%   BMI 40.35 kg/m²     ECOG PS: 1  General: Well developed, no acute distress, obese  Eyes: PERRLA, EOMI, anicteric sclerae  HENT: Atraumatic, OP clear, TMs intact without erythema  Neck: Supple  Lymphatic: No cervical, supraclavicular, axillary or inguinal adenopathy  Respiratory: CTAB, normal respiratory effort  CV: Normal rate, regular rhythm, no murmurs, no peripheral edema  GI: Gravid uterus.  Soft, nontender, nondistended, no masses, no hepatomegaly, no splenomegaly  MS: Normal gait and station. Digits without clubbing or cyanosis. Skin: No rashes, ecchymoses, or petechiae. Normal temperature, turgor, and texture. Neuro/Psych: Alert, oriented. 5/5 strength in all 4 extremities. Appropriate affect, normal judgment/insight. Results:     Lab Results   Component Value Date/Time    WBC 7.5 05/28/2020 03:06 PM    HGB 11.3 05/28/2020 03:06 PM    HCT 32.8 (L) 05/28/2020 03:06 PM    PLATELET 211 51/05/1055 03:06 PM    MCV 90 05/28/2020 03:06 PM    ABS. NEUTROPHILS 5.0 05/28/2020 03:06 PM    Hemoglobin (POC) 7.6 07/05/2019 02:16 PM    Hemoglobin (POC) 13.3 12/18/2014 06:34 PM    Hematocrit (POC) 39 12/18/2014 06:34 PM    Hgb, External 13.2 03/13/2020    Hct, External 38.3 03/13/2020    Platelet cnt., External 216 03/13/2020     Lab Results   Component Value Date/Time    Sodium 134 (L) 03/16/2020 10:59 PM    Potassium 3.7 03/16/2020 10:59 PM    Chloride 104 03/16/2020 10:59 PM    CO2 23 03/16/2020 10:59 PM    Glucose 83 03/16/2020 10:59 PM    BUN 8 03/16/2020 10:59 PM    Creatinine 0.70 03/16/2020 10:59 PM    GFR est AA >60 03/16/2020 10:59 PM    GFR est non-AA >60 03/16/2020 10:59 PM    Calcium 8.6 03/16/2020 10:59 PM    Sodium (POC) 142 12/18/2014 06:34 PM    Potassium (POC) 3.6 12/18/2014 06:34 PM    Chloride (POC) 104 12/18/2014 06:34 PM    Glucose (POC) 87 12/18/2014 06:34 PM    BUN (POC) 13 12/18/2014 06:34 PM    Creatinine (POC) 1.0 12/18/2014 06:34 PM    Calcium, ionized (POC) 1.16 12/18/2014 06:34 PM     Lab Results   Component Value Date/Time    Bilirubin, total 1.3 (H) 03/16/2020 10:59 PM    ALT (SGPT) 23 03/16/2020 10:59 PM    Alk.  phosphatase 50 03/16/2020 10:59 PM    Protein, total 8.0 03/16/2020 10:59 PM    Albumin 3.5 03/16/2020 10:59 PM    Globulin 4.5 (H) 03/16/2020 10:59 PM     Lab Results   Component Value Date/Time    Iron 98 05/28/2020 03:06 PM    TIBC 379 05/28/2020 03:06 PM    Iron % saturation 26 05/28/2020 03:06 PM    Ferritin 37 05/28/2020 03:06 PM       Lab Results Component Value Date/Time    Vitamin B12 99 (L) 05/28/2019 07:07 PM    Folate 9.6 05/28/2019 07:07 PM     Lab Results   Component Value Date/Time    TSH 2.680 06/01/2012 09:01 AM     Lab Results   Component Value Date/Time    Hep B surface Ag screen Negative 03/13/2020 02:53 PM         Imaging:     Radiology report(s) reviewed. Assessment & Plan:   Omar Felix is a 29 y.o. female comes in for f/u of anemia. 1. History of Iron deficiency anemia: 2/2 prior menstrual blood loss and now increased needs during pregnancy. Had required blood transfusion in 5/2019 for Hgb 6.8. s/p Injectafer x 2 in early August 2019. Repeat iron studies and Hgb normal in 9/2019 and 12/2019. Now 5/20/20 labs show anemia without iron deficiency - this is likely due to hemodilutional effect of pregnancy. -- Increase iron supplement to twice daily, using stool softeners as needed. -- Return in 10 weeks with repeat labs and follow up    2. H/o Menorrhagia: Pt now off of OCPs and pregnant. 3. Pregnancy: Currently 21 weeks pregnant. Due 10/18/20. I appreciate the opportunity to participate in Ms. Ximena Mcgill's care.     Signed By: Candis Chaidez MD     June 2, 2020

## 2020-06-02 NOTE — PROGRESS NOTES
Abigail Myers is a 29 y.o. female here for follow up of anemia. Patient with no complaints of pain at this time.

## 2020-06-03 ENCOUNTER — HOSPITAL ENCOUNTER (OUTPATIENT)
Dept: PERINATAL CARE | Age: 35
Discharge: HOME | End: 2020-06-03
Attending: OBSTETRICS & GYNECOLOGY
Payer: MEDICAID

## 2020-06-03 ENCOUNTER — ROUTINE PRENATAL (OUTPATIENT)
Dept: OBGYN CLINIC | Age: 35
End: 2020-06-03

## 2020-06-03 VITALS
BODY MASS INDEX: 39.71 KG/M2 | SYSTOLIC BLOOD PRESSURE: 108 MMHG | WEIGHT: 253 LBS | DIASTOLIC BLOOD PRESSURE: 62 MMHG | HEIGHT: 67 IN

## 2020-06-03 DIAGNOSIS — Z3A.20 20 WEEKS GESTATION OF PREGNANCY: ICD-10-CM

## 2020-06-03 DIAGNOSIS — O09.519 SUPERVISION OF PRIMIGRAVIDA OF ADVANCED MATERNAL AGE, ANTEPARTUM: Primary | ICD-10-CM

## 2020-06-03 PROCEDURE — 76811 OB US DETAILED SNGL FETUS: CPT | Performed by: OBSTETRICS & GYNECOLOGY

## 2020-06-03 NOTE — PROGRESS NOTES
Baby moving  Saw MFM - has fu in July  She told Dr. Ku See FOB is [place] negative - we do not have confirmation of that    Fu in 4 weeks

## 2020-06-03 NOTE — PROGRESS NOTES
Problem List  Date Reviewed: 4/8/2020          Codes Class Noted    Supervision of high risk primigravida in first trimester in patient 28 years or older at time of delivery ICD-10-CM: O09.511  ICD-9-CM: V23.81  3/17/2020    Overview Addendum 4/21/2020  1:29 PM by Jero Friedman LPN     Intrauterine pregnancy with the following problems identified:   EDC 10/18/2020 by [address]  AMA 35 at delivery  BMI >40  Early glucola 12 weeks- done 4/8- 132, wnl  [facility]- SMA carrier- Test FOB (maritza Castillo)- Drawn 4/8/20- normal  NIPTS- normal male- Pt notified  Baby ASA 12 weeks  MFM/genetics 20 weeks  Had flu vaccine  [place] antibody positive-Titer next visit               Anemia ICD-10-CM: D64.9  ICD-9-CM: 388. 9  Unknown        Anxiety ICD-10-CM: F41.9  ICD-9-CM: 300.00  Unknown        Morbid obesity ([address] ICD-10-CM: E66.01  ICD-9-CM: 278.01  Unknown        Syncope ICD-10-CM: R55  ICD-9-CM: 780.2  5/28/2019        Hypokalemia ICD-10-CM: E87.6  ICD-9-CM: 276.8  5/28/2019        Asthma ICD-10-CM: J45.909  ICD-9-CM: 493.90  11/27/2012        Hx of breast reduction, elective ICD-10-CM: Z98.890  ICD-9-CM: V45.89  11/27/2012        Elevated cholesterol ICD-10-CM: E78.00  ICD-9-CM: 272.0  6/1/2012

## 2020-06-03 NOTE — PATIENT INSTRUCTIONS

## 2020-06-10 DIAGNOSIS — Z86.2 HISTORY OF IRON DEFICIENCY ANEMIA: ICD-10-CM

## 2020-06-18 ENCOUNTER — LAB ONLY (OUTPATIENT)
Dept: OBGYN CLINIC | Age: 35
End: 2020-06-18

## 2020-06-18 DIAGNOSIS — Z3A.22 22 WEEKS GESTATION OF PREGNANCY: Primary | ICD-10-CM

## 2020-06-19 LAB
BLD GP AB SCN SERPL QL: NORMAL
BLD GP AB SCN SERPL QL: POSITIVE
BLOOD GROUP ANTIBODIES SERPL: ABNORMAL
XXX BLOOD GROUP AB TITR SERPL AHG: ABNORMAL {TITER}

## 2020-06-24 ENCOUNTER — HOSPITAL ENCOUNTER (EMERGENCY)
Dept: GENERAL RADIOLOGY | Age: 35
Discharge: HOME | End: 2020-06-24
Attending: EMERGENCY MEDICINE
Payer: MEDICAID

## 2020-06-24 ENCOUNTER — HOSPITAL ENCOUNTER (EMERGENCY)
Age: 35
Discharge: HOME | End: 2020-06-24
Attending: EMERGENCY MEDICINE | Admitting: OBSTETRICS & GYNECOLOGY
Payer: MEDICAID

## 2020-06-24 VITALS
HEART RATE: 83 BPM | RESPIRATION RATE: 15 BRPM | DIASTOLIC BLOOD PRESSURE: 78 MMHG | WEIGHT: 256 LBS | HEIGHT: 67 IN | TEMPERATURE: 98.4 F | OXYGEN SATURATION: 98 % | SYSTOLIC BLOOD PRESSURE: 117 MMHG | BODY MASS INDEX: 40.18 KG/M2

## 2020-06-24 DIAGNOSIS — O09.511 SUPERVISION OF HIGH RISK PRIMIGRAVIDA IN FIRST TRIMESTER IN PATIENT 35 YEARS OR OLDER AT TIME OF DELIVERY: ICD-10-CM

## 2020-06-24 PROCEDURE — 73090 X-RAY EXAM OF FOREARM: CPT

## 2020-06-24 PROCEDURE — 99283 EMERGENCY DEPT VISIT LOW MDM: CPT

## 2020-06-24 PROCEDURE — 99284 EMERGENCY DEPT VISIT MOD MDM: CPT

## 2020-06-24 PROCEDURE — 73562 X-RAY EXAM OF KNEE 3: CPT

## 2020-06-24 NOTE — DISCHARGE INSTRUCTIONS
Patient Education   Patient Education   Patient Education        Counting Your [de-identified] Kicks: Care Instructions  Your Care Instructions     Counting your baby's kicks is one way your doctor can tell that your baby is healthy. Most women--especially in a first pregnancy--feel their baby move for the first time between 16 and 22 weeks. The movement may feel like flutters rather than kicks. Your baby may move more at certain times of the day. When you are active, you may notice less kicking than when you are resting. At your prenatal visits, your doctor will ask whether the baby is active. In your last trimester, your doctor may ask you to count the number of times you feel your baby move. Follow-up care is a key part of your treatment and safety. Be sure to make and go to all appointments, and call your doctor if you are having problems. It's also a good idea to know your test results and keep a list of the medicines you take. How do you count fetal kicks? · A common method of checking your baby's movement is to count the number of kicks or moves you feel in 1 hour. Ten movements (such as kicks, flutters, or rolls) in 1 hour are normal. Some doctors suggest that you count in the morning until you get to 10 movements. Then you can quit for that day and start again the next day. · Pick your baby's most active time of day to count. This may be any time from morning to evening. · If you do not feel 10 movements in an hour, your baby may be sleeping. Wait for the next hour and count again. When should you call for help? Call your doctor now or seek immediate medical care if:  · You noticed that your baby has stopped moving or is moving much less than normal.  Watch closely for changes in your health, and be sure to contact your doctor if you have any problems. Where can you learn more?   Go to http://merry-joseluis.info/  Enter [id number] in the search box to learn more about \"Counting Your Baby's Kicks: Care Instructions. \"  Current as of: February 11, 2020               Content Version: 12.5  © 2006-2020 [email or web address]. Care instructions adapted under license by [employer] (which disclaims liability or warranty for this information). If you have questions about a medical condition or this instruction, always ask your healthcare professional. [address] any warranty or liability for your use of this information. Weeks 22 to 26 of Your Pregnancy: Care Instructions  Your Care Instructions    As you enter your 7th month of pregnancy at week 26, your baby's lungs are growing stronger and getting ready to breathe. You may notice that your baby responds to the sound of your or your partner's voice. You may also notice that your baby does less turning and twisting and more squirming or jerking. Jerking often means that your baby has the hiccups. Hiccups are perfectly normal and are only temporary. You may want to think about attending a childbirth preparation class. This is also a good time to start thinking about whether you want to have pain medicine during labor. Most pregnant women are tested for gestational diabetes between weeks 25 and 28. Gestational diabetes occurs when your blood sugar level gets too high when you're pregnant. The test is important, because you can have gestational diabetes and not know it. But the condition can cause problems for your baby. Follow-up care is a key part of your treatment and safety. Be sure to make and go to all appointments, and call your doctor if you are having problems. It's also a good idea to know your test results and keep a list of the medicines you take. How can you care for yourself at home? Ease discomfort from your baby's kicking  · Change your position. Sometimes this will cause your baby to change position too. · Take a deep breath while you raise your arm over your head.  Then breathe out while you drop your arm. Do Kegel exercises to prevent urine from leaking  · You can do Kegel exercises while you stand or sit. ? Squeeze the same muscles you would use to stop your urine. Your belly and thighs should not move. ? Hold the squeeze for 3 seconds, and then relax for 3 seconds. ? Start with 3 seconds. Then add 1 second each week until you are able to squeeze for 10 seconds. ? Repeat the exercise 10 to 15 times for each session. Do three or more sessions each day. Ease or reduce swelling in your feet, ankles, hands, and fingers  · If your fingers are puffy, take off your rings. · Do not eat high-salt foods, such as potato chips. · Prop up your feet on a stool or couch as much as possible. Sleep with pillows under your feet. · Do not stand for long periods of time or wear tight shoes. · Wear support stockings. Where can you learn more? Go to http://merry-joseluis.info/  Enter G264 in the search box to learn more about \"Weeks 22 to 26 of Your Pregnancy: Care Instructions. \"  Current as of: May 29, 2019Content Version: 12.4  © 2418-7908 Healthwise, [employer]. Care instructions adapted under license by [employer] (which disclaims liability or warranty for this information). If you have questions about a medical condition or this instruction, always ask your healthcare professional. [place] [address] any warranty or liability for your use of this information. Pregnancy Precautions: Care Instructions  Your Care Instructions     There is no sure way to prevent labor before your due date ( labor) or to prevent most other pregnancy problems. But there are things you can do to increase your chances of a healthy pregnancy. Go to your appointments, follow your doctor's advice, and take good care of yourself. Eat well, and exercise (if your doctor agrees). And make sure to drink plenty of water.   Follow-up care is a key part of your treatment and safety. Be sure to make and go to all appointments, and call your doctor if you are having problems. It's also a good idea to know your test results and keep a list of the medicines you take. How can you care for yourself at home? · Make sure you go to your prenatal appointments. At each visit, your doctor will check your blood pressure. Your doctor will also check to see if you have protein in your urine. High blood pressure and protein in urine are signs of preeclampsia. This condition can be dangerous for you and your baby. · Drink plenty of fluids, enough so that your urine is light yellow or clear like water. Dehydration can cause contractions. If you have kidney, heart, or liver disease and have to limit fluids, talk with your doctor before you increase the amount of fluids you drink. · Tell your doctor right away if you notice any symptoms of an infection, such as:  ? Burning when you urinate. ? A foul-smelling discharge from your vagina. ? Vaginal itching. ? Unexplained fever. ? Unusual pain or soreness in your uterus or lower belly. · Eat a balanced diet. Include plenty of foods that are high in calcium and iron. ? Foods high in calcium include milk, cheese, yogurt, almonds, and broccoli. ? Foods high in iron include red meat, shellfish, poultry, eggs, beans, raisins, whole-grain bread, and leafy green vegetables. · Do not smoke. If you need help quitting, talk to your doctor about stop-smoking programs and medicines. These can increase your chances of quitting for good. · Do not drink alcohol or use illegal drugs. · Follow your doctor's directions about activity. Your doctor will let you know how much, if any, exercise you can do. · Ask your doctor if you can have sex. If you are at risk for early labor, your doctor may ask you to not have sex. · Take care to prevent falls. During pregnancy, your joints are loose, and your balance is off.  Sports such as bicycling, skiing, or in-line skating can increase your risk of falling. And don't ride horses or motorcycles, dive, water ski, scuba dive, or parachute jump while you are pregnant. · Avoid getting very hot. Do not use saunas or hot tubs. Avoid staying out in the sun in hot weather for long periods. Take acetaminophen (Tylenol) to lower a high fever. · Do not take any over-the-counter or herbal medicines or supplements without talking to your doctor or pharmacist first.  When should you call for help? Call 911 anytime you think you may need emergency care. For example, call if:  · You passed out (lost consciousness). · You have a seizure. · You have severe vaginal bleeding. · You have severe pain in your belly or pelvis. · You have had fluid gushing or leaking from your vagina and you know or think the umbilical cord is bulging into your vagina. If this happens, immediately get down on your knees so your rear end (buttocks) is higher than your head. This will decrease the pressure on the cord until help arrives. Call your doctor now or seek immediate medical care if:  · You have signs of preeclampsia, such as:  ? Sudden swelling of your face, hands, or feet. ? New vision problems (such as dimness, blurring, or seeing spots). ? A severe headache. · You have any vaginal bleeding. · You have belly pain or cramping. · You have a fever. · You have had regular contractions (with or without pain) for an hour. This means that you have 8 or more within 1 hour or 4 or more in 20 minutes after you change your position and drink fluids. · You have a sudden release of fluid from your vagina. · You have low back pain or pelvic pressure that does not go away. · You notice that your baby has stopped moving or is moving much less than normal.  Watch closely for changes in your health, and be sure to contact your doctor if you have any problems. Where can you learn more?   Go to http://merry-joseluis.info/  Enter Y951 in the search box to learn more about \"Pregnancy Precautions: Care Instructions. \"  Current as of: February 11, 2020               Content Version: 12.5  © 2006-2020 Healthwise, Incorporated. Care instructions adapted under license by [employer] (which disclaims liability or warranty for this information). If you have questions about a medical condition or this instruction, always ask your healthcare professional. [place] [address] any warranty or liability for your use of this information.

## 2020-06-24 NOTE — PROGRESS NOTES
1630 Patient is a G1 at 23 weeks 3 days presenting to labor and delivery after being seen in ER for a fall. Patient states she fell at 1330 this afternoon after she lost her footing on the sidewalk. States she fell on her left elbow, her knee, and then her belly. RN noting several scrapes to the left elbow. Patient also stating that she is not feeling the baby move. Rating her pain a 9 out of 10 across her abdomen. Stating that her belly feels sore. Not feeling any intermittent cramping or rohan. Denies vaginal bleeding or leaking of fluid. 1633 EFM applied. Active movement audible while attempting to get FHTs. RN educating patient. Admission assessment completed. [address] hearing more fetal movement. RN educating patient. Now that patient is aware of hearing movement, stating she is feeling it. [address] paging Dr. Kristopher Anderson. No answer. Will call back. 1703 Reviewing events of fall and FHT tracing with Dr. Kristopher Anderson. MD states to discharge patient home since her FHTs look good and she is not bleeding or cramping. To follow up as scheduled. 1706 Removed EFM. Educated patient that she will be discharged home. Patient requesting out of work note for tomorrow since she has a scrape on her elbow. Will ask MD.     8702 Spoke with Dr. Kristopher Anderson. Patient may receive an out of work note for tomorrow only. Then return to work the next day. [address] [facility] discharge instructions with patient. Patient states understanding. Signed copy of discharge papers placed on hard chart. Patient given out of work note for tomorrow. Discharged home in stable condition.

## 2020-06-24 NOTE — PROGRESS NOTES
[address] June 24, 2020       RE: Shira French      To Whom It May Concern,    This is to certify that Shira French may may return to work on 6/26/2020. Please feel free to contact my office at [phone] if you have any questions or concerns. Thank you for your assistance in this matter.       Sincerely,  Eliezer Jay MD

## 2020-06-24 NOTE — ED TRIAGE NOTES
Pt arrives by EMS for c/o GLF after tripping on the sidewalk. Pt reports she is 27 weeks pregnant and is having pain in her abdomen. Pt states \"I haven't felt the baby move since I fell about 45 mins ago. \"  Pt also c/o pain to her right knee and left elbow.

## 2020-06-25 NOTE — ED PROVIDER NOTES
28 yo  female at 6 mos gestation p/w decreased fetal movement after GLF. The history is provided by the patient and the EMS personnel. Fall   The accident occurred less than 1 hour ago. The fall occurred while walking. She fell from a height of ground level. She landed on concrete. There was no blood loss. Point of impact: abdomen, left forearm, right knee. Pain location: abdomen, low back, left forearm, right knee. The pain is at a severity of 9/10.    Pregnancy Problem           Past Medical History:   Diagnosis Date    Abnormal Pap smear of cervix 2019    Negative/+HPV    Anemia     Anxiety     ASCUS (atypical squamous cells of undetermined significance) on Pap smear 2012    Asthma     extrinsic as a child     Morbid obesity ([facility] [address]        Past Surgical History:   Procedure Laterality Date    BREAST SURGERY PROCEDURE UNLISTED  12    Incision and Drainage Left Breast Abscess    HX BREAST BIOPSY Bilateral     abscesses removed  and     HX BREAST REDUCTION      HX OTHER SURGICAL      abscess removed from left breast         Family History:   Problem Relation Age of Onset    Hypertension Mother    [facility] Elevated Lipids Mother     Diabetes Sister        Social History     Socioeconomic History    Marital status: SINGLE     Spouse name: Not on file    Number of children: Not on file    Years of education: Not on file    Highest education level: Not on file   Occupational History    Not on file   Social Needs    Financial resource strain: Not on file    Food insecurity     Worry: Not on file     Inability: Not on file    Transportation needs     Medical: Not on file     Non-medical: Not on file   Tobacco Use    Smoking status: Never Smoker    Smokeless tobacco: Never Used   Substance and Sexual Activity    Alcohol use: No    Drug use: No    Sexual activity: Yes     Partners: Male     Birth control/protection: None   Lifestyle    Physical activity     Days per week: Not on file     Minutes per session: Not on file    Stress: Not on file   Relationships    Social connections     Talks on phone: Not on file     Gets together: Not on file     Attends Temple service: Not on file     Active member of club or organization: Not on file     Attends meetings of clubs or organizations: Not on file     Relationship status: Not on file    Intimate partner violence     Fear of current or ex partner: Not on file     Emotionally abused: Not on file     Physically abused: Not on file     Forced sexual activity: Not on file   Other Topics Concern     Service Not Asked    Blood Transfusions Not Asked    Caffeine Concern Not Asked    Occupational Exposure Not Asked   Carmen [place] Hazards Not Asked    Sleep Concern Not Asked    Stress Concern Not Asked    Weight Concern Not Asked    Special Diet Not Asked    Back Care Not Asked    Exercise Not Asked    Bike Helmet Not Asked   [address] Not Asked    Self-Exams Not Asked   Social History Narrative    Not on file         ALLERGIES: Bactrim [sulfamethoprim ds] and Zofran [ondansetron hcl (pf)]    Review of Systems   All other systems reviewed and are negative. Vitals:    06/24/20 1643 06/24/20 1648 06/24/20 1653 06/24/20 1658   BP:       Pulse:       Resp:       Temp:       SpO2: 99% 99% 99% 98%   Weight:       Height:                Physical Exam  Vitals signs and nursing note reviewed. Constitutional:       General: She is not in acute distress. HENT:      Head: Normocephalic and atraumatic. Mouth/Throat:      Mouth: Mucous membranes are moist.   Eyes:      General: No scleral icterus. Conjunctiva/sclera: Conjunctivae normal.      Pupils: Pupils are equal, round, and reactive to light. Neck:      Musculoskeletal: Neck supple. Trachea: No tracheal deviation. Cardiovascular:      Rate and Rhythm: Normal rate and regular rhythm.    Pulmonary:      Effort: Pulmonary effort is normal. No respiratory distress. Breath sounds: No wheezing or rales. Abdominal:      General: There is no distension. Palpations: Abdomen is soft. Tenderness: There is no abdominal tenderness. Comments: Gravid uterus   Genitourinary:     Comments: deferred  Musculoskeletal:         General: No deformity. Comments: No spinal tenderness. Minimal tenderness of left forearm and right knee   Skin:     General: Skin is warm and dry. Neurological:      General: No focal deficit present. Mental Status: She is alert and oriented to person, place, and time. Cranial Nerves: No cranial nerve deficit. Sensory: No sensory deficit. Motor: No weakness. Psychiatric:         Mood and Affect: Mood normal.          MDM  Number of Diagnoses or Management Options  Diagnosis management comments: HD stable. Fast and OB bedside ultrasound normal.  Sent to L and D for fetal monitoring         Bedside US  Date/Time: 6/25/2020 12:58 AM  Performed by: Joce Alonzo MD  Authorized by: Joce Alonzo MD     Verbal consent obtained: Yes    Given by:  Patient  Performed by: Attending  Type of procedure:  FAST  Indications:  Blunt trauma, pregnancy and abdominal pain  Hepatorenal:  Adequate  Perisplenic:  Adequate  Suprapubic:  Adequate  Pericardial:  Adequate  Hepatorenal free fluid: absent    Perisplenic free fluid: absent    Suprapubic free fluid: absent    Pericardial effusion: absent    Peritoneal free fluid: absent    Pericardial effusion: absent    Bedside US  Date/Time: 6/25/2020 12:59 AM  Performed by: Joce Alonzo MD  Authorized by: Joce Alonzo MD     Written consent obtained: Yes    Type of procedure:   Focused pelvic ultrasound obstetrical  Indications:  Abdominal pain and trauma  Transabdominal sagittal:  Adequate  Transabdominal transverse:  Adequate  Intrauterine Pregnancy:  Present  Interpretation:  Live intrauterine pregnancy  Transabdominal sagittal: Adequate  Transabdominal transverse:  Adequate  Left eye:     Confirmation study:  I have advised the patient to obtain a confirmatory study as an outpatient.

## 2020-07-06 ENCOUNTER — HOSPITAL ENCOUNTER (OUTPATIENT)
Dept: PERINATAL CARE | Age: 35
Discharge: HOME | End: 2020-07-06
Attending: OBSTETRICS & GYNECOLOGY
Payer: MEDICAID

## 2020-07-06 ENCOUNTER — ROUTINE PRENATAL (OUTPATIENT)
Dept: OBGYN CLINIC | Age: 35
End: 2020-07-06

## 2020-07-06 VITALS
WEIGHT: 254 LBS | HEIGHT: 67 IN | BODY MASS INDEX: 39.87 KG/M2 | SYSTOLIC BLOOD PRESSURE: 107 MMHG | DIASTOLIC BLOOD PRESSURE: 70 MMHG

## 2020-07-06 DIAGNOSIS — Z3A.25 25 WEEKS GESTATION OF PREGNANCY: ICD-10-CM

## 2020-07-06 DIAGNOSIS — O09.519 SUPERVISION OF PRIMIGRAVIDA OF ADVANCED MATERNAL AGE, ANTEPARTUM: Primary | ICD-10-CM

## 2020-07-06 PROCEDURE — 76816 OB US FOLLOW-UP PER FETUS: CPT | Performed by: OBSTETRICS & GYNECOLOGY

## 2020-07-06 NOTE — PATIENT INSTRUCTIONS
Weeks 22 to 26 of Your Pregnancy: Care Instructions  Your Care Instructions    As you enter your 7th month of pregnancy at week 26, your baby's lungs are growing stronger and getting ready to breathe. You may notice that your baby responds to the sound of your or your partner's voice. You may also notice that your baby does less turning and twisting and more squirming or jerking. Jerking often means that your baby has the hiccups. Hiccups are perfectly normal and are only temporary. You may want to think about attending a childbirth preparation class. This is also a good time to start thinking about whether you want to have pain medicine during labor. Most pregnant women are tested for gestational diabetes between weeks 25 and 28. Gestational diabetes occurs when your blood sugar level gets too high when you're pregnant. The test is important, because you can have gestational diabetes and not know it. But the condition can cause problems for your baby. Follow-up care is a key part of your treatment and safety. Be sure to make and go to all appointments, and call your doctor if you are having problems. It's also a good idea to know your test results and keep a list of the medicines you take. How can you care for yourself at home? Ease discomfort from your baby's kicking  · Change your position. Sometimes this will cause your baby to change position too. · Take a deep breath while you raise your arm over your head. Then breathe out while you drop your arm. Do Kegel exercises to prevent urine from leaking  · You can do Kegel exercises while you stand or sit. ? Squeeze the same muscles you would use to stop your urine. Your belly and thighs should not move. ? Hold the squeeze for 3 seconds, and then relax for 3 seconds. ? Start with 3 seconds. Then add 1 second each week until you are able to squeeze for 10 seconds. ? Repeat the exercise 10 to 15 times for each session.  Do three or more sessions each day.  Ease or reduce swelling in your feet, ankles, hands, and fingers  · If your fingers are puffy, take off your rings. · Do not eat high-salt foods, such as potato chips. · Prop up your feet on a stool or couch as much as possible. Sleep with pillows under your feet. · Do not stand for long periods of time or wear tight shoes. · Wear support stockings. Where can you learn more? Go to http://merry-joseluis.info/  Enter G264 in the search box to learn more about \"Weeks 22 to 26 of Your Pregnancy: Care Instructions. \"  Current as of: May 29, 2019Content Version: 12.4  © 3421-8508 Healthwise, Incorporated. Care instructions adapted under license by [employer] (which disclaims liability or warranty for this information). If you have questions about a medical condition or this instruction, always ask your healthcare professional. [address] any warranty or liability for your use of this information.

## 2020-07-06 NOTE — PROGRESS NOTES
Problem List  Date Reviewed: 6/3/2020          Codes Class Noted    Supervision of high risk primigravida in first trimester in patient 28 years or older at time of delivery ICD-10-CM: O09.511  ICD-9-CM: V23.81  3/17/2020    Overview Addendum 6/23/2020  2:13 PM by Mehul Bustos LPN     Intrauterine pregnancy with the following problems identified:   EDC 10/18/2020 by [address]  AMA 35 at delivery  BMI >40  Early glucola 12 weeks- done 4/8- 132, wnl  [facility]- SMA carrier- Test FOB (maritza Jonathan)- Drawn 4/8/20- normal  NIPTS- normal male- Pt notified  Baby ASA 12 weeks  MFM/genetics 20 weeks  Had flu vaccine  Pat antibody positive-drawn 6/18, no change-Repeat @next visit. Anemia ICD-10-CM: D64.9  ICD-9-CM: 460. 9  Unknown        Anxiety ICD-10-CM: F41.9  ICD-9-CM: 300.00  Unknown        Morbid obesity ([address] ICD-10-CM: E66.01  ICD-9-CM: 278.01  Unknown        Syncope ICD-10-CM: R55  ICD-9-CM: 780.2  5/28/2019        Hypokalemia ICD-10-CM: E87.6  ICD-9-CM: 276.8  5/28/2019        Asthma ICD-10-CM: J45.909  ICD-9-CM: 493.90  11/27/2012        Hx of breast reduction, elective ICD-10-CM: Z98.890  ICD-9-CM: V45.89  11/27/2012        Elevated cholesterol ICD-10-CM: E78.00  ICD-9-CM: 272.0  6/1/2012

## 2020-07-08 LAB
BLD GP AB SCN SERPL QL: NORMAL
BLD GP AB SCN SERPL QL: NORMAL
BLOOD GROUP ANTIBODIES SERPL: NORMAL

## 2020-07-09 LAB
BLOOD GROUP ANTIBODIES SERPL: NORMAL
SPECIMEN STATUS REPORT, ROLRST: NORMAL

## 2020-07-24 LAB — GTT, 1 HR, GLUCOLA, EXTERNAL: NORMAL

## 2020-07-27 ENCOUNTER — ROUTINE PRENATAL (OUTPATIENT)
Dept: OBGYN CLINIC | Age: 35
End: 2020-07-27

## 2020-07-27 VITALS
BODY MASS INDEX: 40.49 KG/M2 | DIASTOLIC BLOOD PRESSURE: 71 MMHG | WEIGHT: 258 LBS | SYSTOLIC BLOOD PRESSURE: 104 MMHG | HEIGHT: 67 IN

## 2020-07-27 DIAGNOSIS — O09.519 SUPERVISION OF PRIMIGRAVIDA OF ADVANCED MATERNAL AGE, ANTEPARTUM: Primary | ICD-10-CM

## 2020-07-27 DIAGNOSIS — Z23 ENCOUNTER FOR IMMUNIZATION: ICD-10-CM

## 2020-07-27 DIAGNOSIS — O09.511 SUPERVISION OF HIGH RISK PRIMIGRAVIDA IN FIRST TRIMESTER IN PATIENT 35 YEARS OR OLDER AT TIME OF DELIVERY: ICD-10-CM

## 2020-07-27 LAB
HCT, EXTERNAL: 33.8
HGB, EXTERNAL: 11.6
PLATELET CNT,   EXTERNAL: 194

## 2020-07-27 NOTE — PROGRESS NOTES
Administered 0.5mL TETANUS, DIPHTHERIA TOXOIDS AND ACELLULAR PERTUSSIS VACCINE (TDAP) per MD Sebastian Russell order. Patient consent signed by patient. Injection given IM in the Left Deltoid . Patient tolerated well, no complications, no side effects.     Lot # [id number]  Exp: 09/19/22

## 2020-07-27 NOTE — PATIENT INSTRUCTIONS

## 2020-07-27 NOTE — PROGRESS NOTES
Problem List  Date Reviewed: 7/6/2020          Codes Class Noted    Supervision of high risk primigravida in first trimester in patient 28 years or older at time of delivery ICD-10-CM: O09.511  ICD-9-CM: V23.81  3/17/2020    Overview Addendum 7/17/2020  9:17 AM by Michelle Slaughter LPN     Intrauterine pregnancy with the following problems identified:   EDC 10/18/2020 by [address]  AMA 35 at delivery  BMI >40  Early glucola 12 weeks- done 4/8- 132, wnl  [facility]- SMA carrier- Test FOB (maritza Castillo)- Drawn 4/8/20- normal  NIPTS- normal male- Pt notified  Baby ASA 12 weeks  MFM/genetics 20 weeks  Had flu vaccine  Pat antibody positive-drawn 6/18, no change-Repeat @next visit*Repeated 7/6  FOB MARITZA Castillo antibody blood work- Negative               Anemia ICD-10-CM: D64.9  ICD-9-CM: 041. 9  Unknown        Anxiety ICD-10-CM: F41.9  ICD-9-CM: 300.00  Unknown        Morbid obesity ([facility] [address] ICD-10-CM: E66.01  ICD-9-CM: 278.01  Unknown        Syncope ICD-10-CM: R55  ICD-9-CM: 780.2  5/28/2019        Hypokalemia ICD-10-CM: E87.6  ICD-9-CM: 276.8  5/28/2019        Asthma ICD-10-CM: J45.909  ICD-9-CM: 493.90  11/27/2012        Hx of breast reduction, elective ICD-10-CM: Z98.890  ICD-9-CM: V45.89  11/27/2012        Elevated cholesterol ICD-10-CM: E78.00  ICD-9-CM: 272.0  6/1/2012

## 2020-07-28 LAB
ERYTHROCYTE [DISTWIDTH] IN BLOOD BY AUTOMATED COUNT: 13.5 % (ref 11.7–15.4)
GLUCOSE 1H P 50 G GLC PO SERPL-MCNC: 82 MG/DL (ref 65–139)
HCT VFR BLD AUTO: 33.8 % (ref 34–46.6)
HGB BLD-MCNC: 11.6 G/DL (ref 11.1–15.9)
MCH RBC QN AUTO: 32.1 PG (ref 26.6–33)
MCHC RBC AUTO-ENTMCNC: 34.3 G/DL (ref 31.5–35.7)
MCV RBC AUTO: 94 FL (ref 79–97)
PLATELET # BLD AUTO: 194 X10E3/UL (ref 150–450)
RBC # BLD AUTO: 3.61 X10E6/UL (ref 3.77–5.28)
WBC # BLD AUTO: 9 X10E3/UL (ref 3.4–10.8)

## 2020-07-29 ENCOUNTER — TELEPHONE (OUTPATIENT)
Dept: ONCOLOGY | Age: 35
End: 2020-07-29

## 2020-07-29 DIAGNOSIS — O09.511 SUPERVISION OF HIGH RISK PRIMIGRAVIDA IN FIRST TRIMESTER IN PATIENT 35 YEARS OR OLDER AT TIME OF DELIVERY: ICD-10-CM

## 2020-08-03 ENCOUNTER — HOSPITAL ENCOUNTER (OUTPATIENT)
Dept: PERINATAL CARE | Age: 35
Discharge: HOME | End: 2020-08-03
Attending: OBSTETRICS & GYNECOLOGY
Payer: MEDICAID

## 2020-08-03 ENCOUNTER — TELEPHONE (OUTPATIENT)
Dept: ONCOLOGY | Age: 35
End: 2020-08-03

## 2020-08-03 PROCEDURE — 76816 OB US FOLLOW-UP PER FETUS: CPT | Performed by: OBSTETRICS & GYNECOLOGY

## 2020-08-05 ENCOUNTER — TELEPHONE (OUTPATIENT)
Dept: ONCOLOGY | Age: 35
End: 2020-08-05

## 2020-08-10 ENCOUNTER — ROUTINE PRENATAL (OUTPATIENT)
Dept: OBGYN CLINIC | Age: 35
End: 2020-08-10
Payer: MEDICAID

## 2020-08-10 VITALS
DIASTOLIC BLOOD PRESSURE: 78 MMHG | HEIGHT: 67 IN | SYSTOLIC BLOOD PRESSURE: 114 MMHG | BODY MASS INDEX: 40.34 KG/M2 | WEIGHT: 257 LBS

## 2020-08-10 DIAGNOSIS — O09.519 SUPERVISION OF PRIMIGRAVIDA OF ADVANCED MATERNAL AGE, ANTEPARTUM: Primary | ICD-10-CM

## 2020-08-10 DIAGNOSIS — Z86.2 HISTORY OF IRON DEFICIENCY ANEMIA: ICD-10-CM

## 2020-08-10 DIAGNOSIS — Z3A.30 30 WEEKS GESTATION OF PREGNANCY: ICD-10-CM

## 2020-08-10 PROCEDURE — MISCGLOBALOB GLOBAL OB: Performed by: OBSTETRICS & GYNECOLOGY

## 2020-08-10 NOTE — PROGRESS NOTES
Problem List  Date Reviewed: 7/27/2020          Codes Class Noted    Supervision of high risk primigravida in first trimester in patient 28 years or older at time of delivery ICD-10-CM: O09.511  ICD-9-CM: V23.81  3/17/2020    Overview Addendum 7/27/2020  2:31 PM by Heather Guan MD     Intrauterine pregnancy with the following problems identified:   EDC 10/18/2020 by [address]  AMA 35 at delivery  BMI >40  Early glucola 12 weeks- done 4/8- 132, wnl  [facility]- SMA carrier- Test FOB (gary Castillo)- Drawn 4/8/20- normal  NIPTS- normal male- Pt notified  Baby ASA 12 weeks  MFM/genetics 20 weeks  [place] antibody positive-drawn 6/18, no change-Repeat @next visit*Repeated 7/6  FOB GARY Castillo Pat antigen blood work- Negative  Dr. Karan Muller 8/18/20               Anemia ICD-10-CM: D64.9  ICD-9-CM: 889. 9  Unknown        Anxiety ICD-10-CM: F41.9  ICD-9-CM: 300.00  Unknown        Morbid obesity ([address] ICD-10-CM: E66.01  ICD-9-CM: 278.01  Unknown        Syncope ICD-10-CM: R55  ICD-9-CM: 780.2  5/28/2019        Hypokalemia ICD-10-CM: E87.6  ICD-9-CM: 276.8  5/28/2019        Asthma ICD-10-CM: J45.909  ICD-9-CM: 493.90  11/27/2012        Hx of breast reduction, elective ICD-10-CM: Z98.890  ICD-9-CM: V45.89  11/27/2012        Elevated cholesterol ICD-10-CM: E78.00  ICD-9-CM: 272.0  6/1/2012

## 2020-08-10 NOTE — PATIENT INSTRUCTIONS

## 2020-08-12 NOTE — PROGRESS NOTES
[address] [facility] at [facility]  [phone]    Hematology / Oncology Established Visit    Reason for Visit:   Prosper Shrestha is a 28 y.o. female who comes in for f/u of anemia. Referred by Dr. Elise Sellers. History of Present Illness:   Prosper Shrestha is a 28 y.o. female who comes in for evaluation of anemia. She has irregular and heavy periods, lasting 6-7 days, requiring new pad every 1-2 hours. Patient underwent endometrial biopsy. Was started on oral birth control pills, prenatal vitamins, iron supplements. She received a blood transfusion in May 2019. She sees blood in her stools at times and thinks it is related to constipation and hemorrhoids. The blood is not mixed in with the stool. She has not ever seen a GI doctor. She is not currently taking any stools softeners. She denies ice cravings. She has fatigue, DOVE, but no CP. Interval History: Patient her for follow up of anemia. She is 31 weeks pregnant. She received injectafer x 2 early August 2019. She continues taking oral iron supplements once daily. Reports constipation with the iron supplements. She takes a stool softener as needed. Denies ice cravings. Reports fatigue and DOVE r/t to pregnancy. Reports sometimes when she lays down she develops tingling in her hands and feet. She did not have the sensation before she was pregnant.        Past Medical History:   Diagnosis Date    Abnormal Pap smear of cervix 09/2019    Negative/+HPV    Anemia     Anxiety     ASCUS (atypical squamous cells of undetermined significance) on Pap smear 6/1/2012    Asthma     extrinsic as a child     Morbid obesity ([facility] [address]       Past Surgical History:   Procedure Laterality Date    BREAST SURGERY PROCEDURE UNLISTED  11/27/12    Incision and Drainage Left Breast Abscess    HX BREAST BIOPSY Bilateral     abscesses removed 2014 and 2015    HX BREAST REDUCTION  2003    HX OTHER SURGICAL      abscess removed from left breast      Social History     Tobacco Use    Smoking status: Never Smoker    Smokeless tobacco: Never Used   Substance Use Topics    Alcohol use: No      Family History   Problem Relation Age of Onset    Hypertension Mother     Elevated Lipids Mother     Diabetes Sister      Current Outpatient Medications   Medication Sig    Iron BisGl &PS [id number] mg-mg-mcg-mg cap Take  by mouth.  promethazine (PHENERGAN) 25 mg tablet Take 1 Tab by mouth every six (6) hours as needed for Nausea.  doxylamine-pyridoxine, vit B6, (BONJESTA) 20-20 mg TbID Take 1 Tab by mouth two (2) times a day.  pnv w/o calcium-iron fum-fa 29 mg iron- 1 mg chew Take 1 Tab by mouth daily.  medroxyPROGESTERone (PROVERA) 10 mg tablet Take 1 Tab by mouth daily.  letrozole (FEMARA) 2.5 mg tablet Take 2 pills by mouth daily days 3-7    PNV no.153/FA/om3/dha/epa/fish (PRENATAL GUMMIES PO) Take 1 Tab by mouth daily.  senna-docusate (PERICOLACE) 8.6-50 mg per tablet Take 2 Tabs by mouth daily. No current facility-administered medications for this visit. Allergies   Allergen Reactions    Bactrim [Sulfamethoprim Ds] Nausea Only    Zofran [Ondansetron Hcl (Pf)] Rash        Review of Systems: A complete review of systems was obtained, negative except as described above. Physical Exam:     Visit Vitals  /72 (BP 1 Location: Left arm, BP Patient Position: Sitting)   Pulse 83   Temp 97.5 °F (36.4 °C) (Temporal)   Ht 5' 7\" (1.702 m)   Wt 259 lb 12.8 oz (117.8 kg)   LMP 01/06/2020 (Approximate)   SpO2 97%   BMI 40.69 kg/m²     ECOG PS: 1  General: Well developed, no acute distress, obese  Eyes: PERRLA, EOMI, anicteric sclerae  HENT: Atraumatic, OP clear, TMs intact without erythema  Neck: Supple  Lymphatic: No cervical, supraclavicular, axillary or inguinal adenopathy  Respiratory: CTAB, normal respiratory effort  CV: Normal rate, regular rhythm, no murmurs, no peripheral edema  GI: Gravid uterus.  Soft, nontender, nondistended, no masses, no hepatomegaly, no splenomegaly  MS: Normal gait and station. Digits without clubbing or cyanosis. Skin: No rashes, ecchymoses, or petechiae. Normal temperature, turgor, and texture. Neuro/Psych: Alert, oriented. 5/5 strength in all 4 extremities. Appropriate affect, normal judgment/insight. Results:     Lab Results   Component Value Date/Time    WBC 9.0 07/27/2020 01:43 PM    HGB 11.6 07/27/2020 01:43 PM    HCT 33.8 (L) 07/27/2020 01:43 PM    PLATELET 802 63/37/5678 01:43 PM    MCV 94 07/27/2020 01:43 PM    ABS. NEUTROPHILS 5.0 05/28/2020 03:06 PM    Hemoglobin (POC) 7.6 07/05/2019 02:16 PM    Hemoglobin (POC) 13.3 12/18/2014 06:34 PM    Hematocrit (POC) 39 12/18/2014 06:34 PM    Hgb, External 11.6 07/27/2020    Hct, External 33.8 07/27/2020    Platelet cnt., External 194 07/27/2020     Lab Results   Component Value Date/Time    Sodium 137 08/13/2020 10:55 AM    Potassium 3.7 08/13/2020 10:55 AM    Chloride 103 08/13/2020 10:55 AM    CO2 20 08/13/2020 10:55 AM    Glucose 71 08/13/2020 10:55 AM    BUN 5 (L) 08/13/2020 10:55 AM    Creatinine 0.55 (L) 08/13/2020 10:55 AM    GFR est  08/13/2020 10:55 AM    GFR est non- 08/13/2020 10:55 AM    Calcium 8.6 (L) 08/13/2020 10:55 AM    Sodium (POC) 142 12/18/2014 06:34 PM    Potassium (POC) 3.6 12/18/2014 06:34 PM    Chloride (POC) 104 12/18/2014 06:34 PM    Glucose (POC) 87 12/18/2014 06:34 PM    BUN (POC) 13 12/18/2014 06:34 PM    Creatinine (POC) 1.0 12/18/2014 06:34 PM    Calcium, ionized (POC) 1.16 12/18/2014 06:34 PM     Lab Results   Component Value Date/Time    Bilirubin, total 0.9 08/13/2020 10:55 AM    ALT (SGPT) 5 08/13/2020 10:55 AM    Alk.  phosphatase 51 08/13/2020 10:55 AM    Protein, total 6.6 08/13/2020 10:55 AM    Albumin 3.8 08/13/2020 10:55 AM    Globulin 4.5 (H) 03/16/2020 10:59 PM     Lab Results   Component Value Date/Time    Iron 76 08/13/2020 10:55 AM    TIBC 452 (H) 08/13/2020 10:55 AM    Iron % saturation 17 08/13/2020 10:55 AM    Ferritin 12 (L) 08/13/2020 10:55 AM       Lab Results   Component Value Date/Time    Vitamin B12 99 (L) 05/28/2019 07:07 PM    Folate 9.6 05/28/2019 07:07 PM     Lab Results   Component Value Date/Time    TSH 2.680 06/01/2012 09:01 AM     Lab Results   Component Value Date/Time    Hep B surface Ag screen Negative 03/13/2020 02:53 PM         Imaging:     Radiology report(s) reviewed. Assessment & Plan:   Darian Llanes is a 28 y.o. female comes in for f/u of anemia. 1.  Iron deficiency anemia: 2/2 prior menstrual blood loss and now increased demand during pregnancy. Required blood transfusion 5/2019 for Hgb 6.8. s/p Injectafer x 2 in early August 2019. Repeat iron studies and Hgb normal in 9/2019 and 12/2019 and 5/20/20 labs show anemia without iron deficiency - this is likely due to hemodilutional effect of pregnancy. 8/13/20 and 7/20 labs normal Hgb and iron saturation but low ferritin. -- Increase iron supplement to BID as tolerated, using stool softeners as needed. -- Repeat labs in 6 weeks - will call with results. -- Return in 3 months with repeat labs and follow up    2. H/o Menorrhagia: Pt now off of OCPs and pregnant. 3. Pregnancy: Currently 31 weeks pregnant. Due 10/18/20.     4. Vitamin B12 deficiency: Looking back to 5/2019 Vitamin B12 was 99. Reports she never took Vitamin B12 supplements or injections but she is taking a prenatal vitamin. She reports intermittent neuropathy in hands and feet that occurred with pregnancy. -- Check Vitamin B12 and MMA today - call patient with results. I appreciate the opportunity to participate in Ms. Ximena Mcgill's care.     Signed By: Veena Longoria MD     August 18, 2020

## 2020-08-14 LAB
ALBUMIN SERPL-MCNC: 3.8 G/DL (ref 3.8–4.8)
ALBUMIN/GLOB SERPL: 1.4 {RATIO} (ref 1.2–2.2)
ALP SERPL-CCNC: 51 IU/L (ref 39–117)
ALT SERPL-CCNC: 5 IU/L (ref 0–32)
AST SERPL-CCNC: 14 IU/L (ref 0–40)
BILIRUB SERPL-MCNC: 0.9 MG/DL (ref 0–1.2)
BUN SERPL-MCNC: 5 MG/DL (ref 6–20)
BUN/CREAT SERPL: 9 (ref 9–23)
CALCIUM SERPL-MCNC: 8.6 MG/DL (ref 8.7–10.2)
CHLORIDE SERPL-SCNC: 103 MMOL/L (ref 96–106)
CO2 SERPL-SCNC: 20 MMOL/L (ref 20–29)
CREAT SERPL-MCNC: 0.55 MG/DL (ref 0.57–1)
FERRITIN SERPL-MCNC: 12 NG/ML (ref 15–150)
GLOBULIN SER CALC-MCNC: 2.8 G/DL (ref 1.5–4.5)
GLUCOSE SERPL-MCNC: 71 MG/DL (ref 65–99)
IRON SATN MFR SERPL: 17 % (ref 15–55)
IRON SERPL-MCNC: 76 UG/DL (ref 27–159)
POTASSIUM SERPL-SCNC: 3.7 MMOL/L (ref 3.5–5.2)
PROT SERPL-MCNC: 6.6 G/DL (ref 6–8.5)
SODIUM SERPL-SCNC: 137 MMOL/L (ref 134–144)
TIBC SERPL-MCNC: 452 UG/DL (ref 250–450)
UIBC SERPL-MCNC: 376 UG/DL (ref 131–425)

## 2020-08-18 ENCOUNTER — OFFICE VISIT (OUTPATIENT)
Dept: ONCOLOGY | Age: 35
End: 2020-08-18
Payer: MEDICAID

## 2020-08-18 VITALS
WEIGHT: 259.8 LBS | BODY MASS INDEX: 40.78 KG/M2 | TEMPERATURE: 97.5 F | OXYGEN SATURATION: 97 % | DIASTOLIC BLOOD PRESSURE: 72 MMHG | HEART RATE: 83 BPM | SYSTOLIC BLOOD PRESSURE: 109 MMHG | HEIGHT: 67 IN

## 2020-08-18 DIAGNOSIS — O99.012 ANEMIA DURING PREGNANCY IN SECOND TRIMESTER: ICD-10-CM

## 2020-08-18 DIAGNOSIS — Z3A.31 31 WEEKS GESTATION OF PREGNANCY: ICD-10-CM

## 2020-08-18 DIAGNOSIS — E53.8 VITAMIN B12 DEFICIENCY: ICD-10-CM

## 2020-08-18 DIAGNOSIS — Z86.2 HISTORY OF IRON DEFICIENCY ANEMIA: Primary | ICD-10-CM

## 2020-08-18 PROCEDURE — 99214 OFFICE O/P EST MOD 30 MIN: CPT | Performed by: INTERNAL MEDICINE

## 2020-08-18 RX ORDER — ACETAMINOPHEN 325 MG/1
TABLET ORAL
COMMUNITY
End: 2022-07-07

## 2020-08-18 NOTE — PROGRESS NOTES
Sunday Jauregui is a 28 y.o. female here for follow up of anemia. Patient with no complaints of pain at this time.

## 2020-08-25 LAB
Lab: NORMAL
METHYLMALONATE SERPL-SCNC: 141 NMOL/L (ref 0–378)
VIT B12 SERPL-MCNC: 121 PG/ML (ref 232–1245)

## 2020-08-26 ENCOUNTER — TELEPHONE (OUTPATIENT)
Dept: ONCOLOGY | Age: 35
End: 2020-08-26

## 2020-08-26 DIAGNOSIS — E53.8 VITAMIN B12 DEFICIENCY: Primary | ICD-10-CM

## 2020-08-26 RX ORDER — SYRINGE, DISPOSABLE, 3 ML
SYRINGE, EMPTY DISPOSABLE MISCELLANEOUS
Qty: 17 SYRINGE | Refills: 0 | Status: CANCELLED | OUTPATIENT
Start: 2020-08-26

## 2020-08-26 RX ORDER — CYANOCOBALAMIN 1000 UG/ML
INJECTION, SOLUTION INTRAMUSCULAR; SUBCUTANEOUS
Qty: 1 VIAL | Refills: 0 | Status: CANCELLED
Start: 2020-08-26

## 2020-08-26 NOTE — TELEPHONE ENCOUNTER
08/26/20 3:58 PM Called patient to notify of vitamin b12 deficiency. NA x 1. Will attempt to call later.

## 2020-08-27 ENCOUNTER — ROUTINE PRENATAL (OUTPATIENT)
Dept: OBGYN CLINIC | Age: 35
End: 2020-08-27
Payer: MEDICAID

## 2020-08-27 ENCOUNTER — TELEPHONE (OUTPATIENT)
Dept: ONCOLOGY | Age: 35
End: 2020-08-27

## 2020-08-27 VITALS
HEIGHT: 67 IN | BODY MASS INDEX: 40.9 KG/M2 | SYSTOLIC BLOOD PRESSURE: 107 MMHG | WEIGHT: 260.6 LBS | DIASTOLIC BLOOD PRESSURE: 68 MMHG

## 2020-08-27 DIAGNOSIS — Z3A.32 32 WEEKS GESTATION OF PREGNANCY: ICD-10-CM

## 2020-08-27 DIAGNOSIS — O09.519 SUPERVISION OF PRIMIGRAVIDA OF ADVANCED MATERNAL AGE, ANTEPARTUM: Primary | ICD-10-CM

## 2020-08-27 PROCEDURE — 0502F SUBSEQUENT PRENATAL CARE: CPT | Performed by: OBSTETRICS & GYNECOLOGY

## 2020-08-27 NOTE — PROGRESS NOTES
Problem List  Date Reviewed: 8/10/2020          Codes Class Noted    Supervision of high risk primigravida in first trimester in patient 28 years or older at time of delivery ICD-10-CM: O09.511  ICD-9-CM: V23.81  3/17/2020    Overview Addendum 7/27/2020  2:31 PM by Mya Farah MD     Intrauterine pregnancy with the following problems identified:   EDC 10/18/2020 by Marissa  AMA 35 at delivery  BMI >40  Early glucola 12 weeks- done 4/8- 132, wnl  [facility]- SMA carrier- Test FOB (gary Castillo)- Drawn 4/8/20- normal  NIPTS- normal male- Pt notified  Baby ASA 12 weeks  MFM/genetics 20 weeks  [place] antibody positive-drawn 6/18, no change-Repeat @next visit*Repeated 7/6  FOB GARY Castillo Pat antigen blood work- Negative  Dr. Mina Cornell 8/18/20               Anemia ICD-10-CM: D64.9  ICD-9-CM: 591. 9  Unknown        Anxiety ICD-10-CM: F41.9  ICD-9-CM: 300.00  Unknown        Morbid obesity ([facility] [address] ICD-10-CM: E66.01  ICD-9-CM: 278.01  Unknown        Syncope ICD-10-CM: R55  ICD-9-CM: 780.2  5/28/2019        Hypokalemia ICD-10-CM: E87.6  ICD-9-CM: 276.8  5/28/2019        Asthma ICD-10-CM: J45.909  ICD-9-CM: 493.90  11/27/2012        Hx of breast reduction, elective ICD-10-CM: Z98.890  ICD-9-CM: V45.89  11/27/2012        Elevated cholesterol ICD-10-CM: E78.00  ICD-9-CM: 272.0  6/1/2012

## 2020-08-27 NOTE — PATIENT INSTRUCTIONS
Weeks 32 to 34 of Your Pregnancy: Care Instructions  Your Care Instructions     During the last few weeks of your pregnancy, you may have more aches and pains. It's important to rest when you can. Your growing baby is putting more pressure on your bladder. So you may need to urinate more often. Hemorrhoids are also common. These are painful, itchy veins in the rectal area. In the 36th week, most women have a test for group B streptococcus (GBS). GBS is a common bacteria that can live in the vagina and rectum. It can make your baby sick after birth. If you test positive, you will get antibiotics during labor. These will keep your baby from getting the bacteria. You may want to talk with your doctor about banking your baby's umbilical cord blood. This is the blood left in the cord after birth. If you want to save this blood, you must arrange it ahead of time. You can't decide at the last minute. If you haven't already had the Tdap shot during this pregnancy, talk to your doctor about getting it. It will help protect your  against pertussis infection. Follow-up care is a key part of your treatment and safety. Be sure to make and go to all appointments, and call your doctor if you are having problems. It's also a good idea to know your test results and keep a list of the medicines you take. How can you care for yourself at home? Ease hemorrhoids  · Get more liquids, fruits, vegetables, and fiber in your diet. This will help keep your stools soft. · Avoid sitting for too long. Lie on your left side several times a day. · Clean yourself with soft, moist toilet paper. Or you can use witch hazel pads or personal hygiene pads. · If you are uncomfortable, try ice packs. Or you can sit in a warm sitz bath. Do these for 20 minutes at a time, as needed. · Use hydrocortisone cream for pain and itching. Two examples are Anusol and Preparation H Hydrocortisone.   · Ask your doctor about taking an over-the-counter stool softener. Consider breastfeeding  · Experts recommend that women breastfeed for 1 year or longer. · Breast milk may help protect your child from some health problems.  babies are less likely than formula-fed babies to:  ? Get ear infections, colds, diarrhea, and pneumonia. ? Be obese or get diabetes later in life. · Women who breastfeed have less bleeding after the birth. Their uteruses also shrink back faster. · Some women who breastfeed lose weight faster. Making milk burns calories. · Breastfeeding can lower your risk of breast cancer, ovarian cancer, and osteoporosis. Decide about circumcision for boys  · As you make this decision, it may help to think about your personal, Advent, and family traditions. You get to decide if you will keep your son's penis natural or if he will be circumcised. · If you decide that you would like to have your baby circumcised, talk with your doctor. You can share your concerns about pain. And you can discuss your preferences for anesthesia. Where can you learn more? Go to http://merry-joseluis.info/  Enter X711 in the search box to learn more about \"Weeks 32 to 34 of Your Pregnancy: Care Instructions. \"  Current as of: February 11, 2020               Content Version: 12.5  © 9657-0506 [employer], [employer]. Care instructions adapted under license by [employer] (which disclaims liability or warranty for this information). If you have questions about a medical condition or this instruction, always ask your healthcare professional. [place] [address] any warranty or liability for your use of this information.

## 2020-08-31 ENCOUNTER — TELEPHONE (OUTPATIENT)
Dept: ONCOLOGY | Age: 35
End: 2020-08-31

## 2020-08-31 NOTE — TELEPHONE ENCOUNTER
08/31/20 2:25 PM Called patient's number, it is disconnected. Called [facility] - number rang and then went to busy. Able to speak with mother Stefani Ovalle) who said daughter may have changed phone numbers which she does not have right now. Given our office number and asked patient to contact our office regarding recent lab results. Mother verbalized understanding.

## 2020-08-31 NOTE — TELEPHONE ENCOUNTER
08/31/20 2:50 PM Called patient and advised she needs to start on Vitamin B12 injections. Patient does not feel comfortable administering these injections at home and wishes to have them administered in the infusion center. Advised I will set up these appointments and call the patient back.

## 2020-09-01 PROBLEM — E53.8 VITAMIN B12 DEFICIENCY: Status: ACTIVE | Noted: 2020-09-01

## 2020-09-01 RX ORDER — CYANOCOBALAMIN 1000 UG/ML
1000 INJECTION, SOLUTION INTRAMUSCULAR; SUBCUTANEOUS ONCE
Status: CANCELLED
Start: 2020-09-03

## 2020-09-01 RX ORDER — CYANOCOBALAMIN 1000 UG/ML
1000 INJECTION, SOLUTION INTRAMUSCULAR; SUBCUTANEOUS ONCE
Status: CANCELLED
Start: 2020-09-17

## 2020-09-01 RX ORDER — CYANOCOBALAMIN 1000 UG/ML
1000 INJECTION, SOLUTION INTRAMUSCULAR; SUBCUTANEOUS ONCE
Status: CANCELLED
Start: 2020-11-23

## 2020-09-01 RX ORDER — CYANOCOBALAMIN 1000 UG/ML
1000 INJECTION, SOLUTION INTRAMUSCULAR; SUBCUTANEOUS ONCE
Status: CANCELLED
Start: 2020-09-24

## 2020-09-01 RX ORDER — CYANOCOBALAMIN 1000 UG/ML
1000 INJECTION, SOLUTION INTRAMUSCULAR; SUBCUTANEOUS ONCE
Status: CANCELLED
Start: 2020-09-10

## 2020-09-01 RX ORDER — CYANOCOBALAMIN 1000 UG/ML
1000 INJECTION, SOLUTION INTRAMUSCULAR; SUBCUTANEOUS ONCE
Status: CANCELLED
Start: 2020-10-22

## 2020-09-03 ENCOUNTER — HOSPITAL ENCOUNTER (OUTPATIENT)
Dept: INFUSION THERAPY | Age: 35
Discharge: HOME | End: 2020-09-03
Payer: MEDICAID

## 2020-09-03 VITALS
HEART RATE: 89 BPM | SYSTOLIC BLOOD PRESSURE: 92 MMHG | TEMPERATURE: 98.5 F | RESPIRATION RATE: 16 BRPM | DIASTOLIC BLOOD PRESSURE: 69 MMHG

## 2020-09-03 DIAGNOSIS — D50.9 IRON DEFICIENCY ANEMIA, UNSPECIFIED IRON DEFICIENCY ANEMIA TYPE: ICD-10-CM

## 2020-09-03 DIAGNOSIS — E53.8 VITAMIN B12 DEFICIENCY: Primary | ICD-10-CM

## 2020-09-03 PROCEDURE — 74011250636 HC RX REV CODE- 250/636: Performed by: NURSE PRACTITIONER

## 2020-09-03 PROCEDURE — 96372 THER/PROPH/DIAG INJ SC/IM: CPT

## 2020-09-03 RX ORDER — CYANOCOBALAMIN 1000 UG/ML
1000 INJECTION, SOLUTION INTRAMUSCULAR; SUBCUTANEOUS ONCE
Status: COMPLETED | OUTPATIENT
Start: 2020-09-03 | End: 2020-09-03

## 2020-09-03 RX ADMIN — CYANOCOBALAMIN 1000 MCG: 1000 INJECTION, SOLUTION INTRAMUSCULAR at 14:13

## 2020-09-03 NOTE — PROGRESS NOTES
Outpatient Infusion Center Short Visit Progress Note    3700 Patient admitted to [facility] for B12 ambulatory in stable condition. Assessment completed. No new concerns voiced. Vital Signs:  Visit Vitals  BP 92/69   Pulse 89   Temp 98.5 °F (36.9 °C)   Resp 16   LMP 01/06/2020 (Approximate)   Breastfeeding No       Medications:  Medications Administered     cyanocobalamin (VITAMIN B12) injection 1,000 mcg     Admin Date  09/03/2020 Action  Given Dose  1000 mcg Route  IntraMUSCular Administered By  Anayeli Turpin RN                5757 Patient tolerated treatment well. Patient discharged from [place] [address] ambulatory in no distress at 1415. Patient aware of next appointment.     Future Appointments   Date Time Provider Mor Julien   9/10/2020  1:00 PM [facility]   9/10/2020  2:10 PM Dino Plummer MD BSR[facility] BS AMB   9/17/2020  2:00 PM [facility]   9/24/2020  2:00 PM [facility]   10/22/2020  1:30 PM [facility]   11/19/2020  9:00 AM Sirisha Marcelino MD ONCSF BS AMB   11/19/2020  9:30 AM [facility]

## 2020-09-09 ENCOUNTER — HOSPITAL ENCOUNTER (OUTPATIENT)
Dept: PERINATAL CARE | Age: 35
Discharge: HOME | End: 2020-09-09
Attending: OBSTETRICS & GYNECOLOGY
Payer: MEDICAID

## 2020-09-09 PROCEDURE — 76816 OB US FOLLOW-UP PER FETUS: CPT | Performed by: OBSTETRICS & GYNECOLOGY

## 2020-09-10 ENCOUNTER — HOSPITAL ENCOUNTER (OUTPATIENT)
Dept: INFUSION THERAPY | Age: 35
Discharge: HOME | End: 2020-09-10
Payer: MEDICAID

## 2020-09-10 ENCOUNTER — ROUTINE PRENATAL (OUTPATIENT)
Dept: OBGYN CLINIC | Age: 35
End: 2020-09-10
Payer: MEDICAID

## 2020-09-10 VITALS — DIASTOLIC BLOOD PRESSURE: 77 MMHG | WEIGHT: 263 LBS | SYSTOLIC BLOOD PRESSURE: 116 MMHG | BODY MASS INDEX: 41.19 KG/M2

## 2020-09-10 VITALS
OXYGEN SATURATION: 99 % | SYSTOLIC BLOOD PRESSURE: 104 MMHG | HEART RATE: 92 BPM | RESPIRATION RATE: 18 BRPM | DIASTOLIC BLOOD PRESSURE: 73 MMHG | TEMPERATURE: 98.1 F

## 2020-09-10 DIAGNOSIS — D50.9 IRON DEFICIENCY ANEMIA, UNSPECIFIED IRON DEFICIENCY ANEMIA TYPE: ICD-10-CM

## 2020-09-10 DIAGNOSIS — E53.8 VITAMIN B12 DEFICIENCY: Primary | ICD-10-CM

## 2020-09-10 DIAGNOSIS — Z3A.34 34 WEEKS GESTATION OF PREGNANCY: ICD-10-CM

## 2020-09-10 DIAGNOSIS — O09.519 SUPERVISION OF PRIMIGRAVIDA OF ADVANCED MATERNAL AGE, ANTEPARTUM: Primary | ICD-10-CM

## 2020-09-10 PROCEDURE — 96372 THER/PROPH/DIAG INJ SC/IM: CPT

## 2020-09-10 PROCEDURE — 74011250636 HC RX REV CODE- 250/636: Performed by: NURSE PRACTITIONER

## 2020-09-10 PROCEDURE — 0502F SUBSEQUENT PRENATAL CARE: CPT | Performed by: OBSTETRICS & GYNECOLOGY

## 2020-09-10 RX ORDER — CYANOCOBALAMIN 1000 UG/ML
1000 INJECTION, SOLUTION INTRAMUSCULAR; SUBCUTANEOUS ONCE
Status: COMPLETED | OUTPATIENT
Start: 2020-09-10 | End: 2020-09-10

## 2020-09-10 RX ADMIN — CYANOCOBALAMIN 1000 MCG: 1000 INJECTION, SOLUTION INTRAMUSCULAR at 13:03

## 2020-09-10 NOTE — PROGRESS NOTES
[facility] Progress Note    Date: September 10, 2020    Name: Tyler Graves    MRN: [id number]         : 1985    Ms. Brenda Lin Arrived ambulatory and in no distress for B12 Injection. Assessment was completed, no acute issues at this time, no new complaints voiced. No labs to be drawn today. Ms. Hoang Angel vitals were reviewed. Visit Vitals  /73   Pulse 92   Temp 98.1 °F (36.7 °C)   Resp 18   SpO2 99%         Medications:  Medications Administered     cyanocobalamin (VITAMIN B12) injection 1,000 mcg     Admin Date  09/10/2020 Action  Given Dose  1000 mcg Route  IntraMUSCular Administered By  Rekha Hickman RN              Injection given in the Left Deltoid. Ms. Brenda Lin tolerated treatment well and was discharged from [place] [address] in stable condition. She is to return on 2020 for her next appointment.     Estrella Schmid RN  September 10, 2020

## 2020-09-10 NOTE — PROGRESS NOTES
Baby moving  [address] at [facility] 103 %tile - has fu in one month  glucola was 82, no excess weight gain

## 2020-09-10 NOTE — PROGRESS NOTES
Problem List  Date Reviewed: 8/27/2020          Codes Class Noted    Vitamin B12 deficiency ICD-10-CM: E53.8  ICD-9-CM: 266.2  9/1/2020        Supervision of high risk primigravida in first trimester in patient 35 years or older at time of delivery ICD-10-CM: O09.511  ICD-9-CM: V23.81  3/17/2020    Overview Addendum 7/27/2020  2:31 PM by Dino Plummer MD     Intrauterine pregnancy with the following problems identified:   EDC 10/18/2020 by Marissa ARRIETA 35 at delivery  BMI >40  Early glucola 12 weeks- done 4/8- 132, wnl  [facility]- SMA carrier- Test FOB (gary Castillo)- Drawn 4/8/20- normal  NIPTS- normal male- Pt notified  Baby ASA 12 weeks  MFM/genetics 20 weeks  [place] antibody positive-drawn 6/18, no change-Repeat @next visit*Repeated 7/6  FOB GARY Castillo Pat antigen blood work- Negative  Dr. Lory Stewart 8/18/20               Anemia ICD-10-CM: D64.9  ICD-9-CM: 795. 9  Unknown        Anxiety ICD-10-CM: F41.9  ICD-9-CM: 300.00  Unknown        Morbid obesity ([address] ICD-10-CM: E66.01  ICD-9-CM: 278.01  Unknown        Syncope ICD-10-CM: R55  ICD-9-CM: 780.2  5/28/2019        Hypokalemia ICD-10-CM: E87.6  ICD-9-CM: 276.8  5/28/2019        Asthma ICD-10-CM: J45.909  ICD-9-CM: 493.90  11/27/2012        Hx of breast reduction, elective ICD-10-CM: Z98.890  ICD-9-CM: V45.89  11/27/2012        Elevated cholesterol ICD-10-CM: E78.00  ICD-9-CM: 272.0  6/1/2012

## 2020-09-16 ENCOUNTER — HOSPITAL ENCOUNTER (EMERGENCY)
Age: 35
Discharge: HOME | End: 2020-09-17
Attending: OBSTETRICS & GYNECOLOGY | Admitting: OBSTETRICS & GYNECOLOGY
Payer: MEDICAID

## 2020-09-16 VITALS
TEMPERATURE: 98.3 F | HEIGHT: 67 IN | RESPIRATION RATE: 18 BRPM | BODY MASS INDEX: 41.28 KG/M2 | HEART RATE: 89 BPM | SYSTOLIC BLOOD PRESSURE: 125 MMHG | OXYGEN SATURATION: 99 % | DIASTOLIC BLOOD PRESSURE: 85 MMHG | WEIGHT: 263 LBS

## 2020-09-16 LAB

## 2020-09-16 PROCEDURE — 75810000275 HC EMERGENCY DEPT VISIT NO LEVEL OF CARE

## 2020-09-16 PROCEDURE — 74011250636 HC RX REV CODE- 250/636: Performed by: OBSTETRICS & GYNECOLOGY

## 2020-09-16 PROCEDURE — 99284 EMERGENCY DEPT VISIT MOD MDM: CPT

## 2020-09-16 PROCEDURE — 81001 URINALYSIS AUTO W/SCOPE: CPT

## 2020-09-16 PROCEDURE — 96360 HYDRATION IV INFUSION INIT: CPT

## 2020-09-16 PROCEDURE — 59025 FETAL NON-STRESS TEST: CPT

## 2020-09-16 RX ORDER — SODIUM CHLORIDE, SODIUM LACTATE, POTASSIUM CHLORIDE, CALCIUM CHLORIDE 600; 310; 30; 20 MG/100ML; MG/100ML; MG/100ML; MG/100ML
999 INJECTION, SOLUTION INTRAVENOUS ONCE
Status: COMPLETED | OUTPATIENT
Start: 2020-09-16 | End: 2020-09-16

## 2020-09-16 RX ADMIN — SODIUM CHLORIDE, SODIUM LACTATE, POTASSIUM CHLORIDE, AND CALCIUM CHLORIDE 999 ML/HR: 600; 310; 30; 20 INJECTION, SOLUTION INTRAVENOUS at 22:30

## 2020-09-17 ENCOUNTER — HOSPITAL ENCOUNTER (OUTPATIENT)
Dept: INFUSION THERAPY | Age: 35
Discharge: HOME | End: 2020-09-17
Payer: MEDICAID

## 2020-09-17 VITALS
TEMPERATURE: 98.7 F | RESPIRATION RATE: 18 BRPM | SYSTOLIC BLOOD PRESSURE: 101 MMHG | OXYGEN SATURATION: 97 % | HEART RATE: 86 BPM | DIASTOLIC BLOOD PRESSURE: 75 MMHG

## 2020-09-17 DIAGNOSIS — E53.8 VITAMIN B12 DEFICIENCY: Primary | ICD-10-CM

## 2020-09-17 DIAGNOSIS — D50.9 IRON DEFICIENCY ANEMIA, UNSPECIFIED IRON DEFICIENCY ANEMIA TYPE: ICD-10-CM

## 2020-09-17 PROCEDURE — 74011250636 HC RX REV CODE- 250/636: Performed by: NURSE PRACTITIONER

## 2020-09-17 PROCEDURE — 96372 THER/PROPH/DIAG INJ SC/IM: CPT

## 2020-09-17 RX ORDER — CYANOCOBALAMIN 1000 UG/ML
1000 INJECTION, SOLUTION INTRAMUSCULAR; SUBCUTANEOUS ONCE
Status: COMPLETED | OUTPATIENT
Start: 2020-09-17 | End: 2020-09-17

## 2020-09-17 RX ADMIN — CYANOCOBALAMIN 1000 MCG: 1000 INJECTION, SOLUTION INTRAMUSCULAR at 14:12

## 2020-09-17 NOTE — PROGRESS NOTES
\[facility]\"" Progress Note    Date: 2020    Name: Farhad Gutierrez    MRN: [id number]         : 1985    Ms. Zhang Arrived ambulatory and in no distress for B12 Regimen. Assessment was completed, no acute issues at this time, no new complaints voiced. Medications:  B12 IM    Ms. Zhang tolerated treatment well and was discharged from [place] [address] in stable condition. She is to return on 20 for her next appointment.     Liset Guan RN  2020

## 2020-09-17 NOTE — PROGRESS NOTES
2107: Pt arrived via rescue squad on stretcher with c/o contractions. Pt placed on monitor. VSS,.     2130: Dr. Nancie Rodriguez called regarding pt. Per MD will be by to assess pt shortly. 2200: SVE per Dr. Nancie Rodriguez. Pt intact, closed/long. Per MD will give pt IVF bolus, PO hydration. UA sent. 2230: IVF bolus started. 2315: Pt states that contractions are still noticeable, but are much less intense and further apart. 2350: Dr. Nancie Rodriguez called and updated on pt pain. Per MD pt may be discharged home. 5338: Pt given discharge instructions. Pt verbalized understanding of DC instructions. All belongings with pt. Pt would like to ambulate independently off unit.

## 2020-09-17 NOTE — DISCHARGE INSTRUCTIONS
Patient Education   Patient Education   Patient Education        Counting Your [de-identified] Kicks: Care Instructions  Your Care Instructions     Counting your baby's kicks is one way your doctor can tell that your baby is healthy. Most women--especially in a first pregnancy--feel their baby move for the first time between 16 and 22 weeks. The movement may feel like flutters rather than kicks. Your baby may move more at certain times of the day. When you are active, you may notice less kicking than when you are resting. At your prenatal visits, your doctor will ask whether the baby is active. In your last trimester, your doctor may ask you to count the number of times you feel your baby move. Follow-up care is a key part of your treatment and safety. Be sure to make and go to all appointments, and call your doctor if you are having problems. It's also a good idea to know your test results and keep a list of the medicines you take. How do you count fetal kicks? · A common method of checking your baby's movement is to count the number of kicks or moves you feel in 1 hour. Ten movements (such as kicks, flutters, or rolls) in 1 hour are normal. Some doctors suggest that you count in the morning until you get to 10 movements. Then you can quit for that day and start again the next day. · Pick your baby's most active time of day to count. This may be any time from morning to evening. · If you do not feel 10 movements in an hour, your baby may be sleeping. Wait for the next hour and count again. When should you call for help? Call your doctor now or seek immediate medical care if:    · You noticed that your baby has stopped moving or is moving much less than normal.   Watch closely for changes in your health, and be sure to contact your doctor if you have any problems. Where can you learn more?   Go to http://merry-joseluis.info/  Enter Y722 in the search box to learn more about \"Counting Your Baby's Shania: Care Instructions. \"  Current as of: 2020               Content Version: 12.6  © 4993-3554 [employer]. Care instructions adapted under license by [employer] (which disclaims liability or warranty for this information). If you have questions about a medical condition or this instruction, always ask your healthcare professional. [place] [address] any warranty or liability for your use of this information.  Labor: Care Instructions  Your Care Instructions      labor is the start of labor between 21 and 36 weeks of pregnancy. Most babies are born at 40 to 41 weeks of pregnancy. In labor, the uterus contracts to open the cervix. This is the first stage of childbirth.  labor can be caused by a problem with the baby, the mother, or both. Often the cause is not known. In some cases, doctors use medicines to try to delay labor until 29 or more weeks of pregnancy. By this time, a baby has grown enough so that problems are not likely. In some cases--such as with a serious infection--it is healthier for the baby to be born early. Your treatment will depend on how far along you are in your pregnancy and on your health and your baby's health. Follow-up care is a key part of your treatment and safety. Be sure to make and go to all appointments, and call your doctor if you are having problems. It's also a good idea to know your test results and keep a list of the medicines you take. How can you care for yourself at home? · If your doctor prescribed medicines, take them exactly as directed. Call your doctor if you think you are having a problem with your medicine. · Rest until your doctor advises you about activity. He or she will tell you if you should stay in bed most of the time. You may need to arrange for  if you have young children. · Do not have sexual intercourse unless your doctor says it is safe.   · Use pads, not tampons, if you have vaginal bleeding. · Make sure to drink plenty of fluids. Dehydration can lead to contractions. If you have kidney, heart, or liver disease and have to limit fluids, talk with your doctor before you increase the amount of fluids you drink. · Do not smoke or allow others to smoke around you. If you need help quitting, talk to your doctor about stop-smoking programs and medicines. These can increase your chances of quitting for good. When should you call for help? Call  911 anytime you think you may need emergency care. For example, call if:    · You passed out (lost consciousness).     · You have a seizure.     · You have severe vaginal bleeding.     · You have severe pain in your belly or pelvis.     · You have had fluid gushing or leaking from your vagina and you know or think the umbilical cord is bulging into your vagina. If this happens, immediately get down on your knees so your rear end (buttocks) is higher than your head. This will decrease the pressure on the cord until help arrives. Call your doctor now or seek immediate medical care if:    · You have signs of preeclampsia, such as:  ? Sudden swelling of your face, hands, or feet. ? New vision problems (such as dimness, blurring, or seeing spots). ? A severe headache.     · You have any vaginal bleeding.     · You have belly pain or cramping.     · You have a fever.     · You have had regular contractions (with or without pain) for an hour. This means that you have 6 or more within 1 hour after you change your position and drink fluids.     · You have a sudden release of fluid from the vagina.     · You have low back pain or pelvic pressure that does not go away.     · You notice that your baby has stopped moving or is moving much less than normal.   Watch closely for changes in your health, and be sure to contact your doctor if you have any problems. Where can you learn more?   Go to http://merry-joseluis.info/  Enter Q400 in the search box to learn more about \" Labor: Care Instructions. \"  Current as of: 2020               Content Version: 12.6  © 1985-5264 [employer]. Care instructions adapted under license by [employer] (which disclaims liability or warranty for this information). If you have questions about a medical condition or this instruction, always ask your healthcare professional. [address] any warranty or liability for your use of this information. [place] Contractions: Care Instructions  Your Care Instructions     [place] contractions prepare your uterus for labor. Think of them as a \"warm-up\" exercise that your body does. You may begin to feel them between the 28th and 30th weeks of your pregnancy. But they start as early as the 20th week. [place] contractions usually occur more often during the ninth month. They may go away when you are active and return when you rest. These contractions are like mild contractions of true labor, but they occur less often. (You feel fewer than 8 in an hour.) They don't cause your cervix to open. It may be hard for you to tell the difference between [place] contractions and true labor, especially in your first pregnancy. Follow-up care is a key part of your treatment and safety. Be sure to make and go to all appointments, and call your doctor if you are having problems. It's also a good idea to know your test results and keep a list of the medicines you take. How can you care for yourself at home? · Try a warm bath to help relieve muscle tension and reduce pain. · Change positions every 30 minutes. Take breaks if you must sit for a long time. Get up and walk around. · Drink plenty of water, enough so that your urine is light yellow or clear like water. · Taking short walks may help you feel better.   Your doctor needs to check any contractions that are getting stronger or closer together. Where can you learn more? Go to http://merry-joseluis.info/  Enter Z402 in the search box to learn more about \"[place] Contractions: Care Instructions. \"  Current as of: February 11, 2020               Content Version: 12.6  © 8892-7294 [employer]. Care instructions adapted under license by [employer] (which disclaims liability or warranty for this information). If you have questions about a medical condition or this instruction, always ask your healthcare professional. [address] any warranty or liability for your use of this information.

## 2020-09-17 NOTE — H&P
History & Physical    Name: Terence Regalado MRN: [id number]  SSN: [id number]    YOB: 1985  Age: 28 y.o. Sex: female        Subjective:     Estimated Date of Delivery: 10/18/20  OB History    Para Term  AB Living   1 0 0 0 0 0   SAB TAB Ectopic Molar Multiple Live Births   0 0 0 0 0 0      # Outcome Date GA Lbr Denzel/2nd Weight Sex Delivery Anes PTL Lv   1 Current                Ms. Eusebio Fink is a  at admitted with pregnancy at 35w3d based upon an 8 wk ultrasound that complains of painful uterine contractions every 506 minutes that started at [address] [facility] while the patient was at work. 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 She was brought to the hospital by EMS and was placed on oxygen in the ambulance secondary to having a panic attack. She denies vaginal bleeding, leakage of vaginal fluid, urinary symptoms, and has good fetal movement. Has drank two 24 ounce bottles of water and denies recent sexual intercourse. Prenatal care is complicated by obesity with BMI of 41, AMA, and positive Pat antibody. Please see prenatal records for details.     Covid Screen: negative    Past Medical History:   Diagnosis Date    Abnormal Pap smear of cervix 2019    Negative/+HPV    Anemia     Anxiety     ASCUS (atypical squamous cells of undetermined significance) on Pap smear 2012    Asthma     extrinsic as a child     Morbid obesity ([facility] [address]    Blood transfusion 2019  Past Surgical History:   Procedure Laterality Date    BREAST SURGERY PROCEDURE UNLISTED  12    Incision and Drainage Left Breast Abscess    HX BREAST BIOPSY Bilateral     abscesses removed  and     HX BREAST REDUCTION      HX OTHER SURGICAL      abscess removed from left breast   GybHx: denies STIs  Social History     Occupational History    Not on file   Tobacco Use    Smoking status: Never Smoker    Smokeless tobacco: Never Used   Substance and Sexual Activity    Alcohol use: No    Drug use: No    Sexual activity: Yes     Partners: Male     Birth control/protection: None     Family History   Problem Relation Age of Onset    Hypertension Mother     Elevated Lipids Mother     Diabetes Sister    Maternal aunt- pancreatic cancer  Maternal aunt- MI  Maternal aunt-MI    Allergies   Allergen Reactions    Bactrim [Sulfamethoprim Ds] Nausea Only    Zofran [Ondansetron Hcl (Pf)] Rash     Prior to Admission medications    Medication Sig Start Date End Date Taking? Authorizing Provider   acetaminophen (TylenoL) 325 mg tablet Take  by mouth every four (4) hours as needed for Pain. Yes Provider, Historical   Iron BisGl &PS [id number] mg-mg-mcg-mg cap Take  by mouth. Yes Provider, Historical   pnv w/o calcium-iron fum-fa 29 mg iron- 1 mg chew Take 1 Tab by mouth daily. 2/28/20  Yes Domenico Moreno MD   PNV no.153/FA/om3/dha/epa/fish (PRENATAL GUMMIES PO) Take 1 Tab by mouth daily. Yes Provider, Historical   promethazine (PHENERGAN) 25 mg tablet Take 1 Tab by mouth every six (6) hours as needed for Nausea. 2/28/20   Domenico Moreno MD   doxylamine-pyridoxine, vit B6, (BONJESTA) 20-20 mg TbID Take 1 Tab by mouth two (2) times a day. 2/28/20   Domenico Moreno MD   medroxyPROGESTERone (PROVERA) 10 mg tablet Take 1 Tab by mouth daily. 1/31/20   Domenico Moreno MD   letrozole [facility]) 2.5 mg tablet Take 2 pills by mouth daily days 3-7 1/31/20   Domenico Moreno MD   senna-docusate (PERICOLACE) 8.6-50 mg per tablet Take 2 Tabs by mouth daily. 7/26/19   Ethan Hartman MD      Vitamin B12 injections q Thursday      Objective:     Vitals:  Vitals:    09/16/20 2115 09/16/20 2119 09/16/20 2121   BP: 125/85     Pulse: 89     Resp: 18     Temp: 98.3 °F (36.8 °C)     SpO2:  99%    Weight:   119.3 kg (263 lb)   Height:   5' 7\" (1.702 m)        Physical Exam:  Patient without distress.   Heart: Regular rate and rhythm or S1S2 present  Lung: clear to auscultation throughout lung fields, no wheezes, no rales, no rhonchi and normal respiratory effort  Abdomen: soft, nontender, gravid  Fundus: soft and non tender  Perineum: blood absent, amniotic fluid absent  Cervical Exam: Closed/Thick/High  Lower Extremities:  - Edema 1+  Membranes:  Intact  Fetal Heart Rate: Baseline: 145 per minute  Variability: moderate  Accelerations: no  Decelerations: none  Uterine contractions: regular, every 4-5 minutes    Prenatal Labs:   Lab Results   Component Value Date/Time    ABO/Rh(D) O POSITIVE 2019 03:06 AM    Rubella, External 1.55-Immune 2020    HBsAg, External Negative 2020    HIV, External Negative 2020    Gonorrhea, External Negative 2020    Chlamydia, External Negative 2020    ABO,Rh O POS 2020         Assessment/Plan:   Ass:  at 35 3/7 wks with  contractions    Plan: UA  IV and po hydration

## 2020-09-23 ENCOUNTER — HOSPITAL ENCOUNTER (EMERGENCY)
Age: 35
Discharge: HOME | End: 2020-09-23
Attending: OBSTETRICS & GYNECOLOGY | Admitting: OBSTETRICS & GYNECOLOGY
Payer: MEDICAID

## 2020-09-23 VITALS
OXYGEN SATURATION: 98 % | DIASTOLIC BLOOD PRESSURE: 56 MMHG | WEIGHT: 264 LBS | RESPIRATION RATE: 18 BRPM | HEIGHT: 67 IN | HEART RATE: 86 BPM | BODY MASS INDEX: 41.44 KG/M2 | SYSTOLIC BLOOD PRESSURE: 100 MMHG | TEMPERATURE: 98.4 F

## 2020-09-23 PROCEDURE — 75810000275 HC EMERGENCY DEPT VISIT NO LEVEL OF CARE

## 2020-09-23 PROCEDURE — 59025 FETAL NON-STRESS TEST: CPT

## 2020-09-23 PROCEDURE — 99285 EMERGENCY DEPT VISIT HI MDM: CPT

## 2020-09-23 NOTE — PROGRESS NOTES
NST    Fetal heart rate tracing:  Cat I, reactive.  with + accels, no decels. Moderate variability.   Contraction pattern: none

## 2020-09-23 NOTE — DISCHARGE INSTRUCTIONS
Patient Education   Patient Education   Patient Education        Counting Your [de-identified] Kicks: Care Instructions  Your Care Instructions     Counting your baby's kicks is one way your doctor can tell that your baby is healthy. Most women--especially in a first pregnancy--feel their baby move for the first time between 16 and 22 weeks. The movement may feel like flutters rather than kicks. Your baby may move more at certain times of the day. When you are active, you may notice less kicking than when you are resting. At your prenatal visits, your doctor will ask whether the baby is active. In your last trimester, your doctor may ask you to count the number of times you feel your baby move. Follow-up care is a key part of your treatment and safety. Be sure to make and go to all appointments, and call your doctor if you are having problems. It's also a good idea to know your test results and keep a list of the medicines you take. How do you count fetal kicks? · A common method of checking your baby's movement is to count the number of kicks or moves you feel in 1 hour. Ten movements (such as kicks, flutters, or rolls) in 1 hour are normal. Some doctors suggest that you count in the morning until you get to 10 movements. Then you can quit for that day and start again the next day. · Pick your baby's most active time of day to count. This may be any time from morning to evening. · If you do not feel 10 movements in an hour, your baby may be sleeping. Wait for the next hour and count again. When should you call for help? Call your doctor now or seek immediate medical care if:    · You noticed that your baby has stopped moving or is moving much less than normal.   Watch closely for changes in your health, and be sure to contact your doctor if you have any problems. Where can you learn more?   Go to http://merry-joseluis.info/  Enter Q953 in the search box to learn more about \"Counting Your Baby's Shania: Care Instructions. \"  Current as of: 2020               Content Version: 12.6   [employer]. Care instructions adapted under license by [employer] (which disclaims liability or warranty for this information). If you have questions about a medical condition or this instruction, always ask your healthcare professional. [address] any warranty or liability for your use of this information. Pregnancy Precautions: Care Instructions  Your Care Instructions     There is no sure way to prevent labor before your due date ( labor) or to prevent most other pregnancy problems. But there are things you can do to increase your chances of a healthy pregnancy. Go to your appointments, follow your doctor's advice, and take good care of yourself. Eat well, and exercise (if your doctor agrees). And make sure to drink plenty of water. Follow-up care is a key part of your treatment and safety. Be sure to make and go to all appointments, and call your doctor if you are having problems. It's also a good idea to know your test results and keep a list of the medicines you take. How can you care for yourself at home? · Make sure you go to your prenatal appointments. At each visit, your doctor will check your blood pressure. Your doctor will also check to see if you have protein in your urine. High blood pressure and protein in urine are signs of preeclampsia. This condition can be dangerous for you and your baby. · Drink plenty of fluids, enough so that your urine is light yellow or clear like water. Dehydration can cause contractions. If you have kidney, heart, or liver disease and have to limit fluids, talk with your doctor before you increase the amount of fluids you drink. · Tell your doctor right away if you notice any symptoms of an infection, such as:  ? Burning when you urinate.   ? A foul-smelling discharge from your vagina. ? Vaginal itching. ? Unexplained fever. ? Unusual pain or soreness in your uterus or lower belly. · Eat a balanced diet. Include plenty of foods that are high in calcium and iron. ? Foods high in calcium include milk, cheese, yogurt, almonds, and broccoli. ? Foods high in iron include red meat, shellfish, poultry, eggs, beans, raisins, whole-grain bread, and leafy green vegetables. · Do not smoke. If you need help quitting, talk to your doctor about stop-smoking programs and medicines. These can increase your chances of quitting for good. · Do not drink alcohol or use illegal drugs. · Follow your doctor's directions about activity. Your doctor will let you know how much, if any, exercise you can do. · Ask your doctor if you can have sex. If you are at risk for early labor, your doctor may ask you to not have sex. · Take care to prevent falls. During pregnancy, your joints are loose, and your balance is off. Sports such as bicycling, skiing, or in-line skating can increase your risk of falling. And don't ride horses or motorcycles, dive, water ski, scuba dive, or parachute jump while you are pregnant. · Avoid getting very hot. Do not use saunas or hot tubs. Avoid staying out in the sun in hot weather for long periods. Take acetaminophen (Tylenol) to lower a high fever. · Do not take any over-the-counter or herbal medicines or supplements without talking to your doctor or pharmacist first.  When should you call for help? Call 911 anytime you think you may need emergency care. For example, call if:    · You passed out (lost consciousness).     · You have a seizure.     · You have severe vaginal bleeding.     · You have severe pain in your belly or pelvis.     · You have had fluid gushing or leaking from your vagina and you know or think the umbilical cord is bulging into your vagina. If this happens, immediately get down on your knees so your rear end (buttocks) is higher than your head.  This will decrease the pressure on the cord until help arrives. Call your doctor now or seek immediate medical care if:    · You have signs of preeclampsia, such as:  ? Sudden swelling of your face, hands, or feet. ? New vision problems (such as dimness, blurring, or seeing spots). ? A severe headache.     · You have any vaginal bleeding.     · You have belly pain or cramping.     · You have a fever.     · You have had regular contractions (with or without pain) for an hour. This means that you have 8 or more within 1 hour or 4 or more in 20 minutes after you change your position and drink fluids.     · You have a sudden release of fluid from your vagina.     · You have low back pain or pelvic pressure that does not go away.     · You notice that your baby has stopped moving or is moving much less than normal.   Watch closely for changes in your health, and be sure to contact your doctor if you have any problems. Where can you learn more? Go to http://merry-joseluis.info/  Enter Y951 in the search box to learn more about \"Pregnancy Precautions: Care Instructions. \"  Current as of: February 11, 2020               Content Version: 12.6  © 6642-4812 [employer]. Care instructions adapted under license by [employer] (which disclaims liability or warranty for this information). If you have questions about a medical condition or this instruction, always ask your healthcare professional. [place] [address] any warranty or liability for your use of this information. Week 37 of Your Pregnancy: Care Instructions  Your Care Instructions     You are near the end of your pregnancy--and you're probably pretty uncomfortable. It may be harder to walk around. Lying down probably isn't comfortable either. You may have trouble getting to sleep or staying asleep. Most women deliver their babies between 40 and 41 weeks.  This is a good time to think about packing a bag for the hospital with items you'll need. Then you'll be ready when labor starts. Follow-up care is a key part of your treatment and safety. Be sure to make and go to all appointments, and call your doctor if you are having problems. It's also a good idea to know your test results and keep a list of the medicines you take. How can you care for yourself at home? Learn about breastfeeding  · Breastfeeding is best for your baby and good for you. · Breast milk has antibodies to help your baby fight infections. · Mothers who breastfeed often lose weight faster, because making milk burns calories. · Learning the best ways to hold your baby will make breastfeeding easier. · Let your partner bathe and diaper the baby to keep your partner from feeling left out. Snuggle together when you breastfeed. · You may want to learn how to use a breast pump and store your milk. · If you choose to bottle feed, make the feeding feel like breastfeeding so you can bond with your baby. Always hold your baby and the bottle. Do not prop bottles or let your baby fall asleep with a bottle. Learn about crying  · It is common for babies to cry for 1 to 3 hours a day. Some cry more, some cry less. · Babies don't cry to make you upset or because you are a bad parent. · Crying is how your baby communicates. Your baby may be hungry; have gas; need a diaper change; or feel cold, warm, tired, lonely, or tense. Sometimes babies cry for unknown reasons. · If you respond to your baby's needs, he or she will learn to trust you. · Try to stay calm when your baby cries. Your baby may get more upset if he or she senses that you are upset. Know how to care for your   · Your baby's umbilical cord stump will drop off on its own, usually between 1 and 2 weeks. To care for your baby's umbilical cord area:  ? Clean the area at the bottom of the cord 2 or 3 times a day.   ? Pay special attention to the area where the cord attaches to the skin.  ? Keep the diaper folded below the cord. ? Use a damp washcloth or cotton ball to sponge bathe your baby until the stump has come off. · Your baby's first dark stool is called meconium. After the meconium is passed, your baby will develop his or her own bowel pattern. ? Some babies, especially  babies, have several bowel movements a day. Others have one or two a day, or one every 2 to 3 days. ?  babies often have loose, yellow stools. Formula-fed babies have more formed stools. ? If your baby's stools look like little pellets, he or she is constipated. After 2 days of constipation, call your baby's doctor. · If your baby will be circumcised, you can care for him at home. ? Gently rinse his penis with warm water after every diaper change. Do not try to remove the film that forms on the penis. This film will go away on its own. Pat dry. ? Put petroleum ointment, such as Vaseline, on the area of the diaper that will touch your baby's penis. This will keep the diaper from sticking to your baby. ? Ask the doctor about giving your baby acetaminophen (Tylenol) for pain. Where can you learn more? Go to http://merry-joseluis.info/  Enter N257 in the search box to learn more about \"Week 37 of Your Pregnancy: Care Instructions. \"  Current as of: February 11, 2020               Content Version: 12.6  © 0808-1240 [employer]. Care instructions adapted under license by [employer] (which disclaims liability or warranty for this information). If you have questions about a medical condition or this instruction, always ask your healthcare professional. [place] [address] any warranty or liability for your use of this information.

## 2020-09-23 NOTE — PROGRESS NOTES
0150: Patient arrived via wheelchair to floor from ED. Sates that she began having decreased fetal movement over the last 2-3 days, notes that she is still feeling fetal movement - just not as much as it was prior to now. Additionally reports lower abdominal pain characterized as cramping and shooting occurring with contractions and while laying down to rest. She states that as she moves into different positions the pain is less (sitting up, not laying flat). Patient voided and changed into gown now. 0205: Patient placed on EFM, assessment completed and VS taken, history reviewed. Patient notes that she has had some increased mucous discharge, denies any leaking of fluid. SVE preformed by RN, 1(fingertip external os)/30/-3.   0240: RN at bedside US adjusted and patient moved into position of comfort on lateral right per request.  0300: NST reactive at this time. 0315: Patient sleeping, no signs of distress present, will continue to monitor. No contractions noted on NST.  0330: Alicia Hill MD at bedside for assessment, patient sleeping. Alicia Hill MD reviewed s/s and assessed patient after waking. Per MD Omer Suero - patient may discharge home now with office follow up, patient verbalizes understanding. 0335: NST remains reactive, reviewed by Alicia Hill MD.  1935: RN at bedside for review of discharge education and teaching, patient verbalized understanding and all questions answered. 5602: Patient ambulated off unit at this time with spouse.

## 2020-09-23 NOTE — H&P
Labor and Delivery Admission Note  9/23/2020    28 y.o., , female, G1 P 0 Estimated Date of Delivery: 10/18/20 by dates and US presents at 39 3/7  with decreased fetal movement. SHe denies regular contractions, bleeding, or LOF. SHe says she does feel some movement now that she is at the hospital.  She arrives  at [address] Denies F/N/V/D.    PNC: Blood type: O            RH: pos              POB:  G1- AMA, anemia, obesity    Pgyn:  Denies    Past Medical History:   Diagnosis Date    Abnormal Pap smear of cervix 09/2019    Negative/+HPV    Anemia     Anxiety     ASCUS (atypical squamous cells of undetermined significance) on Pap smear 6/1/2012    Asthma     extrinsic as a child     Breast disorder     Morbid obesity ([facility] [address]      Past Surgical History:   Procedure Laterality Date    BREAST SURGERY PROCEDURE UNLISTED  11/27/12    Incision and Drainage Left Breast Abscess    HX BREAST BIOPSY Bilateral     abscesses removed 2014 and 2015    HX BREAST REDUCTION  2003    HX OTHER SURGICAL      abscess removed from left breast     OB/GYN: [facility]    Meds:   No current facility-administered medications for this encounter. PNV  B12 injections weekly      Allergies:    Allergies   Allergen Reactions    Bactrim [Sulfamethoprim Ds] Nausea Only    Zofran [Ondansetron Hcl (Pf)] Rash     Pertinent ROS: see hPI, otherwise 10 point ROS noncontributory     Family History   Problem Relation Age of Onset    Hypertension Mother    Chandra.Noni Elevated Lipids Mother     Diabetes Sister      Social History     Socioeconomic History    Marital status:      Spouse name: Not on file    Number of children: Not on file    Years of education: Not on file    Highest education level: Not on file   Occupational History    Not on file   Social Needs    Financial resource strain: Not on file    Food insecurity     Worry: Not on file     Inability: Not on file    Transportation needs     Medical: Not on file Non-medical: Not on file   Tobacco Use    Smoking status: Never Smoker    Smokeless tobacco: Never Used   Substance and Sexual Activity    Alcohol use: No    Drug use: No    Sexual activity: Yes     Partners: Male     Birth control/protection: None   Lifestyle    Physical activity     Days per week: Not on file     Minutes per session: Not on file    Stress: Not on file   Relationships    Social connections     Talks on phone: Not on file     Gets together: Not on file     Attends Sikh service: Not on file     Active member of club or organization: Not on file     Attends meetings of clubs or organizations: Not on file     Relationship status: Not on file    Intimate partner violence     Fear of current or ex partner: Not on file     Emotionally abused: Not on file     Physically abused: Not on file     Forced sexual activity: Not on file   Other Topics Concern     Service Not Asked    Blood Transfusions Not Asked    Caffeine Concern Not Asked    Occupational Exposure Not Asked    Hobby Hazards Not Asked    Sleep Concern Not Asked    Stress Concern Not Asked    Weight Concern Not Asked    Special Diet Not Asked    Back Care Not Asked    Exercise Not Asked    Bike Helmet Not Asked    Seat Belt Not Asked    Self-Exams Not Asked   Social History Narrative    Not on file       OBJECTIVE:  Gravid , female NAD  Temp (24hrs), Av.3 °F (36.8 °C), Min:98.3 °F (36.8 °C), Max:98.3 °F (36.8 °C)    Visit Vitals  /76   Pulse 99   Temp 98.3 °F (36.8 °C)   Resp 16   Ht 5' 7\" (1.702 m)   Wt 119.7 kg (264 lb)   LMP 2020 (Approximate)   SpO2 97%   BMI 41.35 kg/m²       Labs:    Lab Results   Component Value Date/Time    WBC 9.0 2020 01:43 PM       Exam:  HEENT:  normal   Lungs:  clear  Cor:  RRR  Abdomen:  Fundal height LGA                    Soft between UC                    Clinical EFW difficult to ascetain due to body habitus  Fetal heart rate tracing:  Cat I, reactive.  with + accels, no decels. Moderate variability. Contraction pattern: none  Cervix:  def  Fluid:  intact    Impression:  IUP at 36 3/7 weeks with decreased fetal movement.   Now feeling movement, reactive NST    Plan: d/c home, kick counts, normal follow up    Darinel Felix MD

## 2020-09-24 ENCOUNTER — ROUTINE PRENATAL (OUTPATIENT)
Dept: OBGYN CLINIC | Age: 35
End: 2020-09-24
Payer: MEDICAID

## 2020-09-24 ENCOUNTER — HOSPITAL ENCOUNTER (OUTPATIENT)
Dept: INFUSION THERAPY | Age: 35
Discharge: HOME | End: 2020-09-24
Payer: MEDICAID

## 2020-09-24 VITALS
DIASTOLIC BLOOD PRESSURE: 64 MMHG | RESPIRATION RATE: 20 BRPM | OXYGEN SATURATION: 95 % | TEMPERATURE: 96.3 F | HEART RATE: 102 BPM | SYSTOLIC BLOOD PRESSURE: 112 MMHG

## 2020-09-24 VITALS
SYSTOLIC BLOOD PRESSURE: 119 MMHG | WEIGHT: 266 LBS | DIASTOLIC BLOOD PRESSURE: 78 MMHG | BODY MASS INDEX: 41.75 KG/M2 | HEIGHT: 67 IN

## 2020-09-24 DIAGNOSIS — O09.511 SUPERVISION OF HIGH RISK PRIMIGRAVIDA IN FIRST TRIMESTER IN PATIENT 35 YEARS OR OLDER AT TIME OF DELIVERY: ICD-10-CM

## 2020-09-24 DIAGNOSIS — D50.9 IRON DEFICIENCY ANEMIA, UNSPECIFIED IRON DEFICIENCY ANEMIA TYPE: ICD-10-CM

## 2020-09-24 DIAGNOSIS — Z3A.36 36 WEEKS GESTATION OF PREGNANCY: ICD-10-CM

## 2020-09-24 DIAGNOSIS — E53.8 VITAMIN B12 DEFICIENCY: Primary | ICD-10-CM

## 2020-09-24 DIAGNOSIS — O09.519 SUPERVISION OF PRIMIGRAVIDA OF ADVANCED MATERNAL AGE, ANTEPARTUM: Primary | ICD-10-CM

## 2020-09-24 LAB — GRBS, EXTERNAL: NEGATIVE

## 2020-09-24 PROCEDURE — 96372 THER/PROPH/DIAG INJ SC/IM: CPT

## 2020-09-24 PROCEDURE — 0502F SUBSEQUENT PRENATAL CARE: CPT | Performed by: OBSTETRICS & GYNECOLOGY

## 2020-09-24 PROCEDURE — 74011250636 HC RX REV CODE- 250/636: Performed by: NURSE PRACTITIONER

## 2020-09-24 RX ORDER — CYANOCOBALAMIN 1000 UG/ML
1000 INJECTION, SOLUTION INTRAMUSCULAR; SUBCUTANEOUS ONCE
Status: COMPLETED | OUTPATIENT
Start: 2020-09-24 | End: 2020-09-24

## 2020-09-24 RX ADMIN — CYANOCOBALAMIN 1000 MCG: 1000 INJECTION, SOLUTION INTRAMUSCULAR at 14:24

## 2020-09-24 NOTE — PATIENT INSTRUCTIONS

## 2020-09-24 NOTE — PROGRESS NOTES
\[facility]\"" Progress Note    Date: 2020    Name: Gerardo Meléndez    MRN: [id number]         : 1985    1415:  Ms. Domenic Aguilar Arrived ambulatory and in no distress for B12 Injection. Assessment was completed, no acute issues at this time, no new complaints voiced. Patient still has +2 edema in bilateral ankles. Ms. Plummer Corporal vitals were reviewed. Visit Vitals  /64   Pulse (!) 102   Temp (!) 96.3 °F (35.7 °C)   Resp 20   SpO2 95%         Medications:  Medications Administered     cyanocobalamin (VITAMIN B12) injection 1,000 mcg     Admin Date  2020 Action  Given Dose  1000 mcg Route  IntraMUSCular Administered By  Bere Anton RN                Ms. Domenic Aguilar tolerated treatment well and was discharged from [place] [address] in stable condition. She is to return on  at 1330 for her next appointment.     Dat Lakhani RN  2020

## 2020-09-24 NOTE — PROGRESS NOTES
Problem List  Date Reviewed: 9/10/2020          Codes Class Noted    Vitamin B12 deficiency ICD-10-CM: E53.8  ICD-9-CM: 266.2  9/1/2020        Supervision of high risk primigravida in first trimester in patient 35 years or older at time of delivery ICD-10-CM: O09.511  ICD-9-CM: V23.81  3/17/2020    Overview Addendum 7/27/2020  2:31 PM by Varghese Nguyen MD     Intrauterine pregnancy with the following problems identified:   EDC 10/18/2020 by [address]  AMA 35 at delivery  BMI >40  Early glucola 12 weeks- done 4/8- 132, wnl  [facility]- SMA carrier- Test FOB (gary Castillo)- Drawn 4/8/20- normal  NIPTS- normal male- Pt notified  Baby ASA 12 weeks  MFM/genetics 20 weeks  [place] antibody positive-drawn 6/18, no change-Repeat @next visit*Repeated 7/6  FOB GARY Castillo [place] antigen blood work- Negative  Dr. Jason Escobedo 8/18/20               Anemia ICD-10-CM: D64.9  ICD-9-CM: 473. 9  Unknown        Anxiety ICD-10-CM: F41.9  ICD-9-CM: 300.00  Unknown        Morbid obesity ([address] ICD-10-CM: E66.01  ICD-9-CM: 278.01  Unknown        Syncope ICD-10-CM: R55  ICD-9-CM: 780.2  5/28/2019        Hypokalemia ICD-10-CM: E87.6  ICD-9-CM: 276.8  5/28/2019        Asthma ICD-10-CM: J45.909  ICD-9-CM: 493.90  11/27/2012        Hx of breast reduction, elective ICD-10-CM: Z98.890  ICD-9-CM: V45.89  11/27/2012        Elevated cholesterol ICD-10-CM: E78.00  ICD-9-CM: 272.0  6/1/2012

## 2020-09-24 NOTE — PROGRESS NOTES
Baby moving.    US at [facility] 120 7-3 oz. AC >95% - recommend rescan at 40 weeks if undelivered - has appt  GBS done  Disc labor prec  CBC next week  Getting B12 injections

## 2020-09-28 LAB — B-HEM STREP SPEC QL CULT: NEGATIVE

## 2020-09-30 ENCOUNTER — ROUTINE PRENATAL (OUTPATIENT)
Dept: OBGYN CLINIC | Age: 35
End: 2020-09-30
Payer: MEDICAID

## 2020-09-30 VITALS — BODY MASS INDEX: 41.5 KG/M2 | WEIGHT: 265 LBS | SYSTOLIC BLOOD PRESSURE: 110 MMHG | DIASTOLIC BLOOD PRESSURE: 68 MMHG

## 2020-09-30 DIAGNOSIS — Z3A.37 37 WEEKS GESTATION OF PREGNANCY: ICD-10-CM

## 2020-09-30 DIAGNOSIS — O09.519 SUPERVISION OF PRIMIGRAVIDA OF ADVANCED MATERNAL AGE, ANTEPARTUM: Primary | ICD-10-CM

## 2020-09-30 DIAGNOSIS — Z86.2 HISTORY OF IRON DEFICIENCY ANEMIA: ICD-10-CM

## 2020-09-30 PROCEDURE — 0502F SUBSEQUENT PRENATAL CARE: CPT | Performed by: OBSTETRICS & GYNECOLOGY

## 2020-09-30 NOTE — PROGRESS NOTES
Problem List  Date Reviewed: 9/24/2020          Codes Class Noted    Vitamin B12 deficiency ICD-10-CM: E53.8  ICD-9-CM: 266.2  9/1/2020        Supervision of high risk primigravida in first trimester in patient 28 years or older at time of delivery ICD-10-CM: O09.511  ICD-9-CM: V23.81  3/17/2020    Overview Addendum 9/29/2020  1:56 PM by Sejal Stone LPN     Intrauterine pregnancy with the following problems identified:   EDC 10/18/2020 by US  AMA 35 at delivery  BMI >40  Early glucola 12 weeks- done 4/8- 132, wnl  [facility]- SMA carrier- Test FOB (gary aCstillo)- Drawn 4/8/20- normal  NIPTS- normal male- Pt notified  Baby ASA 12 weeks  MFM/genetics 20 weeks  [place] antibody positive-drawn 6/18, no change-Repeat @next visit*Repeated 7/6  FOB GARY Castillo [place] antigen blood work- Negative  Dr. Ara Stanford 8/18/20  Needs crossmatched cells for delivery due to anti-nini  GBS neg             Anemia ICD-10-CM: D64.9  ICD-9-CM: 285. 9  Unknown        Anxiety ICD-10-CM: F41.9  ICD-9-CM: 300.00  Unknown        Morbid obesity ([address] ICD-10-CM: E66.01  ICD-9-CM: 278.01  Unknown        Syncope ICD-10-CM: R55  ICD-9-CM: 780.2  5/28/2019        Hypokalemia ICD-10-CM: E87.6  ICD-9-CM: 276.8  5/28/2019        Asthma ICD-10-CM: J45.909  ICD-9-CM: 493.90  11/27/2012        Hx of breast reduction, elective ICD-10-CM: Z98.890  ICD-9-CM: V45.89  11/27/2012        Elevated cholesterol ICD-10-CM: E78.00  ICD-9-CM: 272.0  6/1/2012

## 2020-09-30 NOTE — PATIENT INSTRUCTIONS

## 2020-10-01 LAB
ERYTHROCYTE [DISTWIDTH] IN BLOOD BY AUTOMATED COUNT: 13.1 % (ref 11.7–15.4)
HCT VFR BLD AUTO: 31.9 % (ref 34–46.6)
HGB BLD-MCNC: 11.1 G/DL (ref 11.1–15.9)
MCH RBC QN AUTO: 31.9 PG (ref 26.6–33)
MCHC RBC AUTO-ENTMCNC: 34.8 G/DL (ref 31.5–35.7)
MCV RBC AUTO: 92 FL (ref 79–97)
PLATELET # BLD AUTO: 196 X10E3/UL (ref 150–450)
RBC # BLD AUTO: 3.48 X10E6/UL (ref 3.77–5.28)
WBC # BLD AUTO: 8.4 X10E3/UL (ref 3.4–10.8)

## 2020-10-06 ENCOUNTER — HOSPITAL ENCOUNTER (OUTPATIENT)
Dept: LAB | Age: 35
Discharge: HOME | End: 2020-10-06
Payer: MEDICAID

## 2020-10-06 ENCOUNTER — TRANSCRIBE ORDER (OUTPATIENT)
Dept: REGISTRATION | Age: 35
End: 2020-10-06

## 2020-10-06 DIAGNOSIS — Z01.812 PRE-PROCEDURAL LABORATORY EXAMINATIONS: ICD-10-CM

## 2020-10-06 DIAGNOSIS — Z01.812 PRE-PROCEDURAL LABORATORY EXAMINATIONS: Primary | ICD-10-CM

## 2020-10-06 PROCEDURE — 87635 SARS-COV-2 COVID-19 AMP PRB: CPT

## 2020-10-07 ENCOUNTER — HOSPITAL ENCOUNTER (OUTPATIENT)
Dept: PERINATAL CARE | Age: 35
Discharge: HOME | End: 2020-10-07
Attending: OBSTETRICS & GYNECOLOGY
Payer: MEDICAID

## 2020-10-07 LAB — SARS-COV-2, COV2NT: NOT DETECTED

## 2020-10-07 PROCEDURE — 76819 FETAL BIOPHYS PROFIL W/O NST: CPT | Performed by: OBSTETRICS & GYNECOLOGY

## 2020-10-07 PROCEDURE — 76816 OB US FOLLOW-UP PER FETUS: CPT | Performed by: OBSTETRICS & GYNECOLOGY

## 2020-10-08 ENCOUNTER — ROUTINE PRENATAL (OUTPATIENT)
Dept: OBGYN CLINIC | Age: 35
End: 2020-10-08
Payer: MEDICAID

## 2020-10-08 VITALS
BODY MASS INDEX: 41.94 KG/M2 | HEIGHT: 67 IN | WEIGHT: 267.2 LBS | SYSTOLIC BLOOD PRESSURE: 131 MMHG | DIASTOLIC BLOOD PRESSURE: 85 MMHG

## 2020-10-08 DIAGNOSIS — Z23 ENCOUNTER FOR IMMUNIZATION: ICD-10-CM

## 2020-10-08 DIAGNOSIS — Z3A.38 38 WEEKS GESTATION OF PREGNANCY: ICD-10-CM

## 2020-10-08 DIAGNOSIS — O09.519 SUPERVISION OF PRIMIGRAVIDA OF ADVANCED MATERNAL AGE, ANTEPARTUM: Primary | ICD-10-CM

## 2020-10-08 PROCEDURE — 90686 IIV4 VACC NO PRSV 0.5 ML IM: CPT

## 2020-10-08 PROCEDURE — 0502F SUBSEQUENT PRENATAL CARE: CPT | Performed by: OBSTETRICS & GYNECOLOGY

## 2020-10-08 PROCEDURE — 90471 IMMUNIZATION ADMIN: CPT

## 2020-10-08 NOTE — PROGRESS NOTES
Problem List  Date Reviewed: 9/30/2020          Codes Class Noted    Vitamin B12 deficiency ICD-10-CM: E53.8  ICD-9-CM: 266.2  9/1/2020        Supervision of high risk primigravida in first trimester in patient 28 years or older at time of delivery ICD-10-CM: O09.511  ICD-9-CM: V23.81  3/17/2020    Overview Addendum 9/29/2020  1:56 PM by Kristopher Plaat LPN     Intrauterine pregnancy with the following problems identified:   EDC 10/18/2020 by US  AMA 35 at delivery  BMI >40  Early glucola 12 weeks- done 4/8- 132, wnl  [facility]- SMA carrier- Test FOB (gary Castillo)- Drawn 4/8/20- normal  NIPTS- normal male- Pt notified  Baby ASA 12 weeks  MFM/genetics 20 weeks  [place] antibody positive-drawn 6/18, no change-Repeat @next visit*Repeated 7/6  FOB GARY Castillo [place] antigen blood work- Negative  Dr. Rhett Medel 8/18/20  Needs crossmatched cells for delivery due to anti-nini  GBS neg             Anemia ICD-10-CM: D64.9  ICD-9-CM: 285. 9  Unknown        Anxiety ICD-10-CM: F41.9  ICD-9-CM: 300.00  Unknown        Morbid obesity ([address] ICD-10-CM: E66.01  ICD-9-CM: 278.01  Unknown        Syncope ICD-10-CM: R55  ICD-9-CM: 780.2  5/28/2019        Hypokalemia ICD-10-CM: E87.6  ICD-9-CM: 276.8  5/28/2019        Asthma ICD-10-CM: J45.909  ICD-9-CM: 493.90  11/27/2012        Hx of breast reduction, elective ICD-10-CM: Z98.890  ICD-9-CM: V45.89  11/27/2012        Elevated cholesterol ICD-10-CM: E78.00  ICD-9-CM: 272.0  6/1/2012

## 2020-10-08 NOTE — PROGRESS NOTES
Baby moving. She is completely miserable  Flu vaccine today    US yesterday at MFM 4200g EFW >95%tile  AC 3 weeks ahead    Disc delivery options. Disc risks of delivery with fetal macrosomia - limitations of ultrasound at this [place]  Disc risk of CS - bleeding, injury, infection    Patient desires primary CS for suspected macrosomia.  Questions addressed  Needs 2 units set up due to hx of Jerome Ab - will get on L&D in 2 days

## 2020-10-08 NOTE — PATIENT INSTRUCTIONS
Week 38 of Your Pregnancy: Care Instructions  Your Care Instructions     Believe it or not, your baby is almost here. You may have ideas about your baby's personality because of how much he or she moves. Or you may have noticed how he or she responds to sounds, warmth, cold, and light. You may even know what kind of music your baby likes. By now, you have a better idea of what to expect during delivery. You may have talked about your birth preferences with your doctor. But even if you want a vaginal birth, it is a good idea to learn about  births.  birth means that your baby is born through a cut (incision) in your lower belly. It is sometimes the best choice for the health of the baby and the mother. This care sheet can help you understand  births. It also gives you information about what to expect after your baby is born. And it helps you understand more about postpartum depression. Follow-up care is a key part of your treatment and safety. Be sure to make and go to all appointments, and call your doctor if you are having problems. It's also a good idea to know your test results and keep a list of the medicines you take. How can you care for yourself at home? Learn about  birth  · Most C-sections are unplanned. They are done because of problems that occur during labor. These problems might include:  ? Labor that slows or stops. ? High blood pressure or other problems for the mother. ? Signs of distress in the baby. These signs may include a very fast or slow heart rate. · Although most mothers and babies do well after , it is major surgery. It has more risks than a vaginal delivery. · In some cases, a planned  may be safer than a vaginal delivery. This may be the case if:  ? The mother has a health problem, such as a heart condition. ? The baby isn't in a head-down position for delivery. This is called a breech position. ?  The uterus has scars from past surgeries. This could increase the chance of a tear in the uterus. ? There is a problem with the placenta. ? The mother has an infection, such as genital herpes, that could be spread to the baby. ? The mother is having twins or more. ? The baby weighs 9 to 10 pounds or more. · Because of the risks of , planned C-sections generally should be done only for medical reasons. And a planned  should be done at 39 weeks or later unless there is a medical reason to do it sooner. Know what to expect after delivery, and plan for the first few weeks at home  · You, your baby, and your partner or  will get identification bands. Only people with matching bands can  the baby from the nursery. · You will learn how to feed, diaper, and bathe your baby. And you will learn how to care for the umbilical cord stump. If your baby will be circumcised, you will also learn how to care for that. · Ask people to wait to visit you until you are at home. And ask them to wash their hands before they touch your baby. · Make sure you have another adult in your home for at least 2 or 3 days after the birth. · During the first 2 weeks, limit when friends and family can visit. · Do not allow visitors who have colds or infections. Make sure all visitors are up to date with their vaccinations. Never let anyone smoke around your baby. · Try to nap when the baby naps. Be aware of postpartum depression  · \"Baby blues\" are common for the first 1 to 2 weeks after birth. You may cry or feel sad or irritable for no reason. · For some women, these feelings last longer and are more intense. This is called postpartum depression. · If your symptoms last for more than a few weeks or you feel very depressed, ask your doctor for help. · Postpartum depression can be treated. Support groups and counseling can help. Sometimes medicine can also help. Where can you learn more?   Go to http://www.gray.com/  Enter B044 in the search box to learn more about \"Week 38 of Your Pregnancy: Care Instructions. \"  Current as of: February 11, 2020               Content Version: 12.6  © 8326-2254 [employer]. Care instructions adapted under license by [employer] (which disclaims liability or warranty for this information). If you have questions about a medical condition or this instruction, always ask your healthcare professional. [place] [address] any warranty or liability for your use of this information.

## 2020-10-10 ENCOUNTER — HOSPITAL ENCOUNTER (OUTPATIENT)
Age: 35
Discharge: HOME | DRG: 540 | End: 2020-10-10
Attending: OBSTETRICS & GYNECOLOGY | Admitting: OBSTETRICS & GYNECOLOGY
Payer: MEDICAID

## 2020-10-10 PROCEDURE — 86922 COMPATIBILITY TEST ANTIGLOB: CPT

## 2020-10-10 PROCEDURE — 36415 COLL VENOUS BLD VENIPUNCTURE: CPT

## 2020-10-10 PROCEDURE — 86644 CMV ANTIBODY: CPT

## 2020-10-10 PROCEDURE — 86870 RBC ANTIBODY IDENTIFICATION: CPT

## 2020-10-10 PROCEDURE — 86921 COMPATIBILITY TEST INCUBATE: CPT

## 2020-10-10 PROCEDURE — 86900 BLOOD TYPING SEROLOGIC ABO: CPT

## 2020-10-10 RX ORDER — SODIUM CHLORIDE 9 MG/ML
250 INJECTION, SOLUTION INTRAVENOUS AS NEEDED
Status: DISCONTINUED | OUTPATIENT
Start: 2020-10-10 | End: 2020-10-10 | Stop reason: HOSPADM

## 2020-10-10 NOTE — PROGRESS NOTES
[id number] Patient arrived on the unit just to get type and screen and cross match for two units of blood prior to  which is scheduled for Monday 10/12/20 at 0930. Patient confirmed fetal movement. Labs drawn and sent to lab. Patient ambulated off the unit and will return for her section on Monday.

## 2020-10-12 ENCOUNTER — ANESTHESIA EVENT (OUTPATIENT)
Dept: LABOR AND DELIVERY | Age: 35
DRG: 540 | End: 2020-10-12
Payer: MEDICAID

## 2020-10-12 ENCOUNTER — ANESTHESIA (OUTPATIENT)
Dept: LABOR AND DELIVERY | Age: 35
DRG: 540 | End: 2020-10-12
Payer: MEDICAID

## 2020-10-12 ENCOUNTER — HOSPITAL ENCOUNTER (INPATIENT)
Age: 35
LOS: 3 days | Discharge: HOME | DRG: 540 | End: 2020-10-15
Attending: OBSTETRICS & GYNECOLOGY | Admitting: OBSTETRICS & GYNECOLOGY
Payer: MEDICAID

## 2020-10-12 DIAGNOSIS — E53.8 VITAMIN B12 DEFICIENCY: ICD-10-CM

## 2020-10-12 DIAGNOSIS — G89.18 POST-OP PAIN: Primary | ICD-10-CM

## 2020-10-12 DIAGNOSIS — Z34.90 PREGNANCY, UNSPECIFIED GESTATIONAL AGE: ICD-10-CM

## 2020-10-12 LAB
BASOPHILS # BLD: 0 K/UL (ref 0–0.1)
BASOPHILS NFR BLD: 0 % (ref 0–1)
DIFFERENTIAL METHOD BLD: ABNORMAL
EOSINOPHIL # BLD: 0.3 K/UL (ref 0–0.4)
EOSINOPHIL NFR BLD: 3 % (ref 0–7)
ERYTHROCYTE [DISTWIDTH] IN BLOOD BY AUTOMATED COUNT: 13.7 % (ref 11.5–14.5)
HCT VFR BLD AUTO: 32.7 % (ref 35–47)
HGB BLD-MCNC: 10.9 G/DL (ref 11.5–16)
IMM GRANULOCYTES # BLD AUTO: 0 K/UL (ref 0–0.04)
IMM GRANULOCYTES NFR BLD AUTO: 1 % (ref 0–0.5)
LYMPHOCYTES # BLD: 1.6 K/UL (ref 0.8–3.5)
LYMPHOCYTES NFR BLD: 19 % (ref 12–49)
MCH RBC QN AUTO: 30.6 PG (ref 26–34)
MCHC RBC AUTO-ENTMCNC: 33.3 G/DL (ref 30–36.5)
MCV RBC AUTO: 91.9 FL (ref 80–99)
MONOCYTES # BLD: 0.7 K/UL (ref 0–1)
MONOCYTES NFR BLD: 9 % (ref 5–13)
NEUTS SEG # BLD: 5.8 K/UL (ref 1.8–8)
NEUTS SEG NFR BLD: 68 % (ref 32–75)
NRBC # BLD: 0 K/UL (ref 0–0.01)
NRBC BLD-RTO: 0 PER 100 WBC
PLATELET # BLD AUTO: 167 K/UL (ref 150–400)
PMV BLD AUTO: 11.2 FL (ref 8.9–12.9)
RBC # BLD AUTO: 3.56 M/UL (ref 3.8–5.2)
WBC # BLD AUTO: 8.4 K/UL (ref 3.6–11)

## 2020-10-12 PROCEDURE — 74011250636 HC RX REV CODE- 250/636: Performed by: STUDENT IN AN ORGANIZED HEALTH CARE EDUCATION/TRAINING PROGRAM

## 2020-10-12 PROCEDURE — 74011250636 HC RX REV CODE- 250/636: Performed by: NURSE ANESTHETIST, CERTIFIED REGISTERED

## 2020-10-12 PROCEDURE — 77030008459 HC STPLR SKN COOP -B

## 2020-10-12 PROCEDURE — 00HU33Z INSERTION OF INFUSION DEVICE INTO SPINAL CANAL, PERCUTANEOUS APPROACH: ICD-10-PCS | Performed by: STUDENT IN AN ORGANIZED HEALTH CARE EDUCATION/TRAINING PROGRAM

## 2020-10-12 PROCEDURE — 77030018836 HC SOL IRR NACL ICUM -A

## 2020-10-12 PROCEDURE — 36415 COLL VENOUS BLD VENIPUNCTURE: CPT

## 2020-10-12 PROCEDURE — 2709999900 HC NON-CHARGEABLE SUPPLY

## 2020-10-12 PROCEDURE — 77030007866 HC KT SPN ANES BBMI -B: Performed by: NURSE ANESTHETIST, CERTIFIED REGISTERED

## 2020-10-12 PROCEDURE — 85025 COMPLETE CBC W/AUTO DIFF WBC: CPT

## 2020-10-12 PROCEDURE — 74011250636 HC RX REV CODE- 250/636: Performed by: OBSTETRICS & GYNECOLOGY

## 2020-10-12 PROCEDURE — 77010026065 HC OXYGEN MINIMUM MEDICAL AIR: Performed by: OBSTETRICS & GYNECOLOGY

## 2020-10-12 PROCEDURE — 76010000392 HC C SECN EA ADDL 0.5 HR: Performed by: OBSTETRICS & GYNECOLOGY

## 2020-10-12 PROCEDURE — 76010000391 HC C SECN FIRST 1 HR: Performed by: OBSTETRICS & GYNECOLOGY

## 2020-10-12 PROCEDURE — 77030018846 HC SOL IRR STRL H20 ICUM -A

## 2020-10-12 PROCEDURE — 77030005513 HC CATH URETH FOL11 MDII -B

## 2020-10-12 PROCEDURE — 4A1HX4Z MONITORING OF PRODUCTS OF CONCEPTION, CARDIAC ELECTRICAL ACTIVITY, EXTERNAL APPROACH: ICD-10-PCS | Performed by: OBSTETRICS & GYNECOLOGY

## 2020-10-12 PROCEDURE — 74011000258 HC RX REV CODE- 258: Performed by: OBSTETRICS & GYNECOLOGY

## 2020-10-12 PROCEDURE — 77030018809 HC RETRCTR ALXSO DISP AMR -B

## 2020-10-12 PROCEDURE — 59510 CESAREAN DELIVERY: CPT | Performed by: OBSTETRICS & GYNECOLOGY

## 2020-10-12 PROCEDURE — 77030033542 HC RETRCTR RETNTS PANICLS GQSM -B

## 2020-10-12 PROCEDURE — 76060000078 HC EPIDURAL ANESTHESIA: Performed by: OBSTETRICS & GYNECOLOGY

## 2020-10-12 PROCEDURE — 65270000029 HC RM PRIVATE

## 2020-10-12 PROCEDURE — 75410000003 HC RECOV DEL/VAG/CSECN EA 0.5 HR: Performed by: OBSTETRICS & GYNECOLOGY

## 2020-10-12 PROCEDURE — 74011000250 HC RX REV CODE- 250: Performed by: NURSE ANESTHETIST, CERTIFIED REGISTERED

## 2020-10-12 PROCEDURE — 74011250636 HC RX REV CODE- 250/636

## 2020-10-12 PROCEDURE — 77030040179 HC DEV DRSG WND PICO S&N -C

## 2020-10-12 PROCEDURE — 74011000250 HC RX REV CODE- 250

## 2020-10-12 PROCEDURE — 77030040361 HC SLV COMPR DVT MDII -B

## 2020-10-12 RX ORDER — HYDROCODONE BITARTRATE AND ACETAMINOPHEN 10; 325 MG/1; MG/1
1 TABLET ORAL
Status: DISCONTINUED | OUTPATIENT
Start: 2020-10-12 | End: 2020-10-15 | Stop reason: HOSPADM

## 2020-10-12 RX ORDER — DIPHENHYDRAMINE HYDROCHLORIDE 50 MG/ML
12.5 INJECTION, SOLUTION INTRAMUSCULAR; INTRAVENOUS
Status: DISCONTINUED | OUTPATIENT
Start: 2020-10-12 | End: 2020-10-12 | Stop reason: SDUPTHER

## 2020-10-12 RX ORDER — SODIUM CHLORIDE, SODIUM LACTATE, POTASSIUM CHLORIDE, CALCIUM CHLORIDE 600; 310; 30; 20 MG/100ML; MG/100ML; MG/100ML; MG/100ML
125 INJECTION, SOLUTION INTRAVENOUS CONTINUOUS
Status: DISCONTINUED | OUTPATIENT
Start: 2020-10-12 | End: 2020-10-15 | Stop reason: HOSPADM

## 2020-10-12 RX ORDER — ZOLPIDEM TARTRATE 5 MG/1
5 TABLET ORAL
Status: DISCONTINUED | OUTPATIENT
Start: 2020-10-12 | End: 2020-10-15 | Stop reason: HOSPADM

## 2020-10-12 RX ORDER — SODIUM CHLORIDE, SODIUM LACTATE, POTASSIUM CHLORIDE, CALCIUM CHLORIDE 600; 310; 30; 20 MG/100ML; MG/100ML; MG/100ML; MG/100ML
1000 INJECTION, SOLUTION INTRAVENOUS CONTINUOUS
Status: DISCONTINUED | OUTPATIENT
Start: 2020-10-12 | End: 2020-10-12 | Stop reason: HOSPADM

## 2020-10-12 RX ORDER — OXYCODONE HYDROCHLORIDE 5 MG/1
5 TABLET ORAL
Status: DISCONTINUED | OUTPATIENT
Start: 2020-10-12 | End: 2020-10-12

## 2020-10-12 RX ORDER — SWAB
1 SWAB, NON-MEDICATED MISCELLANEOUS DAILY
Status: DISCONTINUED | OUTPATIENT
Start: 2020-10-13 | End: 2020-10-15 | Stop reason: HOSPADM

## 2020-10-12 RX ORDER — BUPIVACAINE HYDROCHLORIDE 7.5 MG/ML
INJECTION, SOLUTION EPIDURAL; RETROBULBAR AS NEEDED
Status: DISCONTINUED | OUTPATIENT
Start: 2020-10-12 | End: 2020-10-12 | Stop reason: HOSPADM

## 2020-10-12 RX ORDER — NALOXONE HYDROCHLORIDE 0.4 MG/ML
0.2 INJECTION, SOLUTION INTRAMUSCULAR; INTRAVENOUS; SUBCUTANEOUS
Status: ACTIVE | OUTPATIENT
Start: 2020-10-12 | End: 2020-10-13

## 2020-10-12 RX ORDER — OXYTOCIN/RINGER'S LACTATE 30/500 ML
10 PLASTIC BAG, INJECTION (ML) INTRAVENOUS AS NEEDED
Status: DISCONTINUED | OUTPATIENT
Start: 2020-10-12 | End: 2020-10-15 | Stop reason: HOSPADM

## 2020-10-12 RX ORDER — SIMETHICONE 80 MG
80 TABLET,CHEWABLE ORAL
Status: DISCONTINUED | OUTPATIENT
Start: 2020-10-12 | End: 2020-10-15 | Stop reason: HOSPADM

## 2020-10-12 RX ORDER — SODIUM CHLORIDE 0.9 % (FLUSH) 0.9 %
5-40 SYRINGE (ML) INJECTION AS NEEDED
Status: DISCONTINUED | OUTPATIENT
Start: 2020-10-12 | End: 2020-10-15 | Stop reason: HOSPADM

## 2020-10-12 RX ORDER — NALOXONE HYDROCHLORIDE 0.4 MG/ML
0.4 INJECTION, SOLUTION INTRAMUSCULAR; INTRAVENOUS; SUBCUTANEOUS AS NEEDED
Status: DISCONTINUED | OUTPATIENT
Start: 2020-10-12 | End: 2020-10-15 | Stop reason: HOSPADM

## 2020-10-12 RX ORDER — OXYTOCIN/RINGER'S LACTATE 30/500 ML
95 PLASTIC BAG, INJECTION (ML) INTRAVENOUS AS NEEDED
Status: DISCONTINUED | OUTPATIENT
Start: 2020-10-12 | End: 2020-10-15 | Stop reason: HOSPADM

## 2020-10-12 RX ORDER — SODIUM CHLORIDE 0.9 % (FLUSH) 0.9 %
5-40 SYRINGE (ML) INJECTION AS NEEDED
Status: DISCONTINUED | OUTPATIENT
Start: 2020-10-12 | End: 2020-10-12 | Stop reason: HOSPADM

## 2020-10-12 RX ORDER — KETOROLAC TROMETHAMINE 30 MG/ML
30 INJECTION, SOLUTION INTRAMUSCULAR; INTRAVENOUS
Status: DISPENSED | OUTPATIENT
Start: 2020-10-12 | End: 2020-10-13

## 2020-10-12 RX ORDER — IBUPROFEN 800 MG/1
800 TABLET ORAL
Status: DISCONTINUED | OUTPATIENT
Start: 2020-10-12 | End: 2020-10-15 | Stop reason: HOSPADM

## 2020-10-12 RX ORDER — DIPHENHYDRAMINE HYDROCHLORIDE 50 MG/ML
12.5 INJECTION, SOLUTION INTRAMUSCULAR; INTRAVENOUS
Status: DISCONTINUED | OUTPATIENT
Start: 2020-10-12 | End: 2020-10-15 | Stop reason: HOSPADM

## 2020-10-12 RX ORDER — OXYCODONE HYDROCHLORIDE 5 MG/1
10 TABLET ORAL
Status: ACTIVE | OUTPATIENT
Start: 2020-10-12 | End: 2020-10-13

## 2020-10-12 RX ORDER — EPHEDRINE SULFATE/0.9% NACL/PF 50 MG/5 ML
SYRINGE (ML) INTRAVENOUS AS NEEDED
Status: DISCONTINUED | OUTPATIENT
Start: 2020-10-12 | End: 2020-10-12 | Stop reason: HOSPADM

## 2020-10-12 RX ORDER — MORPHINE SULFATE 0.5 MG/ML
INJECTION, SOLUTION EPIDURAL; INTRATHECAL; INTRAVENOUS AS NEEDED
Status: DISCONTINUED | OUTPATIENT
Start: 2020-10-12 | End: 2020-10-12 | Stop reason: HOSPADM

## 2020-10-12 RX ORDER — HYDROCODONE BITARTRATE AND ACETAMINOPHEN 5; 325 MG/1; MG/1
1 TABLET ORAL
Status: DISCONTINUED | OUTPATIENT
Start: 2020-10-12 | End: 2020-10-12 | Stop reason: SDUPTHER

## 2020-10-12 RX ORDER — DOCUSATE SODIUM 100 MG/1
100 CAPSULE, LIQUID FILLED ORAL
Status: DISCONTINUED | OUTPATIENT
Start: 2020-10-12 | End: 2020-10-15 | Stop reason: HOSPADM

## 2020-10-12 RX ORDER — FENTANYL CITRATE 50 UG/ML
INJECTION, SOLUTION INTRAMUSCULAR; INTRAVENOUS AS NEEDED
Status: DISCONTINUED | OUTPATIENT
Start: 2020-10-12 | End: 2020-10-12 | Stop reason: HOSPADM

## 2020-10-12 RX ORDER — OXYTOCIN 10 [USP'U]/ML
INJECTION, SOLUTION INTRAMUSCULAR; INTRAVENOUS AS NEEDED
Status: DISCONTINUED | OUTPATIENT
Start: 2020-10-12 | End: 2020-10-12 | Stop reason: HOSPADM

## 2020-10-12 RX ORDER — HYDROCODONE BITARTRATE AND ACETAMINOPHEN 5; 325 MG/1; MG/1
1 TABLET ORAL
Status: DISCONTINUED | OUTPATIENT
Start: 2020-10-12 | End: 2020-10-15 | Stop reason: HOSPADM

## 2020-10-12 RX ADMIN — KETOROLAC TROMETHAMINE 30 MG: 30 INJECTION, SOLUTION INTRAMUSCULAR at 12:03

## 2020-10-12 RX ADMIN — SODIUM CHLORIDE 50 MCG: 9 INJECTION INTRAMUSCULAR; INTRAVENOUS; SUBCUTANEOUS at 10:22

## 2020-10-12 RX ADMIN — SODIUM CHLORIDE, SODIUM LACTATE, POTASSIUM CHLORIDE, AND CALCIUM CHLORIDE 1000 ML: 600; 310; 30; 20 INJECTION, SOLUTION INTRAVENOUS at 09:44

## 2020-10-12 RX ADMIN — SODIUM CHLORIDE, SODIUM LACTATE, POTASSIUM CHLORIDE, AND CALCIUM CHLORIDE 125 ML/HR: 600; 310; 30; 20 INJECTION, SOLUTION INTRAVENOUS at 21:11

## 2020-10-12 RX ADMIN — Medication 5 MG: at 10:15

## 2020-10-12 RX ADMIN — PROMETHAZINE HYDROCHLORIDE 12.5 MG: 25 INJECTION INTRAMUSCULAR; INTRAVENOUS at 12:13

## 2020-10-12 RX ADMIN — Medication 10 MG: at 11:04

## 2020-10-12 RX ADMIN — SODIUM CHLORIDE 50 MCG: 9 INJECTION INTRAMUSCULAR; INTRAVENOUS; SUBCUTANEOUS at 10:41

## 2020-10-12 RX ADMIN — SODIUM CHLORIDE 50 MCG: 9 INJECTION INTRAMUSCULAR; INTRAVENOUS; SUBCUTANEOUS at 10:34

## 2020-10-12 RX ADMIN — SODIUM CHLORIDE, SODIUM LACTATE, POTASSIUM CHLORIDE, AND CALCIUM CHLORIDE 1000 ML: 600; 310; 30; 20 INJECTION, SOLUTION INTRAVENOUS at 08:00

## 2020-10-12 RX ADMIN — Medication 10 MG: at 10:18

## 2020-10-12 RX ADMIN — SODIUM CHLORIDE 3 G: 900 INJECTION, SOLUTION INTRAVENOUS at 09:44

## 2020-10-12 RX ADMIN — SODIUM CHLORIDE, SODIUM LACTATE, POTASSIUM CHLORIDE, AND CALCIUM CHLORIDE 1000 ML: 600; 310; 30; 20 INJECTION, SOLUTION INTRAVENOUS at 08:42

## 2020-10-12 RX ADMIN — SODIUM CHLORIDE, SODIUM LACTATE, POTASSIUM CHLORIDE, AND CALCIUM CHLORIDE 1000 ML: 600; 310; 30; 20 INJECTION, SOLUTION INTRAVENOUS at 22:46

## 2020-10-12 RX ADMIN — SODIUM CHLORIDE 50 MCG: 9 INJECTION INTRAMUSCULAR; INTRAVENOUS; SUBCUTANEOUS at 10:31

## 2020-10-12 RX ADMIN — SODIUM CHLORIDE 100 MCG: 9 INJECTION INTRAMUSCULAR; INTRAVENOUS; SUBCUTANEOUS at 10:56

## 2020-10-12 RX ADMIN — KETOROLAC TROMETHAMINE 30 MG: 30 INJECTION, SOLUTION INTRAMUSCULAR at 19:56

## 2020-10-12 RX ADMIN — OXYTOCIN 30 UNITS: 10 INJECTION, SOLUTION INTRAMUSCULAR; INTRAVENOUS at 10:46

## 2020-10-12 RX ADMIN — SODIUM CHLORIDE, SODIUM LACTATE, POTASSIUM CHLORIDE, AND CALCIUM CHLORIDE: 600; 310; 30; 20 INJECTION, SOLUTION INTRAVENOUS at 10:46

## 2020-10-12 RX ADMIN — Medication 10 MG: at 10:20

## 2020-10-12 RX ADMIN — Medication 5 MG: at 10:16

## 2020-10-12 RX ADMIN — SODIUM CHLORIDE 100 MCG: 9 INJECTION INTRAMUSCULAR; INTRAVENOUS; SUBCUTANEOUS at 10:49

## 2020-10-12 RX ADMIN — SODIUM CHLORIDE 100 MCG: 9 INJECTION INTRAMUSCULAR; INTRAVENOUS; SUBCUTANEOUS at 10:51

## 2020-10-12 RX ADMIN — MORPHINE SULFATE 100 MCG: 0.5 INJECTION, SOLUTION EPIDURAL; INTRATHECAL; INTRAVENOUS at 10:14

## 2020-10-12 RX ADMIN — SODIUM CHLORIDE 50 MCG: 9 INJECTION INTRAMUSCULAR; INTRAVENOUS; SUBCUTANEOUS at 10:37

## 2020-10-12 RX ADMIN — Medication 10 ML: at 19:57

## 2020-10-12 RX ADMIN — BUPIVACAINE HYDROCHLORIDE 1.5 ML: 7.5 INJECTION, SOLUTION EPIDURAL; RETROBULBAR at 10:14

## 2020-10-12 RX ADMIN — FENTANYL CITRATE 10 MCG: 0.05 INJECTION, SOLUTION INTRAMUSCULAR; INTRAVENOUS at 10:14

## 2020-10-12 NOTE — PROGRESS NOTES
10/12/2020  9:58 AM    CM spoke with YVETTE via phone, to complete initial assessment and begin discharge planning. MOB verified and confirmed demographics. YVETTE lives with RADHA- Mckayla Huff ([phone]), at the address on file. This is the couple's first baby. YVETTE is employed and plans to return to work on 11/28. FOB is also employed and will be taking adequate time off. YVETTE reports she has good family support. YVETTE plans to breat and bottle feed baby and has pump to use at home that was given to her, however YVETTE stated she would like to obtain a pump through insurance. YVETTE plans to follow with [facility], [address] [facility]. YVETTE has car seat, bassinet/crib, clothing, bottles and all necessary supplies for baby. YVETTE has [employer] plus Medicaid, and will be adding baby to this policy. CM discussed process to add baby to insurance, MOB verbalized understanding. YVETTE has also been receiving WIC benefits and understands how to add baby to this program.    CM assisted in ordering breast pump for YVETTE via [employer]. CM provided MOB with confirmation page and contact number for [address] Care Management Interventions  PCP Verified by CM: Yes(Dr. Jamie De La Cruz)  Mode of Transport at Discharge:  Other (see comment)  Transition of Care Consult (CM Consult): Discharge Planning  Current Support Network: Own Home, Family Lives Nearby, Lives with Spouse  Confirm Follow Up Transport: Family  Discharge Location  Discharge Placement: Home with family assistance  Annie Santacruz

## 2020-10-12 NOTE — PROGRESS NOTES
Bedside and Verbal shift change report given to Tyesha Roberts (oncoming nurse) by Zafar Toro. Mirian Ford (offgoing nurse). Report given with SBAR, Kardex, Intake/Output and MAR.

## 2020-10-12 NOTE — H&P
History & Physical    Name: Christy Parra MRN: [id number]  SSN: [id number]    YOB: 1985  Age: 28 y.o. Sex: female        Subjective:     Estimated Date of Delivery: 10/18/20  OB History        1    Para   0    Term   0       0    AB   0    Living   0       SAB   0    TAB   0    Ectopic   0    Molar   0    Multiple   0    Live Births   0                MsDavina Reddy is admitted with pregnancy at 36w3d for  Section. Prenatal course was complicated by advanced maternal age and fetal macrosomia. Please see prenatal records for details. Problem List  Date Reviewed: 10/8/2020          Codes Class Noted    Pregnancy ICD-10-CM: Z34.90  ICD-9-CM: V22.2  10/12/2020        Vitamin B12 deficiency ICD-10-CM: E53.8  ICD-9-CM: 266.2  2020        Supervision of high risk primigravida in first trimester in patient 35 years or older at time of delivery ICD-10-CM: O09.511  ICD-9-CM: V23.81  3/17/2020    Overview Addendum 10/8/2020  1:44 PM by Rhonda Medrano     Intrauterine pregnancy with the following problems identified:   EDC 10/18/2020 by US  AMA 35 at delivery  BMI >40  Early glucola 12 weeks- done - 132, wnl  Horizon- SMA carrier- Test FOB (gary Castillo)- Drawn 20- normal  NIPTS- normal male- Pt notified  Baby ASA 12 weeks  MFM/genetics 20 weeks  [place] antibody positive-drawn , no change-Repeat @next visit*Repeated   FOB GARY Castillo [place] antigen blood work- Negative  Dr. Bradley Serrano 20  Needs crossmatched cells for delivery due to anti-nini  GBS neg  Primary C/S 10/12/20. Pt aware. Anemia ICD-10-CM: D64.9  ICD-9-CM: 031. 9  Unknown        Anxiety ICD-10-CM: F41.9  ICD-9-CM: 300.00  Unknown        Morbid obesity ([address] ICD-10-CM: E66.01  ICD-9-CM: 278.01  Unknown        Syncope ICD-10-CM: R55  ICD-9-CM: 780.2  2019        Hypokalemia ICD-10-CM: E87.6  ICD-9-CM: 276.8  2019        Asthma ICD-10-CM: V88.663  ICD-9-CM: 493.90  2012        Hx of breast reduction, elective ICD-10-CM: Z98.890  ICD-9-CM: V45.89  11/27/2012        Elevated cholesterol ICD-10-CM: E78.00  ICD-9-CM: 272.0  6/1/2012                Past Medical History:   Diagnosis Date    Abnormal Pap smear of cervix 09/2019    Negative/+HPV    Anemia     Anxiety     ASCUS (atypical squamous cells of undetermined significance) on Pap smear 6/1/2012    Asthma     extrinsic as a child     Breast disorder     Morbid obesity ([address]      Past Surgical History:   Procedure Laterality Date    BREAST SURGERY PROCEDURE UNLISTED  11/27/12    Incision and Drainage Left Breast Abscess    HX BREAST BIOPSY Bilateral     abscesses removed 2014 and 2015    HX BREAST REDUCTION  2003    HX OTHER SURGICAL      abscess removed from left breast     Social History     Occupational History    Not on file   Tobacco Use    Smoking status: Never Smoker    Smokeless tobacco: Never Used   Substance and Sexual Activity    Alcohol use: No    Drug use: No    Sexual activity: Yes     Partners: Male     Birth control/protection: None     Family History   Problem Relation Age of Onset    Hypertension Mother     Elevated Lipids Mother     Diabetes Sister        Allergies   Allergen Reactions    Bactrim [Sulfamethoprim Ds] Nausea Only    Zofran [Ondansetron Hcl (Pf)] Rash     Prior to Admission medications    Medication Sig Start Date End Date Taking? Authorizing Provider   acetaminophen (TylenoL) 325 mg tablet Take  by mouth every four (4) hours as needed for Pain. Yes Provider, Historical   Iron BisGl &PS [id number] mg-mg-mcg-mg cap Take  by mouth. Yes Provider, Historical   PNV no.153/FA/om3/dha/epa/fish (PRENATAL GUMMIES PO) Take 1 Tab by mouth daily. Yes Provider, Historical   promethazine (PHENERGAN) 25 mg tablet Take 1 Tab by mouth every six (6) hours as needed for Nausea.  2/28/20   Sandra Mora MD   doxylamine-pyridoxine, vit B6, (BONJESTA) 20-20 mg TbID Take 1 Tab by mouth two (2) times a day. 2/28/20   Ramirez Prescott MD   pnv w/o calcium-iron fum-fa 29 mg iron- 1 mg chew Take 1 Tab by mouth daily. 2/28/20   Ramirez Prescott MD   medroxyPROGESTERone (PROVERA) 10 mg tablet Take 1 Tab by mouth daily. 1/31/20   Ramirez Prescott MD   letrozole [facility]) 2.5 mg tablet Take 2 pills by mouth daily days 3-7 1/31/20   Ramirez Prescott MD   senna-docusate (PERICOLACE) 8.6-50 mg per tablet Take 2 Tabs by mouth daily. 7/26/19   Gianna Peralta MD        Review of Systems: A comprehensive review of systems was negative except for that written in the HPI. Objective:     Vitals:  Vitals:    10/12/20 0750 10/12/20 0812   BP: 123/82    Pulse: 93    Resp: 16    Temp: 98.9 °F (37.2 °C)    Weight:  267 lb (121.1 kg)   Height:  5' 7\" (1.702 m)        Physical Exam:  Patient without distress. Heart: Regular rate and rhythm  Lung: clear to auscultation throughout lung fields, no wheezes, no rales, no rhonchi and normal respiratory effort  Abdomen: soft, nontender  Membranes:  Intact  Fetal Heart Rate: Reactive    Prenatal Labs:   Lab Results   Component Value Date/Time    Rubella, External 1.55-Immune 03/13/2020    GrBStrep, External Negative 09/24/2020    HBsAg, External Negative 03/13/2020    HIV, External Negative 03/13/2020    Gonorrhea, External Negative 03/13/2020    Chlamydia, External Negative 03/13/2020        Assessment/Plan:     Plan: Admit for primary CS. EFW 4300g >95% tile, AC >95%tile Group B Strep was negative. I have discussed findings with patient in detail. R/B labor vs CS have been thoroughly discussed. She understands her options and requests a primary CS. I have explained the limitations of ultrasound.      Signed By:  Patti Garcia MD     October 12, 2020

## 2020-10-12 NOTE — PROGRESS NOTES
7092 Patient is a  at 39 weeks 1 day arriving today for a scheduled primary  section due to Babs [address] 7[address] baby. Patient has a history of a blood transfusion due to anemia and due to that has a positive antibody in her blood work. Patient has two units on hold in case of need. Otherwise, patient denies most medical history other than asthma as a child, abnormal PAP, and breast reduction. 0733 Patient placed on EFM at this time. Difficult to trace due to maternal habitus. 4923 Reviewed and signed consents with patient. 0800 IV inserted. Labs collected. IVF bolus started. 7180 Removed from EFM. NST reactive. 0840 Hair removal performed over pubic area. CHG wipes performed over abdomen. 0914 Rounding on patient. Doing well. Denies any needs. 1420 Patient ambulatory to OR for primary . [address] performed with CRNA and patient. 1 CRNA having difficult time with spinal placement. Dr. Barkley Zhang arriving at bedside to attempt spinal.     1014 FHTs obtained in OR post spinal.     1015 Carcamo inserted. SCDs applied. 1022 Skin prepped by Matthew Hinojosa RN.     1022  [facility] in [facility] [address]     [phone] Surgical timeout performed. 1034 Skin incision made by MD.     1044 Uterine incision made. 1045 ROM of clear fluid. Delivery of male infant at this time via low transverse  section at this time. Cord wrapped around both upper extremities. MD reduced and clamped and cut cord after 30 second delay.  handed to nursery RN for assessment. 1047 Placenta delivered. May be discarded per MD.     1110 Incision closed with insorb staples at this time. Mastisol and steri strips applied. IZZY dressing placed over incision and pressure applied. 1122 Patient out of OR. QBL from OR 1740.     1123 Patient back in room 204 for recovery. SCDs reapplied. 1150 Patient having another emesis episode. Patient had several emesis episodes in OR during surgery and received medication. Requesting phenergan from pharmacy. 1203 Patient stating that she has some incisional pain. Toradol given. Patient educated. 1213 Phenergan started at this time. Patient educated. 1240 Patient feeling significantly better with pain and nausea and stating that her pain is better other than with fundal exams. 1345 Fundal performed. Patient stating she is very sleepy from the phenergan. Denies any needs. 1420 Fundal performed. Pads changed and weighed for additional QBL of 55. Carcamo emptied. Patient tolerated well. 1520 Patient states with movement she had a small gush of blood on her pads. Pads changed. QBL additional 35.     1545 TRANSFER - OUT REPORT:    Verbal report given to MANSOOR Valentin RN (name) on [place]  being transferred to MIU (unit) for routine progression of care       Report consisted of patients Situation, Background, Assessment and   Recommendations(SBAR). Information from the following report(s) SBAR, Kardex, Intake/Output, MAR, Accordion, Recent Results and Med Rec Status was reviewed with the receiving nurse. Lines:   Peripheral IV 10/12/20 Right;Posterior Forearm (Active)        Opportunity for questions and clarification was provided. Patient transported with:   Registered Nurse     Baby bands verified with RN and patient.

## 2020-10-12 NOTE — OP NOTES
Operative Note    Name: Stanley [facility] Record Number: [id number]   YOB: 1985  Today's Date: 2020      Pre-operative Diagnosis: LGA    Post-operative Diagnosis: LGA    Operation: low transverse  section Procedure(s):   SECTION    Surgeon(s):  Vero Root MD    Anesthesia: Spinal    Prophylactic Antibiotics: Ancef  DVT Prophylaxis: Sequential Compression Devices  EBL: 1500 cc       Fetal Description: lamas     Birth Information:   Information for the patient's :  Hanna Alicea, Male Ashlyn Agarwal [[id number]]   Delivery of a 8 lb 14.2 oz (4.032 kg) male infant on 10/12/2020 at 10:45 AM. Apgars were 7  and 8 . Umbilical Cord: 3 Vessels     Umbilical Cord Events: Nuchal Cord Without Compressions     Placenta: Manual Removal removal with Intact; Normal appearance. Amniotic Fluid Volume: Moderate     Amniotic Fluid Description:  Clear        Umbilical Cord: cord around both arms    Placenta:  manual removal    Specimens: none           Complications:  None    Implants: none    Circ-1: Bishop Karin RN  Circ-2: Jonathan Marsh RN  Scrub Tech-1: Felicia THOMAS  Surg Asst-1: Bipin Hayes      Procedure Detail:      After proper patient identification and consent, the patient was taken to the operating room, where spinal anesthesia was administered and found to be adequate. Carcamo catheter had been placed using sterile technique. The patient was prepped and draped in the normal sterile fashion. The abdomen was entered using the Pfannenstiel technique. The peritoneum was entered bluntely well superior to the bladder without any apparent injury. An Nick retractor was placed. Palpation revealed no bowel below the retractor. The bladder flap was created without difficulty. A low transverse uterine incision was made with the scalpel and extended with blunt finger dissection. Amniotomy was performed and the fluid was large amount clear.   The babys head was then delivered atraumatically. It was unengaged. The nose and mouth were suctioned. The cord was clamped and cut and the baby was handed off to Nursing staff in attendance. The uterus was manually extracted. The uterus was wiped clean with a moist lap pad and cleared of all clots and debris. There were several large bleeding vessels on the uterus. The uterus itself was larger than normal, no specific fibroids. The uterine incision was closed in 2 layers, first with a running locked suture of 0-monocryl, then with an imbricated layer. Adequate hemostasis was noted. Both tubes and ovaries appeared normal. The pericolic gutters were then lavaged clean with normal saline. Good hemostasis was again reassured The Nick retractor was removed. The fascia was closed with stratifix symmetric in a running fashion. The subcutaneous tissue was closed with interrupted plain gut. Good hemostasis was assured. The incision was lavaged clean and small bleeders were coagulated with the bovie. The skin was closed with absorbable staples. The patient tolerated the procedure well. Sponge, lap, and needle counts were correct times three and the patient and baby were taken to recovery/postpartum room in stable condition.     Cong Kaplan MD  October 12, 2020  4:54 PM

## 2020-10-12 NOTE — ANESTHESIA POSTPROCEDURE EVALUATION
Procedure(s):   SECTION.     spinal    Anesthesia Post Evaluation      Multimodal analgesia: multimodal analgesia used between 6 hours prior to anesthesia start to PACU discharge  Patient location during evaluation: PACU  Patient participation: complete - patient participated  Level of consciousness: awake and alert  Pain management: adequate  Airway patency: patent  Anesthetic complications: no  Cardiovascular status: acceptable  Respiratory status: acceptable  Hydration status: acceptable  Post anesthesia nausea and vomiting:  none      INITIAL Post-op Vital signs:   Vitals Value Taken Time   /59 10/12/2020  1:10 PM   Temp 36.4 °C (97.5 °F) 10/12/2020 11:29 AM   Pulse 89 10/12/2020  1:10 PM   Resp 15 10/12/2020 11:29 AM   SpO2 100 % 10/12/2020  1:09 PM

## 2020-10-12 NOTE — L&D DELIVERY NOTE
Delivery Summary    Patient: Sonia Martinez MRN: [id number]  SSN: [id number]    YOB: 1985  Age: 28 y.o. Sex: female        Information for the patient's :  Guevara España, Darius Arreguin [[id number]]       Labor Events:    Labor: No    Steroids: None   Cervical Ripening Date/Time:       Cervical Ripening Type: None   Antibiotics During Labor: No   Rupture Identifier: Sac 1    Rupture Date/Time: 10/12/2020 10:45 AM   Rupture Type: AROM   Amniotic Fluid Volume: Moderate    Amniotic Fluid Description: Clear    Amniotic Fluid Odor: None    Induction: None       Induction Date/Time:        Indications for Induction:      Augmentation: None   Augmentation Date/Time:      Indications for Augmentation:     Labor complications: None       Additional complications:        Delivery Events:  Indications For Episiotomy:     Episiotomy: None   Perineal Laceration(s): None   Repaired:     Periurethral Laceration Location:      Repaired:     Labial Laceration Location:     Repaired:     Sulcal Laceration Location:     Repaired:     Vaginal Laceration Location:     Repaired:     Cervical Laceration Location:     Repaired:     Repair Suture: None   Number of Repair Packets:     Estimated Blood Loss (ml):  ml   Quantitaive Blood Loss (ml):             Delivery Date: 10/12/2020    Delivery Time: 10:45 AM   Delivery Type: , Low Transverse     Details    Trial of Labor: No   Primary/Repeat: Primary   Priority: Routine   Indications:  Macrosomia       Sex:  Male     Gestational Age: 36w3d  Delivery Clinician:  Luz Frankel  Living Status: Living   Delivery Location: L&D  OR L&D OR #2          APGARS  One minute Five minutes Ten minutes   Skin color: 0   1        Heart rate: 2   2        Grimace: 2   2        Muscle tone: 1   1        Breathin   2        Totals: 7   8          Presentation: Vertex    Position:   Occiput Anterior  Resuscitation Method:  Suctioning-bulb; Tactile Stimulation;Suctioning-deep;C-PAP     Meconium Stained: None      Cord Information: 3 Vessels  Complications: Nuchal Cord Without Compressions  Cord around: left upper extremity  right upper extremity  Delayed cord clamping? Yes  Cord clamped date/time:10/12/2020 10:46 AM  Disposition of Cord Blood: Lab    Blood Gases Sent?: No    Placenta:  Date/Time: 10/12/2020 10:47 AM  Removal: Manual Removal      Appearance: Intact; Normal      Measurements:  Birth Weight: 8 lb 14.2 oz (4.032 kg)      Birth Length: 1' 7\" (0.483 m)      Head Circumference: 1' 2.57\" (0.37 m)      Chest Circumference: 1' 1.39\" (0.34 m)     Abdominal Girth: 1' 1.39\" (0.34 m)    Other Providers:   CATARINA TAY;JENNIFER DAS;DIANNE BOLTON;LEWIS BENNETT;ARIK BAUTISTA;DAVID LUTZ;HARPREET STANLEY;Ariela STEEL, Obstetrician;Primary Nurse;Primary  Nurse; Anesthesiologist;Crna;Tech;Tech;Charge Nurse             Group B Strep:   Lab Results   Component Value Date/Time    GrBStrep, External Negative 2020     Information for the patient's :  Mildayron Chauhan [facility] [[id number]]     Lab Results   Component Value Date/Time    ABO/Rh(D) O POSITIVE 10/12/2020 12:17 PM    DENNIS IgG NEG 10/12/2020 12:17 PM    Bilirubin if DENNIS pos: IF DIRECT KATHY POSITIVE, BILIRUBIN TO FOLLOW 10/12/2020 12:17 PM      No results for input(s): PCO2CB, PO2CB, HCO3I, SO2I, IBD, PTEMPI, SPECTI, PHICB, ISITE, IDEV, IALLEN in the last 72 hours.

## 2020-10-12 NOTE — ANESTHESIA PREPROCEDURE EVALUATION
Relevant Problems   No relevant active problems       Anesthetic History   No history of anesthetic complications            Review of Systems / Medical History  Patient summary reviewed, nursing notes reviewed and pertinent labs reviewed    Pulmonary            Asthma : well controlled  Pertinent negatives: No recent URI     Neuro/Psych         Psychiatric history     Cardiovascular  Within defined limits                     GI/Hepatic/Renal  Within defined limits              Endo/Other        Morbid obesity and anemia     Other Findings              Physical Exam    Airway  Mallampati: II  TM Distance: 4 - 6 cm  Neck ROM: normal range of motion   Mouth opening: Normal     Cardiovascular  Regular rate and rhythm,  S1 and S2 normal,  no murmur, click, rub, or gallop             Dental    Dentition: Lower dentition intact and Upper dentition intact     Pulmonary                 Abdominal         Other Findings            Anesthetic Plan    ASA: 2  Anesthesia type: spinal            Anesthetic plan and risks discussed with: Patient and Spouse

## 2020-10-12 NOTE — LACTATION NOTE
This note was copied from a baby's chart. This is mother's first baby. Mother states she attended a virtual class. LC in to help mother breast feed for the first time. When mother tried to position herself she felt nauseated and wanted to wait until she felt better to put baby to breast. She has a history of breast reduction surgery and plans to breastfeed and bottle feed. Discussed with mother her plan for feeding. Reviewed the benefits of exclusive breast milk feeding during the hospital stay. Informed her of the risks of using formula to supplement in the first few days of life as well as the benefits of successful breast milk feeding; referred her to the Breastfeeding booklet about this information. She acknowledges understanding of information reviewed and states that it is her plan to breastfeed/formula feed her infant. Will support her choice and offer additional information as needed. Mother will successfully establish breastfeeding by feeding in response to early feeding cues   or wake every 3h, will obtain deep latch, and will keep log of feedings/output. Taught to BF at hunger cues and or q 2-3 hrs and to offer 10-20 drops of hand expressed colostrum at any non-feeds. Breast- Feeding Assessment  Attends Breast-Feeding Classes: Yes(virtual class)  Breast-Feeding Experience: No  Breast Trauma/Surgery: Yes(Breast reduction surgery 2012)  Lactation Consultant Visits  Breast-Feedings: Not breast-feeding(LC came to help mother put baby to breast for the 1st time. Mother felt nauseated and did not feel as if she could breastfeed at this time. Mother to rest and try again later.)  Mother/Infant Observation  Infant Observation: Frenulum checked(Heart shaped short tongue - will address when mother is feeling better) Parents aware. Mother given breastfeeding handouts and LC#.

## 2020-10-12 NOTE — ANESTHESIA PROCEDURE NOTES
Spinal Block    Start time: 10/12/2020 9:54 AM  End time: 10/12/2020 10:14 AM  Performed by: Gema Sinha DO  Authorized by: Gema Sinha DO     Pre-procedure: Indications: primary anesthetic  Preanesthetic Checklist: patient identified, risks and benefits discussed, anesthesia consent, patient being monitored and timeout performed    Timeout Time: 09:54          Spinal Block:   Patient Position:  Seated  Prep Region:  Lumbar  Prep: Betadine      Location:  L2-3  Technique:  Single shot        Needle:   Needle Type:   Cory  Needle Gauge:  22 G  Attempts:  3      Events: CSF confirmed and no blood with aspiration        Assessment:  Insertion:  Uncomplicated  Patient tolerance:  Patient tolerated the procedure well with no immediate complications

## 2020-10-12 NOTE — LACTATION NOTE
This note was copied from a baby's chart. Breastfeeding attempted - baby skin to skin on mother's chest. Mother tried for 10 minutes to breastfeed baby but baby too sleepy. Ten drops colostrum hand expressed from left breast. Mother plans to give baby a bottle of formula in a few minutes after she is transferred to Maternal Infant Unit.

## 2020-10-13 LAB
BASOPHILS # BLD: 0 K/UL (ref 0–0.1)
BASOPHILS NFR BLD: 0 % (ref 0–1)
DIFFERENTIAL METHOD BLD: ABNORMAL
EOSINOPHIL # BLD: 0.2 K/UL (ref 0–0.4)
EOSINOPHIL NFR BLD: 2 % (ref 0–7)
ERYTHROCYTE [DISTWIDTH] IN BLOOD BY AUTOMATED COUNT: 14.1 % (ref 11.5–14.5)
HCT VFR BLD AUTO: 20.9 % (ref 35–47)
HGB BLD-MCNC: 7.3 G/DL (ref 11.5–16)
IMM GRANULOCYTES # BLD AUTO: 0.1 K/UL (ref 0–0.04)
IMM GRANULOCYTES NFR BLD AUTO: 1 % (ref 0–0.5)
LYMPHOCYTES # BLD: 1.5 K/UL (ref 0.8–3.5)
LYMPHOCYTES NFR BLD: 14 % (ref 12–49)
MCH RBC QN AUTO: 33 PG (ref 26–34)
MCHC RBC AUTO-ENTMCNC: 34.9 G/DL (ref 30–36.5)
MCV RBC AUTO: 94.6 FL (ref 80–99)
MONOCYTES # BLD: 1 K/UL (ref 0–1)
MONOCYTES NFR BLD: 9 % (ref 5–13)
NEUTS SEG # BLD: 7.9 K/UL (ref 1.8–8)
NEUTS SEG NFR BLD: 74 % (ref 32–75)
NRBC # BLD: 0 K/UL (ref 0–0.01)
NRBC BLD-RTO: 0 PER 100 WBC
PLATELET # BLD AUTO: 150 K/UL (ref 150–400)
PMV BLD AUTO: 11.4 FL (ref 8.9–12.9)
RBC # BLD AUTO: 2.21 M/UL (ref 3.8–5.2)
WBC # BLD AUTO: 10.7 K/UL (ref 3.6–11)

## 2020-10-13 PROCEDURE — 36415 COLL VENOUS BLD VENIPUNCTURE: CPT

## 2020-10-13 PROCEDURE — 74011250636 HC RX REV CODE- 250/636: Performed by: STUDENT IN AN ORGANIZED HEALTH CARE EDUCATION/TRAINING PROGRAM

## 2020-10-13 PROCEDURE — 2709999900 HC NON-CHARGEABLE SUPPLY

## 2020-10-13 PROCEDURE — 85025 COMPLETE CBC W/AUTO DIFF WBC: CPT

## 2020-10-13 PROCEDURE — 65270000029 HC RM PRIVATE

## 2020-10-13 PROCEDURE — 74011250637 HC RX REV CODE- 250/637: Performed by: OBSTETRICS & GYNECOLOGY

## 2020-10-13 RX ORDER — LANOLIN ALCOHOL/MO/W.PET/CERES
325 CREAM (GRAM) TOPICAL 2 TIMES DAILY
Status: DISCONTINUED | OUTPATIENT
Start: 2020-10-13 | End: 2020-10-15 | Stop reason: HOSPADM

## 2020-10-13 RX ADMIN — HYDROCODONE BITARTRATE AND ACETAMINOPHEN 1 TABLET: 5; 325 TABLET ORAL at 14:10

## 2020-10-13 RX ADMIN — IBUPROFEN 800 MG: 800 TABLET ORAL at 23:36

## 2020-10-13 RX ADMIN — IBUPROFEN 800 MG: 800 TABLET ORAL at 15:18

## 2020-10-13 RX ADMIN — FERROUS SULFATE TAB 325 MG (65 MG ELEMENTAL FE) 325 MG: 325 (65 FE) TAB at 19:31

## 2020-10-13 RX ADMIN — KETOROLAC TROMETHAMINE 30 MG: 30 INJECTION, SOLUTION INTRAMUSCULAR at 03:04

## 2020-10-13 RX ADMIN — SIMETHICONE 80 MG: 80 TABLET, CHEWABLE ORAL at 10:43

## 2020-10-13 RX ADMIN — KETOROLAC TROMETHAMINE 30 MG: 30 INJECTION, SOLUTION INTRAMUSCULAR at 09:23

## 2020-10-13 RX ADMIN — DOCUSATE SODIUM 100 MG: 100 CAPSULE, LIQUID FILLED ORAL at 09:23

## 2020-10-13 RX ADMIN — Medication 1 TABLET: at 09:23

## 2020-10-13 NOTE — ROUTINE PROCESS
Bedside and Verbal shift change report given to Adela Castellon RN (oncoming nurse) by Brunilda Wade RN  (offgoing nurse). Report included the following information SBAR, Kardex, Intake/Output, MAR and Recent Results.

## 2020-10-13 NOTE — LACTATION NOTE
This note was copied from a baby's chart. LC in to see mother to assist with feeding. Mother gave baby 34 ml of formula 15 minutes ago. Baby was in nursery awaiting ENT consult.

## 2020-10-13 NOTE — LACTATION NOTE
This note was copied from a baby's chart. Mother was formula feeding baby when [facility] came to visit. Mother states she expressed drops of colostrum last night since baby was not interested in nursing. Mother states baby last breast fed baby at 0700 for 5 minutes with nipple shield due to short nipples. Instructed mother to offer baby breast milk first so that baby gets mother's antibodies then offer formula . She has a medela pump for home use. Reviewed eating healthy while breastfeeding and foods that may boost her breast milk supply. Parents awaiting ENT consult today to have baby's tongue evaluated for tongue tie. Reviewed breastfeeding basics:  Supply and demand, breastfeed baby 8-12 times in 24 hr., feed baby on demand,  stomach size, early  Feeding cues, skin to skin, positioning and baby led latch-on, assymetrical latch with signs of good, deep latch vs shallow, feeding frequency and duration, and log sheet for tracking infant feedings and output. Breastfeeding Booklet and Warm line information given. Discussed typical  weight loss and the importance of infant weight checks with pediatrician 1-2 post discharge. Mother chooses to do both breast and bottle since she had breast reduction surgery. Discussed eating a healthy diet. Instructed mother to eat a variety of foods in order to get a well balanced diet. She should consume an extra 500 calories per day (more than her non-pregnant requirement.) These extra calories will help provide energy needed for optimal breast milk production. Mother also encouraged to \"drink to thirst\" and it is recommended that she drink fluids such as water, fruit/vegetable juice. Nutritious snacks should be available so that she can eat throughout the day to help satisfy her hunger and maintain a good milk supply. Breast surgery (breast reduction surgery) performed in . Family's feeding plans and goals discussed.   Plan of care:  Skin to skin bonding/jkangaroo mother care; frequent breastfeeding; addition of pumping if indicated to maximize milk production. Mother will successfully establish breastfeeding by feeding in response to early feeding cues   or wake every 3h, will obtain deep latch, and will keep log of feedings/output. Taught to BF at hunger cues and or q 2-3 hrs and to offer 10-20 drops of hand expressed colostrum at any non-feeds. Breast Assessment  Left Breast: Large, Extra large  Left Nipple: Everted, Intact  Right Breast: Large, Extra large  Right Nipple: Everted, Intact  Breast- Feeding Assessment  Attends Breast-Feeding Classes: Yes  Breast-Feeding Experience: No  Breast Trauma/Surgery: Yes(Breast reduction surgery 2012. Mother states she felt breast changes in early pregnancy and leaked throughout her pregnancy.)  Type/Quality: Fair(Mother states she expressed drops last night for baby and also tried to put baby to breast.)  Lactation Consultant Visits  Breast-Feedings: (Mother states baby last breast fed at 0700 for 5 min. w/nipple shield. She was formula feeding baby when [facility] came to visit (she did not offer breast). Instructed mom to offer baby breast 1st so baby gets antibodies. Mom to call when baby nurses again.)  Parents awaiting ENT consult due to baby's short tongue.

## 2020-10-13 NOTE — LACTATION NOTE
This note was copied from a baby's chart. LC brought in Symphony breast pump to help stimulate mother's breast milk supply since she had breast reduction surgery and is using a nipple shield. . Instructed mother to pump TID for 20 minutes. Reviewed storage and preparation of expressed breast milk . Mother also given information to read on Breastfeeding After Breast Reduction Surgery. Instructed mother to call [facility] when baby is ready to breastfeed again.

## 2020-10-13 NOTE — PROGRESS NOTES
RN at bedside attempting to get patient OOB, performing assessment first, vitals done, blood pressure 87/51 with a repeat of 87/53, total urine output over the last twelve hours is 600cc, will call MD on call to notify. 2238: Dr Stvee Whyte made aware of blood pressures and total urine ouput, TORB 1L IVF bolus at this time. 2330: Parents requesting rest, infant taken to nursery per parents request.    0100: Patient and FOB sleeping in room. 0320: RN at bedside, wanted to get patient OOB, patient would like pain medication, Toradol given, declines Norco at this time, to get up later, will attempt around 0500.     0519: Patient back to bed after ambulating, patiño catheter pulled, Hgb dropped to 7.3 asymptomatic at this time.

## 2020-10-13 NOTE — PROGRESS NOTES
Post-Operative Day Number 1 Progress Note    Patient doing well post-op day 1 from  delivery without significant complaints. Pain controlled on current medication. Voiding without difficulty, normal lochia. Vitals:    Patient Vitals for the past 8 hrs:   BP Temp Pulse Resp   10/13/20 1047 (!) 100/55 98.4 °F (36.9 °C) 87 16   10/13/20 0631 (!) 101/58 98.3 °F (36.8 °C) 83 16     Temp (24hrs), Av.5 °F (36.9 °C), Min:97.7 °F (36.5 °C), Max:99.2 °F (37.3 °C)      Vital signs stable, afebrile. Exam:  Patient without distress. Abdomen soft, fundus firm at level of umbilicus, nontender. Incision dry and clean without erythema. Lower extremities are negative for swelling, cords or tenderness. Labs:   Recent Results (from the past 24 hour(s))   CBC WITH AUTOMATED DIFF    Collection Time: 10/13/20  3:05 AM   Result Value Ref Range    WBC 10.7 3.6 - 11.0 K/uL    RBC 2.21 (L) 3.80 - 5.20 M/uL    HGB 7.3 (L) 11.5 - 16.0 g/dL    HCT 20.9 (L) 35.0 - 47.0 %    MCV 94.6 80.0 - 99.0 FL    MCH 33.0 26.0 - 34.0 PG    MCHC 34.9 30.0 - 36.5 g/dL    RDW 14.1 11.5 - 14.5 %    PLATELET 681 485 - 414 K/uL    MPV 11.4 8.9 - 12.9 FL    NRBC 0.0 0  WBC    ABSOLUTE NRBC 0.00 0.00 - 0.01 K/uL    NEUTROPHILS 74 32 - 75 %    LYMPHOCYTES 14 12 - 49 %    MONOCYTES 9 5 - 13 %    EOSINOPHILS 2 0 - 7 %    BASOPHILS 0 0 - 1 %    IMMATURE GRANULOCYTES 1 (H) 0.0 - 0.5 %    ABS. NEUTROPHILS 7.9 1.8 - 8.0 K/UL    ABS. LYMPHOCYTES 1.5 0.8 - 3.5 K/UL    ABS. MONOCYTES 1.0 0.0 - 1.0 K/UL    ABS. EOSINOPHILS 0.2 0.0 - 0.4 K/UL    ABS. BASOPHILS 0.0 0.0 - 0.1 K/UL    ABS. IMM. GRANS. 0.1 (H) 0.00 - 0.04 K/UL    DF AUTOMATED         Assessment and Plan:  Patient appears to be having uncomplicated post- course. Continue routine post-op care and maternal education.       Start iron  Recheck CBC in am  Has fu with Dr. Hiram Lua next month

## 2020-10-14 LAB
ABO + RH BLD: NORMAL
ANTIGENS PRESENT RBC DONR: NORMAL
ANTIGENS PRESENT RBC DONR: NORMAL
BLD PROD TYP BPU: NORMAL
BLD PROD TYP BPU: NORMAL
BLOOD BANK CMNT PATIENT-IMP: NORMAL
BLOOD GROUP ANTIBODIES SERPL: NORMAL
BLOOD GROUP ANTIBODIES SERPL: NORMAL
BPU ID: NORMAL
BPU ID: NORMAL
CROSSMATCH RESULT,%XM: NORMAL
CROSSMATCH RESULT,%XM: NORMAL
ERYTHROCYTE [DISTWIDTH] IN BLOOD BY AUTOMATED COUNT: 14.1 % (ref 11.5–14.5)
HCT VFR BLD AUTO: 21.4 % (ref 35–47)
HGB BLD-MCNC: 7.1 G/DL (ref 11.5–16)
MCH RBC QN AUTO: 31.4 PG (ref 26–34)
MCHC RBC AUTO-ENTMCNC: 33.2 G/DL (ref 30–36.5)
MCV RBC AUTO: 94.7 FL (ref 80–99)
NRBC # BLD: 0 K/UL (ref 0–0.01)
NRBC BLD-RTO: 0 PER 100 WBC
PLATELET # BLD AUTO: 155 K/UL (ref 150–400)
PMV BLD AUTO: 11.1 FL (ref 8.9–12.9)
RBC # BLD AUTO: 2.26 M/UL (ref 3.8–5.2)
SPECIMEN EXP DATE BLD: NORMAL
STATUS OF UNIT,%ST: NORMAL
STATUS OF UNIT,%ST: NORMAL
UNIT DIVISION, %UDIV: 0
UNIT DIVISION, %UDIV: 0
WBC # BLD AUTO: 9.5 K/UL (ref 3.6–11)

## 2020-10-14 PROCEDURE — 65270000029 HC RM PRIVATE

## 2020-10-14 PROCEDURE — 36415 COLL VENOUS BLD VENIPUNCTURE: CPT

## 2020-10-14 PROCEDURE — 74011250637 HC RX REV CODE- 250/637: Performed by: OBSTETRICS & GYNECOLOGY

## 2020-10-14 PROCEDURE — 85027 COMPLETE CBC AUTOMATED: CPT

## 2020-10-14 RX ORDER — HYDROCODONE BITARTRATE AND ACETAMINOPHEN 5; 325 MG/1; MG/1
1 TABLET ORAL
Qty: 20 TAB | Refills: 0 | Status: SHIPPED | OUTPATIENT
Start: 2020-10-14 | End: 2020-10-17

## 2020-10-14 RX ORDER — IBUPROFEN 800 MG/1
800 TABLET ORAL
Qty: 60 TAB | Refills: 0 | Status: SHIPPED | OUTPATIENT
Start: 2020-10-14 | End: 2022-07-07

## 2020-10-14 RX ADMIN — HYDROCODONE BITARTRATE AND ACETAMINOPHEN 1 TABLET: 5; 325 TABLET ORAL at 04:38

## 2020-10-14 RX ADMIN — FERROUS SULFATE TAB 325 MG (65 MG ELEMENTAL FE) 325 MG: 325 (65 FE) TAB at 17:20

## 2020-10-14 RX ADMIN — IBUPROFEN 800 MG: 800 TABLET ORAL at 17:20

## 2020-10-14 RX ADMIN — FERROUS SULFATE TAB 325 MG (65 MG ELEMENTAL FE) 325 MG: 325 (65 FE) TAB at 09:11

## 2020-10-14 RX ADMIN — HYDROCODONE BITARTRATE AND ACETAMINOPHEN 1 TABLET: 5; 325 TABLET ORAL at 20:33

## 2020-10-14 RX ADMIN — IBUPROFEN 800 MG: 800 TABLET ORAL at 09:11

## 2020-10-14 NOTE — PROGRESS NOTES
Bedside shift change report given to Faye Hayward RN (oncoming nurse) by Elly Corral RN (offgoing nurse). Report included the following information SBAR, Kardex, Intake/Output and MAR.

## 2020-10-14 NOTE — DISCHARGE SUMMARY
Obstetrical Discharge Summary     Name: Willie Damon MRN: [id number]  SSN: [id number]    YOB: 1985  Age: 28 y.o. Sex: female      Admit Date: 10/12/2020    Discharge Date: 10/15/2020     Admitting Physician: Ana Villagran MD     Attending Physician:  Lenore Barreto MD     Admission Diagnoses: Pregnancy [Z34.90]    Discharge Diagnoses:   Information for the patient's :  Concepcion Phillip [facility] [[id number]]   Delivery of a 8 lb 14.2 oz (4.032 kg) male infant via , Low Transverse on 10/12/2020 at 10:45 AM  by Ana Villagran. Apgars were 7  and 8 . Additional Diagnoses:   Hospital Problems  Date Reviewed: 10/8/2020          Codes Class Noted POA    Pregnancy ICD-10-CM: Z34.90  ICD-9-CM: V22.2  10/12/2020 Unknown             Lab Results   Component Value Date/Time    Rubella, External 1.55-Immune 2020    GrBStrep, External Negative 2020       Hospital Course: Normal hospital course following the delivery. Disposition: Home  Condition: Good    Patient Instructions:   Current Discharge Medication List      START taking these medications    Details   ibuprofen (MOTRIN) 800 mg tablet Take 1 Tab by mouth every six (6) hours as needed for Pain. Qty: 60 Tab, Refills: 0      HYDROcodone-acetaminophen (NORCO) 5-325 mg per tablet Take 1 Tab by mouth every four (4) hours as needed for Pain for up to 3 days. Max Daily Amount: 6 Tabs. Qty: 20 Tab, Refills: 0    Associated Diagnoses: Post-op pain         CONTINUE these medications which have NOT CHANGED    Details   acetaminophen (TylenoL) 325 mg tablet Take  by mouth every four (4) hours as needed for Pain. Iron BisGl &PS [id number] mg-mg-mcg-mg cap Take  by mouth. PNV no.153/FA/om3/dha/epa/fish (PRENATAL GUMMIES PO) Take 1 Tab by mouth daily. pnv w/o calcium-iron fum-fa 29 mg iron- 1 mg chew Take 1 Tab by mouth daily.   Qty: 30 Tab, Refills: 12      senna-docusate (PERICOLACE) 8.6-50 mg per tablet Take 2 Tabs by mouth daily. Qty: 60 Tab, Refills: 3         STOP taking these medications       promethazine (PHENERGAN) 25 mg tablet Comments:   Reason for Stopping:         doxylamine-pyridoxine, vit B6, (BONJESTA) 20-20 mg TbID Comments:   Reason for Stopping:         medroxyPROGESTERone (PROVERA) 10 mg tablet Comments:   Reason for Stopping:         letrozole (FEMARA) 2.5 mg tablet Comments:   Reason for Stopping:               Reference my discharge instructions.     Follow-up Appointments   Procedures    FOLLOW UP VISIT Appointment in: 6 Weeks     Standing Status:   Standing     Number of Occurrences:   1     Order Specific Question:   Appointment in     Answer:   6 Weeks        Signed By:  Zachery Robles MD     October 14, 2020

## 2020-10-14 NOTE — PROGRESS NOTES
Post-Operative Day Number 2 Progress Note    Patient doing well post-op day 2 from  delivery without significant complaints. Pain controlled on current medication. Voiding without difficulty, normal lochia. Vitals:    Patient Vitals for the past 8 hrs:   BP Temp Pulse Resp   10/14/20 0835 109/69 98.1 °F (36.7 °C) 80 16     Temp (24hrs), Av.3 °F (36.8 °C), Min:98.1 °F (36.7 °C), Max:98.5 °F (36.9 °C)      Vital signs stable, afebrile. Exam:  Patient without distress. Abdomen soft, fundus firm at level of umbilicus, nontender. Incision dry and clean without erythema. Lower extremities are negative for swelling, cords or tenderness. Labs:   Recent Results (from the past 24 hour(s))   CBC W/O DIFF    Collection Time: 10/14/20  3:21 AM   Result Value Ref Range    WBC 9.5 3.6 - 11.0 K/uL    RBC 2.26 (L) 3.80 - 5.20 M/uL    HGB 7.1 (L) 11.5 - 16.0 g/dL    HCT 21.4 (L) 35.0 - 47.0 %    MCV 94.7 80.0 - 99.0 FL    MCH 31.4 26.0 - 34.0 PG    MCHC 33.2 30.0 - 36.5 g/dL    RDW 14.1 11.5 - 14.5 %    PLATELET 321 186 - 931 K/uL    MPV 11.1 8.9 - 12.9 FL    NRBC 0.0 0  WBC    ABSOLUTE NRBC 0.00 0.00 - 0.01 K/uL       Assessment and Plan:  Patient appears to be having uncomplicated post- course. Continue routine post-op care and maternal education.

## 2020-10-14 NOTE — LACTATION NOTE
This note was copied from a baby's chart. Mother resting in bed getting ready to eat lunch. Visitor at bedside. Baby had tongue tie clipped yesterday. Mother has been formula feeding her baby. Symphony pump at bedside - mother states she wants baby to have her breast milk but has not pumped at all. Baby had formula at 1100. Instructed mother to offer baby breast first (Q2-3 hr.) and if baby does not nurse she needs to pump for 20 minutes. Mother states -  Remberto Perez was circumcised today. LC offered breastfeeding assistance with baby's next feeding and instructed mother to call [phone]. LC reviewed the following:    Reviewed breastfeeding basics:  Supply and demand,  stomach size, early  Feeding cues, skin to skin, positioning and baby led latch-on, assymetrical latch with signs of good, deep latch vs shallow, feeding frequency and duration, and log sheet for tracking infant feedings and output. Breastfeeding Booklet and Warm line information given. Discussed typical  weight loss and the importance of infant weight checks with pediatrician 1-2 post discharge. Engorgement Care Guidelines:  Reviewed how milk is made and normal phases of milk production. Taught care of engorged breasts - frequent breastfeeding encouraged, cool packs and motrin as tolerated. Anticipatory guidance shared. Pt will successfully establish breastfeeding by feeding in response to early feeding cues   or wake every 3h, will obtain deep latch, and will keep log of feedings/output. Taught to BF at hunger cues and or q 2-3 hrs and to offer 10-20 drops of hand expressed colostrum at any non-feeds.       Breast Assessment  Left Breast: Large, Extra large  Left Nipple: Everted, Intact  Right Breast: Large, Extra large  Right Nipple: Everted, Intact  Breast- Feeding Assessment  Attends Breast-Feeding Classes: Yes  Breast-Feeding Experience: No  Breast Trauma/Surgery: Yes  Type/Quality: (Mother has been primarily formula feeding)  Lactation Consultant Visits  Breast-Feedings: Not breast-feeding(Mother states she has been formula feeding baby (baby had formula at 80). She last nursed baby at 2130 last night. She has not pumped as instructed but states she wants baby to have breastmilk. Instructed mother - call LC when baby is ready to feed again)

## 2020-10-14 NOTE — ROUTINE PROCESS
Bedside and Verbal shift change report given to sloan coleman rn (oncoming nurse) by david covington rn (offgoing nurse). Report included the following information SBAR, Kardex, OR Summary, Procedure Summary, Intake/Output, MAR, Accordion and Recent Results.

## 2020-10-15 VITALS
DIASTOLIC BLOOD PRESSURE: 73 MMHG | RESPIRATION RATE: 16 BRPM | WEIGHT: 267 LBS | TEMPERATURE: 98.3 F | BODY MASS INDEX: 41.91 KG/M2 | HEART RATE: 71 BPM | SYSTOLIC BLOOD PRESSURE: 114 MMHG | OXYGEN SATURATION: 100 % | HEIGHT: 67 IN

## 2020-10-15 PROCEDURE — 74011250637 HC RX REV CODE- 250/637: Performed by: OBSTETRICS & GYNECOLOGY

## 2020-10-15 RX ADMIN — IBUPROFEN 800 MG: 800 TABLET ORAL at 01:46

## 2020-10-15 RX ADMIN — HYDROCODONE BITARTRATE AND ACETAMINOPHEN 1 TABLET: 5; 325 TABLET ORAL at 01:46

## 2020-10-15 RX ADMIN — IBUPROFEN 800 MG: 800 TABLET ORAL at 10:27

## 2020-10-15 RX ADMIN — FERROUS SULFATE TAB 325 MG (65 MG ELEMENTAL FE) 325 MG: 325 (65 FE) TAB at 10:27

## 2020-10-15 NOTE — LACTATION NOTE
This note was copied from a baby's chart. Chart shows numerous feedings, void, stool WNL. Discussed importance of monitoring outputs and feedings on first week of life. Discussed ways to tell if baby is  getting enough breast milk, ie  voids and stools, change in color of stool, and return to birth wt within 2 weeks. Follow up with pediatrician visit for weight check in 1-2 days (per AAP guidelines.)  Encouraged to call Warm Line  [phone]  for any questions/problems that arise. Mother also given breastfeeding support group dates and times for any future needs. Engorgement Care Guidelines:  Reviewed how milk is made and normal phases of milk production. Taught care of engorged breasts - frequent breastfeeding encouraged, cool packs and motrin as tolerated. Anticipatory guidance shared. Pt will successfully establish breastfeeding by feeding in response to early feeding cues   or wake every 3h, will obtain deep latch, and will keep log of feedings/output. Taught to BF at hunger cues and or q 2-3 hrs and to offer 10-20 drops of hand expressed colostrum at any non-feeds. Breast Assessment  Left Breast: Large, Extra large  Left Nipple: Everted, Intact  Right Breast: Large, Extra large  Right Nipple: Everted, Intact  Breast- Feeding Assessment  Attends Breast-Feeding Classes: Yes  Breast-Feeding Experience: No  Breast Trauma/Surgery: Yes  Type/Quality: (Mother has been primarily formula feeding)  Mom formula feeding until she gets home from hospital.      Engorgement anticipatory guidance shared.   Discussed paced bottle feeding if attempting to transition baby to breast.

## 2020-10-15 NOTE — DISCHARGE INSTRUCTIONS
Patient Education         Section: What to Expect at Home  Your Recovery     A  section, or , is surgery to deliver your baby through a cut that the doctor makes in your lower belly and uterus. The cut is called an incision. You may have some pain in your lower belly and need pain medicine for 1 to 2 weeks. You can expect some vaginal bleeding for several weeks. You will probably need about 6 weeks to fully recover. It's important to take it easy while the incision heals. Avoid heavy lifting, strenuous activities, and exercises that strain the belly muscles while you recover. Ask a family member or friend for help with housework, cooking, and shopping. This care sheet gives you a general idea about how long it will take for you to recover. But each person recovers at a different pace. Follow the steps below to get better as quickly as possible. How can you care for yourself at home? Activity    · Rest when you feel tired. Getting enough sleep will help you recover.     · Try to walk each day. Start by walking a little more than you did the day before. Bit by bit, increase the amount you walk. Walking boosts blood flow and helps prevent pneumonia, constipation, and blood clots.     · Avoid strenuous activities, such as bicycle riding, jogging, weightlifting, and aerobic exercise, for 6 weeks or until your doctor says it is okay.     · Until your doctor says it is okay, do not lift anything heavier than your baby.     · Do not do sit-ups or other exercises that strain the belly muscles for 6 weeks or until your doctor says it is okay.     · Hold a pillow over your incision when you cough or take deep breaths. This will support your belly and decrease your pain.     · You may shower as usual. Pat the incision dry when you are done.     · You will have some vaginal bleeding. Wear sanitary pads.  Do not douche or use tampons until your doctor says it is okay.     · Ask your doctor when you can drive again.     · You will probably need to take at least 6 weeks off work. It depends on the type of work you do and how you feel.     · Ask your doctor when it is okay for you to have sex. Diet    · You can eat your normal diet. If your stomach is upset, try bland, low-fat foods like plain rice, broiled chicken, toast, and yogurt.     · Drink plenty of fluids (unless your doctor tells you not to).     · You may notice that your bowel movements are not regular right after your surgery. This is common. Try to avoid constipation and straining with bowel movements. You may want to take a fiber supplement every day. If you have not had a bowel movement after a couple of days, ask your doctor about taking a mild laxative.     · If you are breastfeeding, limit alcohol. Alcohol can cause a lack of energy and other health problems for the baby when a breastfeeding woman drinks heavily. It can also get in the way of a mom's ability to feed her baby or to care for the child in other ways. There isn't a lot of research about exactly how much alcohol can harm a baby. Having no alcohol is the safest choice for your baby. If you choose to have a drink now and then, have only one drink, and limit the number of occasions that you have a drink. Wait to breastfeed at least 2 hours after you have a drink to reduce the amount of alcohol the baby may get in the milk. Medicines    · Your doctor will tell you if and when you can restart your medicines. He or she will also give you instructions about taking any new medicines.     · If you take aspirin or some other blood thinner, ask your doctor if and when to start taking it again. Make sure that you understand exactly what your doctor wants you to do.     · Take pain medicines exactly as directed. ? If the doctor gave you a prescription medicine for pain, take it as prescribed.   ? If you are not taking a prescription pain medicine, ask your doctor if you can take an over-the-counter medicine.     · If you think your pain medicine is making you sick to your stomach:  ? Take your medicine after meals (unless your doctor has told you not to). ? Ask your doctor for a different pain medicine.     · If your doctor prescribed antibiotics, take them as directed. Do not stop taking them just because you feel better. You need to take the full course of antibiotics. Incision care    · If you have strips of tape on the incision, leave the tape on for a week or until it falls off.     · Wash the area daily with warm, soapy water, and pat it dry. Don't use hydrogen peroxide or alcohol, which can slow healing. You may cover the area with a gauze bandage if it weeps or rubs against clothing. Change the bandage every day.     · Keep the area clean and dry. Other instructions    · If you breastfeed your baby, you may be more comfortable while you are healing if you place the baby so that he or she is not resting on your belly. Try tucking your baby under your arm, with his or her body along the side you will be feeding on. Support your baby's upper body with your arm. With that hand you can control your baby's head to bring his or her mouth to your breast. This is sometimes called the football hold. Follow-up care is a key part of your treatment and safety. Be sure to make and go to all appointments, and call your doctor if you are having problems. It's also a good idea to know your test results and keep a list of the medicines you take. When should you call for help? Call  911 anytime you think you may need emergency care. For example, call if:    · You have thoughts of harming yourself, your baby, or another person.     · You passed out (lost consciousness).     · You have chest pain, are short of breath, or cough up blood.     · You have a seizure.    Call your doctor now or seek immediate medical care if:    · You have pain that does not get better after you take pain medicine.     · You have severe vaginal bleeding.     · You are dizzy or lightheaded, or you feel like you may faint.     · You have new or worse pain in your belly or pelvis.     · You have loose stitches, or your incision comes open.     · You have symptoms of infection, such as:  ? Increased pain, swelling, warmth, or redness. ? Red streaks leading from the incision. ? Pus draining from the incision. ? A fever.     · You have symptoms of a blood clot in your leg (called a deep vein thrombosis), such as:  ? Pain in your calf, back of the knee, thigh, or groin. ? Redness and swelling in your leg or groin.     · You have signs of preeclampsia, such as:  ? Sudden swelling of your face, hands, or feet. ? New vision problems (such as dimness, blurring, or seeing spots). ? A severe headache. Watch closely for changes in your health, and be sure to contact your doctor if:    · You do not get better as expected. Where can you learn more? Go to http://www.nichols.com/  Enter M806 in the search box to learn more about \" Section: What to Expect at Home. \"  Current as of: 2020               Content Version: 12.6  © 4287-1592 [employer]. Care instructions adapted under license by [employer] (which disclaims liability or warranty for this information). If you have questions about a medical condition or this instruction, always ask your healthcare professional. [place] [address] any warranty or liability for your use of this information. Patient Education        Learning About Starting to Breastfeed  Planning ahead     Before your baby is born, plan ahead. Learn all you can about breastfeeding. This helps make breastfeeding easier. · Early in your pregnancy, talk to your doctor or midwife about breastfeeding. · Learn the basics before your baby is born.  The staff at hospitals and birthing centers can help you find a lactation specialist. This person is often a nurse who has been trained to teach and advise women about breastfeeding. Or you can take a breastfeeding class. · Plan ahead for times when you will need help after your baby is born. Many women get help from friends and family. Some join a support group to talk to other moms who breastfeed. · Buy the equipment you'll need. Examples are breast pads, nipple cream, extra pillows, and nursing bras. Find out about breast pumps too. Getting help from your hospital or birthing center  It's important to have support from the doctors, nurses, and hospital staff who care for you and your baby. Before it's time for you to give birth, ask about the breastfeeding policies at your hospital or birthing center. Look for a hospital or birthing center that has policies for:  · \"Rooming in. \" This policy encourages you to have your baby in the room with you. It can allow you to breastfeed more often. · Supplemental feedings. Tell the staff that your baby is to get only your breast milk from birth. If staff feed your baby water, sugar solution, or formula right after birth without a medical reason, it may make it harder for you to breastfeed. · Pacifiers or artificial nipples. Staff should not give your  these items. They may interfere with breastfeeding. · Follow-up. Find out if your hospital can help you with breastfeeding issues after you go home. See if you can get information on support groups or other contacts. They might help if you need help setting up and staying with your breastfeeding routine. Your first feeding  It's best to start breastfeeding within 1 hour of birth. For each feeding, you go through these basic steps:  · Get ready for the feeding. Be calm and relaxed, and try not to be distracted. Get some water or juice for yourself. Use two or three pillows to help support your baby while he or she is nursing.   · Find a breastfeeding position that is comfortable for you and your baby. Examples are the cradle and the football positions. Make sure the baby's head and chest are lined up straight and facing your breast. It's best to switch which breast you start with each time. · Get the baby latched on well. Your baby's mouth needs to be wide open, like a yawn, so you may need to gently touch the middle of your baby's lower lip. When your baby's mouth is open wide, quickly bring the baby onto your nipple and areola. The areola is the dark [place] around your nipple. · Provide a complete feeding. Let your baby decide how long to nurse. Be sure to burp your baby after each breast.  In the first days after birth, your breasts make a thick, yellow liquid called colostrum. This liquid gives your baby nutrients and antibodies against infection. It is all that babies need at first. Your breasts will fill with milk a few days after the birth. Talk to your doctor, midwife, or lactation specialist right away if you are having problems and aren't sure what to do. How often to breastfeed  Plan to breastfeed your baby on demand rather than setting a strict schedule. For the first 2 weeks, be prepared to breastfeed at least 8 times in a 24-hour period. In the first few days, you may need to wake a sleeping baby to feed. If you breastfeed more often, it will help your breasts to produce more milk. After you go home  After you're home, don't be afraid to call your doctor, midwife, or lactation specialist with questions. That's true even if you don't know what's bothering you. They are used to parents of newborns calling. They can help you figure out if there is a problem, and if so, how to fix it. Plan for times when you will be apart from your baby. Use a breast pump to collect breast milk ahead of time. You can store milk in the refrigerator or freezer. Then it's ready when someone else will be taking care of your baby.  Experts recommend waiting about a month until breastfeeding is going well before offering a bottle. Breastfeeding is a learned skill that gets easier over time. You are more likely to succeed if you plan ahead, learn the basic techniques, and know where to get help and support. Where can you learn more? Go to http://www.gray.com/  Enter Q917 in the search box to learn more about \"Learning About Starting to Breastfeed. \"  Current as of: 2020               Content Version: 12.6   [employer]. Care instructions adapted under license by [employer] (which disclaims liability or warranty for this information). If you have questions about a medical condition or this instruction, always ask your healthcare professional. [place] [address] any warranty or liability for your use of this information. Patient Education        After Your Delivery (the Postpartum Period): Care Instructions  Your Care Instructions     Congratulations on the birth of your baby. Like pregnancy, the  period can be a time of excitement, jess, and exhaustion. You may look at your wondrous little baby and feel happy. You may also be overwhelmed by your new sleep hours and new responsibilities. At first, babies often sleep during the days and are awake at night. They do not have a pattern or routine. They may make sudden gasps, jerk themselves awake, or look like they have crossed eyes. These are all normal, and they may even make you smile. In these first weeks after delivery, try to take good care of yourself. It may take 4 to 6 weeks to feel like yourself again, and possibly longer if you had a  birth. You will likely feel very tired for several weeks. Your days will be full of ups and downs, but lots of jess as well. Follow-up care is a key part of your treatment and safety. Be sure to make and go to all appointments, and call your doctor if you are having problems.  It's also a good idea to know your test results and keep a list of the medicines you take. How can you care for yourself at home? Take care of your body after delivery  · Use pads instead of tampons for the bloody flow that may last as long as 2 weeks. · Ease cramps with ibuprofen (Advil, Motrin). · Ease soreness of hemorrhoids and the area between your vagina and rectum with ice compresses or witch hazel pads. · Ease constipation by drinking lots of fluid and eating high-fiber foods. Ask your doctor about over-the-counter stool softeners. · Cleanse yourself with a gentle squeeze of warm water from a bottle instead of wiping with toilet paper. · Take a sitz bath in warm water several times a day. · Wear a good nursing bra. Ease sore and swollen breasts with warm, wet washcloths. · If you are not breastfeeding, use ice rather than heat for breast soreness. · Your period may not start for several months if you are breastfeeding. You may bleed more, and longer at first, than you did before you got pregnant. · Wait until you are healed (about 4 to 6 weeks) before you have sexual intercourse. Your doctor will tell you when it is okay to have sex. · Try not to travel with your baby for 5 or 6 weeks. If you take a long car trip, make frequent stops to walk around and stretch. Avoid exhaustion  · Rest every day. Try to nap when your baby naps. · Ask another adult to be with you for a few days after delivery. · Plan for  if you have other children. · Stay flexible so you can eat at odd hours and sleep when you need to. Both you and your baby are making new schedules. · Plan small trips to get out of the house. Change can make you feel less tired. · Ask for help with housework, cooking, and shopping. Remind yourself that your job is to care for your baby. Know about help for postpartum depression  · \"Baby blues\" are common for the first 1 to 2 weeks after birth. You may cry or feel sad or irritable for no reason.   · Rest whenever you can. Being tired makes it harder to handle your emotions. · Go for walks with your baby. · Talk to your partner, friends, and family about your feelings. · If your symptoms last for more than a few weeks, or if you feel very depressed, ask your doctor for help. · Postpartum depression can be treated. Support groups and counseling can help. Sometimes medicine can also help. Stay healthy  · Eat healthy foods so you have more energy and lose extra baby pounds. · If you breastfeed, avoid drugs. If you quit smoking during pregnancy, try to stay smoke-free. If you choose to have a drink now and then, have only one drink, and limit the number of occasions that you have a drink. Wait to breastfeed at least 2 hours after you have a drink to reduce the amount of alcohol the baby may get in the milk. · Start daily exercise after 4 to 6 weeks, but rest when you feel tired. · Learn exercises to tone your belly. Do Kegel exercises to regain strength in your pelvic muscles. You can do these exercises while you stand or sit. ? Squeeze the same muscles you would use to stop your urine. Your belly and thighs should not move. ? Hold the squeeze for 3 seconds, and then relax for 3 seconds. ? Start with 3 seconds. Then add 1 second each week until you are able to squeeze for 10 seconds. ? Repeat the exercise 10 to 15 times for each session. Do three or more sessions each day. · Find a class for new mothers and new babies that has an exercise time. · If you had a  birth, give yourself a bit more time before you exercise, and be careful. When should you call for help? Call  911 anytime you think you may need emergency care. For example, call if:    · You have thoughts of harming yourself, your baby, or another person.     · You passed out (lost consciousness).     · You have chest pain, are short of breath, or cough up blood.     · You have a seizure.    Call your doctor now or seek immediate medical care if:    · You have severe vaginal bleeding. This means you are passing blood clots and soaking through a pad each hour for 2 or more hours.     · You are dizzy or lightheaded, or you feel like you may faint.     · You have a fever.     · You have new or more belly pain.     · You have signs of a blood clot in your leg (called a deep vein thrombosis), such as:  ? Pain in the calf, back of the knee, thigh, or groin. ? Redness and swelling in your leg or groin.     · You have signs of preeclampsia, such as:  ? Sudden swelling of your face, hands, or feet. ? New vision problems (such as dimness, blurring, or seeing spots). ? A severe headache. Watch closely for changes in your health, and be sure to contact your doctor if:    · Your vaginal bleeding seems to be getting heavier.     · You have new or worse vaginal discharge.     · You feel sad, anxious, or hopeless for more than a few days.     · You do not get better as expected. Where can you learn more? Go to http://www.nichols.com/  Enter A461 in the search box to learn more about \"After Your Delivery (the Postpartum Period): Care Instructions. \"  Current as of: February 11, 2020               Content Version: 12.6  © 8265-5067 [employer]. Care instructions adapted under license by [employer] (which disclaims liability or warranty for this information). If you have questions about a medical condition or this instruction, always ask your healthcare professional. [place] [address] any warranty or liability for your use of this information.

## 2020-10-19 ENCOUNTER — APPOINTMENT (OUTPATIENT)
Dept: VASCULAR SURGERY | Age: 35
End: 2020-10-19
Attending: EMERGENCY MEDICINE
Payer: MEDICAID

## 2020-10-19 ENCOUNTER — HOSPITAL ENCOUNTER (EMERGENCY)
Age: 35
Discharge: HOME | End: 2020-10-19
Attending: EMERGENCY MEDICINE | Admitting: EMERGENCY MEDICINE
Payer: MEDICAID

## 2020-10-19 VITALS
DIASTOLIC BLOOD PRESSURE: 71 MMHG | WEIGHT: 245 LBS | BODY MASS INDEX: 38.45 KG/M2 | HEIGHT: 67 IN | RESPIRATION RATE: 18 BRPM | OXYGEN SATURATION: 99 % | TEMPERATURE: 98.7 F | SYSTOLIC BLOOD PRESSURE: 113 MMHG | HEART RATE: 74 BPM

## 2020-10-19 DIAGNOSIS — R60.0 BILATERAL LEG EDEMA: Primary | ICD-10-CM

## 2020-10-19 LAB
ALBUMIN SERPL-MCNC: 2.6 G/DL (ref 3.5–5)
ALBUMIN/GLOB SERPL: 0.6 {RATIO} (ref 1.1–2.2)
ALP SERPL-CCNC: 64 U/L (ref 45–117)
ALT SERPL-CCNC: 13 U/L (ref 12–78)
ANION GAP SERPL CALC-SCNC: 4 MMOL/L (ref 5–15)
AST SERPL-CCNC: 16 U/L (ref 15–37)
BASOPHILS # BLD: 0 K/UL (ref 0–0.1)
BASOPHILS NFR BLD: 0 % (ref 0–1)
BILIRUB SERPL-MCNC: 1 MG/DL (ref 0.2–1)
BNP SERPL-MCNC: 157 PG/ML
BUN SERPL-MCNC: 9 MG/DL (ref 6–20)
BUN/CREAT SERPL: 12 (ref 12–20)
CALCIUM SERPL-MCNC: 7.8 MG/DL (ref 8.5–10.1)
CHLORIDE SERPL-SCNC: 108 MMOL/L (ref 97–108)
CO2 SERPL-SCNC: 29 MMOL/L (ref 21–32)
COMMENT, HOLDF: NORMAL
CREAT SERPL-MCNC: 0.76 MG/DL (ref 0.55–1.02)
DIFFERENTIAL METHOD BLD: ABNORMAL
EOSINOPHIL # BLD: 0.5 K/UL (ref 0–0.4)
EOSINOPHIL NFR BLD: 6 % (ref 0–7)
ERYTHROCYTE [DISTWIDTH] IN BLOOD BY AUTOMATED COUNT: 14.6 % (ref 11.5–14.5)
GLOBULIN SER CALC-MCNC: 4.3 G/DL (ref 2–4)
GLUCOSE SERPL-MCNC: 75 MG/DL (ref 65–100)
HCT VFR BLD AUTO: 24.6 % (ref 35–47)
HGB BLD-MCNC: 7.7 G/DL (ref 11.5–16)
IMM GRANULOCYTES # BLD AUTO: 0.1 K/UL (ref 0–0.04)
IMM GRANULOCYTES NFR BLD AUTO: 1 % (ref 0–0.5)
LYMPHOCYTES # BLD: 1.7 K/UL (ref 0.8–3.5)
LYMPHOCYTES NFR BLD: 22 % (ref 12–49)
MCH RBC QN AUTO: 29.8 PG (ref 26–34)
MCHC RBC AUTO-ENTMCNC: 31.3 G/DL (ref 30–36.5)
MCV RBC AUTO: 95.3 FL (ref 80–99)
MONOCYTES # BLD: 0.7 K/UL (ref 0–1)
MONOCYTES NFR BLD: 9 % (ref 5–13)
NEUTS SEG # BLD: 4.6 K/UL (ref 1.8–8)
NEUTS SEG NFR BLD: 62 % (ref 32–75)
NRBC # BLD: 0.05 K/UL (ref 0–0.01)
NRBC BLD-RTO: 0.7 PER 100 WBC
PLATELET # BLD AUTO: 297 K/UL (ref 150–400)
PLATELET COMMENTS,PCOM: ABNORMAL
PMV BLD AUTO: 10.2 FL (ref 8.9–12.9)
POTASSIUM SERPL-SCNC: 3.6 MMOL/L (ref 3.5–5.1)
PROT SERPL-MCNC: 6.9 G/DL (ref 6.4–8.2)
RBC # BLD AUTO: 2.58 M/UL (ref 3.8–5.2)
RBC MORPH BLD: ABNORMAL
SAMPLES BEING HELD,HOLD: NORMAL
SODIUM SERPL-SCNC: 141 MMOL/L (ref 136–145)
TROPONIN I SERPL-MCNC: <0.05 NG/ML
WBC # BLD AUTO: 7.6 K/UL (ref 3.6–11)

## 2020-10-19 PROCEDURE — 85025 COMPLETE CBC W/AUTO DIFF WBC: CPT

## 2020-10-19 PROCEDURE — 84484 ASSAY OF TROPONIN QUANT: CPT

## 2020-10-19 PROCEDURE — 80053 COMPREHEN METABOLIC PANEL: CPT

## 2020-10-19 PROCEDURE — 36415 COLL VENOUS BLD VENIPUNCTURE: CPT

## 2020-10-19 PROCEDURE — 99284 EMERGENCY DEPT VISIT MOD MDM: CPT

## 2020-10-19 PROCEDURE — 83880 ASSAY OF NATRIURETIC PEPTIDE: CPT

## 2020-10-19 NOTE — ED PROVIDER NOTES
Please note that this dictation was completed with Agent Panda, the computer voice recognition software.  Quite often unanticipated grammatical, syntax, homophones, and other interpretive errors are inadvertently transcribed by the computer software.  Please disregard these errors.  Please excuse any errors that have escaped final proofreading.    'had a baby by  last week (7 days ago/ / Dr Everett Amador); was fine / increased bliat lower leg swelling x 2-3 days/ worse today;   Normal PO/ bladder bowel habits;     44-year-old female past medical history remarkable for abnormal Pap smear of the cervix, anemia, anxiety, ASCUS, asthma as a child, and morbid obesity presents to the ER 7 days post  complaining of bilateral lower extremity foot swelling leg swelling. Patient states she had the  everything was fine she had experienced a swelling and then noticed approximately 2 to 3 days ago gradually worsening swelling. She said it was worse this morning's been laying around all day today with her legs elevated but states \"I can hardly walk secondary to the pain. Patient denies previous episodes of swelling like this states she had a blood transfusion approximately a year ago for anemia denies other current systemic medical complaints. Patient's had normal bladder bowel habits appropriate lochia is tolerating p.o. normally.     pt denies HA, vison changes, diff swallowing, CP, SOB, Abd pain, F/Ch, N/V, D/Cons or other current systemic complaints    Social/ PSH reviewed in EMR    EMR Chart Reviewed           Past Medical History:   Diagnosis Date    Abnormal Pap smear of cervix 2019    Negative/+HPV    Anemia     Anxiety     ASCUS (atypical squamous cells of undetermined significance) on Pap smear 2012    Asthma     extrinsic as a child     Breast disorder     Morbid obesity ([facility] [address]        Past Surgical History:   Procedure Laterality Date    BREAST SURGERY PROCEDURE UNLISTED 11/27/12    Incision and Drainage Left Breast Abscess    HX BREAST BIOPSY Bilateral     abscesses removed 2014 and 2015    HX BREAST REDUCTION  2003    HX OTHER SURGICAL      abscess removed from left breast         Family History:   Problem Relation Age of Onset    Hypertension Mother     Elevated Lipids Mother     Diabetes Sister        Social History     Socioeconomic History    Marital status:      Spouse name: Not on file    Number of children: Not on file    Years of education: Not on file    Highest education level: Not on file   Occupational History    Not on file   Social Needs    Financial resource strain: Not on file    Food insecurity     Worry: Not on file     Inability: Not on file    Transportation needs     Medical: Not on file     Non-medical: Not on file   Tobacco Use    Smoking status: Never Smoker    Smokeless tobacco: Never Used   Substance and Sexual Activity    Alcohol use: No    Drug use: No    Sexual activity: Yes     Partners: Male     Birth control/protection: None   Lifestyle    Physical activity     Days per week: Not on file     Minutes per session: Not on file    Stress: Not on file   Relationships    Social connections     Talks on phone: Not on file     Gets together: Not on file     Attends Muslim service: Not on file     Active member of club or organization: Not on file     Attends meetings of clubs or organizations: Not on file     Relationship status: Not on file    Intimate partner violence     Fear of current or ex partner: Not on file     Emotionally abused: Not on file     Physically abused: Not on file     Forced sexual activity: Not on file   Other Topics Concern     Service Not Asked    Blood Transfusions Not Asked    Caffeine Concern Not Asked    Occupational Exposure Not Asked   Eleuterio Files Hazards Not Asked    Sleep Concern Not Asked    Stress Concern Not Asked    Weight Concern Not Asked    Special Diet Not Asked    Back Care Not Asked    Exercise Not Asked    Bike Helmet Not Asked   [address] [facility] Not Asked    Self-Exams Not Asked   Social History Narrative    Not on file         ALLERGIES: Bactrim [sulfamethoprim ds] and Zofran [ondansetron hcl (pf)]    Review of Systems   Constitutional: Negative for chills and fever. HENT: Negative for drooling, trouble swallowing and voice change. Respiratory: Negative for cough, chest tightness and shortness of breath. Cardiovascular: Positive for leg swelling. Gastrointestinal: Negative for abdominal pain, diarrhea, nausea and vomiting. Genitourinary: Negative for dysuria. Musculoskeletal: Negative for back pain. Skin: Negative for rash. Neurological: Negative for facial asymmetry and speech difficulty. Vitals:    10/19/20 1849   BP: 132/86   Pulse: 74   Resp: 18   Temp: 98.7 °F (37.1 °C)   SpO2: 100%   Weight: 111.1 kg (245 lb)   Height: 5' 7\" (1.702 m)            Physical Exam  Vitals signs and nursing note reviewed. Constitutional:       General: She is not in acute distress. Appearance: Normal appearance. She is well-developed. She is obese. She is not ill-appearing, toxic-appearing or diaphoretic. Comments: NAD, AxOx4, speaking in complete sentences       HENT:      Head: Normocephalic and atraumatic. Right Ear: External ear normal.      Left Ear: External ear normal.   Eyes:      General: No scleral icterus. Right eye: No discharge. Left eye: No discharge. Extraocular Movements: Extraocular movements intact. Conjunctiva/sclera: Conjunctivae normal.      Pupils: Pupils are equal, round, and reactive to light. Neck:      Musculoskeletal: Normal range of motion and neck supple. No muscular tenderness. Vascular: No JVD. Trachea: No tracheal deviation. Cardiovascular:      Rate and Rhythm: Normal rate and regular rhythm. Heart sounds: Normal heart sounds. No murmur. No friction rub. No gallop.     Pulmonary: Effort: Pulmonary effort is normal. No respiratory distress. Breath sounds: Normal breath sounds. No wheezing or rales. Chest:      Chest wall: No tenderness. Abdominal:      General: Bowel sounds are normal.      Palpations: Abdomen is soft. Tenderness: There is no abdominal tenderness. There is no guarding or rebound. Genitourinary:     Vagina: No vaginal discharge. Comments: Pt denies urinary/ vaginal complaints  Musculoskeletal: Normal range of motion. General: No swelling, tenderness, deformity or signs of injury. Right lower leg: Edema present. Left lower leg: Edema present. Skin:     General: Skin is warm and dry. Capillary Refill: Capillary refill takes less than 2 seconds. Coloration: Skin is not jaundiced or pale. Findings: No bruising, erythema, lesion or rash. Neurological:      General: No focal deficit present. Mental Status: She is alert and oriented to person, place, and time. Cranial Nerves: No cranial nerve deficit. Sensory: No sensory deficit. Motor: No weakness or abnormal muscle tone. Coordination: Coordination normal.      Gait: Gait normal.      Deep Tendon Reflexes: Reflexes normal.      Comments: pt has motor/ CV/ Sensation grossly intact to all extremities, R = L in strength;   Psychiatric:         Behavior: Behavior normal.         Thought Content:  Thought content normal.          MDM       Procedures      8:10 PM  Pt states 'I want to leave now'; will discharge

## 2020-10-20 NOTE — DISCHARGE INSTRUCTIONS
Patient Education        Leg and Ankle Edema: Care Instructions  Your Care Instructions  Swelling in the legs, ankles, and feet is called edema. It is common after you sit or stand for a while. Long plane flights or car rides often cause swelling in the legs and feet. You may also have swelling if you have to stand for long periods of time at your job. Problems with the veins in the legs (varicose veins) and changes in hormones can also cause swelling. Sometimes the swelling in the ankles and feet is caused by a more serious problem, such as heart failure, infection, blood clots, or liver or kidney disease. Follow-up care is a key part of your treatment and safety. Be sure to make and go to all appointments, and call your doctor if you are having problems. It's also a good idea to know your test results and keep a list of the medicines you take. How can you care for yourself at home? · If your doctor gave you medicine, take it as prescribed. Call your doctor if you think you are having a problem with your medicine. · Whenever you are resting, raise your legs up. Try to keep the swollen area higher than the level of your heart. · Take breaks from standing or sitting in one position. ? Walk around to increase the blood flow in your lower legs. ? Move your feet and ankles often while you stand, or tighten and relax your leg muscles. · Wear support stockings. Put them on in the morning, before swelling gets worse. · Eat a balanced diet. Lose weight if you need to. · Limit the amount of salt (sodium) in your diet. Salt holds fluid in the body and may increase swelling. When should you call for help? Call 911 anytime you think you may need emergency care. For example, call if:    · You have symptoms of a blood clot in your lung (called a pulmonary embolism). These may include:  ? Sudden chest pain. ? Trouble breathing. ? Coughing up blood.    Call your doctor now or seek immediate medical care if:    · You have signs of a blood clot, such as:  ? Pain in your calf, back of the knee, thigh, or groin. ? Redness and swelling in your leg or groin.     · You have symptoms of infection, such as:  ? Increased pain, swelling, warmth, or redness. ? Red streaks or pus. ? A fever. Watch closely for changes in your health, and be sure to contact your doctor if:    · Your swelling is getting worse.     · You have new or worsening pain in your legs.     · You do not get better as expected. Where can you learn more? Go to http://www.nichols.com/  Enter W006 in the search box to learn more about \"Leg and Ankle Edema: Care Instructions. \"  Current as of: June 26, 2019               Content Version: 12.6  © 2993-7369 [employer], Incorporated. Care instructions adapted under license by [employer] (which disclaims liability or warranty for this information). If you have questions about a medical condition or this instruction, always ask your healthcare professional. [address] any warranty or liability for your use of this information.

## 2020-10-20 NOTE — ED NOTES
Patient given discharge instructions per provider, patient verbalized understanding and ambulatory from ED to home with .

## 2020-10-22 ENCOUNTER — HOSPITAL ENCOUNTER (OUTPATIENT)
Dept: INFUSION THERAPY | Age: 35
Discharge: HOME | End: 2020-10-22
Payer: MEDICAID

## 2020-10-22 VITALS
RESPIRATION RATE: 18 BRPM | SYSTOLIC BLOOD PRESSURE: 122 MMHG | OXYGEN SATURATION: 98 % | TEMPERATURE: 98.6 F | WEIGHT: 258 LBS | BODY MASS INDEX: 40.41 KG/M2 | HEART RATE: 67 BPM | DIASTOLIC BLOOD PRESSURE: 80 MMHG

## 2020-10-22 DIAGNOSIS — E53.8 VITAMIN B12 DEFICIENCY: Primary | ICD-10-CM

## 2020-10-22 DIAGNOSIS — D50.9 IRON DEFICIENCY ANEMIA, UNSPECIFIED IRON DEFICIENCY ANEMIA TYPE: ICD-10-CM

## 2020-10-22 PROCEDURE — 74011250636 HC RX REV CODE- 250/636: Performed by: NURSE PRACTITIONER

## 2020-10-22 PROCEDURE — 96372 THER/PROPH/DIAG INJ SC/IM: CPT

## 2020-10-22 RX ORDER — CYANOCOBALAMIN 1000 UG/ML
1000 INJECTION, SOLUTION INTRAMUSCULAR; SUBCUTANEOUS ONCE
Status: COMPLETED | OUTPATIENT
Start: 2020-10-22 | End: 2020-10-22

## 2020-10-22 RX ADMIN — CYANOCOBALAMIN 1000 MCG: 1000 INJECTION, SOLUTION INTRAMUSCULAR at 13:55

## 2020-10-30 ENCOUNTER — OFFICE VISIT (OUTPATIENT)
Dept: OBGYN CLINIC | Age: 35
End: 2020-10-30
Payer: MEDICAID

## 2020-10-30 VITALS
SYSTOLIC BLOOD PRESSURE: 140 MMHG | BODY MASS INDEX: 40.68 KG/M2 | WEIGHT: 259.2 LBS | HEIGHT: 67 IN | DIASTOLIC BLOOD PRESSURE: 91 MMHG

## 2020-10-30 DIAGNOSIS — R60.0 LOWER EXTREMITY EDEMA: Primary | ICD-10-CM

## 2020-10-30 PROCEDURE — 99213 OFFICE O/P EST LOW 20 MIN: CPT | Performed by: OBSTETRICS & GYNECOLOGY

## 2020-10-30 RX ORDER — FUROSEMIDE 20 MG/1
20 TABLET ORAL DAILY
Qty: 5 TAB | Refills: 0 | Status: SHIPPED | OUTPATIENT
Start: 2020-10-30 | End: 2021-03-04 | Stop reason: ALTCHOICE

## 2020-10-30 NOTE — PATIENT INSTRUCTIONS
Postpartum: Care Instructions  Your Care Instructions  After childbirth (postpartum period), your body goes through many changes. Some of these changes happen over several weeks. In the hours after delivery, your body will begin to recover from childbirth while it prepares to breastfeed your . You may feel emotional during this time. Your hormones can shift your mood without warning for no clear reason. In the first couple of weeks after childbirth, many women have emotions that change from happy to sad. You may find it hard to sleep. You may cry a lot. This is called the \"baby blues. \" These overwhelming emotions often go away within a couple of days or weeks. But it's important to discuss your feelings with your doctor. It is easy to get too tired and overwhelmed during the first weeks after childbirth. Don't try to do too much. Get rest whenever you can, accept help from others, and eat well and drink plenty of fluids. In the first couple of weeks after giving birth, your doctor or midwife may want to check in with you and make a plan for any follow-up care you may need. You will likely have a complete postpartum visit in the first 3 months after delivery. At that time, your doctor or midwife will check on your recovery from childbirth. He or she will also see how you are doing with your emotions and talk about your concerns or questions. Follow-up care is a key part of your treatment and safety. Be sure to make and go to all appointments, and call your doctor if you are having problems. It's also a good idea to know your test results and keep a list of the medicines you take. How can you care for yourself at home? · Sleep or rest when your baby sleeps. · Get help with household chores from family or friends, if you can. Do not try to do it all yourself. · If you have hemorrhoids or swelling or pain around the opening of your vagina, try using cold and heat.  You can put ice or a cold pack on the area for 10 to 20 minutes at a time. Put a thin cloth between the ice and your skin. Also try sitting in a few inches of warm water (sitz bath) 3 times a day and after bowel movements. · Take pain medicines exactly as directed. ? If the doctor gave you a prescription medicine for pain, take it as prescribed. ? If you are not taking a prescription pain medicine, ask your doctor if you can take an over-the-counter medicine. · Eat more fiber to avoid constipation. Include foods such as whole-grain breads and cereals, raw vegetables, raw and dried fruits, and beans. · Drink plenty of fluids, enough so that your urine is light yellow or clear like water. If you have kidney, heart, or liver disease and have to limit fluids, talk with your doctor before you increase the amount of fluids you drink. · Do not rinse inside your vagina with fluids (douche). · If you have stitches, keep the area clean by pouring or spraying warm water over the area outside your vagina and anus after you use the toilet. · Keep a list of questions to ask your doctor or midwife. Your questions might be about:  ? Changes in your breasts, such as lumps or soreness. ? When to expect your menstrual period to start again. ? What form of birth control is best for you. ? Weight you have put on during the pregnancy. ? Exercise options. ? What foods and drinks are best for you, especially if you are breastfeeding. ? Problems you might be having with breastfeeding. ? When you can have sex. Some women may want to talk about lubricants for the vagina. ? Any feelings of sadness or restlessness that you are having. When should you call for help? Call 911 anytime you think you may need emergency care. For example, call if:    · You have thoughts of harming yourself, your baby, or another person.     · You passed out (lost consciousness).     · You have chest pain, are short of breath, or cough up blood.     · You have a seizure.    Call your doctor now or seek immediate medical care if:    · Your vaginal bleeding seems to be getting heavier.     · You are dizzy or lightheaded, or you feel like you may faint.     · You have a fever.     · You have new or more belly pain.     · You have symptoms of a blood clot in your leg (called a deep vein thrombosis), such as:  ? Pain in the calf, back of the knee, thigh, or groin. ? Redness and swelling in your leg or groin.     · You have signs of preeclampsia, such as:  ? Sudden swelling of your face, hands, or feet. ? New vision problems (such as dimness, blurring, or seeing spots). ? A severe headache. Watch closely for changes in your health, and be sure to contact your doctor if:    · You have new or worse vaginal discharge.     · You feel sad or depressed.     · You are having problems with your breasts or breastfeeding. Where can you learn more? Go to http://www.gray.com/  Enter B264 in the search box to learn more about \"Postpartum: Care Instructions. \"  Current as of: February 11, 2020               Content Version: 12.6  © 3695-0673 [employer]. Care instructions adapted under license by [employer] (which disclaims liability or warranty for this information). If you have questions about a medical condition or this instruction, always ask your healthcare professional. [address] any warranty or liability for your use of this information.

## 2020-10-30 NOTE — PROGRESS NOTES
Stuart Canseco is a 28 y.o. female who presents for a postpartum swelling. She has severe swelling in both legs and feet. Taking no medication, trying to keep feet elevated as much as possible. Patient is breast and bottle feeding. Denies SOB or DOVE. Concerned because she cannot fit into any shoes    She is now six weeks post primary  section 10/12/2020.      Weight is actually same as prepregnancy      Visit Vitals  BP (!) 140/91   Ht 5' 7\" (1.702 m)   Wt 259 lb 3.2 oz (117.6 kg)   BMI 40.60 kg/m²       PHYSICAL EXAMINATION    Constitutional  · Appearance: well-nourished, well developed, alert, in no acute distress    HENT  · Head and Face: appears normal    Chest: CTA, no wheeze or rhonchi        Skin  · General Inspection: no rash, no lesions identified    Neurologic/Psychiatric  · Mental Status:  · Orientation: grossly oriented to person, place and time  · Mood and Affect: mood normal, affect appropriate      EXT: 4+ leg swelling, edema really not sig pitting but ankles look swollen    Assessment:  Mildly elevated BP - normal labs in ER  LE swelling - no evidence of CHF      Plan:  Lasix 20mg daily x 5 days  Fu to office next week to reweigh

## 2020-11-05 NOTE — PROGRESS NOTES
Postpartum evaluation    Cuco Gilliland is a 28 y.o. female who presents for follow up to post partum swelling. Patient delivered 10/12/2020 by . Patient states she has completed her lasix medication.  Weight loss is 15#           Visit Vitals  /79   Ht 5' 7\" (1.702 m)   Wt 244 lb 12.8 oz (111 kg)   BMI 38.34 kg/m²       PHYSICAL EXAMINATION    Constitutional  · Appearance: well-nourished, well developed, alert, in no acute distress    HENT  · Head and Face: appears normal    Skin  · General Inspection: no rash, no lesions identified    Neurologic/Psychiatric  · Mental Status:  · Orientation: grossly oriented to person, place and time  · Mood and Affect: mood normal, affect appropriate  Lower extremities: min edema noted    Assessment:  Severe LE edema improved post lasix - 15# weight loss    Plan:  RTO forpostpartum exam    15 minutes was spent face to face with patient and >50% was spent counseling

## 2020-11-09 ENCOUNTER — OFFICE VISIT (OUTPATIENT)
Dept: OBGYN CLINIC | Age: 35
End: 2020-11-09
Payer: MEDICAID

## 2020-11-09 VITALS
BODY MASS INDEX: 38.42 KG/M2 | HEIGHT: 67 IN | SYSTOLIC BLOOD PRESSURE: 115 MMHG | DIASTOLIC BLOOD PRESSURE: 79 MMHG | WEIGHT: 244.8 LBS

## 2020-11-09 DIAGNOSIS — R60.0 LOWER EXTREMITY EDEMA: Primary | ICD-10-CM

## 2020-11-09 PROCEDURE — 99213 OFFICE O/P EST LOW 20 MIN: CPT | Performed by: OBSTETRICS & GYNECOLOGY

## 2020-11-09 NOTE — PATIENT INSTRUCTIONS

## 2020-11-16 DIAGNOSIS — Z86.2 HISTORY OF IRON DEFICIENCY ANEMIA: ICD-10-CM

## 2020-11-18 ENCOUNTER — TELEPHONE (OUTPATIENT)
Dept: ONCOLOGY | Age: 35
End: 2020-11-18

## 2020-11-18 NOTE — TELEPHONE ENCOUNTER
[employer] at [facility]  [phone]        11/18/20 2:03 PM Attempted to call patient via home/cell number listed to remind her to have labs drawn prior to appointment on Monday, 11/23. No answer and voicemail has not been set up yet. Will attempt to call again later. If no return call or if patient is unable to have labs drawn, Candace Saravia will order labs to be done in [address] [place] [address] while she is there for vitamin B12 injection. 2:10 PM Received return call. Patient states she is planning to go to [facility] on Friday, 11/20. Reviewed upcoming appointment information as well. No further questions or concerns at this time.

## 2020-11-18 NOTE — PROGRESS NOTES
[id number] [facility] Oncology at [facility]  [phone]    Hematology / Oncology Established Visit    Reason for Visit:   Alf Martin is a 28 y.o. female who comes in for f/u of anemia. Referred by Dr. Jamie De La Cruz. History of Present Illness:   Alf Martin is a 28 y.o. female who comes in for evaluation of anemia. She has irregular and heavy periods, lasting 6-7 days, requiring new pad every 1-2 hours. Patient underwent endometrial biopsy. Was started on oral birth control pills, prenatal vitamins, iron supplements. She received a blood transfusion in May 2019. She sees blood in her stools at times and thinks it is related to constipation and hemorrhoids. The blood is not mixed in with the stool. She has not ever seen a GI doctor. She is not currently taking any stools softeners. She denies ice cravings. She has fatigue, DOVE, but no CP. Interval History: Patient her for follow up of anemia. She delivered a baby boy on 10/12/20. Hgb 7.7 on 10/19/20 after  but up to 10.6 on 20. She last received injectafer x 2 early 2019. She continues taking oral iron supplements once daily. She was started on B12 injections due to low B12 level. She is currently receiving monthly B12 injections in the infusion center. Reports constipation with the iron supplements despite stool softener if she takes iron more than once a day. She takes a stool softener as needed. Denies ice cravings. Does feel fatigued.        Past Medical History:   Diagnosis Date    Abnormal Pap smear of cervix 2019    Negative/+HPV    Anemia     Anxiety     ASCUS (atypical squamous cells of undetermined significance) on Pap smear 2012    Asthma     extrinsic as a child     Breast disorder     Morbid obesity ([address]       Past Surgical History:   Procedure Laterality Date    BREAST SURGERY PROCEDURE UNLISTED  12    Incision and Drainage Left Breast Abscess    HX BREAST BIOPSY Bilateral abscesses removed 2014 and 2015    HX BREAST REDUCTION  2003    HX OTHER SURGICAL      abscess removed from left breast      Social History     Tobacco Use    Smoking status: Never Smoker    Smokeless tobacco: Never Used   Substance Use Topics    Alcohol use: No      Family History   Problem Relation Age of Onset    Hypertension Mother     Elevated Lipids Mother     Diabetes Sister      Current Outpatient Medications   Medication Sig    furosemide (LASIX) 20 mg tablet Take 1 Tab by mouth daily.  ibuprofen (MOTRIN) 800 mg tablet Take 1 Tab by mouth every six (6) hours as needed for Pain.  acetaminophen (TylenoL) 325 mg tablet Take  by mouth every four (4) hours as needed for Pain.  Iron BisGl &PS [id number] mg-mg-mcg-mg cap Take  by mouth.  pnv w/o calcium-iron fum-fa 29 mg iron- 1 mg chew Take 1 Tab by mouth daily.  PNV no.153/FA/om3/dha/epa/fish (PRENATAL GUMMIES PO) Take 1 Tab by mouth daily.  senna-docusate (PERICOLACE) 8.6-50 mg per tablet Take 2 Tabs by mouth daily. No current facility-administered medications for this visit. Allergies   Allergen Reactions    Bactrim [Sulfamethoprim Ds] Nausea Only    Zofran [Ondansetron Hcl (Pf)] Rash        Review of Systems: A complete review of systems was obtained, negative except as described above. Physical Exam:     Visit Vitals  /87   Pulse 76   Temp (!) 96.3 °F (35.7 °C)   Ht 5' 7\" (1.702 m)   Wt 248 lb 3.2 oz (112.6 kg)   SpO2 98%   BMI 38.87 kg/m²     ECOG PS: 1  General: Well developed, no acute distress, obese  Eyes: PERRLA, EOMI, anicteric sclerae  HENT: Atraumatic, OP clear, TMs intact without erythema  Neck: Supple  Lymphatic: No cervical, supraclavicular, axillary or inguinal adenopathy  Respiratory: CTAB, normal respiratory effort  CV: Normal rate, regular rhythm, no murmurs, no peripheral edema  GI: Gravid uterus.  Soft, nontender, nondistended, no masses, no hepatomegaly, no splenomegaly  MS: Normal gait and station. Digits without clubbing or cyanosis. Skin: No rashes, ecchymoses, or petechiae. Normal temperature, turgor, and texture. Neuro/Psych: Alert, oriented. 5/5 strength in all 4 extremities. Appropriate affect, normal judgment/insight. Results:     Lab Results   Component Value Date/Time    WBC 5.6 11/20/2020 12:00 AM    HGB 10.6 (L) 11/20/2020 12:00 AM    HCT 34.1 11/20/2020 12:00 AM    PLATELET 191 21/83/4921 12:00 AM    MCV 83 11/20/2020 12:00 AM    ABS. NEUTROPHILS 3.3 11/20/2020 12:00 AM    Hemoglobin (POC) 7.6 07/05/2019 02:16 PM    Hemoglobin (POC) 13.3 12/18/2014 06:34 PM    Hematocrit (POC) 39 12/18/2014 06:34 PM    Hgb, External 11.6 07/27/2020    Hct, External 33.8 07/27/2020    Platelet cnt., External 194 07/27/2020     Lab Results   Component Value Date/Time    Sodium 141 10/19/2020 07:12 PM    Potassium 3.6 10/19/2020 07:12 PM    Chloride 108 10/19/2020 07:12 PM    CO2 29 10/19/2020 07:12 PM    Glucose 75 10/19/2020 07:12 PM    BUN 9 10/19/2020 07:12 PM    Creatinine 0.76 10/19/2020 07:12 PM    GFR est AA >60 10/19/2020 07:12 PM    GFR est non-AA >60 10/19/2020 07:12 PM    Calcium 7.8 (L) 10/19/2020 07:12 PM    Sodium (POC) 142 12/18/2014 06:34 PM    Potassium (POC) 3.6 12/18/2014 06:34 PM    Chloride (POC) 104 12/18/2014 06:34 PM    Glucose (POC) 87 12/18/2014 06:34 PM    BUN (POC) 13 12/18/2014 06:34 PM    Creatinine (POC) 1.0 12/18/2014 06:34 PM    Calcium, ionized (POC) 1.16 12/18/2014 06:34 PM     Lab Results   Component Value Date/Time    Bilirubin, total 1.0 10/19/2020 07:12 PM    ALT (SGPT) 13 10/19/2020 07:12 PM    Alk.  phosphatase 64 10/19/2020 07:12 PM    Protein, total 6.9 10/19/2020 07:12 PM    Albumin 2.6 (L) 10/19/2020 07:12 PM    Globulin 4.3 (H) 10/19/2020 07:12 PM     Lab Results   Component Value Date/Time    Iron 33 11/20/2020 12:00 AM    TIBC 399 11/20/2020 12:00 AM    Iron % saturation 8 (LL) 11/20/2020 12:00 AM    Ferritin 7 (L) 11/20/2020 12:00 AM Lab Results   Component Value Date/Time    Vitamin B12 121 (L) 08/18/2020 12:00 AM    Folate 9.6 05/28/2019 07:07 PM     Lab Results   Component Value Date/Time    TSH 2.680 06/01/2012 09:01 AM     Lab Results   Component Value Date/Time    Hep B surface Ag screen Negative 03/13/2020 02:53 PM         Imaging:     Radiology report(s) reviewed. Assessment & Plan:   Maria Jacome is a 28 y.o. female comes in for f/u of anemia. 1.  Iron deficiency anemia: 2/2 prior menstrual blood loss and was due to increased demand during pregnancy. Required blood transfusion 5/2019 for Hgb 6.8. s/p Injectafer x 2 in early August 2019. Repeat iron studies and Hgb normal in 9/2019 and 12/2019 and 5/20/20 labs show anemia without iron deficiency - this is likely due to hemodilutional effect of pregnancy. 8/13/20 and 7/20 labs normal Hgb and iron saturation but low ferritin. -- Iron supplement daily, using stool softeners as needed. -- Return in 4 months with repeat labs and follow up    2. H/o Menorrhagia: Pt previously on OCPs and later on Depot. Not currently on any contraception given recent pregnancy and childbirth. 3. Post partum: Delivered 10/12/20.      4. Vitamin B12 deficiency: Looking back to 5/2019 Vitamin B12 was 99. Reports she never took Vitamin B12 supplements or injections but she is taking a prenatal vitamin. She reports intermittent neuropathy in hands and feet that occurred with pregnancy. 8/18/20 labs B12 121 and . Starting 9/2020 completed weekly x 3, followed now receiving monthly B12 injections in [facility]. -- Checking B12 level in [facility] today. -- Will switch to oral B12 supplements, 1000mcg daily. I appreciate the opportunity to participate in Ms. Ximena Mcgill's care.     Signed By: Rachelle Atkins MD     November 23, 2020

## 2020-11-21 LAB
BASOPHILS # BLD AUTO: 0 X10E3/UL (ref 0–0.2)
BASOPHILS NFR BLD AUTO: 1 %
EOSINOPHIL # BLD AUTO: 0.3 X10E3/UL (ref 0–0.4)
EOSINOPHIL NFR BLD AUTO: 6 %
ERYTHROCYTE [DISTWIDTH] IN BLOOD BY AUTOMATED COUNT: 16.9 % (ref 11.7–15.4)
FERRITIN SERPL-MCNC: 7 NG/ML (ref 15–150)
HCT VFR BLD AUTO: 34.1 % (ref 34–46.6)
HGB BLD-MCNC: 10.6 G/DL (ref 11.1–15.9)
IMM GRANULOCYTES # BLD AUTO: 0 X10E3/UL (ref 0–0.1)
IMM GRANULOCYTES NFR BLD AUTO: 0 %
IRON SATN MFR SERPL: 8 % (ref 15–55)
IRON SERPL-MCNC: 33 UG/DL (ref 27–159)
LYMPHOCYTES # BLD AUTO: 1.6 X10E3/UL (ref 0.7–3.1)
LYMPHOCYTES NFR BLD AUTO: 29 %
MCH RBC QN AUTO: 25.7 PG (ref 26.6–33)
MCHC RBC AUTO-ENTMCNC: 31.1 G/DL (ref 31.5–35.7)
MCV RBC AUTO: 83 FL (ref 79–97)
MONOCYTES # BLD AUTO: 0.4 X10E3/UL (ref 0.1–0.9)
MONOCYTES NFR BLD AUTO: 7 %
NEUTROPHILS # BLD AUTO: 3.3 X10E3/UL (ref 1.4–7)
NEUTROPHILS NFR BLD AUTO: 57 %
PLATELET # BLD AUTO: 359 X10E3/UL (ref 150–450)
RBC # BLD AUTO: 4.13 X10E6/UL (ref 3.77–5.28)
TIBC SERPL-MCNC: 399 UG/DL (ref 250–450)
UIBC SERPL-MCNC: 366 UG/DL (ref 131–425)
WBC # BLD AUTO: 5.6 X10E3/UL (ref 3.4–10.8)

## 2020-11-23 ENCOUNTER — OFFICE VISIT (OUTPATIENT)
Dept: ONCOLOGY | Age: 35
End: 2020-11-23
Payer: MEDICAID

## 2020-11-23 ENCOUNTER — HOSPITAL ENCOUNTER (OUTPATIENT)
Dept: INFUSION THERAPY | Age: 35
Discharge: HOME | End: 2020-11-23
Payer: MEDICAID

## 2020-11-23 ENCOUNTER — OFFICE VISIT (OUTPATIENT)
Dept: OBGYN CLINIC | Age: 35
End: 2020-11-23
Payer: MEDICAID

## 2020-11-23 VITALS
OXYGEN SATURATION: 98 % | BODY MASS INDEX: 38.75 KG/M2 | WEIGHT: 247.4 LBS | DIASTOLIC BLOOD PRESSURE: 87 MMHG | SYSTOLIC BLOOD PRESSURE: 125 MMHG | TEMPERATURE: 96.3 F | HEART RATE: 76 BPM | RESPIRATION RATE: 18 BRPM

## 2020-11-23 VITALS
BODY MASS INDEX: 38.92 KG/M2 | WEIGHT: 248 LBS | SYSTOLIC BLOOD PRESSURE: 129 MMHG | HEIGHT: 67 IN | DIASTOLIC BLOOD PRESSURE: 89 MMHG

## 2020-11-23 VITALS
WEIGHT: 248.2 LBS | HEIGHT: 67 IN | BODY MASS INDEX: 38.96 KG/M2 | TEMPERATURE: 96.3 F | DIASTOLIC BLOOD PRESSURE: 87 MMHG | OXYGEN SATURATION: 98 % | SYSTOLIC BLOOD PRESSURE: 125 MMHG | HEART RATE: 76 BPM

## 2020-11-23 DIAGNOSIS — D50.9 IRON DEFICIENCY ANEMIA, UNSPECIFIED IRON DEFICIENCY ANEMIA TYPE: ICD-10-CM

## 2020-11-23 DIAGNOSIS — K59.00 CONSTIPATION, UNSPECIFIED CONSTIPATION TYPE: ICD-10-CM

## 2020-11-23 DIAGNOSIS — D50.0 IRON DEFICIENCY ANEMIA DUE TO CHRONIC BLOOD LOSS: Primary | ICD-10-CM

## 2020-11-23 DIAGNOSIS — E53.8 VITAMIN B12 DEFICIENCY: ICD-10-CM

## 2020-11-23 DIAGNOSIS — E53.8 VITAMIN B12 DEFICIENCY: Primary | ICD-10-CM

## 2020-11-23 DIAGNOSIS — Z87.42 HISTORY OF MENORRHAGIA: ICD-10-CM

## 2020-11-23 LAB — VIT B12 SERPL-MCNC: 225 PG/ML (ref 193–986)

## 2020-11-23 PROCEDURE — 0503F POSTPARTUM CARE VISIT: CPT | Performed by: OBSTETRICS & GYNECOLOGY

## 2020-11-23 PROCEDURE — 82607 VITAMIN B-12: CPT

## 2020-11-23 PROCEDURE — 74011250636 HC RX REV CODE- 250/636: Performed by: NURSE PRACTITIONER

## 2020-11-23 PROCEDURE — 96372 THER/PROPH/DIAG INJ SC/IM: CPT

## 2020-11-23 PROCEDURE — 36415 COLL VENOUS BLD VENIPUNCTURE: CPT

## 2020-11-23 PROCEDURE — 99214 OFFICE O/P EST MOD 30 MIN: CPT | Performed by: INTERNAL MEDICINE

## 2020-11-23 RX ORDER — LANOLIN ALCOHOL/MO/W.PET/CERES
1000 CREAM (GRAM) TOPICAL DAILY
Qty: 90 TAB | Refills: 3 | Status: SHIPPED | OUTPATIENT
Start: 2020-11-23 | End: 2021-03-05 | Stop reason: SDUPTHER

## 2020-11-23 RX ORDER — ACETAMINOPHEN 325 MG/1
650 TABLET ORAL AS NEEDED
Status: CANCELLED
Start: 2020-12-28

## 2020-11-23 RX ORDER — CYANOCOBALAMIN 1000 UG/ML
1000 INJECTION, SOLUTION INTRAMUSCULAR; SUBCUTANEOUS ONCE
Status: COMPLETED | OUTPATIENT
Start: 2020-11-23 | End: 2020-11-23

## 2020-11-23 RX ORDER — DIPHENHYDRAMINE HYDROCHLORIDE 50 MG/ML
25 INJECTION, SOLUTION INTRAMUSCULAR; INTRAVENOUS AS NEEDED
Status: CANCELLED
Start: 2020-12-28

## 2020-11-23 RX ORDER — DIPHENHYDRAMINE HYDROCHLORIDE 50 MG/ML
25 INJECTION, SOLUTION INTRAMUSCULAR; INTRAVENOUS AS NEEDED
Status: CANCELLED
Start: 2020-12-21

## 2020-11-23 RX ORDER — CYANOCOBALAMIN 1000 UG/ML
1000 INJECTION, SOLUTION INTRAMUSCULAR; SUBCUTANEOUS ONCE
Status: CANCELLED
Start: 2020-12-28

## 2020-11-23 RX ORDER — CYANOCOBALAMIN 1000 UG/ML
1000 INJECTION, SOLUTION INTRAMUSCULAR; SUBCUTANEOUS ONCE
Status: CANCELLED
Start: 2020-12-21

## 2020-11-23 RX ORDER — ACETAMINOPHEN 325 MG/1
650 TABLET ORAL AS NEEDED
Status: CANCELLED
Start: 2020-12-21

## 2020-11-23 RX ADMIN — CYANOCOBALAMIN 1000 MCG: 1000 INJECTION, SOLUTION INTRAMUSCULAR at 11:57

## 2020-11-23 NOTE — PROGRESS NOTES
Outpatient Infusion Center Short Visit Progress Note    [id number] Patient admitted to [facility] for B12 injection and lab ambulatory in stable condition. Assessment completed. No new concerns voiced. Vital Signs:  Visit Vitals  /87 (BP 1 Location: Right arm, BP Patient Position: At rest;Sitting)   Pulse 76   Temp (!) 96.3 °F (35.7 °C)   Resp 18   Wt 112.2 kg (247 lb 6.4 oz)   SpO2 98%   Breastfeeding Yes   BMI 38.75 kg/m²            Lab Results:  Vitamin B12 drawn peripherally and sent for processing        Medications:  Medications Administered     cyanocobalamin (VITAMIN B12) injection 1,000 mcg     Admin Date  11/23/2020 Action  Given Dose  1000 mcg Route  IntraMUSCular Administered By  Kaitlyn Villafuerte, RAFFI                Patient tolerated treatment well. Patient discharged from [facility] ambulatory in no distress at 1200. Patient aware of next appointment.     Future Appointments   Date Time Provider Mor Julien   12/18/2020 10:00 AM [facility]   1/15/2021 10:00 [facility]   2/23/2021 10:00 AM Lala Zee MD [facility] BS AMB   2/24/2021  1:00 PM Lala Zee MD [facility] BS AMB   3/5/2021  9:30 AM Omega Bello MD ON[facility] BS AMB

## 2020-11-23 NOTE — LETTER
11/23/2020 1:47 PM 
 
Ms. Gerardo Melnédez [address] 
[address] [phone] To Whom it may concern; 
 
     Gerardo Meléndez is under my care. She was seen in the office today 11/23/2020. She will be able to return to work as of 11/24/2020, at which time she will be capable of working with the following restrictions: None. Sincerely, Penny Chairez MD

## 2020-11-23 NOTE — PROGRESS NOTES
Christy Parra is a 28 y.o. female here for follow up of anemia. Patient with no complaints of pain at this time.

## 2020-11-23 NOTE — PROGRESS NOTES
Postpartum evaluation    Jazmín Daniel is a 28 y.o. female who presents for a postpartum exam.     She is now six weeks post repeat  section. Her baby is doing well. She has had no menses since delivery. She has had the following significant problems since her delivery: none    The patient is breast feeding without difficulty. The patient would like to use Depo for birth control. She is currently taking: no medications. She is due for her next AE in 3 months.      Visit Vitals  /89 (BP 1 Location: Right arm, BP Patient Position: Sitting)   Ht 5' 7\" (1.702 m)   Wt 248 lb (112.5 kg)   BMI 38.84 kg/m²       PHYSICAL EXAMINATION    Constitutional  · Appearance: well-nourished, well developed, alert, in no acute distress    HENT  · Head and Face: appears normal    Neck  · Inspection/Palpation: normal appearance, no masses or tenderness  · Lymph Nodes: no lymphadenopathy present  · Thyroid: gland size normal, nontender, no nodules or masses present on palpation    Breasts  · Inspection of Breasts: breasts symmetrical, no skin changes, no discharge present, nipple appearance normal, no skin retraction present  · Palpation of Breasts and Axillae: no masses present on palpation, no breast tenderness  · Axillary Lymph Nodes: no lymphadenopathy present    Gastrointestinal  · Abdominal Examination: abdomen non-tender to palpation, normal bowel sounds, no masses present  · Liver and spleen: no hepatomegaly present, spleen not palpable  · Hernias: no hernias identified    Genitourinary  · External Genitalia: normal appearance for age, no discharge present, no tenderness present, no inflammatory lesions present, no masses present, no atrophy present  · Vagina: normal vaginal vault without central or paravaginal defects, no discharge present, no inflammatory lesions present, no masses present  · Bladder: non-tender to palpation  · Urethra: appears normal  · Cervix: normal   · Uterus: normal size, shape and consistency  · Adnexa: no adnexal tenderness present, no adnexal masses present  · Perineum: perineum within normal limits, no evidence of trauma, no rashes or skin lesions present  · Anus: anus within normal limits, no hemorrhoids present  · Inguinal Lymph Nodes: no lymphadenopathy present    Skin  · General Inspection: no rash, no lesions identified    Neurologic/Psychiatric  · Mental Status:  · Orientation: grossly oriented to person, place and time  · Mood and Affect: mood normal, affect appropriate    Assessment:  Normal postpartum check    Plan:  RTO for AE.   Mirena with menses

## 2020-11-25 RX ORDER — SODIUM CHLORIDE 9 MG/ML
25 INJECTION, SOLUTION INTRAVENOUS CONTINUOUS
Status: CANCELLED
Start: 2020-12-18

## 2020-11-25 RX ORDER — ALBUTEROL SULFATE 0.83 MG/ML
2.5 SOLUTION RESPIRATORY (INHALATION) AS NEEDED
Status: CANCELLED
Start: 2020-12-30

## 2020-11-25 RX ORDER — SODIUM CHLORIDE 9 MG/ML
25 INJECTION, SOLUTION INTRAVENOUS CONTINUOUS
Status: CANCELLED
Start: 2020-12-30

## 2020-11-25 RX ORDER — EPINEPHRINE 1 MG/ML
0.3 INJECTION, SOLUTION, CONCENTRATE INTRAVENOUS AS NEEDED
Status: CANCELLED | OUTPATIENT
Start: 2020-12-18

## 2020-11-25 RX ORDER — EPINEPHRINE 1 MG/ML
0.3 INJECTION, SOLUTION, CONCENTRATE INTRAVENOUS AS NEEDED
Status: CANCELLED | OUTPATIENT
Start: 2020-12-30

## 2020-11-25 RX ORDER — DIPHENHYDRAMINE HYDROCHLORIDE 50 MG/ML
50 INJECTION, SOLUTION INTRAMUSCULAR; INTRAVENOUS AS NEEDED
Status: CANCELLED
Start: 2020-12-18

## 2020-11-25 RX ORDER — ACETAMINOPHEN 325 MG/1
650 TABLET ORAL AS NEEDED
Status: CANCELLED
Start: 2020-12-30

## 2020-11-25 RX ORDER — ACETAMINOPHEN 325 MG/1
650 TABLET ORAL AS NEEDED
Status: CANCELLED
Start: 2020-12-18

## 2020-11-25 RX ORDER — HYDROCORTISONE SODIUM SUCCINATE 100 MG/2ML
100 INJECTION, POWDER, FOR SOLUTION INTRAMUSCULAR; INTRAVENOUS AS NEEDED
Status: CANCELLED | OUTPATIENT
Start: 2020-12-18

## 2020-11-25 RX ORDER — ALBUTEROL SULFATE 0.83 MG/ML
2.5 SOLUTION RESPIRATORY (INHALATION) AS NEEDED
Status: CANCELLED
Start: 2020-12-18

## 2020-11-25 RX ORDER — DIPHENHYDRAMINE HYDROCHLORIDE 50 MG/ML
25 INJECTION, SOLUTION INTRAMUSCULAR; INTRAVENOUS AS NEEDED
Status: CANCELLED
Start: 2020-12-30

## 2020-11-25 RX ORDER — DIPHENHYDRAMINE HYDROCHLORIDE 50 MG/ML
50 INJECTION, SOLUTION INTRAMUSCULAR; INTRAVENOUS AS NEEDED
Status: CANCELLED
Start: 2020-12-30

## 2020-11-25 RX ORDER — HYDROCORTISONE SODIUM SUCCINATE 100 MG/2ML
100 INJECTION, POWDER, FOR SOLUTION INTRAMUSCULAR; INTRAVENOUS AS NEEDED
Status: CANCELLED | OUTPATIENT
Start: 2020-12-30

## 2020-11-25 RX ORDER — DIPHENHYDRAMINE HYDROCHLORIDE 50 MG/ML
25 INJECTION, SOLUTION INTRAMUSCULAR; INTRAVENOUS AS NEEDED
Status: CANCELLED
Start: 2020-12-18

## 2020-11-25 NOTE — PROGRESS NOTES
Alert patient labs show iron deficiency. She would benefit from injectafer x 2, I know next week is busy, so schedule week of 12/7. Ask her if she can tolerate increasing iron supplements to BID. Thank you.

## 2020-11-25 NOTE — PROGRESS NOTES
Called patient and advised of lab results per Francisco Vigil NP. Patient verbalized understanding. Patient has infusion appointment scheduled for 12/18. Patient states she was unable to schedule sooner due to conflicting appointments. Patient unable to tolerate increasing iron supplements to twice daily due to constipation. Advised this nurse would update NP and call back if any additional recommendations. She verbalized understanding.

## 2020-12-18 ENCOUNTER — HOSPITAL ENCOUNTER (OUTPATIENT)
Dept: INFUSION THERAPY | Age: 35
Discharge: HOME | End: 2020-12-18
Payer: MEDICAID

## 2020-12-18 ENCOUNTER — APPOINTMENT (OUTPATIENT)
Dept: INFUSION THERAPY | Age: 35
End: 2020-12-18

## 2020-12-18 VITALS
HEART RATE: 77 BPM | OXYGEN SATURATION: 99 % | RESPIRATION RATE: 18 BRPM | SYSTOLIC BLOOD PRESSURE: 118 MMHG | TEMPERATURE: 98.4 F | DIASTOLIC BLOOD PRESSURE: 84 MMHG

## 2020-12-18 DIAGNOSIS — D50.9 IRON DEFICIENCY ANEMIA, UNSPECIFIED IRON DEFICIENCY ANEMIA TYPE: Primary | ICD-10-CM

## 2020-12-18 PROCEDURE — 74011250636 HC RX REV CODE- 250/636: Performed by: NURSE PRACTITIONER

## 2020-12-18 PROCEDURE — 96365 THER/PROPH/DIAG IV INF INIT: CPT

## 2020-12-18 PROCEDURE — 74011000258 HC RX REV CODE- 258: Performed by: NURSE PRACTITIONER

## 2020-12-18 RX ORDER — SODIUM CHLORIDE 9 MG/ML
25 INJECTION, SOLUTION INTRAVENOUS CONTINUOUS
Status: DISCONTINUED | OUTPATIENT
Start: 2020-12-18 | End: 2020-12-19 | Stop reason: HOSPADM

## 2020-12-18 RX ADMIN — SODIUM CHLORIDE 25 ML/HR: 900 INJECTION, SOLUTION INTRAVENOUS at 13:58

## 2020-12-18 RX ADMIN — FERRIC CARBOXYMALTOSE INJECTION 750 MG: 50 INJECTION, SOLUTION INTRAVENOUS at 13:59

## 2020-12-18 NOTE — PROGRESS NOTES
Outpatient Infusion Center Short Visit Progress Note    [id number]  Patient admitted to [facility] for Injectafer infusion ambulatory in stable condition. Assessment completed. No new concerns voiced. Covid Screening      1. Do you have any symptoms of COVID-19? SOB, coughing, fever, or generally not feeling well ? NO  2. Have you been exposed to COVID-19 recently? NO  3. Have you had any recent contact with family/friend that has a pending COVID test? NO    Vital Signs:  Visit Vitals  /84   Pulse 77   Temp 98.4 °F (36.9 °C)   Resp 18   SpO2 99%         Peripheral IV  (24g) with positive blood return. Medications:  Medications Administered     0.9% sodium chloride infusion     Admin Date  12/18/2020 Action  New Bag Dose  25 mL/hr Rate  25 mL/hr Route  IntraVENous Administered By  Baljit Cali RN          ferric carboxymaltose (INJECTAFER) 750 mg in 0.9% sodium chloride 250 mL, overfill volume 25 mL IVPB     Admin Date  12/18/2020 Action  Given Dose  750 mg Rate  870 mL/hr Route  IntraVENous Administered By  Baljit Cali RN                [id number]  Patient tolerated treatment well. Patient discharged from [place] [address] ambulatory in no distress. Patient aware of next appointment on 12/30/20.     Future Appointments   Date Time Provider Mor Julien   12/30/2020  3:30 PM [facility]    2/23/2021 10:00 AM Lissy Soliman MD [facility] BS AMB   2/24/2021  1:00 PM Lissy Soliman MD [facility] BS AMB   3/5/2021  9:30 AM William Owens MD ON[facility] BS AMB

## 2021-01-15 ENCOUNTER — APPOINTMENT (OUTPATIENT)
Dept: INFUSION THERAPY | Age: 36
End: 2021-01-15

## 2021-02-24 ENCOUNTER — OFFICE VISIT (OUTPATIENT)
Dept: OBGYN CLINIC | Age: 36
End: 2021-02-24
Payer: MEDICAID

## 2021-02-24 VITALS
BODY MASS INDEX: 38.45 KG/M2 | SYSTOLIC BLOOD PRESSURE: 108 MMHG | DIASTOLIC BLOOD PRESSURE: 70 MMHG | HEIGHT: 67 IN | WEIGHT: 245 LBS

## 2021-02-24 DIAGNOSIS — Z11.51 SPECIAL SCREENING EXAMINATION FOR HUMAN PAPILLOMAVIRUS (HPV): ICD-10-CM

## 2021-02-24 DIAGNOSIS — N63.10 MASS OF RIGHT BREAST: ICD-10-CM

## 2021-02-24 DIAGNOSIS — Z11.3 SCREENING EXAMINATION FOR VENEREAL DISEASE: ICD-10-CM

## 2021-02-24 DIAGNOSIS — Z01.419 ENCOUNTER FOR GYNECOLOGICAL EXAMINATION WITHOUT ABNORMAL FINDING: Primary | ICD-10-CM

## 2021-02-24 DIAGNOSIS — N94.89 SUPPRESSION OF MENSES: ICD-10-CM

## 2021-02-24 PROCEDURE — 99395 PREV VISIT EST AGE 18-39: CPT | Performed by: OBSTETRICS & GYNECOLOGY

## 2021-02-24 NOTE — LETTER
NOTIFICATION RETURN TO WORK / SCHOOL 
 
2/24/2021 1:36 PM 
 
Ms. Porsche Morales [address] [place] [phone] To Whom It May Concern: Porsche Morales is currently under the care of [address] [place] [address]. She will return to work/school on: 2/24/2021 If there are questions or concerns please have the patient contact our office. Sincerely, Bebe Machado MD

## 2021-02-24 NOTE — PROGRESS NOTES
Dhruv Gaytan is a ,  28 y.o. female BLACK whose Patient's last menstrual period was 2021 (approximate). was on 2021 who presents for her annual checkup. She is having no significant problems. With regard to the Gardisil vaccine, she is older than the FDA approved age to receive it. Menstrual status:    Her periods are moderate in flow. She is using three to five pads or tampons per day, usually regular every 26-30 days. She denies dysmenorrhea. She reports no premenstrual symptoms. Contraception:    The current method of family planning is abstinence and she wants the IUD . Sexual history:    She  reports being sexually active and has had partner(s) who are Male. She reports using the following method of birth control/protection: None. Medical conditions:    Since her last annual GYN exam about 2019 ago, she has not the following changes in her health history: none. Pap and Mammogram History:    Her most recent Pap smear was normal/+HPV obtained 2019 year(s) ago. The patient had a recent mammogram 2019 which was negative for malignancy. The patient does not have a family history of breast cancer. Past Medical History:   Diagnosis Date    Abnormal Pap smear of cervix 2019    Negative/+HPV    Anemia     Anxiety     ASCUS (atypical squamous cells of undetermined significance) on Pap smear 2012    Asthma     extrinsic as a child     Breast disorder     Morbid obesity ([facility] [address]      Past Surgical History:   Procedure Laterality Date    HX BREAST BIOPSY Bilateral     abscesses removed  and     HX BREAST REDUCTION      HX OTHER SURGICAL      abscess removed from left breast    SC BREAST SURGERY PROCEDURE UNLISTED  12    Incision and Drainage Left Breast Abscess       Current Outpatient Medications   Medication Sig Dispense Refill    cyanocobalamin 1,000 mcg tablet Take 1 Tab by mouth daily.  90 Tab 3    acetaminophen (TylenoL) 325 mg tablet Take  by mouth every four (4) hours as needed for Pain.  furosemide (LASIX) 20 mg tablet Take 1 Tab by mouth daily. 5 Tab 0    ibuprofen (MOTRIN) 800 mg tablet Take 1 Tab by mouth every six (6) hours as needed for Pain. 60 Tab 0    pnv w/o calcium-iron fum-fa 29 mg iron- 1 mg chew Take 1 Tab by mouth daily. 30 Tab 12    PNV no.153/FA/om3/dha/epa/fish (PRENATAL GUMMIES PO) Take 1 Tab by mouth daily.  senna-docusate (PERICOLACE) 8.6-50 mg per tablet Take 2 Tabs by mouth daily.  60 Tab 3     Allergies: Bactrim [sulfamethoprim ds] and Zofran [ondansetron hcl (pf)]   Social History     Socioeconomic History    Marital status:      Spouse name: Not on file    Number of children: Not on file    Years of education: Not on file    Highest education level: Not on file   Occupational History    Not on file   Social Needs    Financial resource strain: Not on file    Food insecurity     Worry: Not on file     Inability: Not on file    Transportation needs     Medical: Not on file     Non-medical: Not on file   Tobacco Use    Smoking status: Never Smoker    Smokeless tobacco: Never Used   Substance and Sexual Activity    Alcohol use: No    Drug use: No    Sexual activity: Yes     Partners: Male     Birth control/protection: None   Lifestyle    Physical activity     Days per week: Not on file     Minutes per session: Not on file    Stress: Not on file   Relationships    Social connections     Talks on phone: Not on file     Gets together: Not on file     Attends Buddhism service: Not on file     Active member of club or organization: Not on file     Attends meetings of clubs or organizations: Not on file     Relationship status: Not on file    Intimate partner violence     Fear of current or ex partner: Not on file     Emotionally abused: Not on file     Physically abused: Not on file     Forced sexual activity: Not on file   Other Topics Concern   [address] Service Not Asked    Blood Transfusions Not Asked    Caffeine Concern Not Asked    Occupational Exposure Not Asked    Hobby Hazards Not Asked    Sleep Concern Not Asked    Stress Concern Not Asked    Weight Concern Not Asked    Special Diet Not Asked    Back Care Not Asked    Exercise Not Asked    Bike Helmet Not Asked   [address] Not Asked    Self-Exams Not Asked   Social History Narrative    Not on file     Tobacco History:  reports that she has never smoked. She has never used smokeless tobacco.  Alcohol Abuse:  reports no history of alcohol use. Drug Abuse:  reports no history of drug use.     Patient Active Problem List   Diagnosis Code    Elevated cholesterol E78.00    Asthma J45.909    Hx of breast reduction, elective Z98.890    Anemia D64.9    Anxiety F41.9    Morbid obesity ([facility] [address] E66.01    Syncope R55    Hypokalemia E87.6    Supervision of high risk primigravida in first trimester in patient 28 years or older at time of delivery O36.200    Vitamin B12 deficiency E53.8    Pregnancy Z34.90       Review of Systems - History obtained from the patient  Constitutional: negative for weight loss, fever, night sweats  HEENT: negative for hearing loss, earache, congestion, snoring, sorethroat  CV: negative for chest pain, palpitations, edema  Resp: negative for cough, shortness of breath, wheezing  GI: negative for change in bowel habits, abdominal pain, black or bloody stools  : negative for frequency, dysuria, hematuria, vaginal discharge  MSK: negative for back pain, joint pain, muscle pain  Breast: negative for breast lumps, nipple discharge, galactorrhea  Skin :negative for itching, rash, hives  Neuro: negative for dizziness, headache, confusion, weakness  Psych: negative for anxiety, depression, change in mood  Heme/lymph: negative for bleeding, bruising, pallor    Physical Exam    Visit Vitals  /70   Ht 5' 7\" (1.702 m)   Wt 245 lb (111.1 kg)   LMP 01/29/2021 (Approximate)   BMI 38.37 kg/m²       Constitutional  · Appearance: well-nourished, well developed, alert, in no acute distress    HENT  · Head and Face: appears normal    Neck  · Inspection/Palpation: normal appearance, no masses or tenderness  · Lymph Nodes: no lymphadenopathy present  · Thyroid: gland size normal, nontender, no nodules or masses present on palpation    Chest  · Respiratory Effort: breathing normal  · Auscultation: normal breath sounds    Cardiovascular  · Heart:  · Auscultation: regular rate and rhythm without murmur    Breasts  · Inspection of Breasts: breasts symmetrical, no skin changes, no discharge present, nipple appearance normal, no skin retraction present  · Palpation of Breasts and Axillae: 3cm mass on right at 7:00  · Axillary Lymph Nodes: no lymphadenopathy present    Gastrointestinal  · Abdominal Examination: abdomen non-tender to palpation, normal bowel sounds, no masses present  · Liver and spleen: no hepatomegaly present, spleen not palpable  · Hernias: no hernias identified    Genitourinary  · External Genitalia: normal appearance for age, no discharge present, no tenderness present, no inflammatory lesions present, no masses present, no atrophy present  · Vagina: normal vaginal vault without central or paravaginal defects, no discharge present, no inflammatory lesions present, no masses present  · Bladder: non-tender to palpation  · Urethra: appears normal  · Cervix: normal   · Uterus: normal size, shape and consistency  · Adnexa: no adnexal tenderness present, no adnexal masses present  · Perineum: perineum within normal limits, no evidence of trauma, no rashes or skin lesions present  · Anus: anus within normal limits, no hemorrhoids present  · Inguinal Lymph Nodes: no lymphadenopathy present    Skin  · General Inspection: no rash, no lesions identified    Neurologic/Psychiatric  · Mental Status:  · Orientation: grossly oriented to person, place and time  · Mood and Affect: mood normal, affect appropriate    . Assessment:  Routine gynecologic examination  Her current medical status is satisfactory with no evidence of significant gynecologic issues.   HPV pos  Breast mass  Suppression of menses  Plan:  Counseled re: diet, exercise, healthy lifestyle  Return for yearly wellness visits  Pap/HPV - colpo if abnormal  Once pap issue resolves, place Mirena with menses  See VBC

## 2021-02-26 LAB
CYTOLOGIST CVX/VAG CYTO: NORMAL
CYTOLOGY CVX/VAG DOC CYTO: NORMAL
CYTOLOGY CVX/VAG DOC THIN PREP: NORMAL
CYTOLOGY HISTORY:: NORMAL
DX ICD CODE: NORMAL
HPV I/H RISK 4 DNA CVX QL PROBE+SIG AMP: NEGATIVE
Lab: NORMAL
OTHER STN SPEC: NORMAL
STAT OF ADQ CVX/VAG CYTO-IMP: NORMAL

## 2021-02-26 RX ORDER — METRONIDAZOLE 500 MG/1
500 TABLET ORAL 2 TIMES DAILY
Qty: 14 TAB | Refills: 0 | Status: SHIPPED | OUTPATIENT
Start: 2021-02-26 | End: 2021-03-04 | Stop reason: ALTCHOICE

## 2021-03-04 ENCOUNTER — DOCUMENTATION ONLY (OUTPATIENT)
Dept: SURGERY | Age: 36
End: 2021-03-04

## 2021-03-04 ENCOUNTER — OFFICE VISIT (OUTPATIENT)
Dept: SURGERY | Age: 36
End: 2021-03-04
Payer: MEDICAID

## 2021-03-04 VITALS
TEMPERATURE: 98.3 F | BODY MASS INDEX: 38.45 KG/M2 | WEIGHT: 245 LBS | HEIGHT: 67 IN | HEART RATE: 70 BPM | SYSTOLIC BLOOD PRESSURE: 115 MMHG | DIASTOLIC BLOOD PRESSURE: 77 MMHG

## 2021-03-04 DIAGNOSIS — R92.8 ABNORMAL ULTRASOUND OF BREAST: ICD-10-CM

## 2021-03-04 DIAGNOSIS — N63.10 BREAST MASS, RIGHT: Primary | ICD-10-CM

## 2021-03-04 PROCEDURE — 19083 BX BREAST 1ST LESION US IMAG: CPT | Performed by: SURGERY

## 2021-03-04 PROCEDURE — 99203 OFFICE O/P NEW LOW 30 MIN: CPT | Performed by: SURGERY

## 2021-03-04 NOTE — PATIENT INSTRUCTIONS
Breast Lumps: Care Instructions  Your Care Instructions  Breast lumps are common, especially in women between ages 27 and 48. Many women's breasts feel lumpy and tender before their menstrual period. Women also may have lumps when they are breastfeeding. Breast lumps may go away after menopause. All new breast lumps in women after menopause should be checked by a doctor. Although lumps may be normal for you, it is important to have your doctor check any lump or thickness that is not like the rest of your breast to make sure it is not cancer. A lump may be larger, harder, or different from the rest of your breast tissue. Follow-up care is a key part of your treatment and safety. Be sure to make and go to all appointments, and call your doctor if you are having problems. It's also a good idea to know your test results and keep a list of the medicines you take. How can you care for yourself at home? · Make an appointment to have a mammogram and other follow-up visits as recommended by your doctor. When should you call for help? Watch closely for changes in your health, and be sure to contact your doctor if:    · You do not get better as expected.     · Your breast has changed.     · You have pain in your breast.     · You have a discharge from your nipple.     · A breast lump changes or does not go away. Where can you learn more? Go to http://www.gray.com/  Enter Q928 in the search box to learn more about \"Breast Lumps: Care Instructions. \"  Current as of: November 8, 2019               Content Version: 12.6  © 7803-3550 [employer]. Care instructions adapted under license by [employer] (which disclaims liability or warranty for this information). If you have questions about a medical condition or this instruction, always ask your healthcare professional. [address] any warranty or liability for your use of this information.

## 2021-03-04 NOTE — PROGRESS NOTES
[facility]  OFFICE PROCEDURE PROGRESS NOTE        Chart reviewed for the following:   I, Dr. Wanda Luna have reviewed the History, Physical and updated the Allergic reactions for Ximena Mcgill     TIME OUT performed immediately prior to start of procedure:   I, Dr. Wanda Luna have performed the following reviews on Ximena Mcgill prior to the start of the procedure:            * Patient was identified by name and date of birth   * Agreement on procedure being performed was verified  * Risks and Benefits explained to the patient  * Procedure site verified and marked as necessary  * Patient was positioned for comfort  * Consent was signed and verified     Time: 215pm      Date of procedure: 3/4/2021    Procedure performed by:  Dr. Wanda Luna    Provider assisted by: Malissa Marmolejo RN    Patient assisted by: Malissa Marmolejo RN    How tolerated by patient: Patient tolerated the procedure well without complications.  Denies pain post biopsy.     Post Procedural Pain Scale: 0 none    Comments: Written and verbal post biopsy instructions reviewed with and given to patient with her understanding.

## 2021-03-04 NOTE — PROGRESS NOTES
HISTORY OF PRESENT ILLNESS  Renee Bess is a 28 y.o. female. HPI NEW patient referral for consultation by Dr. Terrie Snellen for a new RIGHT breast palpable lump. The patient's OB/GYN found the lump during an exam last week. It is not tender to palpation. The patient is post partum and her son Faye Arvizu is now 1 months old. She stopped breast feeding after 2 weeks. Dionisio Kristinw BILATERAL breast reduction . Family history -   Maternal aunt pancreatic cancer at age 48 and is . No breast or ovarian cancer. [facility] Results (most recent):  Results from [place] encounter on 19   [facility] MAMMO BI SCREENING INCL CAD    Narrative STUDY: Bilateral digital screening mammogram    INDICATION:  Screening. COMPARISON: Mammogram and ultrasound 2017. BREAST COMPOSITION:  The breasts are almost entirely fatty. FINDINGS: Bilateral digital screening mammography was performed and is  interpreted in conjunction with a computer assisted detection (CAD) system. There is no significant change in benign-appearing lobulated masses of the right  breast and asymmetry of the left breast. No suspicious masses or calcifications  are identified. There has been no significant change. Impression IMPRESSION:  BI-RADS 2: Benign. No mammographic evidence of malignancy. RECOMMENDATIONS:  Next screening mammogram is recommended in one year. The patient will be notified of these results.      Past Medical History:   Diagnosis Date    Abnormal Pap smear of cervix 2019    Negative/+HPV    Anemia     Anxiety     ASCUS (atypical squamous cells of undetermined significance) on Pap smear 2012    Asthma     extrinsic as a child     Breast disorder     Morbid obesity ([facility] [address]      Past Surgical History:   Procedure Laterality Date    HX BREAST BIOPSY Bilateral     abscesses removed  and     HX BREAST REDUCTION      HX OTHER SURGICAL      abscess removed from left breast    NE BREAST SURGERY PROCEDURE UNLISTED  12    Incision and Drainage Left Breast Abscess      OB History        1    Para   1    Term   1       0    AB   0    Living   1       SAB   0    TAB   0    Ectopic   0    Molar   0    Multiple   0    Live Births   1          Obstetric Comments   Menarche 12, LMP 3/2/21, # of children 1, age of 4st delivery 28, Hysterectomy/oophorectomy no/no Breast bx yes history of breast feeding none, BCP yes, Hormone therapy yes           Family History   Problem Relation Age of Onset    Hypertension Mother    [facility] Elevated Lipids Mother     Diabetes Sister      Social History     Tobacco Use    Smoking status: Never Smoker    Smokeless tobacco: Never Used   Substance Use Topics    Alcohol use: No      Prior to Admission medications    Medication Sig Start Date End Date Taking? Authorizing Provider   cyanocobalamin 1,000 mcg tablet Take 1 Tab by mouth daily. 20  Yes Yolie Hua MD   ibuprofen (MOTRIN) 800 mg tablet Take 1 Tab by mouth every six (6) hours as needed for Pain. 10/14/20  Yes Gene Espitia MD   acetaminophen (TylenoL) 325 mg tablet Take  by mouth every four (4) hours as needed for Pain. Yes Provider, Historical   PNV no.153/FA/om3/dha/epa/fish (PRENATAL GUMMIES PO) Take 1 Tab by mouth daily. Yes Provider, Historical   senna-docusate (PERICOLACE) 8.6-50 mg per tablet Take 2 Tabs by mouth daily. 19  Yes Yolie Hua MD      Allergies   Allergen Reactions    Bactrim [Sulfamethoprim Ds] Nausea Only    Zofran [Ondansetron Hcl (Pf)] Rash       Review of Systems   Constitutional: Negative. HENT: Negative. Eyes: Negative. Respiratory: Negative. Cardiovascular: Negative. Gastrointestinal: Negative. Genitourinary: Negative. Musculoskeletal: Negative. Skin: Negative. Neurological: Negative. Endo/Heme/Allergies: Negative. Psychiatric/Behavioral: Negative.         Physical Exam  Constitutional:       Appearance: She is well-developed. Cardiovascular:      Rate and Rhythm: Normal rate and regular rhythm. Heart sounds: Normal heart sounds. Pulmonary:      Effort: Pulmonary effort is normal.      Breath sounds: Normal breath sounds. Chest:      Breasts: Breasts are symmetrical.         Right: Mass (2 cm round mobile mass LOQ 8:00 1/3) present. No swelling, nipple discharge, skin change or tenderness. Left: Skin change (yeast infection LIQ) present. No swelling, mass, nipple discharge or tenderness. Lymphadenopathy:      Upper Body:      Right upper body: No supraclavicular or axillary adenopathy. Left upper body: No supraclavicular or axillary adenopathy. Skin:     General: Skin is warm and dry. Neurological:      Mental Status: She is alert and oriented to person, place, and time. US - Guided Core Biopsy  Indication :  Breast mass lower outer quadrant. 8:00 1/3. palpable. Ultrasound Findings: 2 cm and 1.8cm oval adjacent masses with through transmission  Aspirate: NO aspirate  Prep : alcohol. Anesthesia : 1% lidocaine with epinephrine, 3cc. Device : The hand-held 10 gauge BARD needle was inserted through the lesion and captured tissue with real-time Ultrasound Confirmation. .   Core Sampling :  2 cores were obtained. Marker: no clip placed   Dressing : Steristrips, gauze and tape. Instructions : Remove gauze this evening. Remove steristrips in one week. Tolerance: Pt tolerated procedure with some discomfort  Pathology : FIbroadenoma  Concordance: yes  ASSESSMENT and PLAN    ICD-10-CM ICD-9-CM    1. Breast mass, right  N63.10 611.72      Total time spent with patient: 30 minutes   1. RIGHT breast mass  2. Breast biopsy today. 3. If negative PRN follow up      Biopsy is benign, a fibroadenoma  Spoke with patient  Will recheck with ultrasound in 3 months for stability. Discussed excision vs observation.   Pt is comfortable with observation

## 2021-03-05 ENCOUNTER — VIRTUAL VISIT (OUTPATIENT)
Dept: ONCOLOGY | Age: 36
End: 2021-03-05
Payer: MEDICAID

## 2021-03-05 DIAGNOSIS — Z87.42 HISTORY OF MENORRHAGIA: ICD-10-CM

## 2021-03-05 DIAGNOSIS — D50.0 IRON DEFICIENCY ANEMIA DUE TO CHRONIC BLOOD LOSS: Primary | ICD-10-CM

## 2021-03-05 DIAGNOSIS — N63.0 BREAST MASS: ICD-10-CM

## 2021-03-05 DIAGNOSIS — E53.8 VITAMIN B12 DEFICIENCY: ICD-10-CM

## 2021-03-05 PROCEDURE — 99214 OFFICE O/P EST MOD 30 MIN: CPT | Performed by: INTERNAL MEDICINE

## 2021-03-05 RX ORDER — LANOLIN ALCOHOL/MO/W.PET/CERES
1000 CREAM (GRAM) TOPICAL DAILY
Qty: 90 TAB | Refills: 3 | Status: SHIPPED | OUTPATIENT
Start: 2021-03-05 | End: 2022-07-07

## 2021-03-05 RX ORDER — LANOLIN ALCOHOL/MO/W.PET/CERES
1000 CREAM (GRAM) TOPICAL DAILY
Qty: 90 TAB | Refills: 3 | Status: SHIPPED | OUTPATIENT
Start: 2021-03-05 | End: 2021-03-05 | Stop reason: SDUPTHER

## 2021-03-05 NOTE — PROGRESS NOTES
[id number] [facility], [facility] at [address] [place] [address]  [phone]    Hematology / Oncology Established Visit    Reason for Visit:      Bryce Kelly is a 28 y.o. female who is seen for follow up of by synchronous (real-time) audio-video technology on 3/5/2021 for follow up of anemia. History of Present Illness:   Bryce Kelly is a 28 y.o. female who comes in for evaluation of anemia. She has irregular and heavy periods, lasting 6-7 days, requiring new pad every 1-2 hours. Patient underwent endometrial biopsy. Was started on oral birth control pills, prenatal vitamins, iron supplements. She received a blood transfusion in May 2019. She sees blood in her stools at times and thinks it is related to constipation and hemorrhoids. The blood is not mixed in with the stool. She has not ever seen a GI doctor. She is not currently taking any stools softeners. She denies ice cravings. She has fatigue, DOVE, but no CP. Interval History: Patient seen for follow up of anemia. Last received injectafer x 2 early August 2019. She continues taking oral iron supplements once daily. Was receiving B12 injections monthly in infusion center, but was advised to start oral B12 supplements at our last visit in late 2020. Pt has not started this. Had a breast biopsy by Dr. Masha Mendez yesterday (3/4/21), but this is thought to be galactocele.     Past Medical History:   Diagnosis Date    Abnormal Pap smear of cervix 09/2019    Negative/+HPV    Anemia     Anxiety     ASCUS (atypical squamous cells of undetermined significance) on Pap smear 6/1/2012    Asthma     extrinsic as a child     Breast disorder     Morbid obesity ([facility] [address]       Past Surgical History:   Procedure Laterality Date    HX BREAST BIOPSY Bilateral     abscesses removed 2014 and 2015    HX BREAST REDUCTION  2003    HX OTHER SURGICAL      abscess removed from left breast    [place] BREAST SURGERY PROCEDURE UNLISTED  11/27/12    Incision and Drainage Left Breast Abscess      Social History     Tobacco Use    Smoking status: Never Smoker    Smokeless tobacco: Never Used   Substance Use Topics    Alcohol use: No      Family History   Problem Relation Age of Onset    Hypertension Mother     Elevated Lipids Mother     Diabetes Sister      Current Outpatient Medications   Medication Sig    cyanocobalamin 1,000 mcg tablet Take 1 Tab by mouth daily.  ibuprofen (MOTRIN) 800 mg tablet Take 1 Tab by mouth every six (6) hours as needed for Pain.  acetaminophen (TylenoL) 325 mg tablet Take  by mouth every four (4) hours as needed for Pain.  PNV no.153/FA/om3/dha/epa/fish (PRENATAL GUMMIES PO) Take 1 Tab by mouth daily.  senna-docusate (PERICOLACE) 8.6-50 mg per tablet Take 2 Tabs by mouth daily. No current facility-administered medications for this visit. Allergies   Allergen Reactions    Bactrim [Sulfamethoprim Ds] Nausea Only    Zofran [Ondansetron Hcl (Pf)] Rash        Review of Systems: A complete review of systems was obtained, negative except as described above. Physical Exam:       Due to this being a TeleHealth evaluation, many elements of the physical examination are unable to be assessed.      Constitutional: Appears well-developed and well-nourished in no apparent distress   Mental status: Alert and awake, Oriented to person/place/time, Able to follow commands  Eyes: EOM normal, Sclera normal, No visible ocular discharge  HENT: Normocephalic, atraumatic; Mouth/Throat: Moist mucous membranes, External Ears normal  Neck: No visualized mass  Pulmonary/Chest: Respiratory effort normal, No visualized signs of difficulty breathing or respiratory distress   Musculoskeletal: Normal gait with no signs of ataxia, Normal range of motion of neck  Neurological: No facial asymmetry (Cranial nerve 7 motor function), No gaze palsy  Skin: No significant exanthematous lesions or discoloration noted on facial skin  Psychiatric: Normal affect, normal judgment/insight. No hallucinations     Results:     Lab Results   Component Value Date/Time    WBC 5.6 11/20/2020 12:00 AM    HGB 10.6 (L) 11/20/2020 12:00 AM    HCT 34.1 11/20/2020 12:00 AM    PLATELET 484 41/86/7767 12:00 AM    MCV 83 11/20/2020 12:00 AM    ABS. NEUTROPHILS 3.3 11/20/2020 12:00 AM    Hemoglobin (POC) 7.6 07/05/2019 02:16 PM    Hemoglobin (POC) 13.3 12/18/2014 06:34 PM    Hematocrit (POC) 39 12/18/2014 06:34 PM    Hgb, External 11.6 07/27/2020    Hct, External 33.8 07/27/2020    Platelet cnt., External 194 07/27/2020     Lab Results   Component Value Date/Time    Sodium 141 10/19/2020 07:12 PM    Potassium 3.6 10/19/2020 07:12 PM    Chloride 108 10/19/2020 07:12 PM    CO2 29 10/19/2020 07:12 PM    Glucose 75 10/19/2020 07:12 PM    BUN 9 10/19/2020 07:12 PM    Creatinine 0.76 10/19/2020 07:12 PM    GFR est AA >60 10/19/2020 07:12 PM    GFR est non-AA >60 10/19/2020 07:12 PM    Calcium 7.8 (L) 10/19/2020 07:12 PM    Sodium (POC) 142 12/18/2014 06:34 PM    Potassium (POC) 3.6 12/18/2014 06:34 PM    Chloride (POC) 104 12/18/2014 06:34 PM    Glucose (POC) 87 12/18/2014 06:34 PM    BUN (POC) 13 12/18/2014 06:34 PM    Creatinine (POC) 1.0 12/18/2014 06:34 PM    Calcium, ionized (POC) 1.16 12/18/2014 06:34 PM     Lab Results   Component Value Date/Time    Bilirubin, total 1.0 10/19/2020 07:12 PM    ALT (SGPT) 13 10/19/2020 07:12 PM    Alk.  phosphatase 64 10/19/2020 07:12 PM    Protein, total 6.9 10/19/2020 07:12 PM    Albumin 2.6 (L) 10/19/2020 07:12 PM    Globulin 4.3 (H) 10/19/2020 07:12 PM     Lab Results   Component Value Date/Time    Iron 33 11/20/2020 12:00 AM    TIBC 399 11/20/2020 12:00 AM    Iron % saturation 8 (LL) 11/20/2020 12:00 AM    Ferritin 7 (L) 11/20/2020 12:00 AM       Lab Results   Component Value Date/Time    Vitamin B12 225 11/23/2020 11:55 AM    Folate 9.6 05/28/2019 07:07 PM     Lab Results   Component Value Date/Time    TSH 2.680 06/01/2012 09:01 AM     Lab Results   Component Value Date/Time    Hep B surface Ag screen Negative 03/13/2020 02:53 PM         Imaging:     Radiology report(s) reviewed. Assessment & Plan:   Bryce Kelly is a 28 y.o. female comes in for f/u of anemia. 1. Iron deficiency anemia: 2/2 prior menstrual blood loss and was due to increased demand during pregnancy. Required blood transfusion 5/2019 for Hgb 6.8. s/p Injectafer x 2 in early August 2019. Repeat iron studies and Hgb normal in 9/2019 and 12/2019 and 5/20/20 labs show anemia without iron deficiency - this is likely due to hemodilutional effect of pregnancy. 8/13/20 and 7/20 labs normal Hgb and iron saturation but low ferritin. -- Labs today - call pt with results  -- Iron supplement daily, using stool softeners as needed. -- Return in 4 months with repeat labs and follow up    2. H/o Menorrhagia: Pt previously on OCPs and later on Depot. Not currently on any contraception given recent pregnancy and childbirth on 10/12/20. 3. Vitamin B12 deficiency: B12 level as low as 99 in 5/2019. Had neuropathy during pregnancy in 2020. On B12 injections 9/2020 - 11/2020. Pt did NOT start PO B12 daily. Urged her to do so now. Resent script and reminded her it is also available over the counter. 4. Breast mass: Had biopsy with Dr. Bear Navarrete. 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 Is awaiting pathology results. I appreciate the opportunity to participate in Ms. Ximena Mcgill's care. I was in the office while conducting this encounter. The patient was at her at home    Consent:  She and/or her healthcare decision maker is aware that this patient-initiated Telehealth encounter is a billable service, with coverage as determined by her insurance carrier.  She is aware that she may receive a bill and has provided verbal consent to proceed: Yes    Pursuant to the emergency declaration under the [employer] and the [facility], 1135 waiver authority and the [employer] and [employer] Appropriations Act, this Virtual  Visit was conducted, with patient's (and/or legal guardian's) consent, to reduce the patient's risk of exposure to COVID-19 and provide necessary medical care. Services were provided through a video synchronous discussion virtually to substitute for in-person visit.     Signed By: Vernon Cueto MD     March 5, 2021

## 2021-03-05 NOTE — PROGRESS NOTES
Chief Complaint   Patient presents with   Jessica Beard is a pleasant 28year old woman who presents today as a follow up for anemia.  She denies pain except from her right breast because she recently had a biopsy

## 2021-03-23 DIAGNOSIS — D50.0 IRON DEFICIENCY ANEMIA DUE TO CHRONIC BLOOD LOSS: ICD-10-CM

## 2021-07-07 RX ORDER — NORETHINDRONE ACETATE AND ETHINYL ESTRADIOL 1.5-30(21)
1 KIT ORAL DAILY
Qty: 3 PACKAGE | Refills: 4 | Status: SHIPPED | OUTPATIENT
Start: 2021-07-07 | End: 2021-10-05

## 2022-03-18 PROBLEM — E53.8 VITAMIN B12 DEFICIENCY: Status: ACTIVE | Noted: 2020-09-01

## 2022-03-19 PROBLEM — E87.6 HYPOKALEMIA: Status: ACTIVE | Noted: 2019-05-28

## 2022-03-19 PROBLEM — R55 SYNCOPE: Status: ACTIVE | Noted: 2019-05-28

## 2022-03-19 PROBLEM — Z34.90 PREGNANCY: Status: ACTIVE | Noted: 2020-10-12

## 2022-03-20 PROBLEM — O09.511: Status: ACTIVE | Noted: 2020-03-17

## 2022-07-06 ENCOUNTER — OFFICE VISIT (OUTPATIENT)
Dept: FAMILY MEDICINE CLINIC | Age: 37
End: 2022-07-06
Payer: MEDICARE

## 2022-07-06 VITALS
HEART RATE: 64 BPM | HEIGHT: 67 IN | DIASTOLIC BLOOD PRESSURE: 84 MMHG | BODY MASS INDEX: 39.87 KG/M2 | RESPIRATION RATE: 16 BRPM | WEIGHT: 254 LBS | OXYGEN SATURATION: 100 % | TEMPERATURE: 97.2 F | SYSTOLIC BLOOD PRESSURE: 120 MMHG

## 2022-07-06 DIAGNOSIS — Z11.59 ENCOUNTER FOR HEPATITIS C SCREENING TEST FOR LOW RISK PATIENT: ICD-10-CM

## 2022-07-06 DIAGNOSIS — Z83.49 FAMILY HISTORY OF THYROID DISORDER: ICD-10-CM

## 2022-07-06 DIAGNOSIS — L30.9 ECZEMA, UNSPECIFIED TYPE: ICD-10-CM

## 2022-07-06 DIAGNOSIS — E87.6 HYPOKALEMIA: ICD-10-CM

## 2022-07-06 DIAGNOSIS — E78.00 ELEVATED CHOLESTEROL: Primary | ICD-10-CM

## 2022-07-06 DIAGNOSIS — D50.0 IRON DEFICIENCY ANEMIA DUE TO CHRONIC BLOOD LOSS: ICD-10-CM

## 2022-07-06 DIAGNOSIS — Z83.3 FAMILY HISTORY OF DIABETES MELLITUS: ICD-10-CM

## 2022-07-06 DIAGNOSIS — E53.8 VITAMIN B12 DEFICIENCY: ICD-10-CM

## 2022-07-06 PROCEDURE — G8417 CALC BMI ABV UP PARAM F/U: HCPCS | Performed by: NURSE PRACTITIONER

## 2022-07-06 PROCEDURE — 99204 OFFICE O/P NEW MOD 45 MIN: CPT | Performed by: NURSE PRACTITIONER

## 2022-07-06 PROCEDURE — G8427 DOCREV CUR MEDS BY ELIG CLIN: HCPCS | Performed by: NURSE PRACTITIONER

## 2022-07-06 PROCEDURE — G8432 DEP SCR NOT DOC, RNG: HCPCS | Performed by: NURSE PRACTITIONER

## 2022-07-06 RX ORDER — PIMECROLIMUS 10 MG/G
CREAM TOPICAL 2 TIMES DAILY
Qty: 30 G | Refills: 1 | Status: SHIPPED | OUTPATIENT
Start: 2022-07-06 | End: 2022-08-07

## 2022-07-06 RX ORDER — PREDNISONE 20 MG/1
20 TABLET ORAL
Qty: 7 TABLET | Refills: 1 | Status: SHIPPED | OUTPATIENT
Start: 2022-07-06 | End: 2022-08-18

## 2022-07-06 NOTE — PROGRESS NOTES
Subjective  Chief Complaint   Patient presents with    New Patient    Rash    Establish Care     HPI:  Bear Muller is a 39 y.o. female. 40 yo female presents as a new patient. She has chronic conditions of anxiety, anemia, eczema and asthma. She is not followed by any specialists at this time.   She has a current outbreak of eczema on the palms of both hands and has not found anything that has made it better and scratching makes it worse    Past Medical History:   Diagnosis Date    Abnormal Pap smear of cervix 09/2019    Negative/+HPV    Anemia     Anxiety     ASCUS (atypical squamous cells of undetermined significance) on Pap smear 6/1/2012    Asthma     extrinsic as a child     Breast disorder     Morbid obesity ([facility] [address]      Family History   Problem Relation Age of Onset    Hypertension Mother    [facility] Elevated Lipids Mother     Diabetes Sister      Social History     Socioeconomic History    Marital status:      Spouse name: Not on file    Number of children: Not on file    Years of education: Not on file    Highest education level: Not on file   Occupational History    Not on file   Tobacco Use    Smoking status: Never Smoker    Smokeless tobacco: Never Used   Substance and Sexual Activity    Alcohol use: Never    Drug use: Never    Sexual activity: Yes     Partners: Male     Birth control/protection: None   Other Topics Concern     Service Not Asked    Blood Transfusions Not Asked    Caffeine Concern Not Asked    Occupational Exposure Not Asked    Hobby Hazards Not Asked    Sleep Concern Not Asked    Stress Concern Not Asked    Weight Concern Not Asked    Special Diet Not Asked    Back Care Not Asked    Exercise Not Asked    Bike Helmet Not Asked    Seat Belt Not Asked    Self-Exams Not Asked   Social History Narrative    Not on file     Social Determinants of Health     Financial Resource Strain:     Difficulty of Paying Living Expenses: Not on file   Food Insecurity:     Worried About Running Out of Food in the Last Year: Not on file    Leobardo of Food in the Last Year: Not on file   Transportation Needs:     Lack of Transportation (Medical): Not on file    Lack of Transportation (Non-Medical): Not on file   Physical Activity:     Days of Exercise per Week: Not on file    Minutes of Exercise per Session: Not on file   Stress:     Feeling of Stress : Not on file   Social Connections:     Frequency of Communication with Friends and Family: Not on file    Frequency of Social Gatherings with Friends and Family: Not on file    Attends Mosque Services: Not on file    Active Member of [address] [place] [address] [employer] or Organizations: Not on file    Attends Club or Organization Meetings: Not on file    Marital Status: Not on file   Intimate Partner Violence:     Fear of Current or Ex-Partner: Not on file    Emotionally Abused: Not on file    Physically Abused: Not on file    Sexually Abused: Not on file   Housing Stability:     Unable to Pay for Housing in the Last Year: Not on file    Number of [place] in the Last Year: Not on file    Unstable Housing in the Last Year: Not on file     Current Outpatient Medications on File Prior to Visit   Medication Sig Dispense Refill    cyanocobalamin 1,000 mcg tablet Take 1 Tab by mouth daily. 90 Tab 3    ibuprofen (MOTRIN) 800 mg tablet Take 1 Tab by mouth every six (6) hours as needed for Pain. 60 Tab 0    acetaminophen (TylenoL) 325 mg tablet Take  by mouth every four (4) hours as needed for Pain.  PNV no.153/FA/om3/dha/epa/fish (PRENATAL GUMMIES PO) Take 1 Tab by mouth daily.  senna-docusate (PERICOLACE) 8.6-50 mg per tablet Take 2 Tabs by mouth daily. 60 Tab 3     No current facility-administered medications on file prior to visit.      Allergies   Allergen Reactions    Bactrim [Sulfamethoprim Ds] Nausea Only    Zofran [Ondansetron Hcl (Pf)] Rash     ROS   ROS per HPI and PMH      Objective  Physical Exam  Vitals and nursing note reviewed. HENT:      Head: Normocephalic. Cardiovascular:      Rate and Rhythm: Normal rate and regular rhythm. Heart sounds: Normal heart sounds. Pulmonary:      Effort: Pulmonary effort is normal.      Breath sounds: Normal breath sounds. Abdominal:      General: Bowel sounds are normal.      Palpations: Abdomen is soft. Skin:     General: Skin is warm and dry. Neurological:      Mental Status: She is alert and oriented to person, place, and time. Psychiatric:         Mood and Affect: Mood normal.         Behavior: Behavior normal.         Thought Content: Thought content normal.         Judgment: Judgment normal.          Assessment & Plan      ICD-10-CM ICD-9-CM    1. Elevated cholesterol  E78.00 272.0 LIPID PANEL   2. Iron deficiency anemia due to chronic blood loss  D50.0 280.0 CBC WITH AUTOMATED DIFF   3. Hypokalemia  W45.9 614.8 METABOLIC PANEL, COMPREHENSIVE   4. Vitamin B12 deficiency  E53.8 266.2 VITAMIN B12   5. Family history of diabetes mellitus  Z83.3 V18.0 HEMOGLOBIN A1C WITH EAG   6. Family history of thyroid disorder  Z83.49 V18.19 TSH 3RD GENERATION      T4, FREE   7. Encounter for hepatitis C screening test for low risk patient  Z11.59 V73.89 HEPATITIS C AB   8. Eczema, unspecified type  L30.9 692.9      Diagnoses and all orders for this visit:    1. Elevated cholesterol  -     LIPID PANEL  Obtaining updated lipid panel for trending and will make treatment decisions when I get the results    2. Iron deficiency anemia due to chronic blood loss  -     CBC WITH AUTOMATED DIFF  Obtaining updated CBC for trending and will make treatment decisions when I get the results    3. Hypokalemia  -     METABOLIC PANEL, COMPREHENSIVE  Obtaining updated CMP for trending and will make treatment decisions when I get the results    4. Vitamin B12 deficiency  -     VITAMIN B12  Obtaining updated B12 for trending and will make treatment decisions when I get the results    5.  Family history of diabetes mellitus  -     HEMOGLOBIN A1C WITH EAG  Obtaining updated A1C for trending and will make treatment decisions when I get the results    6. Family history of thyroid disorder  -     TSH 3RD GENERATION  -     T4, FREE  Obtaining updated TSH and T4 for trending and will make treatment decisions when I get the results    7. Encounter for hepatitis C screening test for low risk patient  -     HEPATITIS C AB  Screening for hep C and will confirm positive with additional lab testing and will also refer to GI for further evaluation and treatment    8. Eczema, unspecified type  Will treat with elidel and patient will send a portal message if this does not work    Other orders  -     predniSONE (DELTASONE) 20 mg tablet; Take 1 Tablet by mouth daily (with breakfast). -     pimecrolimus (ELIDEL) 1 % topical cream; Apply  to affected area two (2) times a day.         Danika Fiore NP

## 2022-07-06 NOTE — PROGRESS NOTES
Chief Complaint   Patient presents with    New Patient    Rash    Establish Care     1. \"Have you been to the ER, urgent care clinic since your last visit? Hospitalized since your last visit? \" No    2. \"Have you seen or consulted any other health care providers outside of the [address] [place] [address] since your last visit? \" No     3. For patients aged 39-70: Has the patient had a colonoscopy / FIT/ Cologuard? No      If the patient is female:    4. For patients aged 41-77: Has the patient had a mammogram within the past 2 years? No      5. For patients aged 21-65: Has the patient had a pap smear?  No     Visit Vitals  Pulse 64   Temp 97.2 °F (36.2 °C) (Temporal)   Resp 16   Ht 5' 7\" (1.702 m)   Wt 254 lb (115.2 kg)   SpO2 100%   BMI 39.78 kg/m²     3 most recent PHQ Screens 7/6/2022   Little interest or pleasure in doing things Not at all   Feeling down, depressed, irritable, or hopeless Not at all   Total Score PHQ 2 0

## 2022-07-07 PROBLEM — Z11.59 ENCOUNTER FOR HEPATITIS C SCREENING TEST FOR LOW RISK PATIENT: Status: ACTIVE | Noted: 2022-07-07

## 2022-07-07 PROBLEM — Z83.49 FAMILY HISTORY OF THYROID DISORDER: Status: ACTIVE | Noted: 2022-07-07

## 2022-07-07 PROBLEM — L30.9 ECZEMA: Status: ACTIVE | Noted: 2022-07-07

## 2022-07-07 PROBLEM — Z83.3 FAMILY HISTORY OF DIABETES MELLITUS: Status: ACTIVE | Noted: 2022-07-07

## 2022-07-07 LAB
ALBUMIN SERPL-MCNC: 4.8 G/DL (ref 3.8–4.8)
ALBUMIN/GLOB SERPL: 1.4 {RATIO} (ref 1.2–2.2)
ALP SERPL-CCNC: 60 IU/L (ref 44–121)
ALT SERPL-CCNC: 12 IU/L (ref 0–32)
AST SERPL-CCNC: 17 IU/L (ref 0–40)
BASOPHILS # BLD AUTO: 0 X10E3/UL (ref 0–0.2)
BASOPHILS NFR BLD AUTO: 1 %
BILIRUB SERPL-MCNC: 1.5 MG/DL (ref 0–1.2)
BUN SERPL-MCNC: 9 MG/DL (ref 6–20)
BUN/CREAT SERPL: 14 (ref 9–23)
CALCIUM SERPL-MCNC: 9.1 MG/DL (ref 8.7–10.2)
CHLORIDE SERPL-SCNC: 100 MMOL/L (ref 96–106)
CHOLEST SERPL-MCNC: 174 MG/DL (ref 100–199)
CO2 SERPL-SCNC: 22 MMOL/L (ref 20–29)
CREAT SERPL-MCNC: 0.63 MG/DL (ref 0.57–1)
EGFR: 118 ML/MIN/1.73
EOSINOPHIL # BLD AUTO: 0.3 X10E3/UL (ref 0–0.4)
EOSINOPHIL NFR BLD AUTO: 5 %
ERYTHROCYTE [DISTWIDTH] IN BLOOD BY AUTOMATED COUNT: 18.5 % (ref 11.7–15.4)
EST. AVERAGE GLUCOSE BLD GHB EST-MCNC: 111 MG/DL
GLOBULIN SER CALC-MCNC: 3.5 G/DL (ref 1.5–4.5)
GLUCOSE SERPL-MCNC: 80 MG/DL (ref 65–99)
HBA1C MFR BLD: 5.5 % (ref 4.8–5.6)
HCT VFR BLD AUTO: 25 % (ref 34–46.6)
HCV AB S/CO SERPL IA: <0.1 S/CO RATIO (ref 0–0.9)
HDLC SERPL-MCNC: 62 MG/DL
HGB BLD-MCNC: 6.7 G/DL (ref 11.1–15.9)
IMM GRANULOCYTES # BLD AUTO: 0 X10E3/UL (ref 0–0.1)
IMM GRANULOCYTES NFR BLD AUTO: 0 %
LDLC SERPL CALC-MCNC: 101 MG/DL (ref 0–99)
LYMPHOCYTES # BLD AUTO: 2.1 X10E3/UL (ref 0.7–3.1)
LYMPHOCYTES NFR BLD AUTO: 38 %
MCH RBC QN AUTO: 17.4 PG (ref 26.6–33)
MCHC RBC AUTO-ENTMCNC: 26.8 G/DL (ref 31.5–35.7)
MCV RBC AUTO: 65 FL (ref 79–97)
MONOCYTES # BLD AUTO: 0.5 X10E3/UL (ref 0.1–0.9)
MONOCYTES NFR BLD AUTO: 9 %
NEUTROPHILS # BLD AUTO: 2.7 X10E3/UL (ref 1.4–7)
NEUTROPHILS NFR BLD AUTO: 47 %
PLATELET # BLD AUTO: 333 X10E3/UL (ref 150–450)
POTASSIUM SERPL-SCNC: 3.8 MMOL/L (ref 3.5–5.2)
PROT SERPL-MCNC: 8.3 G/DL (ref 6–8.5)
RBC # BLD AUTO: 3.86 X10E6/UL (ref 3.77–5.28)
SODIUM SERPL-SCNC: 139 MMOL/L (ref 134–144)
T4 FREE SERPL-MCNC: 1.31 NG/DL (ref 0.82–1.77)
TRIGL SERPL-MCNC: 56 MG/DL (ref 0–149)
TSH SERPL DL<=0.005 MIU/L-ACNC: 1.14 UIU/ML (ref 0.45–4.5)
VIT B12 SERPL-MCNC: 268 PG/ML (ref 232–1245)
VLDLC SERPL CALC-MCNC: 11 MG/DL (ref 5–40)
WBC # BLD AUTO: 5.5 X10E3/UL (ref 3.4–10.8)

## 2022-07-23 ENCOUNTER — PATIENT MESSAGE (OUTPATIENT)
Dept: FAMILY MEDICINE CLINIC | Age: 37
End: 2022-07-23

## 2022-07-23 DIAGNOSIS — R79.89 ABNORMAL CBC: Primary | ICD-10-CM

## 2022-07-23 RX ORDER — FERROUS SULFATE 325(65) MG
TABLET, DELAYED RELEASE (ENTERIC COATED) ORAL
Qty: 180 TABLET | Refills: 1 | Status: SHIPPED | OUTPATIENT
Start: 2022-07-23 | End: 2022-07-25

## 2022-07-23 NOTE — PROGRESS NOTES
You have several abnormalities in your CBC and you are very anemic.   I am calling in iron for you to take twice a day and I would like you to also be further evaluated by hematology: [address] [facility], [place] , [address] [place] [address], [phone], Your other labs are within normal limits and we will recheck all you labs at your next office visit

## 2022-07-25 RX ORDER — FERROUS SULFATE 325(65) MG
TABLET, DELAYED RELEASE (ENTERIC COATED) ORAL
Qty: 180 TABLET | Refills: 1 | Status: SHIPPED | OUTPATIENT
Start: 2022-07-25

## 2022-07-25 NOTE — TELEPHONE ENCOUNTER
From: [place] [employer]  To:  Bandar Seals NP  Sent: 7/23/2022 7:25 PM EDT  Subject: Wrong pharmacy     I go to the [facility] on [address]

## 2022-08-02 ENCOUNTER — TELEPHONE (OUTPATIENT)
Dept: FAMILY MEDICINE CLINIC | Age: 37
End: 2022-08-02

## 2022-08-02 NOTE — TELEPHONE ENCOUNTER
FYI/Just received the records from us today They have tried calling both numbers for patient and response. They will keep trying.

## 2022-08-04 ENCOUNTER — TELEPHONE (OUTPATIENT)
Dept: FAMILY MEDICINE CLINIC | Age: 37
End: 2022-08-04

## 2022-08-04 NOTE — TELEPHONE ENCOUNTER
I has not been able to contact patient and her CBC shows her hemoglobin is 6.7. Patient needs to go to the ER to receive a blood transfusion. Another CBC needs to be done before [facility] will see her.

## 2022-08-04 NOTE — TELEPHONE ENCOUNTER
Tried the number second number on file again and this time I was able to reach pt. I advised pt that she needed to go to the ER as her hemoglobin is 6.7. Pt verbalized understanding.

## 2022-08-06 ENCOUNTER — APPOINTMENT (OUTPATIENT)
Dept: ULTRASOUND IMAGING | Age: 37
End: 2022-08-06
Attending: NURSE PRACTITIONER
Payer: MEDICAID

## 2022-08-06 ENCOUNTER — APPOINTMENT (OUTPATIENT)
Dept: CT IMAGING | Age: 37
End: 2022-08-06
Attending: NURSE PRACTITIONER
Payer: MEDICAID

## 2022-08-06 ENCOUNTER — HOSPITAL ENCOUNTER (OUTPATIENT)
Age: 37
Setting detail: OBSERVATION
Discharge: HOME | End: 2022-08-07
Attending: EMERGENCY MEDICINE | Admitting: INTERNAL MEDICINE
Payer: MEDICAID

## 2022-08-06 DIAGNOSIS — D64.9 SYMPTOMATIC ANEMIA: Primary | ICD-10-CM

## 2022-08-06 PROBLEM — Z11.59 ENCOUNTER FOR HEPATITIS C SCREENING TEST FOR LOW RISK PATIENT: Status: RESOLVED | Noted: 2022-07-07 | Resolved: 2022-08-06

## 2022-08-06 PROBLEM — D50.9 IRON DEFICIENCY ANEMIA: Status: ACTIVE | Noted: 2022-08-06

## 2022-08-06 LAB
ALBUMIN SERPL-MCNC: 3.7 G/DL (ref 3.5–5)
ALBUMIN/GLOB SERPL: 0.7 {RATIO} (ref 1.1–2.2)
ALP SERPL-CCNC: 73 U/L (ref 45–117)
ALT SERPL-CCNC: 26 U/L (ref 12–78)
ANION GAP SERPL CALC-SCNC: 8 MMOL/L (ref 5–15)
APPEARANCE UR: CLEAR
AST SERPL-CCNC: 24 U/L (ref 15–37)
BACTERIA URNS QL MICRO: ABNORMAL /HPF
BASOPHILS # BLD: 0.1 K/UL (ref 0–0.1)
BASOPHILS NFR BLD: 1 % (ref 0–1)
BILIRUB SERPL-MCNC: 1.4 MG/DL (ref 0.2–1)
BILIRUB UR QL: NEGATIVE
BUN SERPL-MCNC: 8 MG/DL (ref 6–20)
BUN/CREAT SERPL: 10 (ref 12–20)
CALCIUM SERPL-MCNC: 8.6 MG/DL (ref 8.5–10.1)
CHLORIDE SERPL-SCNC: 103 MMOL/L (ref 97–108)
CO2 SERPL-SCNC: 27 MMOL/L (ref 21–32)
COLOR UR: ABNORMAL
CREAT SERPL-MCNC: 0.79 MG/DL (ref 0.55–1.02)
DIFFERENTIAL METHOD BLD: ABNORMAL
EOSINOPHIL # BLD: 0.3 K/UL (ref 0–0.4)
EOSINOPHIL NFR BLD: 5 % (ref 0–7)
EPITH CASTS URNS QL MICRO: ABNORMAL /LPF
ERYTHROCYTE [DISTWIDTH] IN BLOOD BY AUTOMATED COUNT: 19.7 % (ref 11.5–14.5)
FERRITIN SERPL-MCNC: 2 NG/ML (ref 26–388)
GLOBULIN SER CALC-MCNC: 5 G/DL (ref 2–4)
GLUCOSE SERPL-MCNC: 88 MG/DL (ref 65–100)
GLUCOSE UR STRIP.AUTO-MCNC: NEGATIVE MG/DL
HCG UR QL: NEGATIVE
HCT VFR BLD AUTO: 24.4 % (ref 35–47)
HGB BLD-MCNC: 6.8 G/DL (ref 11.5–16)
HGB UR QL STRIP: NEGATIVE
HISTORY CHECKED?,CKHIST: NORMAL
HYALINE CASTS URNS QL MICRO: ABNORMAL /LPF (ref 0–2)
IMM GRANULOCYTES # BLD AUTO: 0 K/UL (ref 0–0.04)
IMM GRANULOCYTES NFR BLD AUTO: 0 % (ref 0–0.5)
IRON SATN MFR SERPL: 7 % (ref 20–50)
IRON SERPL-MCNC: 33 UG/DL (ref 35–150)
KETONES UR QL STRIP.AUTO: NEGATIVE MG/DL
LEUKOCYTE ESTERASE UR QL STRIP.AUTO: ABNORMAL
LYMPHOCYTES # BLD: 2 K/UL (ref 0.8–3.5)
LYMPHOCYTES NFR BLD: 32 % (ref 12–49)
MCH RBC QN AUTO: 17.8 PG (ref 26–34)
MCHC RBC AUTO-ENTMCNC: 27.9 G/DL (ref 30–36.5)
MCV RBC AUTO: 63.9 FL (ref 80–99)
MONOCYTES # BLD: 0.6 K/UL (ref 0–1)
MONOCYTES NFR BLD: 10 % (ref 5–13)
NEUTS SEG # BLD: 3.2 K/UL (ref 1.8–8)
NEUTS SEG NFR BLD: 52 % (ref 32–75)
NITRITE UR QL STRIP.AUTO: NEGATIVE
NRBC # BLD: 0 K/UL (ref 0–0.01)
NRBC BLD-RTO: 0 PER 100 WBC
PH UR STRIP: 7.5 [PH] (ref 5–8)
PLATELET # BLD AUTO: 288 K/UL (ref 150–400)
PMV BLD AUTO: 9 FL (ref 8.9–12.9)
POTASSIUM SERPL-SCNC: 3.7 MMOL/L (ref 3.5–5.1)
PROT SERPL-MCNC: 8.7 G/DL (ref 6.4–8.2)
PROT UR STRIP-MCNC: NEGATIVE MG/DL
RBC # BLD AUTO: 3.82 M/UL (ref 3.8–5.2)
RBC #/AREA URNS HPF: ABNORMAL /HPF (ref 0–5)
RBC MORPH BLD: ABNORMAL
SODIUM SERPL-SCNC: 138 MMOL/L (ref 136–145)
SP GR UR REFRACTOMETRY: 1.01 (ref 1–1.03)
TIBC SERPL-MCNC: 497 UG/DL (ref 250–450)
TROPONIN-HIGH SENSITIVITY: <4 NG/L (ref 0–51)
UROBILINOGEN UR QL STRIP.AUTO: 1 EU/DL (ref 0.2–1)
VIT B12 SERPL-MCNC: 158 PG/ML (ref 193–986)
WBC # BLD AUTO: 6.2 K/UL (ref 3.6–11)
WBC URNS QL MICRO: ABNORMAL /HPF (ref 0–4)

## 2022-08-06 PROCEDURE — 76856 US EXAM PELVIC COMPLETE: CPT

## 2022-08-06 PROCEDURE — 86902 BLOOD TYPE ANTIGEN DONOR EA: CPT

## 2022-08-06 PROCEDURE — 94761 N-INVAS EAR/PLS OXIMETRY MLT: CPT

## 2022-08-06 PROCEDURE — 81025 URINE PREGNANCY TEST: CPT

## 2022-08-06 PROCEDURE — 99285 EMERGENCY DEPT VISIT HI MDM: CPT

## 2022-08-06 PROCEDURE — 86880 COOMBS TEST DIRECT: CPT

## 2022-08-06 PROCEDURE — 76830 TRANSVAGINAL US NON-OB: CPT

## 2022-08-06 PROCEDURE — 82607 VITAMIN B-12: CPT

## 2022-08-06 PROCEDURE — 2709999900 HC NON-CHARGEABLE SUPPLY

## 2022-08-06 PROCEDURE — 74011250636 HC RX REV CODE- 250/636

## 2022-08-06 PROCEDURE — 86920 COMPATIBILITY TEST SPIN: CPT

## 2022-08-06 PROCEDURE — 80053 COMPREHEN METABOLIC PANEL: CPT

## 2022-08-06 PROCEDURE — 93005 ELECTROCARDIOGRAM TRACING: CPT

## 2022-08-06 PROCEDURE — G0378 HOSPITAL OBSERVATION PER HR: HCPCS

## 2022-08-06 PROCEDURE — 36415 COLL VENOUS BLD VENIPUNCTURE: CPT

## 2022-08-06 PROCEDURE — 86922 COMPATIBILITY TEST ANTIGLOB: CPT

## 2022-08-06 PROCEDURE — 81001 URINALYSIS AUTO W/SCOPE: CPT

## 2022-08-06 PROCEDURE — 96374 THER/PROPH/DIAG INJ IV PUSH: CPT

## 2022-08-06 PROCEDURE — 85025 COMPLETE CBC W/AUTO DIFF WBC: CPT

## 2022-08-06 PROCEDURE — 86921 COMPATIBILITY TEST INCUBATE: CPT

## 2022-08-06 PROCEDURE — 82728 ASSAY OF FERRITIN: CPT

## 2022-08-06 PROCEDURE — 86900 BLOOD TYPING SEROLOGIC ABO: CPT

## 2022-08-06 PROCEDURE — P9016 RBC LEUKOCYTES REDUCED: HCPCS

## 2022-08-06 PROCEDURE — 86870 RBC ANTIBODY IDENTIFICATION: CPT

## 2022-08-06 PROCEDURE — 70450 CT HEAD/BRAIN W/O DYE: CPT

## 2022-08-06 PROCEDURE — 83540 ASSAY OF IRON: CPT

## 2022-08-06 PROCEDURE — 84484 ASSAY OF TROPONIN QUANT: CPT

## 2022-08-06 PROCEDURE — 74011250636 HC RX REV CODE- 250/636: Performed by: INTERNAL MEDICINE

## 2022-08-06 PROCEDURE — 74011000250 HC RX REV CODE- 250: Performed by: INTERNAL MEDICINE

## 2022-08-06 PROCEDURE — 36430 TRANSFUSION BLD/BLD COMPNT: CPT

## 2022-08-06 RX ORDER — SODIUM CHLORIDE 0.9 % (FLUSH) 0.9 %
5-40 SYRINGE (ML) INJECTION AS NEEDED
Status: DISCONTINUED | OUTPATIENT
Start: 2022-08-06 | End: 2022-08-07 | Stop reason: HOSPADM

## 2022-08-06 RX ORDER — DIPHENHYDRAMINE HYDROCHLORIDE 50 MG/ML
INJECTION, SOLUTION INTRAMUSCULAR; INTRAVENOUS
Status: COMPLETED
Start: 2022-08-06 | End: 2022-08-06

## 2022-08-06 RX ORDER — ACETAMINOPHEN 650 MG/1
650 SUPPOSITORY RECTAL
Status: DISCONTINUED | OUTPATIENT
Start: 2022-08-06 | End: 2022-08-07 | Stop reason: HOSPADM

## 2022-08-06 RX ORDER — SODIUM CHLORIDE 9 MG/ML
250 INJECTION, SOLUTION INTRAVENOUS AS NEEDED
Status: DISPENSED | OUTPATIENT
Start: 2022-08-06 | End: 2022-08-07

## 2022-08-06 RX ORDER — SENNOSIDES 8.6 MG/1
1 TABLET ORAL DAILY PRN
Status: DISCONTINUED | OUTPATIENT
Start: 2022-08-06 | End: 2022-08-07 | Stop reason: HOSPADM

## 2022-08-06 RX ORDER — DIPHENHYDRAMINE HYDROCHLORIDE 50 MG/ML
50 INJECTION, SOLUTION INTRAMUSCULAR; INTRAVENOUS
Status: COMPLETED | OUTPATIENT
Start: 2022-08-06 | End: 2022-08-06

## 2022-08-06 RX ORDER — ACETAMINOPHEN 325 MG/1
650 TABLET ORAL
Status: DISCONTINUED | OUTPATIENT
Start: 2022-08-06 | End: 2022-08-07 | Stop reason: HOSPADM

## 2022-08-06 RX ORDER — POLYETHYLENE GLYCOL 3350 17 G/17G
17 POWDER, FOR SOLUTION ORAL DAILY PRN
Status: DISCONTINUED | OUTPATIENT
Start: 2022-08-06 | End: 2022-08-07 | Stop reason: HOSPADM

## 2022-08-06 RX ORDER — SODIUM CHLORIDE 0.9 % (FLUSH) 0.9 %
5-40 SYRINGE (ML) INJECTION EVERY 8 HOURS
Status: DISCONTINUED | OUTPATIENT
Start: 2022-08-06 | End: 2022-08-07 | Stop reason: HOSPADM

## 2022-08-06 RX ADMIN — DIPHENHYDRAMINE HYDROCHLORIDE 50 MG: 50 INJECTION, SOLUTION INTRAMUSCULAR; INTRAVENOUS at 15:05

## 2022-08-06 RX ADMIN — IRON SUCROSE 200 MG: 20 INJECTION, SOLUTION INTRAVENOUS at 16:43

## 2022-08-06 RX ADMIN — SODIUM CHLORIDE, PRESERVATIVE FREE 10 ML: 5 INJECTION INTRAVENOUS at 21:51

## 2022-08-06 NOTE — ED TRIAGE NOTES
Pt states \"I was called by my doctor Thursday and they said my hemoglobin was 6.0 and to come for a blood transfusion\". Reports dizziness, chest pain, SOB and blurred vision starting the beginning of last week. Hx of acute blood loss anemia due to heavy cycles. Last cycle was last week. Denies bleeding at this time.

## 2022-08-06 NOTE — ED NOTES
Patient states history of transfusion reaction: hives and body aches. Brit Grimaldo NP notified and new orders received. Transfusion paused. No symptoms at this time.

## 2022-08-06 NOTE — PROGRESS NOTES
TRANSFER - IN REPORT:    Verbal report received from Lona Chavira (name) on [facility]  being received from ED (unit) for routine progression of care      Report consisted of patients Situation, Background, Assessment and   Recommendations(SBAR). Information from the following report(s) SBAR, Kardex, Intake/Output, MAR, and Recent Results was reviewed with the receiving nurse. Opportunity for questions and clarification was provided. Assessment completed upon patients arrival to unit and care assumed.

## 2022-08-06 NOTE — PROGRESS NOTES
Bedside shift change report given to BRUCE (oncoming nurse) by [employer] (offgoing nurse). Report included the following information SBAR, Kardex, Intake/Output, MAR, and Recent Results.

## 2022-08-06 NOTE — ED NOTES
TRANSFER - OUT REPORT:    Verbal report given to RAFFI Hawkins(name) on MALAIKA Stewart  being transferred to [facility](unit) for routine progression of care       Report consisted of patients Situation, Background, Assessment and   Recommendations(SBAR). Information from the following report(s) SBAR, Kardex, ED Summary, Procedure Summary, Intake/Output, MAR, Recent Results and Cardiac Rhythm NSR was reviewed with the receiving nurse. Lines:   Peripheral IV 08/06/22 Left Antecubital (Active)   Site Assessment Clean, dry, & intact 08/06/22 1205   Phlebitis Assessment 0 08/06/22 1205   Infiltration Assessment 0 08/06/22 1205   Dressing Status Clean, dry, & intact 08/06/22 1205   Dressing Type Transparent;Tape 08/06/22 1205   Hub Color/Line Status Pink;Patent; Flushed 08/06/22 1205   Action Taken Blood drawn 08/06/22 1205       Peripheral IV 08/06/22 Right Antecubital (Active)   Site Assessment Clean, dry, & intact 08/06/22 1415   Phlebitis Assessment 0 08/06/22 1415   Infiltration Assessment 0 08/06/22 1415   Dressing Status Clean, dry, & intact 08/06/22 1415   Dressing Type Transparent 08/06/22 1415   Hub Color/Line Status Pink;Flushed;Patent 08/06/22 1415   Action Taken Blood drawn 08/06/22 1415        Opportunity for questions and clarification was provided.       Patient transported with:   [employer]

## 2022-08-06 NOTE — H&P
[address]  [place] [address] [place]  [phone]    [facility] Medicine History and Physical      NAME:       Odella Gowers   :       1985   MRN:      [id number]     PCP:      Gurjit Rodriguez NP     Date of service:   2022     Chief  Complaint:  Generalized weakness, dizziness, fatigue     History Of Presenting Illness:       Ms. Cali Sorensen is a 39 y.o. female who is being admitted for symptomatic Iron deficiency anemia. Ms. Cali Sorensen presented to our Emergency Department today complaining of  progressively worsening generalized weakness, fatigue associated with dizziness, exertional dyspnea. She denies any cough or fever. She was seen by her PCP and told her Hgb was low at 6.0. She has a hx menorrhagia. Takes iron pills at home. She has no abdominal discomfort. In the ED, her Hgb was 6.8. Given persistent symptoms, she will be observed in hospital for blood transfusion and close monitoring. Allergies   Allergen Reactions    Bactrim [Sulfamethoprim Ds] Nausea Only    Zofran [Ondansetron Hcl (Pf)] Rash       Prior to Admission medications    Medication Sig Start Date End Date Taking? Authorizing Provider   ferrous sulfate (IRON) 325 mg (65 mg iron) EC tablet Take one tablet by mouth in the morning and in the evening by mouth 22   Gurjit Rodriguez NP   predniSONE (DELTASONE) 20 mg tablet Take 1 Tablet by mouth daily (with breakfast). 22   Gurjit Rodriguez NP   pimecrolimus (ELIDEL) 1 % topical cream Apply  to affected area two (2) times a day.  22   Gurjit Rodriguez NP       Past Medical History:   Diagnosis Date    Abnormal Pap smear of cervix 2019    Negative/+HPV    Anemia     Anxiety     ASCUS (atypical squamous cells of undetermined significance) on Pap smear 2012    Asthma     extrinsic as a child     Breast disorder     Morbid obesity ([address]         Past Surgical History:   Procedure Laterality Date    HX BREAST BIOPSY Bilateral     abscesses removed 2014 and 2015    HX BREAST REDUCTION  2003    HX OTHER SURGICAL      abscess removed from left breast    IL BREAST SURGERY PROCEDURE UNLISTED  11/27/12    Incision and Drainage Left Breast Abscess       Social History     Tobacco Use    Smoking status: Never    Smokeless tobacco: Never   Substance Use Topics    Alcohol use: Never        Family History   Problem Relation Age of Onset    Hypertension Mother     Elevated Lipids Mother     Diabetes Sister         Review of Systems:    Constitutional ROS: no fever, chills, rigors or night sweats  Respiratory ROS: no cough, sputum, hemoptysis or pleuritic pain. Cardiovascular ROS: no palpitations, orthopnea, PND or syncope  Endocrine ROS: no polydispsia, polyuria, heat or cold intolerance or major weight change. Gastrointestinal ROS: no dysphagia, abdominal pain, nausea, vomiting or diarrhea    Genito-Urinary ROS: no dysuria, frequency, hematuria, retention or flank pain  Musculoskeletal ROS: no joint pain, swelling or muscular tenderness  Neurological ROS: no headache, confusion, focal weakness    Psychiatric ROS: no depression, anxiety, mood swings  Dermatological ROS: no rash, pruritis, or urticaria  Heme-Lymph ROS: no swollen glands, bleeding    Examination:    Constitutional:  Visit Vitals  /85   Pulse 87   Temp 99.5 °F (37.5 °C)   Resp 20   Ht 5' 7\" (1.702 m)   Wt 108.9 kg (240 lb)   LMP 07/22/2022   SpO2 96%   BMI 37.59 kg/m²         General:  Weak and ill looking patient in no acute distress  Eyes: Pale conjunctivae, PERRLA with no discharge.  Normal eye movements  Ear, Nose, Mouth & Throat: No ottorrhea, rhinorrhea, non tender sinuses, dry mucous membranes  Respiratory:  No accessory muscle use, clear breath sounds without crackles or wheezes  Cardiovascular:  No JVD or murmurs, regular and normal S1, S2 without thrills, bruits or peripheral edema. GI & :  Soft abdomen, non-tender, non-distended, normoactive bowel sounds with no palpable organomegaly  Heme:  No cervical or axillary adenopathy. Musculoskeletal:  No cyanosis, clubbing, atrophy or deformities  Skin:  No rashes, bruising or ulcers   Neurological: Awake and alert, speech is clear, CNs 2-12 are grossly intact and otherwise non focal  Psychiatric:  Has a good insight and is oriented x 3  ________________________________________________________________________    Data Review:    Labs:    Recent Labs     08/06/22  1152   WBC 6.2   HGB 6.8*   HCT 24.4*        Recent Labs     08/06/22  1152      K 3.7      CO2 27   GLU 88   BUN 8   CREA 0.79   CA 8.6   ALB 3.7   ALT 26     No components found for: GLPOC  No results for input(s): PH, PCO2, PO2, HCO3, FIO2 in the last 72 hours. No results for input(s): INR, INREXT, INREXT in the last 72 hours. Imaging Studies:  CT scan head, vaginal ultrasounds - reviewed    I have also reviewed available old medical records. Assessment & Impression:     Ms. Ryan Marie is a 39 y.o. female being evaluated for:     Principal Problem:    Iron deficiency anemia (8/6/2022)       Plan of management:    Iron deficiency anemia (8/6/2022) POA: now symptomatic. Likely due to her hx of menorrhagia. Vaginal ultrasound showed a normal uterus. Previously seen by hematology and no other etiology established. Observe in hospital. Check an iron profile, vitamin B12 levels and transfuse 1 unit pRBCs. Given IV Venofer. Post transfusion Hgb.  Outpatient follow up with gynecology    Code Status:  Full    Surrogate decision maker: Family    Risk of deterioration: high      Total time spent for the care of the patient: [address] discussed with: Patient, Nursing Staff and ED physician    Discussed:  Code Status, Care Plan and D/C Planning    Prophylaxis:  SCD's    Probable Disposition:  Home w/Family           ___________________________________________________    Attending Physician: Brianna Luna MD

## 2022-08-06 NOTE — ED PROVIDER NOTES
This is a 43-year-old female who presents ambulatory to the emergency room after a phone call from her primary care doctor on Thursday that says her hemoglobin is 6.0. She was directed to come to the emergency room at that time. Presents today as she \"couldn't get off work yesterday. \"  Patient has a history of anemia in the past secondary to abnormal uterine bleeding. Is followed by hematology oncology and was supposed to start B12 but was non compliant. States over the past few weeks she has had worsening fatigue, dizziness, chest pain, shortness of breath and blurred vision. Also states she has had worsening constipation with her last bowel movement today. No blood noted. Positive urinary urgency and frequency. States she has to walk slow due to the feeling like she is going to pass out. Has not had any follow up with OB/GYN, Dr. Izzy Clayton, for her abnormal uterine bleeding. States her last menstrual cycle was last week and was \"very heavy. \" Denies any current bleeding. Denies any other complaints. Kiko Matthews NP  Past Medical History:  09/2019: Abnormal Pap smear of cervix      Comment:  Negative/+HPV  No date: Anemia  No date: Anxiety  6/1/2012: ASCUS (atypical squamous cells of undetermined   significance) on Pap smear  No date: Asthma      Comment:  extrinsic as a child   No date: Breast disorder  No date: Morbid obesity ([address]  Past Surgical History:  No date: HX BREAST BIOPSY;  Bilateral      Comment:  abscesses removed 2014 and 2015  2003: HX BREAST REDUCTION  No date: HX OTHER SURGICAL      Comment:  abscess removed from left breast  11/27/12: [place] BREAST SURGERY PROCEDURE UNLISTED      Comment:  Incision and Drainage Left Breast Abscess         Past Medical History:   Diagnosis Date    Abnormal Pap smear of cervix 09/2019    Negative/+HPV    Anemia     Anxiety     ASCUS (atypical squamous cells of undetermined significance) on Pap smear 6/1/2012    Asthma     extrinsic as a child     Breast disorder Morbid obesity ([facility] [address]        Past Surgical History:   Procedure Laterality Date    HX BREAST BIOPSY Bilateral     abscesses removed 2014 and 2015    HX BREAST REDUCTION  2003    HX OTHER SURGICAL      abscess removed from left breast    [place] BREAST SURGERY PROCEDURE UNLISTED  11/27/12    Incision and Drainage Left Breast Abscess         Family History:   Problem Relation Age of Onset    Hypertension Mother     Elevated Lipids Mother     Diabetes Sister        Social History     Socioeconomic History    Marital status:      Spouse name: Not on file    Number of children: Not on file    Years of education: Not on file    Highest education level: Not on file   Occupational History    Not on file   Tobacco Use    Smoking status: Never    Smokeless tobacco: Never   Substance and Sexual Activity    Alcohol use: Never    Drug use: Never    Sexual activity: Yes     Partners: Male     Birth control/protection: None   Other Topics Concern     Service Not Asked    Blood Transfusions Not Asked    Caffeine Concern Not Asked    Occupational Exposure Not Asked    Hobby Hazards Not Asked    Sleep Concern Not Asked    Stress Concern Not Asked    Weight Concern Not Asked    Special Diet Not Asked    Back Care Not Asked    Exercise Not Asked    Bike Helmet Not Asked    Seat Belt Not Asked    Self-Exams Not Asked   Social History Narrative    Not on file     Social Determinants of Health     Financial Resource Strain: Not on file   Food Insecurity: Not on file   Transportation Needs: Not on file   Physical Activity: Not on file   Stress: Not on file   Social Connections: Not on file   Intimate Partner Violence: Not on file   Housing Stability: Not on file         ALLERGIES: Bactrim [sulfamethoprim ds] and Zofran [ondansetron hcl (pf)]    Review of Systems   Constitutional:  Positive for fatigue. Negative for appetite change, chills, diaphoresis and fever.    HENT:  Negative for congestion, ear discharge, ear pain, sinus pressure, sinus pain, sore throat and trouble swallowing. Eyes:  Negative for photophobia, pain, redness and visual disturbance. Respiratory:  Positive for shortness of breath. Negative for chest tightness and wheezing. Cardiovascular:  Positive for chest pain. Negative for palpitations. Gastrointestinal:  Positive for constipation. Negative for abdominal distention, abdominal pain, nausea and vomiting. Endocrine: Negative. Genitourinary:  Positive for frequency and urgency. Negative for difficulty urinating and flank pain. Musculoskeletal:  Negative for back pain, neck pain and neck stiffness. Skin:  Positive for pallor. Negative for color change, rash and wound. Allergic/Immunologic: Negative. Neurological:  Positive for dizziness and weakness (general). Negative for speech difficulty and headaches. Hematological:  Does not bruise/bleed easily. Psychiatric/Behavioral:  Negative for behavioral problems. The patient is not nervous/anxious. Vitals:    08/06/22 1139   BP: 130/89   Pulse: 84   Resp: 15   Temp: 99.5 °F (37.5 °C)   SpO2: 99%   Weight: 108.9 kg (240 lb)   Height: 5' 7\" (1.702 m)            Physical Exam  Vitals and nursing note reviewed. Constitutional:       General: She is not in acute distress. Appearance: Normal appearance. She is well-developed. She is not ill-appearing. HENT:      Head: Normocephalic and atraumatic. Right Ear: External ear normal.      Left Ear: External ear normal.      Nose: Nose normal. No congestion. Mouth/Throat:      Mouth: Mucous membranes are moist.   Eyes:      General:         Right eye: No discharge. Left eye: No discharge. Conjunctiva/sclera: Conjunctivae normal.      Pupils: Pupils are equal, round, and reactive to light. Neck:      Vascular: No JVD. Trachea: No tracheal deviation. Cardiovascular:      Rate and Rhythm: Normal rate and regular rhythm. Pulses: Normal pulses.       Heart sounds: Normal heart sounds. No murmur heard. No gallop. Pulmonary:      Effort: Pulmonary effort is normal. No respiratory distress. Breath sounds: Normal breath sounds. Chest:      Chest wall: No tenderness. Abdominal:      General: Bowel sounds are normal. There is no distension. Palpations: Abdomen is soft. Tenderness: There is no abdominal tenderness. There is no guarding or rebound. Genitourinary:     Comments: Negative    Musculoskeletal:         General: No tenderness. Normal range of motion. Cervical back: Normal range of motion and neck supple. No tenderness. Comments: Generalized weakness and fatigue. Skin:     General: Skin is warm and dry. Capillary Refill: Capillary refill takes 2 to 3 seconds. Coloration: Skin is pale. Findings: No erythema or rash. Neurological:      General: No focal deficit present. Mental Status: She is alert and oriented to person, place, and time. Motor: No weakness. Coordination: Coordination normal.   Psychiatric:         Mood and Affect: Mood normal.         Behavior: Behavior normal.         Thought Content: Thought content normal.         Judgment: Judgment normal.        MDM  Number of Diagnoses or Management Options  Symptomatic anemia: new and requires workup  Diagnosis management comments: Differential diagnosis includes symptomatic anemia, abnormal uterine bleeding, MI and others. After physical assessment and discussion with Dr. Rochelle Sams patient was admitted to the hospitalist service for further evaluation and treatment. Patient in agreement with plan of care. Amount and/or Complexity of Data Reviewed  Clinical lab tests: ordered and reviewed  Tests in the radiology section of CPT®: ordered and reviewed  Discuss the patient with other providers: yes (Dr. Rochelle Sams  )         Walker Chavez for Admission  1:35 PM    ED Room Number: AQ67/64  Patient Name and age:  Galileo Benton 39 y.o. female  Working Diagnosis:   1.  Symptomatic anemia        COVID-19 Suspicion:  no  Sepsis present:  no  Reassessment needed: no  Code Status:  Full Code  Readmission: no  Isolation Requirements:  no  Recommended Level of Care:  telemetry  Department:[facility] ED - [phone]  Other:  family practice eleanor       Procedures

## 2022-08-06 NOTE — ED NOTES
Transfusion restarted. This RN to remain at bedside during first 15 min to monitor for reaction symptoms. detailed exam

## 2022-08-07 VITALS
WEIGHT: 240 LBS | RESPIRATION RATE: 17 BRPM | TEMPERATURE: 98.4 F | SYSTOLIC BLOOD PRESSURE: 99 MMHG | OXYGEN SATURATION: 95 % | HEART RATE: 68 BPM | BODY MASS INDEX: 37.67 KG/M2 | DIASTOLIC BLOOD PRESSURE: 65 MMHG | HEIGHT: 67 IN

## 2022-08-07 LAB
HCT VFR BLD AUTO: 26.3 % (ref 35–47)
HGB BLD-MCNC: 7.4 G/DL (ref 11.5–16)

## 2022-08-07 PROCEDURE — G0378 HOSPITAL OBSERVATION PER HR: HCPCS

## 2022-08-07 PROCEDURE — 74011250637 HC RX REV CODE- 250/637: Performed by: INTERNAL MEDICINE

## 2022-08-07 PROCEDURE — 74011250636 HC RX REV CODE- 250/636: Performed by: INTERNAL MEDICINE

## 2022-08-07 PROCEDURE — 96374 THER/PROPH/DIAG INJ IV PUSH: CPT

## 2022-08-07 PROCEDURE — 85018 HEMOGLOBIN: CPT

## 2022-08-07 PROCEDURE — 36415 COLL VENOUS BLD VENIPUNCTURE: CPT

## 2022-08-07 PROCEDURE — 74011000250 HC RX REV CODE- 250: Performed by: INTERNAL MEDICINE

## 2022-08-07 RX ORDER — LANOLIN ALCOHOL/MO/W.PET/CERES
1000 CREAM (GRAM) TOPICAL DAILY
Qty: 30 TABLET | Refills: 1 | Status: SHIPPED | OUTPATIENT
Start: 2022-08-07 | End: 2022-09-06

## 2022-08-07 RX ORDER — LANOLIN ALCOHOL/MO/W.PET/CERES
1000 CREAM (GRAM) TOPICAL DAILY
Status: DISCONTINUED | OUTPATIENT
Start: 2022-08-07 | End: 2022-08-07 | Stop reason: HOSPADM

## 2022-08-07 RX ADMIN — CYANOCOBALAMIN TAB 500 MCG 1000 MCG: 500 TAB at 09:12

## 2022-08-07 RX ADMIN — IRON SUCROSE 200 MG: 20 INJECTION, SOLUTION INTRAVENOUS at 09:11

## 2022-08-07 RX ADMIN — SODIUM CHLORIDE, PRESERVATIVE FREE 10 ML: 5 INJECTION INTRAVENOUS at 07:06

## 2022-08-07 NOTE — PROGRESS NOTES
Problem: Fluid Volume - Risk of, Imbalanced  Goal: *Balanced intake and output  Outcome: Progressing Towards Goal     Problem: Patient Education: Go to Patient Education Activity  Goal: Patient/Family Education  Outcome: Progressing Towards Goal     Problem: Nutrition Deficit  Goal: *Optimize nutritional status  Outcome: Progressing Towards Goal     Problem: Patient Education: Go to Patient Education Activity  Goal: Patient/Family Education  Outcome: Progressing Towards Goal     Problem: Falls - Risk of  Goal: *Absence of Falls  Description: Document Justin Pepe Fall Risk and appropriate interventions in the flowsheet.   Outcome: Progressing Towards Goal  Note: Fall Risk Interventions:                                Problem: Patient Education: Go to Patient Education Activity  Goal: Patient/Family Education  Outcome: Progressing Towards Goal

## 2022-08-07 NOTE — PROGRESS NOTES
Discharge instructions given. Pt. verbalized an understanding and documents signed. Docs. Placed in paper chart. Discharge per MD order. Patient being driven home by   Pt. Stable and in no apparent distress at time of discharge.

## 2022-08-07 NOTE — PROGRESS NOTES
Bedside, Verbal, and Written shift change report given to [address] [place] (oncoming nurse) by Lenore Arnold RN (offgoing nurse). Report included the following information SBAR, Kardex, Procedure Summary, Intake/Output, MAR, Recent Results, Med Rec Status, Alarm Parameters , and Quality Measures.

## 2022-08-07 NOTE — PROGRESS NOTES
D/C Order Noted:   9:13 AM- Pt remains on Med. Surg- plans for discharge home with family and follow up appointments. Medicare Outpatient Observation Notice (MOON)/ [place] Outpatient Observation Notice (Dante Patel) provided to patient/representative with verbal explanation of the notice. Time allotted for questions regarding the notice. Patient /representative provided a completed copy of the MOON/VOON notice. Copy placed on bedside chart. Pt states no questions or concerns- states her family will come and drive her home. RN informed no further CM needs identified. Care Management Interventions  PCP Verified by CM:  Yes  Mode of Transport at Discharge: Self  Transition of Care Consult (CM Consult): Discharge Planning  MyChart Signup: No  Discharge Durable Medical Equipment: No  Health Maintenance Reviewed: Yes  Physical Therapy Consult: No  Occupational Therapy Consult: No  Speech Therapy Consult: No  Support Systems: Other Family Member(s), Spouse/Significant Other  Confirm Follow Up Transport: Family  The Patient and/or Patient Representative was Provided with a Choice of Provider and Agrees with the Discharge Plan?: Yes  Freedom of Choice List was Provided with Basic Dialogue that Supports the Patient's Individualized Plan of Care/Goals, Treatment Preferences and Shares the Quality Data Associated with the Providers?: Yes  Discharge Location  Patient Expects to be Discharged to[de-identified] Home with family assistance     MARCI Cespedes, [address]

## 2022-08-07 NOTE — DISCHARGE SUMMARY
[facility]  [address] [facility], [address] [place] [address]  Tel: [phone]    Hospital Medicine Discharge Summary    Patient ID:    Omar Felix  Age:              39 y.o.    : 1985  MRN:             [id number]     PCP: Soham Triana NP     Date of Admission: 2022    Date of Discharge:  2022    Principal admission Diagnosis:   Iron deficiency anemia [D50.9]    Discharge Diagnoses:  Principal Problem:    Iron deficiency anemia (2022)    Vitamin B12 deficiency (2020)    Hospital Course:     Ms. Rowan Bernal is a 39 y.o. admitted to [facility] and treated for the following:    Iron deficiency anemia (2022) POA: now symptomatic. Likely due to her hx of menorrhagia. Vaginal ultrasound showed a normal uterus. Previously seen by hematology and no other etiology established other than vitamin B12 deficiency. She received IV venofer and transfused with 1 unit pRBC and Hgb was 7.4 at discharge. She was advised to continue with oral iron. Outpatient follow up with gynecology and hematology for possible IV iron infusions    Vitamin B12 deficiency (2020) POA: vit B12 was low at 158. Started on supplementation. Eczema hands - continue Prednisone     Discharge Exam:  Visit Vitals  BP 99/65 (BP 1 Location: Right upper arm, BP Patient Position: At rest)   Pulse 68   Temp 98.4 °F (36.9 °C)   Resp 17   Ht 5' 7\" (1.702 m)   Wt 108.9 kg (240 lb)   LMP 2022   SpO2 95%   BMI 37.59 kg/m²        Patient has been seen and examined.     General: well looking and in no acute distress  Pulm: clear breath sounds without wheezes  Card: no murmurs, normal S1, S2 without thrills, bruits   Abd:    soft, non-tender, normoactive bowel sounds  Skin: no rashes and skin turgor is good  Neuro: awake, alert and has a non focal     Activity: Activity as tolerated    Diet: Regular Diet    Current Discharge Medication List        CONTINUE these medications which have NOT CHANGED    Details   ferrous sulfate (IRON) 325 mg (65 mg iron) EC tablet Take one tablet by mouth in the morning and in the evening by mouth  Qty: 180 Tablet, Refills: 1      predniSONE (DELTASONE) 20 mg tablet Take 1 Tablet by mouth daily (with breakfast). Qty: 7 Tablet, Refills: 1           STOP taking these medications       pimecrolimus (ELIDEL) 1 % topical cream Comments:   Reason for Stopping: Follow-up Information       Follow up With Specialties Details Why Dionne Bustos MD Hematology and Oncology, Internal Medicine Physician, Hematology Physician, Oncology Call to schedule follow up and review for possible iron infusions [address] [place] [address]  [phone]              Follow-up tests or labs: None    Discharge Condition: Stable    Disposition: home    Time taken to co-ordinate and arrange discharge:  35 minutes.     Signed:  Felicia Hidalgo MD       8/7/2022   7:13 AM

## 2022-08-08 LAB
ATRIAL RATE: 72 BPM
CALCULATED P AXIS, ECG09: 18 DEGREES
CALCULATED R AXIS, ECG10: 26 DEGREES
CALCULATED T AXIS, ECG11: 3 DEGREES
DIAGNOSIS, 93000: NORMAL
P-R INTERVAL, ECG05: 170 MS
Q-T INTERVAL, ECG07: 390 MS
QRS DURATION, ECG06: 80 MS
QTC CALCULATION (BEZET), ECG08: 427 MS
VENTRICULAR RATE, ECG03: 72 BPM

## 2022-08-09 LAB
ABO + RH BLD: NORMAL
ANTIGENS PRESENT RBC DONR: NORMAL
ANTIGENS PRESENT RBC DONR: NORMAL
BLD PROD TYP BPU: NORMAL
BLD PROD TYP BPU: NORMAL
BLOOD BANK CMNT PATIENT-IMP: NORMAL
BLOOD GROUP ANTIBODIES SERPL: NORMAL
BLOOD GROUP ANTIBODIES SERPL: NORMAL
BPU ID: NORMAL
BPU ID: NORMAL
CROSSMATCH RESULT,%XM: NORMAL
CROSSMATCH RESULT,%XM: NORMAL
DAT POLY-SP REAG RBC QL: NORMAL
SPECIMEN EXP DATE BLD: NORMAL
STATUS OF UNIT,%ST: NORMAL
STATUS OF UNIT,%ST: NORMAL
UNIT DIVISION, %UDIV: 0
UNIT DIVISION, %UDIV: 0

## 2022-08-17 ENCOUNTER — PATIENT MESSAGE (OUTPATIENT)
Dept: ONCOLOGY | Age: 37
End: 2022-08-17

## 2022-08-18 ENCOUNTER — OFFICE VISIT (OUTPATIENT)
Dept: FAMILY MEDICINE CLINIC | Age: 37
End: 2022-08-18
Payer: MEDICAID

## 2022-08-18 VITALS
DIASTOLIC BLOOD PRESSURE: 64 MMHG | BODY MASS INDEX: 40.02 KG/M2 | HEIGHT: 67 IN | WEIGHT: 255 LBS | SYSTOLIC BLOOD PRESSURE: 102 MMHG | TEMPERATURE: 97.2 F | RESPIRATION RATE: 16 BRPM | HEART RATE: 80 BPM | OXYGEN SATURATION: 98 %

## 2022-08-18 DIAGNOSIS — Z09 HOSPITAL DISCHARGE FOLLOW-UP: Primary | ICD-10-CM

## 2022-08-18 DIAGNOSIS — D50.9 IRON DEFICIENCY ANEMIA, UNSPECIFIED IRON DEFICIENCY ANEMIA TYPE: ICD-10-CM

## 2022-08-18 PROCEDURE — 99214 OFFICE O/P EST MOD 30 MIN: CPT | Performed by: NURSE PRACTITIONER

## 2022-08-18 PROCEDURE — 96372 THER/PROPH/DIAG INJ SC/IM: CPT | Performed by: NURSE PRACTITIONER

## 2022-08-18 RX ORDER — DIPHENHYDRAMINE HYDROCHLORIDE 50 MG/ML
50 INJECTION, SOLUTION INTRAMUSCULAR; INTRAVENOUS AS NEEDED
Status: CANCELLED
Start: 2022-09-02

## 2022-08-18 RX ORDER — HYDROCORTISONE SODIUM SUCCINATE 100 MG/2ML
100 INJECTION, POWDER, FOR SOLUTION INTRAMUSCULAR; INTRAVENOUS AS NEEDED
Status: CANCELLED | OUTPATIENT
Start: 2022-09-02

## 2022-08-18 RX ORDER — ACETAMINOPHEN 325 MG/1
650 TABLET ORAL AS NEEDED
Status: CANCELLED
Start: 2022-08-26

## 2022-08-18 RX ORDER — SODIUM CHLORIDE 9 MG/ML
5-40 INJECTION INTRAMUSCULAR; INTRAVENOUS; SUBCUTANEOUS AS NEEDED
Status: CANCELLED | OUTPATIENT
Start: 2022-09-02

## 2022-08-18 RX ORDER — ACETAMINOPHEN 325 MG/1
650 TABLET ORAL AS NEEDED
Status: CANCELLED
Start: 2022-09-02

## 2022-08-18 RX ORDER — HYDROCORTISONE SODIUM SUCCINATE 100 MG/2ML
100 INJECTION, POWDER, FOR SOLUTION INTRAMUSCULAR; INTRAVENOUS AS NEEDED
Status: CANCELLED | OUTPATIENT
Start: 2022-08-26

## 2022-08-18 RX ORDER — SODIUM CHLORIDE 9 MG/ML
5-250 INJECTION, SOLUTION INTRAVENOUS AS NEEDED
Status: CANCELLED | OUTPATIENT
Start: 2022-09-02

## 2022-08-18 RX ORDER — DIPHENHYDRAMINE HYDROCHLORIDE 50 MG/ML
25 INJECTION, SOLUTION INTRAMUSCULAR; INTRAVENOUS AS NEEDED
Status: CANCELLED
Start: 2022-09-02

## 2022-08-18 RX ORDER — DIPHENHYDRAMINE HYDROCHLORIDE 50 MG/ML
25 INJECTION, SOLUTION INTRAMUSCULAR; INTRAVENOUS AS NEEDED
Status: CANCELLED
Start: 2022-08-26

## 2022-08-18 RX ORDER — CYANOCOBALAMIN 1000 UG/ML
1000 INJECTION, SOLUTION INTRAMUSCULAR; SUBCUTANEOUS
Status: SHIPPED | OUTPATIENT
Start: 2022-08-18

## 2022-08-18 RX ORDER — ALBUTEROL SULFATE 0.83 MG/ML
2.5 SOLUTION RESPIRATORY (INHALATION) AS NEEDED
Status: CANCELLED
Start: 2022-09-02

## 2022-08-18 RX ORDER — DIPHENHYDRAMINE HYDROCHLORIDE 50 MG/ML
50 INJECTION, SOLUTION INTRAMUSCULAR; INTRAVENOUS AS NEEDED
Status: CANCELLED
Start: 2022-08-26

## 2022-08-18 RX ORDER — HEPARIN 100 UNIT/ML
500 SYRINGE INTRAVENOUS AS NEEDED
Status: CANCELLED
Start: 2022-08-31

## 2022-08-18 RX ORDER — ONDANSETRON 2 MG/ML
8 INJECTION INTRAMUSCULAR; INTRAVENOUS AS NEEDED
Status: CANCELLED | OUTPATIENT
Start: 2022-08-31

## 2022-08-18 RX ORDER — SODIUM CHLORIDE 9 MG/ML
5-40 INJECTION INTRAMUSCULAR; INTRAVENOUS; SUBCUTANEOUS AS NEEDED
Status: CANCELLED | OUTPATIENT
Start: 2022-08-26

## 2022-08-18 RX ORDER — ALBUTEROL SULFATE 0.83 MG/ML
2.5 SOLUTION RESPIRATORY (INHALATION) AS NEEDED
Status: CANCELLED
Start: 2022-08-26

## 2022-08-18 RX ORDER — EPINEPHRINE 1 MG/ML
0.3 INJECTION, SOLUTION, CONCENTRATE INTRAVENOUS AS NEEDED
Status: CANCELLED | OUTPATIENT
Start: 2022-09-02

## 2022-08-18 RX ORDER — EPINEPHRINE 1 MG/ML
0.3 INJECTION, SOLUTION, CONCENTRATE INTRAVENOUS AS NEEDED
Status: CANCELLED | OUTPATIENT
Start: 2022-08-26

## 2022-08-18 RX ORDER — SODIUM CHLORIDE 0.9 % (FLUSH) 0.9 %
5-40 SYRINGE (ML) INJECTION AS NEEDED
Status: CANCELLED | OUTPATIENT
Start: 2022-09-02

## 2022-08-18 RX ORDER — SODIUM CHLORIDE 9 MG/ML
5-250 INJECTION, SOLUTION INTRAVENOUS AS NEEDED
Status: CANCELLED | OUTPATIENT
Start: 2022-08-26

## 2022-08-18 RX ORDER — ONDANSETRON 2 MG/ML
8 INJECTION INTRAMUSCULAR; INTRAVENOUS AS NEEDED
Status: CANCELLED | OUTPATIENT
Start: 2022-08-24

## 2022-08-18 RX ORDER — SODIUM CHLORIDE 0.9 % (FLUSH) 0.9 %
5-40 SYRINGE (ML) INJECTION AS NEEDED
Status: CANCELLED | OUTPATIENT
Start: 2022-08-26

## 2022-08-18 RX ORDER — HEPARIN 100 UNIT/ML
500 SYRINGE INTRAVENOUS AS NEEDED
Status: CANCELLED
Start: 2022-08-24

## 2022-08-18 RX ADMIN — CYANOCOBALAMIN 1000 MCG: 1000 INJECTION, SOLUTION INTRAMUSCULAR; SUBCUTANEOUS at 15:00

## 2022-08-18 NOTE — PROGRESS NOTES
[address] [facility] Oncology at [facility]  [phone]    Hematology / Oncology Established Visit    Reason for Visit:      Odella Gowers is a 40 y.o. female who is seen for follow up of anemia. History of Present Illness:   Odella Gowers is a 40 y.o. female who comes in for evaluation of anemia. She has irregular and heavy periods, lasting 6-7 days, requiring new pad every 1-2 hours. Patient underwent endometrial biopsy. Was started on oral birth control pills, prenatal vitamins, iron supplements. She received a blood transfusion in May 2019. She sees blood in her stools at times and thinks it is related to constipation and hemorrhoids. The blood is not mixed in with the stool. She has not ever seen a GI doctor. She is not currently taking any stools softeners. She denies ice cravings. She has fatigue, DOVE, but no CP. Interval History: Patient seen for follow up of anemia after recent hospitalization. Admitted 8/6-8/7/22 for symptomatic iron deficiency anemia likely due to menorrhagia. Also, found again to have low vitamin B12. While inpatient, received 2 x IV venofer and transfused with 1 unit of pRBC. Taking oral iron supplements twice daily for past 2 months. No issues with constipation or GI upset. She had not started B12 supplements until seeing PCP (FREDDY Mckenna.) She was just recently started on weekly B12 injections x 4 with PCP (FREDDY Mckenna) with first injection yesterday. Also started taking oral B12. She prefers to get injections in the office. Reports heavy menstrual cycles. Not currently on contraceptives b/c she is actively trying for another child. Cycles are monthly lasting 2-3 days - filling up more than 10, sometimes 2 at a time. She is currently on her cycle. Reports fatigue, mild exertional SOB. No ice cravings. No neuropathy.       PMHx: Anemia, Anxiety, Asthma, Obesity  PSurgHx: Breast reduction 2003, I&D of breast abscess 2012  Shx: No EtOH/tobacco  Fhx: Mother with HTN, HLD. Sister with DM  Review of Systems: A complete review of systems was obtained, negative except as described above. Physical Exam:     Visit Vitals  /69 (BP 1 Location: Left arm, BP Patient Position: Sitting, BP Cuff Size: Adult long)   Pulse (!) 59   Temp 98 °F (36.7 °C) (Oral)   Resp 19   Ht 5' 7\" (1.702 m)   Wt 251 lb (113.9 kg)   LMP 07/22/2022   SpO2 98%   BMI 39.31 kg/m²     ECOG PS: 0  General: no distress  Eyes: anicteric sclerae  HENT: oropharynx clear  Neck: supple  Lymphatic: no cervical, supraclavicular adenopathy  Respiratory: normal respiratory effort  CV: no peripheral edema  GI: soft, nontender, nondistended, no masses  Skin: no rashes; no ecchymoses; no petechiae      Results:     Lab Results   Component Value Date/Time    WBC 6.2 08/06/2022 11:52 AM    HGB 7.4 (L) 08/07/2022 03:21 AM    HCT 26.3 (L) 08/07/2022 03:21 AM    PLATELET 688 44/23/2508 11:52 AM    MCV 63.9 (L) 08/06/2022 11:52 AM    ABS.  NEUTROPHILS 3.2 08/06/2022 11:52 AM    Hemoglobin (POC) 7.6 07/05/2019 02:16 PM    Hemoglobin (POC) 13.3 12/18/2014 06:34 PM    Hematocrit (POC) 39 12/18/2014 06:34 PM    Hgb, External 11.6 07/27/2020 12:00 AM    Hct, External 33.8 07/27/2020 12:00 AM    Platelet cnt., External 194 07/27/2020 12:00 AM     Lab Results   Component Value Date/Time    Sodium 138 08/06/2022 11:52 AM    Potassium 3.7 08/06/2022 11:52 AM    Chloride 103 08/06/2022 11:52 AM    CO2 27 08/06/2022 11:52 AM    Glucose 88 08/06/2022 11:52 AM    BUN 8 08/06/2022 11:52 AM    Creatinine 0.79 08/06/2022 11:52 AM    GFR est AA >60 08/06/2022 11:52 AM    GFR est non-AA >60 08/06/2022 11:52 AM    Calcium 8.6 08/06/2022 11:52 AM    Sodium (POC) 142 12/18/2014 06:34 PM    Potassium (POC) 3.6 12/18/2014 06:34 PM    Chloride (POC) 104 12/18/2014 06:34 PM    Glucose (POC) 87 12/18/2014 06:34 PM    BUN (POC) 13 12/18/2014 06:34 PM    Creatinine (POC) 1.0 12/18/2014 06:34 PM    Calcium, ionized (POC) 1.16 12/18/2014 06:34 PM     Lab Results   Component Value Date/Time    Bilirubin, total 1.4 (H) 08/06/2022 11:52 AM    ALT (SGPT) 26 08/06/2022 11:52 AM    Alk. phosphatase 73 08/06/2022 11:52 AM    Protein, total 8.7 (H) 08/06/2022 11:52 AM    Albumin 3.7 08/06/2022 11:52 AM    Globulin 5.0 (H) 08/06/2022 11:52 AM     Lab Results   Component Value Date/Time    Iron 33 (L) 08/06/2022 11:52 AM    TIBC 497 (H) 08/06/2022 11:52 AM    Iron % saturation 7 (L) 08/06/2022 11:52 AM    Ferritin 2 (L) 08/06/2022 11:52 AM       Lab Results   Component Value Date/Time    Vitamin B12 158 (L) 08/06/2022 11:52 AM    Folate 9.6 05/28/2019 07:07 PM     Lab Results   Component Value Date/Time    TSH 1.140 07/06/2022 03:54 PM     Lab Results   Component Value Date/Time    Hep B surface Ag screen Negative 03/13/2020 02:53 PM    Hep C Virus Ab <0.1 07/06/2022 03:54 PM         Imaging:     Radiology report(s) reviewed. Assessment & Plan:   Odella Gowers is a 40 y.o. female comes in for f/u of anemia. 1. Iron deficiency anemia: Previously due to increased demand in pregnancy and currently due to menorrhagia. Required blood transfusion 5/2019 for Hgb 6.8. s/p Injectafer x 2 in early August 2019. Repeat iron studies and Hgb normal in 9/2019, 12/2019 and 5/20/20 labs show anemia without iron deficiency - this is likely due to hemodilutional effect of pregnancy. 8/13/20 and 7/2020 labs normal Hgb and iron saturation but low ferritin. Admitted 8/6-8/7/22 for symptomatic iron deficiency anemia with Hgb 6.8 likely due to menorrhagia. Received 2 x IV venofer infusions and transfused with 1 unit of pRBC. -- Increase iron supplements to tid until iron infusions completed given active menstrual bleeding.   -- Injectafer x 2 now. CBC on date of 2nd Injectafer. -- Follow up in 12 weeks with repeat labs. 2. Menorrhagia: Pt previously on OCPs and later on Depot. Not currently on OCPs, given she is actively trying for another child. 3. Vitamin B12 deficiency: B12 level as low as 99 in 5/2019. Had neuropathy during pregnancy in 2020. C/p B12 injections 9/2020 - 11/2020. I had encouraged patient to start on B12 supplements multiple times in the past, but pt was noncompliant. B12 level low at 158 on 8/2022 and has been started on once weekly B12 x 4 at PCP office. First dose 8/18/22. Advised I then recommend monthly B12 injections. I educated pt again today about importance of B12 repletion as B12 deficiency can cause anemia as well as neurological changes. 4. H/o breast mass: Had biopsy with Dr. Cesario Baig. Pathology showed fibroadenoma. I appreciate the opportunity to participate in Ms. Ximena Mcgill's care. I personally saw and evaluated the patient and performed the key components of medical decision making. The history, physical exam, and documentation were performed by Ananya Ravi NP. I reviewed and verified the above documentation and modified it as needed. Treating iron deficiency anemia with Injectafer. Educated on B12 deficiency and replacement as well.      Signed By: Selena Carter MD

## 2022-08-18 NOTE — PROGRESS NOTES
Chief Complaint   Patient presents with    Follow Up Chronic Condition     6 week follow up had blood transfusion last week     1. \"Have you been to the ER, urgent care clinic since your last visit? Hospitalized since your last visit? \" Blood Transfusion    2. \"Have you seen or consulted any other health care providers outside of the [address] [place] [address] since your last visit? \" No     3. For patients aged 39-70: Has the patient had a colonoscopy / FIT/ Cologuard? No      If the patient is female:    4. For patients aged 41-77: Has the patient had a mammogram within the past 2 years? No      5. For patients aged 21-65: Has the patient had a pap smear?  No  Visit Vitals  /64 (BP 1 Location: Left upper arm, BP Patient Position: Sitting, BP Cuff Size: Large adult)   Pulse 80   Temp 97.2 °F (36.2 °C) (Temporal)   Resp 16   Ht 5' 7\" (1.702 m)   Wt 255 lb (115.7 kg)   LMP 07/22/2022   SpO2 98%   BMI 39.94 kg/m²

## 2022-08-18 NOTE — PROGRESS NOTES
Subjective  Chief Complaint   Patient presents with    Follow Up Chronic Condition     6 week follow up had blood transfusion last week     HPI:  Asia Betts is a 40 y.o. female. 39 yo female presents for follow up after an ED visit where she was transfused with 1 units prbcs. She states that this is her second transfusion. In the ER it was determined that her anemia was likely due to her hx of menorrhagia. Vaginal ultrasound showed a normal uterus. Previously seen by hematology and no other etiology established other than vitamin B12 deficiency. She received IV venofer and transfused with 1 unit pRBC and Hgb was 7.4 at discharge  She was at work about 3 years ago and felt lightheaded and was transported to the ER and found to have a Hgb of 5 and was transfused at that time also with 2 units Prbcs. Patient has pale nails, gums and conjunctiva. She states that she has been anemic since she was 6 and actually has seen the hematologist that she has an appointment with tomorrow.       Past Medical History:   Diagnosis Date    Abnormal Pap smear of cervix 09/2019    Negative/+HPV    Anemia     Anxiety     ASCUS (atypical squamous cells of undetermined significance) on Pap smear 6/1/2012    Asthma     extrinsic as a child     Breast disorder     Morbid obesity ([facility] [address]      Family History   Problem Relation Age of Onset    Hypertension Mother     Elevated Lipids Mother     Diabetes Sister      Social History     Socioeconomic History    Marital status:      Spouse name: Not on file    Number of children: Not on file    Years of education: Not on file    Highest education level: Not on file   Occupational History    Not on file   Tobacco Use    Smoking status: Never    Smokeless tobacco: Never   Substance and Sexual Activity    Alcohol use: Never    Drug use: Never    Sexual activity: Yes     Partners: Male     Birth control/protection: None   Other Topics Concern     Service Not Asked    Blood Transfusions Not Asked    Caffeine Concern Not Asked    Occupational Exposure Not Asked    Hobby Hazards Not Asked    Sleep Concern Not Asked    Stress Concern Not Asked    Weight Concern Not Asked    Special Diet Not Asked    Back Care Not Asked    Exercise Not Asked    Bike Helmet Not Asked    Seat Belt Not Asked    Self-Exams Not Asked   Social History Narrative    Not on file     Social Determinants of Health     Financial Resource Strain: Not on file   Food Insecurity: Not on file   Transportation Needs: Not on file   Physical Activity: Not on file   Stress: Not on file   Social Connections: Not on file   Intimate Partner Violence: Not on file   Housing Stability: Not on file     Current Outpatient Medications on File Prior to Visit   Medication Sig Dispense Refill    cyanocobalamin 1,000 mcg tablet Take 1 Tablet by mouth in the morning for 30 days. 30 Tablet 1    ferrous sulfate (IRON) 325 mg (65 mg iron) EC tablet Take one tablet by mouth in the morning and in the evening by mouth 180 Tablet 1    predniSONE (DELTASONE) 20 mg tablet Take 1 Tablet by mouth daily (with breakfast). (Patient not taking: Reported on 8/18/2022) 7 Tablet 1     No current facility-administered medications on file prior to visit. Allergies   Allergen Reactions    Bactrim [Sulfamethoprim Ds] Nausea Only    Zofran [Ondansetron Hcl (Pf)] Rash     ROS  ROS per HPI and PMH      Objective  Physical Exam  Eyes:      Comments: Pale conjunctiva   Neurological:      Mental Status: She is alert and oriented to person, place, and time. Psychiatric:         Mood and Affect: Mood normal.         Behavior: Behavior normal.         Thought Content: Thought content normal.         Judgment: Judgment normal.        Assessment & Plan      ICD-10-CM ICD-9-CM    1. Hospital discharge follow-up  Z09 V67.59       2.  Iron deficiency anemia, unspecified iron deficiency anemia type  D50.9 280.9 CBC WITH AUTOMATED DIFF      cyanocobalamin (VITAMIN B12) injection 1,000 mcg      FERRITIN      IRON PROFILE        Diagnoses and all orders for this visit:    1. Hospital discharge follow-up  Patient has a follow up appointment with hematology tomorrow for further evaluation and treatment    2. Iron deficiency anemia, unspecified iron deficiency anemia type  -     CBC WITH AUTOMATED DIFF  -     cyanocobalamin (VITAMIN B12) injection 1,000 mcg  -     FERRITIN  -     IRON PROFILE  Obtaining updated CBC, Ferritin and iron profile and will administer B12 1000 mgs weekly. First injection is today    Follow-up and Dispositions    Return in about 3 months (around 11/18/2022), or B12 injection weekly (nurse visit), for every three months with fasting labs with provider.        Yaneth Lam NP

## 2022-08-19 ENCOUNTER — OFFICE VISIT (OUTPATIENT)
Dept: ONCOLOGY | Age: 37
End: 2022-08-19
Payer: MEDICAID

## 2022-08-19 ENCOUNTER — TELEPHONE (OUTPATIENT)
Dept: ONCOLOGY | Age: 37
End: 2022-08-19

## 2022-08-19 VITALS
DIASTOLIC BLOOD PRESSURE: 69 MMHG | WEIGHT: 251 LBS | TEMPERATURE: 98 F | BODY MASS INDEX: 39.39 KG/M2 | RESPIRATION RATE: 19 BRPM | HEART RATE: 59 BPM | OXYGEN SATURATION: 98 % | SYSTOLIC BLOOD PRESSURE: 108 MMHG | HEIGHT: 67 IN

## 2022-08-19 DIAGNOSIS — D50.0 IRON DEFICIENCY ANEMIA DUE TO CHRONIC BLOOD LOSS: Primary | ICD-10-CM

## 2022-08-19 DIAGNOSIS — N92.0 MENORRHAGIA WITH REGULAR CYCLE: ICD-10-CM

## 2022-08-19 DIAGNOSIS — E53.8 VITAMIN B12 DEFICIENCY: ICD-10-CM

## 2022-08-19 DIAGNOSIS — Z09 HOSPITAL DISCHARGE FOLLOW-UP: ICD-10-CM

## 2022-08-19 DIAGNOSIS — Z87.42 HISTORY OF MENORRHAGIA: ICD-10-CM

## 2022-08-19 LAB
BASOPHILS # BLD AUTO: 0 X10E3/UL (ref 0–0.2)
BASOPHILS NFR BLD AUTO: 1 %
EOSINOPHIL # BLD AUTO: 0.3 X10E3/UL (ref 0–0.4)
EOSINOPHIL NFR BLD AUTO: 6 %
ERYTHROCYTE [DISTWIDTH] IN BLOOD BY AUTOMATED COUNT: 25.3 % (ref 11.7–15.4)
FERRITIN SERPL-MCNC: 74 NG/ML (ref 15–150)
HCT VFR BLD AUTO: 30.1 % (ref 34–46.6)
HGB BLD-MCNC: 8.9 G/DL (ref 11.1–15.9)
IMM GRANULOCYTES # BLD AUTO: 0 X10E3/UL (ref 0–0.1)
IMM GRANULOCYTES NFR BLD AUTO: 0 %
IRON SATN MFR SERPL: 7 % (ref 15–55)
IRON SERPL-MCNC: 29 UG/DL (ref 27–159)
LYMPHOCYTES # BLD AUTO: 2 X10E3/UL (ref 0.7–3.1)
LYMPHOCYTES NFR BLD AUTO: 33 %
MCH RBC QN AUTO: 20.6 PG (ref 26.6–33)
MCHC RBC AUTO-ENTMCNC: 29.6 G/DL (ref 31.5–35.7)
MCV RBC AUTO: 70 FL (ref 79–97)
MONOCYTES # BLD AUTO: 0.3 X10E3/UL (ref 0.1–0.9)
MONOCYTES NFR BLD AUTO: 6 %
MORPHOLOGY BLD-IMP: ABNORMAL
NEUTROPHILS # BLD AUTO: 3.3 X10E3/UL (ref 1.4–7)
NEUTROPHILS NFR BLD AUTO: 54 %
PLATELET # BLD AUTO: 284 X10E3/UL (ref 150–450)
RBC # BLD AUTO: 4.33 X10E6/UL (ref 3.77–5.28)
TIBC SERPL-MCNC: 393 UG/DL (ref 250–450)
UIBC SERPL-MCNC: 364 UG/DL (ref 131–425)
WBC # BLD AUTO: 6 X10E3/UL (ref 3.4–10.8)

## 2022-08-19 PROCEDURE — 99213 OFFICE O/P EST LOW 20 MIN: CPT | Performed by: INTERNAL MEDICINE

## 2022-08-19 PROCEDURE — 1111F DSCHRG MED/CURRENT MED MERGE: CPT | Performed by: INTERNAL MEDICINE

## 2022-08-19 NOTE — PROGRESS NOTES
Identified pt with two pt identifiers(name and ). Reviewed record in preparation for visit and have obtained necessary documentation. Chief Complaint   Patient presents with    Hospital Follow Up    Anemia      Vitals:    22 0804   BP: 108/69   Pulse: (!) 59   Resp: 19   Temp: 98 °F (36.7 °C)   TempSrc: Oral   SpO2: 98%   Weight: 251 lb (113.9 kg)   Height: 5' 7\" (1.702 m)   PainSc:   0 - No pain   LMP: 2022       Allergies   Allergen Reactions    Bactrim [Sulfamethoprim Ds] Nausea Only    Zofran [Ondansetron Hcl (Pf)] Rash       Current Outpatient Medications   Medication Instructions    cyanocobalamin 1,000 mcg, Oral, DAILY    ferrous sulfate (IRON) 325 mg (65 mg iron) EC tablet Take one tablet by mouth in the morning and in the evening by mouth       Health Maintenance Review: Patient reminded of \"due or due soon\" health maintenance. I have asked the patient to contact his/her primary care provider (PCP) for follow-up on his/her health maintenance. Immunization History   Administered Date(s) Administered    COVID-19, MODERNA BLUE border, Primary or Immunocompromised, (age 18y+), IM, 100 mcg/0.5mL 2021, 04/15/2021    COVID-19, MODERNA Booster BLUE border, (age 18y+), IM, 50mcg/0.25mL 2021    Influenza Vaccine 2020    Influenza, FLUARIX, FLULAVAL, (age 10 mo+) AND AFLURIA, FLUZONE (age 1 y+), PF 10/08/2020    Tdap 2020           Coordination of Care Questionnaire:  :   1) Have you been to an emergency room, urgent care, or hospitalized since your last visit? If yes, where when, and reason for visit? yes and iron deficient at [facility]      2. Have seen or consulted any other health care provider since your last visit? If yes, where when, and reason for visit? NO      Patient is accompanied by self I have received verbal consent from Gerald Michaels to discuss any/all medical information while they are present in the room.

## 2022-08-21 NOTE — PROGRESS NOTES
You hemoglobin is at a good leverl at 8.9. I a still moving forward with the B12 injections, you taking iron and seeing Hematology.  Your iron saturation is still below baseline at 7 which means that you have very low circulating Iron and I can imagine that you may have to go back for iron infusions( Noelle please fax labs to them)

## 2022-08-24 ENCOUNTER — TELEPHONE (OUTPATIENT)
Dept: ONCOLOGY | Age: 37
End: 2022-08-24

## 2022-08-24 ENCOUNTER — APPOINTMENT (OUTPATIENT)
Dept: INFUSION THERAPY | Age: 37
End: 2022-08-24

## 2022-08-24 RX ORDER — DIPHENHYDRAMINE HYDROCHLORIDE 50 MG/ML
25 INJECTION, SOLUTION INTRAMUSCULAR; INTRAVENOUS AS NEEDED
Status: CANCELLED
Start: 2022-08-26

## 2022-08-24 RX ORDER — SODIUM CHLORIDE 9 MG/ML
5-40 INJECTION INTRAMUSCULAR; INTRAVENOUS; SUBCUTANEOUS AS NEEDED
Status: CANCELLED | OUTPATIENT
Start: 2022-09-15

## 2022-08-24 RX ORDER — SODIUM CHLORIDE 9 MG/ML
5-250 INJECTION, SOLUTION INTRAVENOUS AS NEEDED
Status: CANCELLED | OUTPATIENT
Start: 2022-09-02

## 2022-08-24 RX ORDER — SODIUM CHLORIDE 9 MG/ML
5-250 INJECTION, SOLUTION INTRAVENOUS AS NEEDED
Status: CANCELLED | OUTPATIENT
Start: 2022-08-26

## 2022-08-24 RX ORDER — DIPHENHYDRAMINE HYDROCHLORIDE 50 MG/ML
50 INJECTION, SOLUTION INTRAMUSCULAR; INTRAVENOUS AS NEEDED
Status: CANCELLED
Start: 2022-09-15

## 2022-08-24 RX ORDER — ACETAMINOPHEN 325 MG/1
650 TABLET ORAL AS NEEDED
Status: CANCELLED
Start: 2022-09-15

## 2022-08-24 RX ORDER — ALBUTEROL SULFATE 0.83 MG/ML
2.5 SOLUTION RESPIRATORY (INHALATION) AS NEEDED
Start: 2022-09-22

## 2022-08-24 RX ORDER — ALBUTEROL SULFATE 0.83 MG/ML
2.5 SOLUTION RESPIRATORY (INHALATION) AS NEEDED
Status: CANCELLED
Start: 2022-09-02

## 2022-08-24 RX ORDER — SODIUM CHLORIDE 9 MG/ML
5-250 INJECTION, SOLUTION INTRAVENOUS AS NEEDED
Status: CANCELLED | OUTPATIENT
Start: 2022-09-22

## 2022-08-24 RX ORDER — SODIUM CHLORIDE 9 MG/ML
5-40 INJECTION INTRAMUSCULAR; INTRAVENOUS; SUBCUTANEOUS AS NEEDED
Status: CANCELLED | OUTPATIENT
Start: 2022-09-08

## 2022-08-24 RX ORDER — DIPHENHYDRAMINE HYDROCHLORIDE 50 MG/ML
50 INJECTION, SOLUTION INTRAMUSCULAR; INTRAVENOUS AS NEEDED
Start: 2022-09-22

## 2022-08-24 RX ORDER — ACETAMINOPHEN 325 MG/1
650 TABLET ORAL AS NEEDED
Status: CANCELLED
Start: 2022-09-08

## 2022-08-24 RX ORDER — ALBUTEROL SULFATE 0.83 MG/ML
2.5 SOLUTION RESPIRATORY (INHALATION) AS NEEDED
Status: CANCELLED
Start: 2022-09-15

## 2022-08-24 RX ORDER — HYDROCORTISONE SODIUM SUCCINATE 100 MG/2ML
100 INJECTION, POWDER, FOR SOLUTION INTRAMUSCULAR; INTRAVENOUS AS NEEDED
Status: CANCELLED | OUTPATIENT
Start: 2022-09-02

## 2022-08-24 RX ORDER — DIPHENHYDRAMINE HYDROCHLORIDE 50 MG/ML
50 INJECTION, SOLUTION INTRAMUSCULAR; INTRAVENOUS AS NEEDED
Status: CANCELLED
Start: 2022-09-08

## 2022-08-24 RX ORDER — SODIUM CHLORIDE 9 MG/ML
5-40 INJECTION INTRAMUSCULAR; INTRAVENOUS; SUBCUTANEOUS AS NEEDED
Status: CANCELLED | OUTPATIENT
Start: 2022-09-22

## 2022-08-24 RX ORDER — ACETAMINOPHEN 325 MG/1
650 TABLET ORAL AS NEEDED
Status: CANCELLED
Start: 2022-08-26

## 2022-08-24 RX ORDER — EPINEPHRINE 1 MG/ML
0.3 INJECTION, SOLUTION, CONCENTRATE INTRAVENOUS AS NEEDED
Status: CANCELLED | OUTPATIENT
Start: 2022-09-15

## 2022-08-24 RX ORDER — ONDANSETRON 2 MG/ML
8 INJECTION INTRAMUSCULAR; INTRAVENOUS AS NEEDED
Status: CANCELLED | OUTPATIENT
Start: 2022-08-26

## 2022-08-24 RX ORDER — ONDANSETRON 2 MG/ML
8 INJECTION INTRAMUSCULAR; INTRAVENOUS AS NEEDED
Status: CANCELLED | OUTPATIENT
Start: 2022-09-08

## 2022-08-24 RX ORDER — SODIUM CHLORIDE 9 MG/ML
5-40 INJECTION INTRAMUSCULAR; INTRAVENOUS; SUBCUTANEOUS AS NEEDED
Status: CANCELLED | OUTPATIENT
Start: 2022-09-02

## 2022-08-24 RX ORDER — SODIUM CHLORIDE 9 MG/ML
5-250 INJECTION, SOLUTION INTRAVENOUS AS NEEDED
Status: CANCELLED | OUTPATIENT
Start: 2022-09-15

## 2022-08-24 RX ORDER — ACETAMINOPHEN 325 MG/1
650 TABLET ORAL AS NEEDED
Status: CANCELLED
Start: 2022-09-02

## 2022-08-24 RX ORDER — ACETAMINOPHEN 325 MG/1
650 TABLET ORAL AS NEEDED
Start: 2022-09-22

## 2022-08-24 RX ORDER — HYDROCORTISONE SODIUM SUCCINATE 100 MG/2ML
100 INJECTION, POWDER, FOR SOLUTION INTRAMUSCULAR; INTRAVENOUS AS NEEDED
Status: CANCELLED | OUTPATIENT
Start: 2022-09-15

## 2022-08-24 RX ORDER — HYDROCORTISONE SODIUM SUCCINATE 100 MG/2ML
100 INJECTION, POWDER, FOR SOLUTION INTRAMUSCULAR; INTRAVENOUS AS NEEDED
Status: CANCELLED | OUTPATIENT
Start: 2022-08-26

## 2022-08-24 RX ORDER — SODIUM CHLORIDE 0.9 % (FLUSH) 0.9 %
5-40 SYRINGE (ML) INJECTION AS NEEDED
Status: CANCELLED | OUTPATIENT
Start: 2022-09-22

## 2022-08-24 RX ORDER — SODIUM CHLORIDE 0.9 % (FLUSH) 0.9 %
5-40 SYRINGE (ML) INJECTION AS NEEDED
Status: CANCELLED | OUTPATIENT
Start: 2022-09-02

## 2022-08-24 RX ORDER — SODIUM CHLORIDE 0.9 % (FLUSH) 0.9 %
5-40 SYRINGE (ML) INJECTION AS NEEDED
Status: CANCELLED | OUTPATIENT
Start: 2022-09-08

## 2022-08-24 RX ORDER — DIPHENHYDRAMINE HYDROCHLORIDE 50 MG/ML
25 INJECTION, SOLUTION INTRAMUSCULAR; INTRAVENOUS AS NEEDED
Status: CANCELLED
Start: 2022-09-02

## 2022-08-24 RX ORDER — ALBUTEROL SULFATE 0.83 MG/ML
2.5 SOLUTION RESPIRATORY (INHALATION) AS NEEDED
Status: CANCELLED
Start: 2022-08-26

## 2022-08-24 RX ORDER — DIPHENHYDRAMINE HYDROCHLORIDE 50 MG/ML
25 INJECTION, SOLUTION INTRAMUSCULAR; INTRAVENOUS AS NEEDED
Status: CANCELLED
Start: 2022-09-08

## 2022-08-24 RX ORDER — EPINEPHRINE 1 MG/ML
0.3 INJECTION, SOLUTION, CONCENTRATE INTRAVENOUS AS NEEDED
OUTPATIENT
Start: 2022-09-22

## 2022-08-24 RX ORDER — ONDANSETRON 2 MG/ML
8 INJECTION INTRAMUSCULAR; INTRAVENOUS AS NEEDED
OUTPATIENT
Start: 2022-09-22

## 2022-08-24 RX ORDER — EPINEPHRINE 1 MG/ML
0.3 INJECTION, SOLUTION, CONCENTRATE INTRAVENOUS AS NEEDED
Status: CANCELLED | OUTPATIENT
Start: 2022-08-26

## 2022-08-24 RX ORDER — EPINEPHRINE 1 MG/ML
0.3 INJECTION, SOLUTION, CONCENTRATE INTRAVENOUS AS NEEDED
Status: CANCELLED | OUTPATIENT
Start: 2022-09-02

## 2022-08-24 RX ORDER — SODIUM CHLORIDE 9 MG/ML
5-250 INJECTION, SOLUTION INTRAVENOUS AS NEEDED
Status: CANCELLED | OUTPATIENT
Start: 2022-09-08

## 2022-08-24 RX ORDER — DIPHENHYDRAMINE HYDROCHLORIDE 50 MG/ML
25 INJECTION, SOLUTION INTRAMUSCULAR; INTRAVENOUS AS NEEDED
Status: CANCELLED
Start: 2022-09-15

## 2022-08-24 RX ORDER — SODIUM CHLORIDE 9 MG/ML
5-40 INJECTION INTRAMUSCULAR; INTRAVENOUS; SUBCUTANEOUS AS NEEDED
Status: CANCELLED | OUTPATIENT
Start: 2022-08-26

## 2022-08-24 RX ORDER — EPINEPHRINE 1 MG/ML
0.3 INJECTION, SOLUTION, CONCENTRATE INTRAVENOUS AS NEEDED
Status: CANCELLED | OUTPATIENT
Start: 2022-09-08

## 2022-08-24 RX ORDER — DIPHENHYDRAMINE HYDROCHLORIDE 50 MG/ML
25 INJECTION, SOLUTION INTRAMUSCULAR; INTRAVENOUS AS NEEDED
Start: 2022-09-22

## 2022-08-24 RX ORDER — DIPHENHYDRAMINE HYDROCHLORIDE 50 MG/ML
50 INJECTION, SOLUTION INTRAMUSCULAR; INTRAVENOUS AS NEEDED
Status: CANCELLED
Start: 2022-09-02

## 2022-08-24 RX ORDER — ONDANSETRON 2 MG/ML
8 INJECTION INTRAMUSCULAR; INTRAVENOUS AS NEEDED
Status: CANCELLED | OUTPATIENT
Start: 2022-09-02

## 2022-08-24 RX ORDER — ONDANSETRON 2 MG/ML
8 INJECTION INTRAMUSCULAR; INTRAVENOUS AS NEEDED
Status: CANCELLED | OUTPATIENT
Start: 2022-09-15

## 2022-08-24 RX ORDER — SODIUM CHLORIDE 0.9 % (FLUSH) 0.9 %
5-40 SYRINGE (ML) INJECTION AS NEEDED
Status: CANCELLED | OUTPATIENT
Start: 2022-09-15

## 2022-08-24 RX ORDER — SODIUM CHLORIDE 0.9 % (FLUSH) 0.9 %
5-40 SYRINGE (ML) INJECTION AS NEEDED
Status: CANCELLED | OUTPATIENT
Start: 2022-08-26

## 2022-08-24 RX ORDER — HYDROCORTISONE SODIUM SUCCINATE 100 MG/2ML
100 INJECTION, POWDER, FOR SOLUTION INTRAMUSCULAR; INTRAVENOUS AS NEEDED
OUTPATIENT
Start: 2022-09-22

## 2022-08-24 RX ORDER — ALBUTEROL SULFATE 0.83 MG/ML
2.5 SOLUTION RESPIRATORY (INHALATION) AS NEEDED
Status: CANCELLED
Start: 2022-09-08

## 2022-08-24 RX ORDER — DIPHENHYDRAMINE HYDROCHLORIDE 50 MG/ML
50 INJECTION, SOLUTION INTRAMUSCULAR; INTRAVENOUS AS NEEDED
Status: CANCELLED
Start: 2022-08-26

## 2022-08-24 RX ORDER — HYDROCORTISONE SODIUM SUCCINATE 100 MG/2ML
100 INJECTION, POWDER, FOR SOLUTION INTRAMUSCULAR; INTRAVENOUS AS NEEDED
Status: CANCELLED | OUTPATIENT
Start: 2022-09-08

## 2022-08-25 ENCOUNTER — CLINICAL SUPPORT (OUTPATIENT)
Dept: FAMILY MEDICINE CLINIC | Age: 37
End: 2022-08-25
Payer: MEDICAID

## 2022-08-25 ENCOUNTER — TELEPHONE (OUTPATIENT)
Dept: ONCOLOGY | Age: 37
End: 2022-08-25

## 2022-08-25 DIAGNOSIS — E53.8 VITAMIN B12 DEFICIENCY: Primary | ICD-10-CM

## 2022-08-25 PROCEDURE — 96372 THER/PROPH/DIAG INJ SC/IM: CPT | Performed by: NURSE PRACTITIONER

## 2022-08-25 RX ADMIN — CYANOCOBALAMIN 1000 MCG: 1000 INJECTION, SOLUTION INTRAMUSCULAR; SUBCUTANEOUS at 08:49

## 2022-08-26 ENCOUNTER — HOSPITAL ENCOUNTER (OUTPATIENT)
Dept: INFUSION THERAPY | Age: 37
Discharge: HOME | End: 2022-08-26
Payer: MEDICAID

## 2022-08-26 VITALS
DIASTOLIC BLOOD PRESSURE: 70 MMHG | OXYGEN SATURATION: 100 % | HEART RATE: 62 BPM | SYSTOLIC BLOOD PRESSURE: 96 MMHG | TEMPERATURE: 97.8 F | RESPIRATION RATE: 16 BRPM

## 2022-08-26 DIAGNOSIS — D50.9 IRON DEFICIENCY ANEMIA, UNSPECIFIED IRON DEFICIENCY ANEMIA TYPE: ICD-10-CM

## 2022-08-26 DIAGNOSIS — D50.0 IRON DEFICIENCY ANEMIA DUE TO CHRONIC BLOOD LOSS: Primary | ICD-10-CM

## 2022-08-26 PROCEDURE — 74011250636 HC RX REV CODE- 250/636: Performed by: NURSE PRACTITIONER

## 2022-08-26 PROCEDURE — 96365 THER/PROPH/DIAG IV INF INIT: CPT

## 2022-08-26 PROCEDURE — 74011000258 HC RX REV CODE- 258: Performed by: NURSE PRACTITIONER

## 2022-08-26 PROCEDURE — 74011000250 HC RX REV CODE- 250: Performed by: NURSE PRACTITIONER

## 2022-08-26 RX ORDER — SODIUM CHLORIDE 9 MG/ML
5-250 INJECTION, SOLUTION INTRAVENOUS AS NEEDED
Status: DISPENSED | OUTPATIENT
Start: 2022-08-26 | End: 2022-08-26

## 2022-08-26 RX ORDER — SODIUM CHLORIDE 0.9 % (FLUSH) 0.9 %
5-40 SYRINGE (ML) INJECTION AS NEEDED
Status: DISPENSED | OUTPATIENT
Start: 2022-08-26 | End: 2022-08-26

## 2022-08-26 RX ADMIN — SODIUM CHLORIDE 25 ML/HR: 900 INJECTION, SOLUTION INTRAVENOUS at 11:50

## 2022-08-26 RX ADMIN — IRON SUCROSE 200 MG: 20 INJECTION, SOLUTION INTRAVENOUS at 11:50

## 2022-08-26 RX ADMIN — Medication 10 ML: at 12:25

## 2022-08-26 NOTE — PROGRESS NOTES
[facility] Progress Note    Date: 2022    Name: Renee Bess    MRN: [id number]         : 1985    Ms. Zhang Arrived ambulatory and in no distress for Venofer Regimen. Assessment was completed, no acute issues at this time, no new concerns voiced. 24g PIV placed in left AC, positive blood return noted. No labs drawn today. Ms. De La Cruz Seen vitals were reviewed. Patient Vitals for the past 12 hrs:   Temp Pulse Resp BP SpO2   22 1223 -- 62 16 96/70 --   22 1126 97.8 °F (36.6 °C) 66 16 101/74 100 %       Medications:  Medications Administered       0.9% sodium chloride infusion       Admin Date  2022 Action  New Bag Dose  25 mL/hr Rate  25 mL/hr Route  IntraVENous Administered By  Sarah Villa RN              iron sucrose (VENOFER) 200 mg in 0.9% sodium chloride 100 mL, overfill volume 10 mL IVPB       Admin Date  2022 Action  New Bag Dose  200 mg Rate  240 mL/hr Route  IntraVENous Administered By  Sarah Villa RN              sodium chloride (NS) flush 5-40 mL       Admin Date  2022 Action  Given Dose  10 mL Route  IntraVENous Administered By  Sarah Villa RN                  Ms. Zhang tolerated treatment well and was discharged from [place] [address] in stable condition at 1225. PIV flushed and removed. She is to return on  at 1300 for her next appointment.     Juan J Sahni RN  2022

## 2022-08-29 ENCOUNTER — TELEPHONE (OUTPATIENT)
Dept: ONCOLOGY | Age: 37
End: 2022-08-29

## 2022-08-31 ENCOUNTER — TELEPHONE (OUTPATIENT)
Dept: ONCOLOGY | Age: 37
End: 2022-08-31

## 2022-08-31 ENCOUNTER — APPOINTMENT (OUTPATIENT)
Dept: INFUSION THERAPY | Age: 37
End: 2022-08-31

## 2022-09-01 ENCOUNTER — CLINICAL SUPPORT (OUTPATIENT)
Dept: FAMILY MEDICINE CLINIC | Age: 37
End: 2022-09-01
Payer: MEDICAID

## 2022-09-01 DIAGNOSIS — E53.8 VITAMIN B12 DEFICIENCY: Primary | ICD-10-CM

## 2022-09-01 PROCEDURE — 96372 THER/PROPH/DIAG INJ SC/IM: CPT | Performed by: NURSE PRACTITIONER

## 2022-09-01 RX ADMIN — CYANOCOBALAMIN 1000 MCG: 1000 INJECTION, SOLUTION INTRAMUSCULAR; SUBCUTANEOUS at 09:32

## 2022-09-02 ENCOUNTER — HOSPITAL ENCOUNTER (OUTPATIENT)
Dept: INFUSION THERAPY | Age: 37
Discharge: HOME | End: 2022-09-02
Payer: MEDICAID

## 2022-09-02 VITALS
WEIGHT: 252.2 LBS | OXYGEN SATURATION: 99 % | TEMPERATURE: 98.9 F | HEIGHT: 67 IN | RESPIRATION RATE: 18 BRPM | BODY MASS INDEX: 39.58 KG/M2 | SYSTOLIC BLOOD PRESSURE: 101 MMHG | DIASTOLIC BLOOD PRESSURE: 64 MMHG | HEART RATE: 72 BPM

## 2022-09-02 DIAGNOSIS — D50.9 IRON DEFICIENCY ANEMIA, UNSPECIFIED IRON DEFICIENCY ANEMIA TYPE: ICD-10-CM

## 2022-09-02 DIAGNOSIS — D50.0 IRON DEFICIENCY ANEMIA DUE TO CHRONIC BLOOD LOSS: Primary | ICD-10-CM

## 2022-09-02 PROCEDURE — 74011000258 HC RX REV CODE- 258: Performed by: NURSE PRACTITIONER

## 2022-09-02 PROCEDURE — 74011250636 HC RX REV CODE- 250/636: Performed by: NURSE PRACTITIONER

## 2022-09-02 PROCEDURE — 96365 THER/PROPH/DIAG IV INF INIT: CPT

## 2022-09-02 RX ORDER — SODIUM CHLORIDE 9 MG/ML
5-250 INJECTION, SOLUTION INTRAVENOUS AS NEEDED
Status: DISCONTINUED | OUTPATIENT
Start: 2022-09-02 | End: 2022-09-03 | Stop reason: HOSPADM

## 2022-09-02 RX ORDER — SODIUM CHLORIDE 9 MG/ML
5-40 INJECTION INTRAMUSCULAR; INTRAVENOUS; SUBCUTANEOUS AS NEEDED
Status: DISCONTINUED | OUTPATIENT
Start: 2022-09-02 | End: 2022-09-03 | Stop reason: HOSPADM

## 2022-09-02 RX ORDER — SODIUM CHLORIDE 0.9 % (FLUSH) 0.9 %
5-40 SYRINGE (ML) INJECTION AS NEEDED
Status: DISCONTINUED | OUTPATIENT
Start: 2022-09-02 | End: 2022-09-03 | Stop reason: HOSPADM

## 2022-09-02 RX ADMIN — SODIUM CHLORIDE 25 ML/HR: 9 INJECTION, SOLUTION INTRAVENOUS at 13:46

## 2022-09-02 RX ADMIN — IRON SUCROSE 200 MG: 20 INJECTION, SOLUTION INTRAVENOUS at 13:47

## 2022-09-02 NOTE — PROGRESS NOTES
[facility] Progress Note    Date: 2022    Name: Shahriar Perez    MRN: [id number]         : 1985    Ms. Eli Gamez Arrived ambulatory and in no distress for Venofer Infusion. Assessment was completed, no acute issues at this time, no new complaints voiced. 24 G PIV established to right arm, + blood return. No labs due at this time. Ms. Johan Reed vitals were reviewed. Visit Vitals  /62   Pulse 64   Temp 98.9 °F (37.2 °C)   Resp 18   Ht 5' 7\" (1.702 m)   Wt 114.4 kg (252 lb 3.2 oz)   SpO2 100%   BMI 39.50 kg/m²         Medications:  Medications Administered       0.9% sodium chloride infusion       Admin Date  2022 Action  New Bag Dose  25 mL/hr Rate  25 mL/hr Route  IntraVENous Administered By  [place], [place]              iron sucrose (VENOFER) 200 mg in 0.9% sodium chloride 100 mL, overfill volume 10 mL IVPB       Admin Date  2022 Action  New Bag Dose  200 mg Rate  240 mL/hr Route  IntraVENous Administered By  Patti Malloy                      Ms. Eli Gamez tolerated treatment well and was discharged from [place] [address] in stable condition at [phone]. PIV flushed & removed. She is to return on  at 1430 for her next appointment.     Gabriela Frias RN  2022

## 2022-09-08 ENCOUNTER — CLINICAL SUPPORT (OUTPATIENT)
Dept: FAMILY MEDICINE CLINIC | Age: 37
End: 2022-09-08
Payer: MEDICAID

## 2022-09-08 ENCOUNTER — HOSPITAL ENCOUNTER (OUTPATIENT)
Dept: INFUSION THERAPY | Age: 37
Discharge: HOME | End: 2022-09-08
Payer: MEDICAID

## 2022-09-08 VITALS
TEMPERATURE: 97.8 F | SYSTOLIC BLOOD PRESSURE: 102 MMHG | RESPIRATION RATE: 18 BRPM | WEIGHT: 255 LBS | HEART RATE: 66 BPM | HEIGHT: 67 IN | BODY MASS INDEX: 40.02 KG/M2 | DIASTOLIC BLOOD PRESSURE: 74 MMHG | OXYGEN SATURATION: 100 %

## 2022-09-08 DIAGNOSIS — D50.9 IRON DEFICIENCY ANEMIA, UNSPECIFIED IRON DEFICIENCY ANEMIA TYPE: ICD-10-CM

## 2022-09-08 DIAGNOSIS — E53.8 VITAMIN B12 DEFICIENCY: Primary | ICD-10-CM

## 2022-09-08 DIAGNOSIS — D50.0 IRON DEFICIENCY ANEMIA DUE TO CHRONIC BLOOD LOSS: Primary | ICD-10-CM

## 2022-09-08 PROCEDURE — 96365 THER/PROPH/DIAG IV INF INIT: CPT

## 2022-09-08 PROCEDURE — 74011250636 HC RX REV CODE- 250/636: Performed by: NURSE PRACTITIONER

## 2022-09-08 PROCEDURE — 96372 THER/PROPH/DIAG INJ SC/IM: CPT | Performed by: NURSE PRACTITIONER

## 2022-09-08 PROCEDURE — 74011000258 HC RX REV CODE- 258: Performed by: NURSE PRACTITIONER

## 2022-09-08 RX ORDER — SODIUM CHLORIDE 9 MG/ML
5-250 INJECTION, SOLUTION INTRAVENOUS AS NEEDED
Status: DISCONTINUED | OUTPATIENT
Start: 2022-09-08 | End: 2022-09-09 | Stop reason: HOSPADM

## 2022-09-08 RX ORDER — SODIUM CHLORIDE 9 MG/ML
5-40 INJECTION INTRAMUSCULAR; INTRAVENOUS; SUBCUTANEOUS AS NEEDED
Status: DISCONTINUED | OUTPATIENT
Start: 2022-09-08 | End: 2022-09-09 | Stop reason: HOSPADM

## 2022-09-08 RX ORDER — SODIUM CHLORIDE 0.9 % (FLUSH) 0.9 %
5-40 SYRINGE (ML) INJECTION AS NEEDED
Status: DISCONTINUED | OUTPATIENT
Start: 2022-09-08 | End: 2022-09-09 | Stop reason: HOSPADM

## 2022-09-08 RX ADMIN — CYANOCOBALAMIN 1000 MCG: 1000 INJECTION, SOLUTION INTRAMUSCULAR; SUBCUTANEOUS at 09:38

## 2022-09-08 RX ADMIN — SODIUM CHLORIDE 25 ML/HR: 9 INJECTION, SOLUTION INTRAVENOUS at 14:54

## 2022-09-08 RX ADMIN — IRON SUCROSE 200 MG: 20 INJECTION, SOLUTION INTRAVENOUS at 14:55

## 2022-09-15 ENCOUNTER — HOSPITAL ENCOUNTER (OUTPATIENT)
Dept: INFUSION THERAPY | Age: 37
Discharge: HOME | End: 2022-09-15
Payer: MEDICAID

## 2022-09-15 ENCOUNTER — CLINICAL SUPPORT (OUTPATIENT)
Dept: FAMILY MEDICINE CLINIC | Age: 37
End: 2022-09-15
Payer: MEDICAID

## 2022-09-15 VITALS
BODY MASS INDEX: 40.14 KG/M2 | DIASTOLIC BLOOD PRESSURE: 64 MMHG | OXYGEN SATURATION: 97 % | SYSTOLIC BLOOD PRESSURE: 102 MMHG | HEART RATE: 57 BPM | RESPIRATION RATE: 16 BRPM | TEMPERATURE: 97 F | WEIGHT: 256.3 LBS

## 2022-09-15 DIAGNOSIS — D50.0 IRON DEFICIENCY ANEMIA DUE TO CHRONIC BLOOD LOSS: Primary | ICD-10-CM

## 2022-09-15 DIAGNOSIS — E53.8 VITAMIN B12 DEFICIENCY: Primary | ICD-10-CM

## 2022-09-15 DIAGNOSIS — D50.9 IRON DEFICIENCY ANEMIA, UNSPECIFIED IRON DEFICIENCY ANEMIA TYPE: ICD-10-CM

## 2022-09-15 PROCEDURE — 74011250636 HC RX REV CODE- 250/636: Performed by: NURSE PRACTITIONER

## 2022-09-15 PROCEDURE — 96372 THER/PROPH/DIAG INJ SC/IM: CPT | Performed by: STUDENT IN AN ORGANIZED HEALTH CARE EDUCATION/TRAINING PROGRAM

## 2022-09-15 PROCEDURE — 74011000258 HC RX REV CODE- 258: Performed by: NURSE PRACTITIONER

## 2022-09-15 PROCEDURE — 96365 THER/PROPH/DIAG IV INF INIT: CPT

## 2022-09-15 RX ORDER — SODIUM CHLORIDE 9 MG/ML
5-250 INJECTION, SOLUTION INTRAVENOUS AS NEEDED
Status: DISCONTINUED | OUTPATIENT
Start: 2022-09-15 | End: 2022-09-16 | Stop reason: HOSPADM

## 2022-09-15 RX ORDER — CYANOCOBALAMIN 1000 UG/ML
1000 INJECTION, SOLUTION INTRAMUSCULAR; SUBCUTANEOUS ONCE
Status: COMPLETED | OUTPATIENT
Start: 2022-09-15 | End: 2022-09-15

## 2022-09-15 RX ADMIN — CYANOCOBALAMIN 1000 MCG: 1000 INJECTION, SOLUTION INTRAMUSCULAR; SUBCUTANEOUS at 09:55

## 2022-09-15 RX ADMIN — IRON SUCROSE 200 MG: 20 INJECTION, SOLUTION INTRAVENOUS at 15:28

## 2022-09-15 RX ADMIN — SODIUM CHLORIDE 250 ML/HR: 9 INJECTION, SOLUTION INTRAVENOUS at 15:38

## 2022-09-15 NOTE — PROGRESS NOTES
Outpatient Infusion Center Short Visit Progress Note    Ms. Zhang admitted to [facility] for 4/5 Iron Sucrose ambulatory in stable condition. Assessment completed. No new concerns voiced. Covid Screening      1. Do you have any symptoms of COVID-19? SOB, coughing, fever, or generally not feeling well ? no  2. Have you been exposed to COVID-19 recently? no  3. Have you had any recent contact with family/friend that has a pending COVID test? no    Vital Signs:  Visit Vitals  /64   Pulse (!) 57   Temp 97 °F (36.1 °C)   Resp 16   Wt 116.3 kg (256 lb 4.8 oz)   SpO2 97%   BMI 40.14 kg/m²         PIV with positive blood return. Medications:  Medications Administered       0.9% sodium chloride infusion       Admin Date  09/15/2022 Action  New Bag Dose  250 mL/hr Rate  250 mL/hr Route  IntraVENous Administered By  Tommie Hutchins RN              iron sucrose (VENOFER) 200 mg in 0.9% sodium chloride 100 mL, overfill volume 10 mL IVPB       Admin Date  09/15/2022 Action  New Bag Dose  200 mg Rate  240 mL/hr Route  IntraVENous Administered By  Tommie Hutchins RN                    Pt declined one hour post iron observation. AVS declined    Patient tolerated treatment well. Patient discharged from [place] [address] ambulatory in no distress at 1615. Patient aware of next appointment.     Jennifer Brewer RN  September 15, 2022    Future Appointments   Date Time Provider Mor Julien   9/22/2022  9:45 AM [id number] [facility] BS AMB   9/22/2022  1:00 [facility]   9/29/2022  9:45 AM [id number] [facility] BS AMB   11/11/2022  8:30 AM Bruna Burr MD [facility] BS AMB   12/1/2022  9:00 AM Ventura Maradiaga NP [facility] BS AMB

## 2022-09-17 PROBLEM — Z09 HOSPITAL DISCHARGE FOLLOW-UP: Status: RESOLVED | Noted: 2022-08-18 | Resolved: 2022-09-17

## 2022-09-22 ENCOUNTER — HOSPITAL ENCOUNTER (OUTPATIENT)
Dept: INFUSION THERAPY | Age: 37
Discharge: HOME | End: 2022-09-22
Payer: MEDICAID

## 2022-09-22 ENCOUNTER — CLINICAL SUPPORT (OUTPATIENT)
Dept: FAMILY MEDICINE CLINIC | Age: 37
End: 2022-09-22
Payer: MEDICAID

## 2022-09-22 VITALS
WEIGHT: 255.8 LBS | RESPIRATION RATE: 16 BRPM | BODY MASS INDEX: 40.06 KG/M2 | SYSTOLIC BLOOD PRESSURE: 100 MMHG | TEMPERATURE: 97.3 F | DIASTOLIC BLOOD PRESSURE: 55 MMHG | HEART RATE: 58 BPM

## 2022-09-22 DIAGNOSIS — D50.9 IRON DEFICIENCY ANEMIA, UNSPECIFIED IRON DEFICIENCY ANEMIA TYPE: ICD-10-CM

## 2022-09-22 DIAGNOSIS — D50.0 IRON DEFICIENCY ANEMIA DUE TO CHRONIC BLOOD LOSS: Primary | ICD-10-CM

## 2022-09-22 DIAGNOSIS — E53.8 VITAMIN B12 DEFICIENCY: Primary | ICD-10-CM

## 2022-09-22 PROCEDURE — 74011000258 HC RX REV CODE- 258: Performed by: NURSE PRACTITIONER

## 2022-09-22 PROCEDURE — 74011250636 HC RX REV CODE- 250/636: Performed by: NURSE PRACTITIONER

## 2022-09-22 PROCEDURE — 96365 THER/PROPH/DIAG IV INF INIT: CPT

## 2022-09-22 PROCEDURE — 96372 THER/PROPH/DIAG INJ SC/IM: CPT | Performed by: NURSE PRACTITIONER

## 2022-09-22 RX ORDER — SODIUM CHLORIDE 9 MG/ML
5-250 INJECTION, SOLUTION INTRAVENOUS AS NEEDED
Status: DISCONTINUED | OUTPATIENT
Start: 2022-09-22 | End: 2022-09-23 | Stop reason: HOSPADM

## 2022-09-22 RX ORDER — SODIUM CHLORIDE 9 MG/ML
5-40 INJECTION INTRAMUSCULAR; INTRAVENOUS; SUBCUTANEOUS AS NEEDED
Status: DISCONTINUED | OUTPATIENT
Start: 2022-09-22 | End: 2022-09-23 | Stop reason: HOSPADM

## 2022-09-22 RX ORDER — SODIUM CHLORIDE 0.9 % (FLUSH) 0.9 %
5-40 SYRINGE (ML) INJECTION AS NEEDED
Status: DISCONTINUED | OUTPATIENT
Start: 2022-09-22 | End: 2022-09-23 | Stop reason: HOSPADM

## 2022-09-22 RX ADMIN — IRON SUCROSE 200 MG: 20 INJECTION, SOLUTION INTRAVENOUS at 13:54

## 2022-09-22 RX ADMIN — CYANOCOBALAMIN 1000 MCG: 1000 INJECTION, SOLUTION INTRAMUSCULAR; SUBCUTANEOUS at 15:00

## 2022-09-22 RX ADMIN — SODIUM CHLORIDE 250 ML/HR: 9 INJECTION, SOLUTION INTRAVENOUS at 13:50

## 2022-09-22 NOTE — PROGRESS NOTES
Outpatient Infusion Center Short Visit Progress Note    Ms. Christianna Osler admitted to [facility] for 5 of 5 Venofer ambulatory in stable condition. Assessment completed. No new concerns voiced. Covid Screening      1. Do you have any symptoms of COVID-19? SOB, coughing, fever, or generally not feeling well ? no  2. Have you been exposed to COVID-19 recently? no  3. Have you had any recent contact with family/friend that has a pending COVID test? no    Vital Signs:  Visit Vitals  BP 98/68   Pulse 73   Temp 97.3 °F (36.3 °C)   Resp 16   Wt 116 kg (255 lb 12.8 oz)   BMI 40.06 kg/m²         PIV with positive blood return. Medications:  Medications Administered       0.9% sodium chloride infusion       Admin Date  09/22/2022 Action  New Bag Dose  250 mL/hr Rate  250 mL/hr Route  IntraVENous Administered By  Burke De Los Santos RN              iron sucrose (VENOFER) 200 mg in 0.9% sodium chloride 100 mL, overfill volume 10 mL IVPB       Admin Date  09/22/2022 Action  New Bag Dose  200 mg Rate  240 mL/hr Route  IntraVENous Administered By  Burke De Los Santos RN                    Pt declined 30 minute post treatment observation   AVS declined    Patient tolerated treatment well. Patient discharged from [place] [address] ambulatory in no distress at 1445. Patient aware  no pending appointment.     Lianet Danielle RN  September 22, 2022    Future Appointments   Date Time Provider Mor Julien   9/29/2022 11:45 AM [id number] [facility] BS AMB   11/11/2022  8:30 AM Cyrus Brown, Abilio Aparicio MD [facility] BS AMB   12/1/2022  9:00 AM Ayan Coolye NP [facility] BS AMB

## 2022-09-22 NOTE — PROGRESS NOTES
OUTPATIENT INFUSION CENTER    DISCHARGE INSTRUCTIONS FOR:    IRON INFUSIONS - INCLUDING VENOFER, FERRLECIT, AND INFED    You should continue to take your usual home medications unless otherwise instructed by your physician. Drink plenty of fluids and eat your usual diet. All medications have the potential to cause side effects. Your physician can instruct you regarding any necessary treatment for side effects. Some possible side effects of iron infusions may include the following:     - Urinary changes;   - Mild muscle cramping;   - Mild nausea, stomach pain, diarrhea or constipation;   - Mild skin itching, mild pain at IV site. Signs/Symptoms of an allergic reaction may require immediate medical attention. These may include one or more of the following:       Skin redness, itching, swelling, blistering, weeping, crusting, rash or hives. Wheezing, chest tightness, cough, or shortness of breath;   Swelling of the face, eyelids, lips, tongue, or throat;  Severe headache, seizures or tremor;  Stuffy nose, runny nose, sneezing;   Red (bloodshot), itchy, swollen, or watery eyes;  Stomach  pain, nausea, vomiting, diarrhea or bloody diarrhea; Chest pain or tightness, increased heart rate, palpitations, changes in blood pressure which can cause dizziness, unusual feelings of weakness or fatigue;  Back ache or pain around waist;  Painful urination, increase or decrease in amount of urine, blood in urine. Contact your physicians office with any questions or concerns regarding your treatment.     Ghada Lombardi, Signature: _____________________________________ 9/22/2022  Lianet Danielle RN

## 2022-09-30 ENCOUNTER — CLINICAL SUPPORT (OUTPATIENT)
Dept: FAMILY MEDICINE CLINIC | Age: 37
End: 2022-09-30
Payer: MEDICAID

## 2022-09-30 DIAGNOSIS — E53.8 VITAMIN B12 DEFICIENCY: Primary | ICD-10-CM

## 2022-09-30 PROCEDURE — 96372 THER/PROPH/DIAG INJ SC/IM: CPT | Performed by: NURSE PRACTITIONER

## 2022-09-30 RX ORDER — CYANOCOBALAMIN 1000 UG/ML
1000 INJECTION, SOLUTION INTRAMUSCULAR; SUBCUTANEOUS ONCE
Status: COMPLETED | OUTPATIENT
Start: 2022-09-30 | End: 2022-09-30

## 2022-09-30 RX ADMIN — CYANOCOBALAMIN 1000 MCG: 1000 INJECTION, SOLUTION INTRAMUSCULAR; SUBCUTANEOUS at 09:16

## 2022-10-13 ENCOUNTER — HOSPITAL ENCOUNTER (EMERGENCY)
Age: 37
Discharge: HOME | End: 2022-10-13
Attending: EMERGENCY MEDICINE
Payer: MEDICAID

## 2022-10-13 VITALS
WEIGHT: 253 LBS | DIASTOLIC BLOOD PRESSURE: 74 MMHG | SYSTOLIC BLOOD PRESSURE: 113 MMHG | TEMPERATURE: 98.2 F | RESPIRATION RATE: 16 BRPM | BODY MASS INDEX: 39.71 KG/M2 | HEIGHT: 67 IN | OXYGEN SATURATION: 96 % | HEART RATE: 63 BPM

## 2022-10-13 DIAGNOSIS — D64.9 SYMPTOMATIC ANEMIA: Primary | ICD-10-CM

## 2022-10-13 LAB
ALBUMIN SERPL-MCNC: 3.8 G/DL (ref 3.5–5)
ALBUMIN/GLOB SERPL: 0.8 {RATIO} (ref 1.1–2.2)
ALP SERPL-CCNC: 58 U/L (ref 45–117)
ALT SERPL-CCNC: 24 U/L (ref 12–78)
ANION GAP SERPL CALC-SCNC: 7 MMOL/L (ref 5–15)
AST SERPL-CCNC: 16 U/L (ref 15–37)
BASOPHILS # BLD: 0 K/UL (ref 0–0.1)
BASOPHILS NFR BLD: 0 % (ref 0–1)
BILIRUB SERPL-MCNC: 1 MG/DL (ref 0.2–1)
BUN SERPL-MCNC: 15 MG/DL (ref 6–20)
BUN/CREAT SERPL: 17 (ref 12–20)
CALCIUM SERPL-MCNC: 9.1 MG/DL (ref 8.5–10.1)
CHLORIDE SERPL-SCNC: 106 MMOL/L (ref 97–108)
CO2 SERPL-SCNC: 28 MMOL/L (ref 21–32)
CREAT SERPL-MCNC: 0.88 MG/DL (ref 0.55–1.02)
DIFFERENTIAL METHOD BLD: ABNORMAL
EOSINOPHIL # BLD: 0.1 K/UL (ref 0–0.4)
EOSINOPHIL NFR BLD: 2 % (ref 0–7)
ERYTHROCYTE [DISTWIDTH] IN BLOOD BY AUTOMATED COUNT: 26.5 % (ref 11.5–14.5)
GLOBULIN SER CALC-MCNC: 4.7 G/DL (ref 2–4)
GLUCOSE SERPL-MCNC: 82 MG/DL (ref 65–100)
HCT VFR BLD AUTO: 34.1 % (ref 35–47)
HGB BLD-MCNC: 10.7 G/DL (ref 11.5–16)
IMM GRANULOCYTES # BLD AUTO: 0 K/UL (ref 0–0.04)
IMM GRANULOCYTES NFR BLD AUTO: 0 % (ref 0–0.5)
LYMPHOCYTES # BLD: 1.9 K/UL (ref 0.8–3.5)
LYMPHOCYTES NFR BLD: 34 % (ref 12–49)
MCH RBC QN AUTO: 26.4 PG (ref 26–34)
MCHC RBC AUTO-ENTMCNC: 31.4 G/DL (ref 30–36.5)
MCV RBC AUTO: 84 FL (ref 80–99)
MONOCYTES # BLD: 0.5 K/UL (ref 0–1)
MONOCYTES NFR BLD: 9 % (ref 5–13)
NEUTS SEG # BLD: 3.2 K/UL (ref 1.8–8)
NEUTS SEG NFR BLD: 55 % (ref 32–75)
NRBC # BLD: 0 K/UL (ref 0–0.01)
NRBC BLD-RTO: 0 PER 100 WBC
PLATELET # BLD AUTO: 242 K/UL (ref 150–400)
PLATELET COMMENTS,PCOM: ABNORMAL
PMV BLD AUTO: 9.5 FL (ref 8.9–12.9)
POTASSIUM SERPL-SCNC: 3.8 MMOL/L (ref 3.5–5.1)
PROT SERPL-MCNC: 8.5 G/DL (ref 6.4–8.2)
RBC # BLD AUTO: 4.06 M/UL (ref 3.8–5.2)
RBC MORPH BLD: ABNORMAL
SODIUM SERPL-SCNC: 141 MMOL/L (ref 136–145)
WBC # BLD AUTO: 5.7 K/UL (ref 3.6–11)

## 2022-10-13 PROCEDURE — 80053 COMPREHEN METABOLIC PANEL: CPT

## 2022-10-13 PROCEDURE — 85025 COMPLETE CBC W/AUTO DIFF WBC: CPT

## 2022-10-13 PROCEDURE — 99283 EMERGENCY DEPT VISIT LOW MDM: CPT

## 2022-10-13 PROCEDURE — 86921 COMPATIBILITY TEST INCUBATE: CPT

## 2022-10-13 PROCEDURE — 86900 BLOOD TYPING SEROLOGIC ABO: CPT

## 2022-10-13 PROCEDURE — 86922 COMPATIBILITY TEST ANTIGLOB: CPT

## 2022-10-13 PROCEDURE — 36415 COLL VENOUS BLD VENIPUNCTURE: CPT

## 2022-10-13 PROCEDURE — 86920 COMPATIBILITY TEST SPIN: CPT

## 2022-10-13 NOTE — ED TRIAGE NOTES
Patient reports she has been having generalized fatigue, weakness and chills.  Patient reports she feels as though she did when she needed a blood transfusion in the past- Pt reports she has heavy menstrual bleeding in the past which has caused her to need a transfusion in the past. Patient reports her menses started 10/9    Patient reports she also has chronic back pain and muscle spasms which she also reports having today-

## 2022-10-13 NOTE — ED PROVIDER NOTES
Patient is a 59-year-old female with past medical history of anemia due to abnormal uterine bleeding, anxiety, asthma, obesity presenting for evaluation of possible low hemoglobin. Reports that she has been on her menstrual cycle for the past week. Notes that she was recently admitted here for a blood transfusion. Has been getting iron transfusions as outpatient. Notes that she is in bleeding heavily over the past week. Today, began to feel weak, fatigued, and cold. No chest pain or shortness of breath. States that this feels similar to when she has needed a blood transfusion in the past.  She has not currently on any hormonal therapy to stop the abnormal uterine bleeding as she would like to have another child.        Past Medical History:   Diagnosis Date    Abnormal Pap smear of cervix 09/2019    Negative/+HPV    Anemia     Anxiety     ASCUS (atypical squamous cells of undetermined significance) on Pap smear 6/1/2012    Asthma     extrinsic as a child     Breast disorder     Morbid obesity ([facility] [address]        Past Surgical History:   Procedure Laterality Date    HX BREAST BIOPSY Bilateral     abscesses removed 2014 and 2015    HX BREAST REDUCTION  2003    HX OTHER SURGICAL      abscess removed from left breast    MI BREAST SURGERY PROCEDURE UNLISTED  11/27/12    Incision and Drainage Left Breast Abscess         Family History:   Problem Relation Age of Onset    Hypertension Mother     Elevated Lipids Mother     Diabetes Sister        Social History     Socioeconomic History    Marital status:      Spouse name: Not on file    Number of children: Not on file    Years of education: Not on file    Highest education level: Not on file   Occupational History    Not on file   Tobacco Use    Smoking status: Never    Smokeless tobacco: Never   Substance and Sexual Activity    Alcohol use: Never    Drug use: Never    Sexual activity: Yes     Partners: Male     Birth control/protection: None   Other Topics Concern  Service Not Asked    Blood Transfusions Not Asked    Caffeine Concern Not Asked    Occupational Exposure Not Asked    Hobby Hazards Not Asked    Sleep Concern Not Asked    Stress Concern Not Asked    Weight Concern Not Asked    Special Diet Not Asked    Back Care Not Asked    Exercise Not Asked    Bike Helmet Not Asked    Seat Belt Not Asked    Self-Exams Not Asked   Social History Narrative    Not on file     Social Determinants of Health     Financial Resource Strain: Not on file   Food Insecurity: Not on file   Transportation Needs: Not on file   Physical Activity: Not on file   Stress: Not on file   Social Connections: Not on file   Intimate Partner Violence: Not on file   Housing Stability: Not on file         ALLERGIES: Bactrim [sulfamethoprim ds] and Zofran [ondansetron hcl (pf)]    Review of Systems   Constitutional:  Positive for chills and fatigue. Negative for unexpected weight change. HENT:  Negative for congestion. Eyes:  Negative for visual disturbance. Respiratory:  Negative for cough, chest tightness and shortness of breath. Cardiovascular:  Negative for chest pain and palpitations. Gastrointestinal:  Negative for abdominal pain, nausea and vomiting. Genitourinary:  Positive for vaginal bleeding. Negative for dysuria and flank pain. Musculoskeletal:  Negative for back pain. Skin:  Negative for color change. Allergic/Immunologic: Negative for immunocompromised state. Neurological:  Negative for dizziness and headaches. Hematological:  Negative for adenopathy. Psychiatric/Behavioral:  Negative for agitation. There were no vitals filed for this visit. Physical Exam  Vitals and nursing note reviewed. Constitutional:       Appearance: Normal appearance. She is obese. HENT:      Head: Atraumatic. Eyes:      Conjunctiva/sclera: Conjunctivae normal.      Pupils: Pupils are equal, round, and reactive to light.    Cardiovascular:      Rate and Rhythm: Normal rate and regular rhythm. Pulses: Normal pulses. Heart sounds: Normal heart sounds. Pulmonary:      Effort: Pulmonary effort is normal.      Breath sounds: Normal breath sounds. Abdominal:      General: Abdomen is flat. Musculoskeletal:         General: Normal range of motion. Cervical back: Neck supple. Skin:     General: Skin is warm and dry. Capillary Refill: Capillary refill takes less than 2 seconds. Neurological:      General: No focal deficit present. Mental Status: She is alert and oriented to person, place, and time. Mental status is at baseline. Psychiatric:         Mood and Affect: Mood normal.         Behavior: Behavior normal.        MDM  Number of Diagnoses or Management Options  Symptomatic anemia  Diagnosis management comments: Patient presenting with concern for anemia. Complaining of fatigue, weakness, cold sensation, similar to her prior episodes. Will obtain CBC and blood type for further evaluation. Patient agreeable to transfusion today if necessary. 1527 -patient's hemoglobin today is 10.7, actually improved from baseline. CMP is unremarkable. Patient may still be having some symptomatic anemia, although it is approximately 2 points improved from last recheck. Discussed with patient that it is also possible that she has a viral syndrome causing her symptoms. She should follow-up with her hematologist for further monitoring and management. Discussed my clinical impression(s), any labs and/or radiology results with the patient. I answered any questions and addressed any concerns. Discussed the importance of following up with their primary care physician and/or specialist(s). Discussed signs or symptoms that would warrant return back to the ER for further evaluation. The patient is agreeable with discharge.        Amount and/or Complexity of Data Reviewed  Clinical lab tests: ordered and reviewed  Tests in the radiology section of CPT®: ordered and reviewed      ED Course as of 10/13/22 1527   Thu Oct 13, 2022   1513 HGB(!): 10.7 [NB]      ED Course User Index  [NB] Leslie Haji NP       Procedures

## 2022-10-13 NOTE — DISCHARGE INSTRUCTIONS
Thank you for allowing us to provide you with medical care today. We realize that you have many choices for your emergency care needs. We thank you for choosing [facility]. Please choose us in the future for any continued health care needs. The exam and treatment you received in the emergency department were for an emergent problem and are not intended as complete care. It is important that you follow-up with a doctor. If your symptoms worsen or you do not improve you should return to the emergency department. We are available 24 hours a day. Please make an appointment with your health care provider for follow-up of your emergency department visit. Take this sheet with you when you go to your follow-up visit.

## 2022-10-13 NOTE — Clinical Note
[address]  [facility] EMERGENCY DEPT  [address] [id number]  [phone]    Work/School Note    Date: 10/13/2022    To Whom It May concern:    Desiree Ibarra was seen and treated today in the emergency room by the following provider(s):  Attending Provider: Sylvie Wagner DO  Nurse Practitioner: Portia Wagner NP. Desiree Ibarra is excused from work/school on 10/13/22 and 10/14/22. She is medically clear to return to work/school on 10/15/2022.        Sincerely,          Elizabeth Mireles NP

## 2022-10-17 LAB
ABO + RH BLD: NORMAL
ANTIGENS PRESENT RBC DONR: NORMAL
BLD PROD TYP BPU: NORMAL
BLOOD BANK CMNT PATIENT-IMP: NORMAL
BLOOD GROUP ANTIBODIES SERPL: NORMAL
BPU ID: NORMAL
CROSSMATCH RESULT,%XM: NORMAL
SPECIMEN EXP DATE BLD: NORMAL
STATUS OF UNIT,%ST: NORMAL
UNIT DIVISION, %UDIV: 0

## 2022-10-27 ENCOUNTER — CLINICAL SUPPORT (OUTPATIENT)
Dept: FAMILY MEDICINE CLINIC | Age: 37
End: 2022-10-27
Payer: MEDICAID

## 2022-10-27 DIAGNOSIS — R82.90 FOUL SMELLING URINE: Primary | ICD-10-CM

## 2022-10-27 LAB
BILIRUB UR QL STRIP: NEGATIVE
GLUCOSE UR-MCNC: NEGATIVE MG/DL
KETONES P FAST UR STRIP-MCNC: NEGATIVE MG/DL
PH UR STRIP: 5.5 [PH] (ref 4.6–8)
PROT UR QL STRIP: NORMAL
SP GR UR STRIP: 1.03 (ref 1–1.03)
UA UROBILINOGEN AMB POC: NORMAL (ref 0.2–1)
URINALYSIS CLARITY POC: NORMAL
URINALYSIS COLOR POC: YELLOW
URINE BLOOD POC: NORMAL
URINE LEUKOCYTES POC: NORMAL
URINE NITRITES POC: POSITIVE

## 2022-10-27 PROCEDURE — 81003 URINALYSIS AUTO W/O SCOPE: CPT | Performed by: NURSE PRACTITIONER

## 2022-10-27 PROCEDURE — 99211 OFF/OP EST MAY X REQ PHY/QHP: CPT | Performed by: NURSE PRACTITIONER

## 2022-10-27 RX ORDER — NITROFURANTOIN 25; 75 MG/1; MG/1
100 CAPSULE ORAL 2 TIMES DAILY
Qty: 14 CAPSULE | Refills: 0 | Status: SHIPPED | OUTPATIENT
Start: 2022-10-27

## 2022-10-27 NOTE — PROGRESS NOTES
Pt's son in office being seen stating felt like had UTI. Spoke to provider who advised to do a urine dip.

## 2022-11-02 LAB
BACTERIA UR CULT: ABNORMAL
BACTERIA UR CULT: ABNORMAL

## 2022-11-30 ENCOUNTER — TELEPHONE (OUTPATIENT)
Dept: FAMILY MEDICINE CLINIC | Age: 37
End: 2022-11-30

## 2022-11-30 DIAGNOSIS — D50.0 IRON DEFICIENCY ANEMIA DUE TO CHRONIC BLOOD LOSS: Primary | ICD-10-CM

## 2022-11-30 NOTE — TELEPHONE ENCOUNTER
Reason for call: Pt calling--she cannot come to 12/1 appt tomorrow. She has court and she would like to reschedule. TSS has no in person until 2/1, and only VV OV this and next week. She said M-W she can do anything after 2:00pm and Thur-Fri she can do anytime in the morning or throughout the day. She needs B12 and Diabetes follow up appt. She is requesting a call back if we can schedule her sometime next week or the following week.     Is this a new problem: yes     Date of last appointment:  10/27/2022     Can we respond via Clarity: no    Best call back number: [phone]

## 2022-12-02 ENCOUNTER — OFFICE VISIT (OUTPATIENT)
Dept: ONCOLOGY | Age: 37
End: 2022-12-02
Payer: MEDICAID

## 2022-12-02 VITALS
TEMPERATURE: 97.5 F | DIASTOLIC BLOOD PRESSURE: 71 MMHG | SYSTOLIC BLOOD PRESSURE: 105 MMHG | HEART RATE: 58 BPM | HEIGHT: 67 IN | WEIGHT: 248 LBS | BODY MASS INDEX: 38.92 KG/M2

## 2022-12-02 DIAGNOSIS — D50.0 IRON DEFICIENCY ANEMIA DUE TO CHRONIC BLOOD LOSS: Primary | ICD-10-CM

## 2022-12-02 DIAGNOSIS — E53.8 VITAMIN B12 DEFICIENCY: ICD-10-CM

## 2022-12-02 DIAGNOSIS — N92.0 MENORRHAGIA WITH REGULAR CYCLE: ICD-10-CM

## 2022-12-02 RX ORDER — ACETAMINOPHEN 325 MG/1
650 TABLET ORAL AS NEEDED
Start: 2022-12-11

## 2022-12-02 RX ORDER — SODIUM CHLORIDE 9 MG/ML
5-40 INJECTION INTRAMUSCULAR; INTRAVENOUS; SUBCUTANEOUS AS NEEDED
OUTPATIENT
Start: 2022-12-11

## 2022-12-02 RX ORDER — SODIUM CHLORIDE 0.9 % (FLUSH) 0.9 %
5-40 SYRINGE (ML) INJECTION AS NEEDED
OUTPATIENT
Start: 2022-12-08

## 2022-12-02 RX ORDER — ALBUTEROL SULFATE 0.83 MG/ML
2.5 SOLUTION RESPIRATORY (INHALATION) AS NEEDED
Start: 2022-12-11

## 2022-12-02 RX ORDER — DIPHENHYDRAMINE HYDROCHLORIDE 50 MG/ML
25 INJECTION, SOLUTION INTRAMUSCULAR; INTRAVENOUS AS NEEDED
Start: 2022-12-11

## 2022-12-02 RX ORDER — ACETAMINOPHEN 325 MG/1
650 TABLET ORAL AS NEEDED
Start: 2022-12-13

## 2022-12-02 RX ORDER — SODIUM CHLORIDE 0.9 % (FLUSH) 0.9 %
5-40 SYRINGE (ML) INJECTION AS NEEDED
OUTPATIENT
Start: 2022-12-11

## 2022-12-02 RX ORDER — ONDANSETRON 2 MG/ML
8 INJECTION INTRAMUSCULAR; INTRAVENOUS AS NEEDED
OUTPATIENT
Start: 2022-12-11

## 2022-12-02 RX ORDER — ALBUTEROL SULFATE 0.83 MG/ML
2.5 SOLUTION RESPIRATORY (INHALATION) AS NEEDED
Start: 2022-12-08

## 2022-12-02 RX ORDER — ACETAMINOPHEN 325 MG/1
650 TABLET ORAL AS NEEDED
Start: 2022-12-08

## 2022-12-02 RX ORDER — SODIUM CHLORIDE 9 MG/ML
5-40 INJECTION INTRAMUSCULAR; INTRAVENOUS; SUBCUTANEOUS AS NEEDED
OUTPATIENT
Start: 2022-12-13

## 2022-12-02 RX ORDER — DIPHENHYDRAMINE HYDROCHLORIDE 50 MG/ML
25 INJECTION, SOLUTION INTRAMUSCULAR; INTRAVENOUS AS NEEDED
Start: 2022-12-13

## 2022-12-02 RX ORDER — EPINEPHRINE 1 MG/ML
0.3 INJECTION, SOLUTION, CONCENTRATE INTRAVENOUS AS NEEDED
OUTPATIENT
Start: 2022-12-08

## 2022-12-02 RX ORDER — HYDROCORTISONE SODIUM SUCCINATE 100 MG/2ML
100 INJECTION, POWDER, FOR SOLUTION INTRAMUSCULAR; INTRAVENOUS AS NEEDED
OUTPATIENT
Start: 2022-12-13

## 2022-12-02 RX ORDER — ONDANSETRON 2 MG/ML
8 INJECTION INTRAMUSCULAR; INTRAVENOUS AS NEEDED
OUTPATIENT
Start: 2022-12-13

## 2022-12-02 RX ORDER — SODIUM CHLORIDE 9 MG/ML
5-40 INJECTION INTRAMUSCULAR; INTRAVENOUS; SUBCUTANEOUS AS NEEDED
OUTPATIENT
Start: 2022-12-08

## 2022-12-02 RX ORDER — SODIUM CHLORIDE 9 MG/ML
5-250 INJECTION, SOLUTION INTRAVENOUS AS NEEDED
OUTPATIENT
Start: 2022-12-11

## 2022-12-02 RX ORDER — HYDROCORTISONE SODIUM SUCCINATE 100 MG/2ML
100 INJECTION, POWDER, FOR SOLUTION INTRAMUSCULAR; INTRAVENOUS AS NEEDED
OUTPATIENT
Start: 2022-12-08

## 2022-12-02 RX ORDER — EPINEPHRINE 1 MG/ML
0.3 INJECTION, SOLUTION, CONCENTRATE INTRAVENOUS AS NEEDED
OUTPATIENT
Start: 2022-12-11

## 2022-12-02 RX ORDER — DIPHENHYDRAMINE HYDROCHLORIDE 50 MG/ML
50 INJECTION, SOLUTION INTRAMUSCULAR; INTRAVENOUS AS NEEDED
Start: 2022-12-08

## 2022-12-02 RX ORDER — ALBUTEROL SULFATE 0.83 MG/ML
2.5 SOLUTION RESPIRATORY (INHALATION) AS NEEDED
Start: 2022-12-13

## 2022-12-02 RX ORDER — SODIUM CHLORIDE 9 MG/ML
5-250 INJECTION, SOLUTION INTRAVENOUS AS NEEDED
OUTPATIENT
Start: 2022-12-08

## 2022-12-02 RX ORDER — SODIUM CHLORIDE 9 MG/ML
5-250 INJECTION, SOLUTION INTRAVENOUS AS NEEDED
OUTPATIENT
Start: 2022-12-13

## 2022-12-02 RX ORDER — ONDANSETRON 2 MG/ML
8 INJECTION INTRAMUSCULAR; INTRAVENOUS AS NEEDED
OUTPATIENT
Start: 2022-12-08

## 2022-12-02 RX ORDER — DIPHENHYDRAMINE HYDROCHLORIDE 50 MG/ML
25 INJECTION, SOLUTION INTRAMUSCULAR; INTRAVENOUS AS NEEDED
Start: 2022-12-08

## 2022-12-02 RX ORDER — SODIUM CHLORIDE 0.9 % (FLUSH) 0.9 %
5-40 SYRINGE (ML) INJECTION AS NEEDED
OUTPATIENT
Start: 2022-12-13

## 2022-12-02 RX ORDER — DIPHENHYDRAMINE HYDROCHLORIDE 50 MG/ML
50 INJECTION, SOLUTION INTRAMUSCULAR; INTRAVENOUS AS NEEDED
Start: 2022-12-11

## 2022-12-02 RX ORDER — DIPHENHYDRAMINE HYDROCHLORIDE 50 MG/ML
50 INJECTION, SOLUTION INTRAMUSCULAR; INTRAVENOUS AS NEEDED
Start: 2022-12-13

## 2022-12-02 RX ORDER — EPINEPHRINE 1 MG/ML
0.3 INJECTION, SOLUTION, CONCENTRATE INTRAVENOUS AS NEEDED
OUTPATIENT
Start: 2022-12-13

## 2022-12-02 RX ORDER — HYDROCORTISONE SODIUM SUCCINATE 100 MG/2ML
100 INJECTION, POWDER, FOR SOLUTION INTRAMUSCULAR; INTRAVENOUS AS NEEDED
OUTPATIENT
Start: 2022-12-11

## 2022-12-02 NOTE — PROGRESS NOTES
[address] [facility] at [facility]  [phone]    Hematology / Oncology Established Visit    Reason for Visit:      Lance Russell is a 40 y.o. female who is seen for follow up of anemia. History of Present Illness:   Lance Russell is a 40 y.o. female who comes in for evaluation of anemia. She has irregular and heavy periods, lasting 6-7 days, requiring new pad every 1-2 hours. Patient underwent endometrial biopsy. Was started on oral birth control pills, prenatal vitamins, iron supplements. She received a blood transfusion in May 2019. She sees blood in her stools at times and thinks it is related to constipation and hemorrhoids. The blood is not mixed in with the stool. She has not ever seen a GI doctor. She is not currently taking any stools softeners. She denies ice cravings. She has fatigue, DOVE, but no CP. Interval History: Patient seen for follow up of anemia. Reports she is taking iron supplements twice daily. Reports heavy menstrual bleeding occurring monthly lasting 5-6 days - 2 packs of 36 pads over 1 week - has to double up on pads. Reports moderate fatigue, dyspnea on exertion. Reports she is recovering from a cold - has intermittent wheezing, managing with mucinex. COVID test negative. Reports she completed weekly B12 injections with PCP NP Nancy Lambert - has follow up with her on Tuesday to discuss frequency of B12 injections. PMHx: Anemia, Anxiety, Asthma, Obesity  PSurgHx: Breast reduction 2003, I&D of breast abscess 2012  Shx: No EtOH/tobacco  Fhx: Mother with HTN, HLD. Sister with DM  Review of Systems: A complete review of systems was obtained, negative except as described above.     Physical Exam:     Visit Vitals  /71   Pulse (!) 58   Temp 97.5 °F (36.4 °C)   Ht 5' 7\" (1.702 m)   Wt 248 lb (112.5 kg)   BMI 38.84 kg/m²     ECOG PS: 0  General: no distress  Eyes: anicteric sclerae  HENT: oropharynx clear  Neck: supple  Lymphatic: deferred today Respiratory: CTAB, normal respiratory effort  CV: no peripheral edema  GI: soft, nontender, nondistended, no masses  Skin: no rashes; no ecchymoses; no petechiae      Results:     Lab Results   Component Value Date/Time    WBC 4.7 11/30/2022 12:48 PM    HGB 11.4 11/30/2022 12:48 PM    HCT 35.9 11/30/2022 12:48 PM    PLATELET 319 25/31/7486 12:48 PM    MCV 85 11/30/2022 12:48 PM    ABS. NEUTROPHILS 2.3 11/30/2022 12:48 PM    Hemoglobin (POC) 7.6 07/05/2019 02:16 PM    Hemoglobin (POC) 13.3 12/18/2014 06:34 PM    Hematocrit (POC) 39 12/18/2014 06:34 PM    Hgb, External 11.6 07/27/2020 12:00 AM    Hct, External 33.8 07/27/2020 12:00 AM    Platelet cnt., External 194 07/27/2020 12:00 AM     Lab Results   Component Value Date/Time    Sodium 141 10/13/2022 02:27 PM    Potassium 3.8 10/13/2022 02:27 PM    Chloride 106 10/13/2022 02:27 PM    CO2 28 10/13/2022 02:27 PM    Glucose 82 10/13/2022 02:27 PM    BUN 15 10/13/2022 02:27 PM    Creatinine 0.88 10/13/2022 02:27 PM    GFR est AA >60 08/06/2022 11:52 AM    GFR est non-AA >60 08/06/2022 11:52 AM    Calcium 9.1 10/13/2022 02:27 PM    Sodium (POC) 142 12/18/2014 06:34 PM    Potassium (POC) 3.6 12/18/2014 06:34 PM    Chloride (POC) 104 12/18/2014 06:34 PM    Glucose (POC) 87 12/18/2014 06:34 PM    BUN (POC) 13 12/18/2014 06:34 PM    Creatinine (POC) 1.0 12/18/2014 06:34 PM    Calcium, ionized (POC) 1.16 12/18/2014 06:34 PM     Lab Results   Component Value Date/Time    Bilirubin, total 1.0 10/13/2022 02:27 PM    ALT (SGPT) 24 10/13/2022 02:27 PM    Alk.  phosphatase 58 10/13/2022 02:27 PM    Protein, total 8.5 (H) 10/13/2022 02:27 PM    Albumin 3.8 10/13/2022 02:27 PM    Globulin 4.7 (H) 10/13/2022 02:27 PM     Lab Results   Component Value Date/Time    Iron 28 11/30/2022 12:48 PM    TIBC 388 11/30/2022 12:48 PM    Iron % saturation 7 (LL) 11/30/2022 12:48 PM    Ferritin 35 11/30/2022 12:48 PM       Lab Results   Component Value Date/Time    Vitamin B12 158 (L) 08/06/2022 11:52 AM    Folate 9.6 05/28/2019 07:07 PM     Lab Results   Component Value Date/Time    TSH 1.140 07/06/2022 03:54 PM     Lab Results   Component Value Date/Time    Hep B surface Ag screen Negative 03/13/2020 02:53 PM    Hep C Virus Ab <0.1 07/06/2022 03:54 PM         Imaging:     Radiology report(s) reviewed. Assessment & Plan:   Jakob Ornelas is a 40 y.o. female comes in for f/u of anemia. 1. Iron deficiency anemia: Previously due to increased demand in pregnancy and currently due to menorrhagia. Required blood transfusion 5/2019 for Hgb 6.8. s/p Injectafer x 2 in early August 2019. Repeat iron studies and Hgb normal in 9/2019, 12/2019 and 5/20/20 labs show anemia without iron deficiency - this is likely due to hemodilutional effect of pregnancy. 8/13/20 and 7/2020 labs normal Hgb and iron saturation but low ferritin. Admitted 8/6-8/7/22 for symptomatic iron deficiency anemia with Hgb 6.8 likely due to menorrhagia. Received 2 x IV venofer infusions and transfused with 1 unit of pRBC. Received iron sucrose x 5 in 9/2022. Current labs show iron deficiency with low iron saturation, normal ferritin and Hgb on 11/2022. Given pt is symptomatic with dyspnea and active menstrual bleeding, will proceed with Iron sucrose infusions. -- Advised taking iron supplements BID when off menstrual cycle and TID during monthly menstrual cycles. -- Iron sucrose x 3.   -- Follow up in 12 weeks with repeat labs. 2. Menorrhagia: Pt previously on OCPs and later on Depot. Not currently on OCPs, given she is actively trying for another child. 3. Vitamin B12 deficiency: B12 level as low as 99 in 5/2019. Had neuropathy during pregnancy in 2020. C/p B12 injections 9/2020 - 11/2020. I had encouraged patient to start on B12 supplements multiple times in the past, but pt was noncompliant. B12 level low at 158 on 8/2022 and completed once weekly B12 x 4 at PCP office.  I educated pt again today about importance of B12 repletion as B12 deficiency can cause anemia as well as neurological changes. -- Advised I then recommend monthly B12 injections. She has follow up with PCP next week to discuss monthly B12 injections. 4. H/o breast mass: Had biopsy with Dr. Shannan Arias. Pathology showed fibroadenoma. I appreciate the opportunity to participate in Ms. Ximena Mcgill's care. I personally saw and evaluated the patient and performed the key components of medical decision making. The history, physical exam, and documentation were performed by Brian Cooper NP. I reviewed and verified the above documentation and modified it as needed. Given worsening iron stores and symptoms of fatigue despite oral iron in patient with menorrhagia, will treat with parenteral iron.      Signed By: Kendall Bal MD

## 2022-12-06 ENCOUNTER — VIRTUAL VISIT (OUTPATIENT)
Dept: FAMILY MEDICINE CLINIC | Age: 37
End: 2022-12-06
Payer: MEDICAID

## 2022-12-06 DIAGNOSIS — D50.0 IRON DEFICIENCY ANEMIA DUE TO CHRONIC BLOOD LOSS: ICD-10-CM

## 2022-12-06 DIAGNOSIS — E53.8 VITAMIN B12 DEFICIENCY: ICD-10-CM

## 2022-12-06 DIAGNOSIS — R10.30 LOWER ABDOMINAL PAIN: Primary | ICD-10-CM

## 2022-12-06 DIAGNOSIS — R79.89 ABNORMAL CBC: ICD-10-CM

## 2022-12-06 PROBLEM — R10.84 GENERALIZED ABDOMINAL PAIN: Status: ACTIVE | Noted: 2022-12-06

## 2022-12-06 PROCEDURE — 99214 OFFICE O/P EST MOD 30 MIN: CPT | Performed by: NURSE PRACTITIONER

## 2022-12-06 NOTE — PROGRESS NOTES
Chief Complaint   Patient presents with    Follow Up Chronic Condition     4month followup     1. \"Have you been to the ER, urgent care clinic since your last visit? Hospitalized since your last visit? \" No    2. \"Have you seen or consulted any other health care providers outside of the [address] [place] [address] since your last visit? \" No     3. For patients aged 39-70: Has the patient had a colonoscopy / FIT/ Cologuard? NA - based on age      If the patient is female:    4. For patients aged 41-77: Has the patient had a mammogram within the past 2 years? NA - based on age or sex      11. For patients aged 21-65: Has the patient had a pap smear?  Yes - no Care Gap present    3 most recent PHQ Screens 12/6/2022   Little interest or pleasure in doing things Not at all   Feeling down, depressed, irritable, or hopeless Not at all   Total Score PHQ 2 5[address] [facility] [phone]

## 2022-12-06 NOTE — PROGRESS NOTES
Alis Smith (: 1985) is a 40 y.o. female, established patient, here for evaluation of the following chief complaint(s):   Follow Up Chronic Condition (4month followup)       ASSESSMENT/PLAN:  Below is the assessment and plan developed based on review of pertinent history, labs, studies, and medications. 1. Lower abdominal pain  -     US ABD COMP; Future  Referral for abdominal ultrasound for additional clinical information. We will make treatment or referral decisions when I get those results. 2. Abnormal CBC  -     Patient is followed by [facility]. Her first iron infusion is this Thursday. She will continue to be adherent with their plan of treatment. 3. Vitamin B12 deficiency  Patient will make an appointment to come and for an updated B12 and I will make supplementation decisions when I get the results of the B12.  4. Iron deficiency anemia due to chronic blood loss  Patient will continue to follow-up with [facility]. She will continue to be adherent with her appointments with them. We will support their plan of treatment. Return in about 2 months (around 2023) for annual wellness with physical and fasting labs. SUBJECTIVE/OBJECTIVE:  40-year-old female presents for 4-month follow-up for anemia. She is currently being followed by [facility] and the recommendations for her that she was started on iron infusions this Thursday, she will take iron by mouth 2 times a day before her period and three times a day during her period. Patient also has a current complaint of lower abdominal discomfort that she is experienced since her  section 2 years ago there are certain physical motions that exacerbate her discomfort and she has not found anything that made it better. Review of Systems   ROS per HPI and past medical history. Patient-Reported Weight: 247lb       Physical Exam  Nursing note reviewed. HENT:      Head: Normocephalic. Pulmonary:      Effort: Pulmonary effort is normal.   Neurological:      Mental Status: She is alert and oriented to person, place, and time. Psychiatric:         Mood and Affect: Mood normal.         Thought Content: Thought content normal.         Judgment: Judgment normal.               Ximena Amador, was evaluated through a synchronous (real-time) audio-video encounter. The patient (or guardian if applicable) is aware that this is a billable service, which includes applicable co-pays. This Virtual Visit was conducted with patient's (and/or legal guardian's) consent. The visit was conducted pursuant to the emergency declaration under the [facility], [address] [place] [address] authority and the [employer] [facility] General Act. Patient identification was verified, and a caregiver was present when appropriate. The patient was located at: Home: [address] [place] [address] Dr Donn Newman [facility] [address]  The provider was located at: Home: [unfilled]       An electronic signature was used to authenticate this note.   -- Lianet Leon NP

## 2022-12-08 ENCOUNTER — HOSPITAL ENCOUNTER (OUTPATIENT)
Dept: INFUSION THERAPY | Age: 37
Discharge: HOME | End: 2022-12-08
Payer: MEDICAID

## 2022-12-08 ENCOUNTER — HOSPITAL ENCOUNTER (OUTPATIENT)
Dept: ULTRASOUND IMAGING | Age: 37
Discharge: HOME | End: 2022-12-08
Payer: MEDICAID

## 2022-12-08 VITALS
HEART RATE: 67 BPM | DIASTOLIC BLOOD PRESSURE: 63 MMHG | OXYGEN SATURATION: 99 % | TEMPERATURE: 98.3 F | RESPIRATION RATE: 18 BRPM | WEIGHT: 252.7 LBS | BODY MASS INDEX: 39.66 KG/M2 | HEIGHT: 67 IN | SYSTOLIC BLOOD PRESSURE: 99 MMHG

## 2022-12-08 DIAGNOSIS — D50.0 IRON DEFICIENCY ANEMIA DUE TO CHRONIC BLOOD LOSS: Primary | ICD-10-CM

## 2022-12-08 DIAGNOSIS — D50.9 IRON DEFICIENCY ANEMIA, UNSPECIFIED IRON DEFICIENCY ANEMIA TYPE: ICD-10-CM

## 2022-12-08 PROCEDURE — 76700 US EXAM ABDOM COMPLETE: CPT

## 2022-12-08 PROCEDURE — 96365 THER/PROPH/DIAG IV INF INIT: CPT

## 2022-12-08 PROCEDURE — 74011000258 HC RX REV CODE- 258: Performed by: NURSE PRACTITIONER

## 2022-12-08 PROCEDURE — 74011250636 HC RX REV CODE- 250/636: Performed by: NURSE PRACTITIONER

## 2022-12-08 RX ORDER — SODIUM CHLORIDE 9 MG/ML
5-250 INJECTION, SOLUTION INTRAVENOUS AS NEEDED
Status: DISCONTINUED | OUTPATIENT
Start: 2022-12-08 | End: 2022-12-09 | Stop reason: HOSPADM

## 2022-12-08 RX ADMIN — SODIUM CHLORIDE 25 ML/HR: 9 INJECTION, SOLUTION INTRAVENOUS at 15:35

## 2022-12-08 RX ADMIN — IRON SUCROSE 200 MG: 20 INJECTION, SOLUTION INTRAVENOUS at 15:49

## 2022-12-08 NOTE — PROGRESS NOTES
Outpatient Infusion Center Progress Note    [id number] Pt admit to [facility] for Venofer ambulatory in stable condition. Assessment completed. No new concerns voiced. Peripheral IV 24 g established in left AC with positive blood return. Patient tolerated treatment well. No adverse reactions noted. Patient did not stay post treatment observation for monitoring. Visit Vitals  BP 99/63 (BP 1 Location: Left arm, BP Patient Position: Sitting)   Pulse 67   Temp 98.3 °F (36.8 °C)   Resp 18   Ht 5' 7\" (1.702 m)   Wt 114.6 kg (252 lb 11.2 oz)   SpO2 99%   Breastfeeding No   BMI 39.58 kg/m²     Patient Vitals for the past 12 hrs:   Temp Pulse Resp BP SpO2   12/08/22 1628 -- 67 18 99/63 --   12/08/22 1528 98.3 °F (36.8 °C) 63 18 (!) 98/59 99 %      Medications:  Medications Administered       0.9% sodium chloride infusion       Admin Date  12/08/2022 Action  New Bag Dose  25 mL/hr Rate  25 mL/hr Route  IntraVENous Administered By  Cherrie Kerns RN              iron sucrose (VENOFER) 200 mg in 0.9% sodium chloride 100 mL, overfill volume 10 mL IVPB       Admin Date  12/08/2022 Action  New Bag Dose  200 mg Rate  240 mL/hr Route  IntraVENous Administered By  Cherrie Kerns RN                   Over 30 minutes. 1640 Pt tolerated treatment well. PIV taken out with no issues, pressure applied, wrapped in 2x2 gauze and coban. D/c home ambulatory in no distress.  Pt aware of next appointment scheduled for     Future Appointments   Date Time Provider Mor Julien   12/15/2022  3:30 PM SS INF1 CH4 <2H [facility]   12/22/2022  3:30 PM SS INF1 CH4 <2H [facility]   2/23/2023  1:00 PM Zofia Burr MD [facility] BS AMB

## 2022-12-09 ENCOUNTER — PATIENT MESSAGE (OUTPATIENT)
Dept: FAMILY MEDICINE CLINIC | Age: 37
End: 2022-12-09

## 2022-12-09 RX ORDER — IBUPROFEN 800 MG/1
800 TABLET ORAL
Qty: 90 TABLET | Refills: 1 | Status: SHIPPED | OUTPATIENT
Start: 2022-12-09

## 2022-12-09 RX ORDER — PREDNISONE 20 MG/1
20 TABLET ORAL DAILY
Qty: 7 TABLET | Refills: 0 | Status: SHIPPED | OUTPATIENT
Start: 2022-12-09

## 2022-12-09 NOTE — TELEPHONE ENCOUNTER
Ibuprofen and steroid burst to see if it will help her abdominal pain which started after her  for failure to progress with possible scar tissue pain in the presence of a normal abdominal US

## 2022-12-14 ENCOUNTER — HOSPITAL ENCOUNTER (OUTPATIENT)
Dept: INFUSION THERAPY | Age: 37
Discharge: HOME | End: 2022-12-14
Payer: MEDICAID

## 2022-12-14 VITALS
TEMPERATURE: 98 F | RESPIRATION RATE: 18 BRPM | OXYGEN SATURATION: 99 % | SYSTOLIC BLOOD PRESSURE: 96 MMHG | HEART RATE: 67 BPM | DIASTOLIC BLOOD PRESSURE: 60 MMHG

## 2022-12-14 DIAGNOSIS — D50.9 IRON DEFICIENCY ANEMIA, UNSPECIFIED IRON DEFICIENCY ANEMIA TYPE: ICD-10-CM

## 2022-12-14 DIAGNOSIS — D50.0 IRON DEFICIENCY ANEMIA DUE TO CHRONIC BLOOD LOSS: Primary | ICD-10-CM

## 2022-12-14 PROCEDURE — 74011000258 HC RX REV CODE- 258: Performed by: NURSE PRACTITIONER

## 2022-12-14 PROCEDURE — 96365 THER/PROPH/DIAG IV INF INIT: CPT

## 2022-12-14 PROCEDURE — 74011250636 HC RX REV CODE- 250/636: Performed by: NURSE PRACTITIONER

## 2022-12-14 RX ORDER — SODIUM CHLORIDE 9 MG/ML
5-40 INJECTION INTRAMUSCULAR; INTRAVENOUS; SUBCUTANEOUS AS NEEDED
Status: DISCONTINUED | OUTPATIENT
Start: 2022-12-14 | End: 2022-12-15 | Stop reason: HOSPADM

## 2022-12-14 RX ORDER — SODIUM CHLORIDE 9 MG/ML
5-250 INJECTION, SOLUTION INTRAVENOUS AS NEEDED
Status: DISCONTINUED | OUTPATIENT
Start: 2022-12-14 | End: 2022-12-15 | Stop reason: HOSPADM

## 2022-12-14 RX ADMIN — IRON SUCROSE 200 MG: 20 INJECTION, SOLUTION INTRAVENOUS at 15:06

## 2022-12-14 NOTE — PROGRESS NOTES
[facility] Progress Note    Date: 2022    Name: Ro Shoemaker    MRN: [id number]         : 1985    Ms. Melvin Chung Arrived ambulatory and in no distress for Venofer Regimen. Assessment was completed, no acute issues at this time, no new complaints voiced. 24g PIV placed in left AC, positive blood return noted. No labs drawn today. Ms. Tonya Forde vitals were reviewed. Patient Vitals for the past 12 hrs:   Temp Pulse Resp BP SpO2   22 1545 98 °F (36.7 °C) 67 18 96/60 --   22 1425 97.7 °F (36.5 °C) 100 18 117/81 99 %       Medications:  Medications Administered       iron sucrose (VENOFER) 200 mg in 0.9% sodium chloride 100 mL, overfill volume 10 mL IVPB       Admin Date  2022 Action  New Bag Dose  200 mg Rate  240 mL/hr Route  IntraVENous Administered By  Cheyenne Esqueda RN                    Ms. Melvin Chung tolerated treatment well and was discharged from [place] [address] in stable condition. PIV flushed and removed.      Future Appointments   Date Time Provider Mor Julien   2022  3:30 [facility]   2023  1:00 PM Naa Burr MD [facility] BS RENEE Ibrhaim RN  2022

## 2022-12-15 ENCOUNTER — HOSPITAL ENCOUNTER (OUTPATIENT)
Dept: INFUSION THERAPY | Age: 37
End: 2022-12-15
Payer: MEDICAID

## 2022-12-22 ENCOUNTER — HOSPITAL ENCOUNTER (OUTPATIENT)
Dept: INFUSION THERAPY | Age: 37
Discharge: HOME | End: 2022-12-22
Payer: MEDICAID

## 2022-12-22 VITALS
SYSTOLIC BLOOD PRESSURE: 96 MMHG | RESPIRATION RATE: 18 BRPM | TEMPERATURE: 97.9 F | DIASTOLIC BLOOD PRESSURE: 57 MMHG | HEART RATE: 61 BPM | OXYGEN SATURATION: 99 %

## 2022-12-22 DIAGNOSIS — D50.9 IRON DEFICIENCY ANEMIA, UNSPECIFIED IRON DEFICIENCY ANEMIA TYPE: ICD-10-CM

## 2022-12-22 DIAGNOSIS — D50.0 IRON DEFICIENCY ANEMIA DUE TO CHRONIC BLOOD LOSS: Primary | ICD-10-CM

## 2022-12-22 PROCEDURE — 96365 THER/PROPH/DIAG IV INF INIT: CPT

## 2022-12-22 PROCEDURE — 74011250636 HC RX REV CODE- 250/636: Performed by: NURSE PRACTITIONER

## 2022-12-22 PROCEDURE — 74011000258 HC RX REV CODE- 258: Performed by: NURSE PRACTITIONER

## 2022-12-22 RX ORDER — SODIUM CHLORIDE 9 MG/ML
5-250 INJECTION, SOLUTION INTRAVENOUS AS NEEDED
Status: DISCONTINUED | OUTPATIENT
Start: 2022-12-22 | End: 2022-12-23 | Stop reason: HOSPADM

## 2022-12-22 RX ORDER — SODIUM CHLORIDE 0.9 % (FLUSH) 0.9 %
5-40 SYRINGE (ML) INJECTION AS NEEDED
Status: DISCONTINUED | OUTPATIENT
Start: 2022-12-22 | End: 2022-12-23 | Stop reason: HOSPADM

## 2022-12-22 RX ORDER — SODIUM CHLORIDE 9 MG/ML
5-40 INJECTION INTRAMUSCULAR; INTRAVENOUS; SUBCUTANEOUS AS NEEDED
Status: DISCONTINUED | OUTPATIENT
Start: 2022-12-22 | End: 2022-12-23 | Stop reason: HOSPADM

## 2022-12-22 RX ADMIN — IRON SUCROSE 200 MG: 20 INJECTION, SOLUTION INTRAVENOUS at 16:10

## 2022-12-22 RX ADMIN — SODIUM CHLORIDE 25 ML/HR: 9 INJECTION, SOLUTION INTRAVENOUS at 16:08

## 2022-12-22 NOTE — PROGRESS NOTES
Outpatient Infusion Center Short Visit Progress Note    3223 Ms. Lyn Arzola admitted to [facility] for Venofer ambulatory in stable condition. Assessment completed. No new concerns voiced. Vital Signs:  Visit Vitals  /73   Pulse 61   Temp 97.9 °F (36.6 °C)   Resp 18   SpO2 99%         PIV with positive blood return. Medications:  Medications Administered       0.9% sodium chloride infusion       Admin Date  12/22/2022 Action  New Bag Dose  25 mL/hr Rate  25 mL/hr Route  IntraVENous Administered By  Pamela Naik RN              iron sucrose (VENOFER) 200 mg in 0.9% sodium chloride 100 mL, overfill volume 10 mL IVPB       Admin Date  12/22/2022 Action  New Bag Dose  200 mg Rate  240 mL/hr Route  IntraVENous Administered By  Pamela Naik RN                  SBAR to [place] @ 8464 for completion of treatment. Pt declined the 30 min post tx observation time. AVS declined    Patient tolerated treatment well. Patient discharged from [place] [address] ambulatory in no distress at 1700. Patient aware of next appointment.     Qamar Cano RN  December 22, 2022    Future Appointments   Date Time Provider Mor Julien   2/23/2023  1:00 PM Stephany Burr MD [facility] BS AMB

## 2022-12-22 NOTE — PROGRESS NOTES
Outpatient Infusion Center Short Visit Progress Note    4385 Ms. Robb Rios admitted to [facility] for Venofer ambulatory in stable condition. Assessment completed. No new concerns voiced. Vital Signs:  Visit Vitals  /73   Pulse 61   Temp 97.9 °F (36.6 °C)   Resp 18   SpO2 99%         PIV with positive blood return. Medications:  Medications Administered       0.9% sodium chloride infusion       Admin Date  12/22/2022 Action  New Bag Dose  25 mL/hr Rate  25 mL/hr Route  IntraVENous Administered By  Nanci Stanford RN              iron sucrose (VENOFER) 200 mg in 0.9% sodium chloride 100 mL, overfill volume 10 mL IVPB       Admin Date  12/22/2022 Action  New Bag Dose  200 mg Rate  240 mL/hr Route  IntraVENous Administered By  Nanci Stanford RN                    Pt declined the 30 min post tx observation time. AVS declined    Patient tolerated treatment well. Patient discharged from [place] [address] ambulatory in no distress at 1700. Patient aware of next appointment.     Stephanie Gray RN  December 22, 2022    Future Appointments   Date Time Provider Mor Julien   2/23/2023  1:00 PM Nabor Burr MD [facility] BS AMB

## 2023-02-09 ENCOUNTER — CLINICAL SUPPORT (OUTPATIENT)
Dept: FAMILY MEDICINE CLINIC | Age: 38
End: 2023-02-09
Payer: MEDICAID

## 2023-02-09 DIAGNOSIS — E53.8 VITAMIN B12 DEFICIENCY: Primary | ICD-10-CM

## 2023-02-09 PROCEDURE — 96372 THER/PROPH/DIAG INJ SC/IM: CPT | Performed by: NURSE PRACTITIONER

## 2023-02-09 RX ADMIN — CYANOCOBALAMIN 1000 MCG: 1000 INJECTION, SOLUTION INTRAMUSCULAR; SUBCUTANEOUS at 14:35

## 2023-02-23 ENCOUNTER — TELEPHONE (OUTPATIENT)
Dept: ONCOLOGY | Age: 38
End: 2023-02-23

## 2023-02-23 ENCOUNTER — OFFICE VISIT (OUTPATIENT)
Dept: ONCOLOGY | Age: 38
End: 2023-02-23
Payer: MEDICAID

## 2023-02-23 VITALS
SYSTOLIC BLOOD PRESSURE: 105 MMHG | HEART RATE: 64 BPM | BODY MASS INDEX: 39.11 KG/M2 | DIASTOLIC BLOOD PRESSURE: 72 MMHG | RESPIRATION RATE: 20 BRPM | HEIGHT: 67 IN | TEMPERATURE: 97.9 F | OXYGEN SATURATION: 98 % | WEIGHT: 249.2 LBS

## 2023-02-23 DIAGNOSIS — R53.83 OTHER FATIGUE: ICD-10-CM

## 2023-02-23 DIAGNOSIS — N92.0 MENORRHAGIA WITH REGULAR CYCLE: ICD-10-CM

## 2023-02-23 DIAGNOSIS — E53.8 VITAMIN B12 DEFICIENCY: ICD-10-CM

## 2023-02-23 DIAGNOSIS — D50.0 IRON DEFICIENCY ANEMIA DUE TO CHRONIC BLOOD LOSS: Primary | ICD-10-CM

## 2023-02-23 PROCEDURE — 99213 OFFICE O/P EST LOW 20 MIN: CPT | Performed by: INTERNAL MEDICINE

## 2023-02-23 RX ORDER — EPINEPHRINE 1 MG/ML
0.3 INJECTION, SOLUTION, CONCENTRATE INTRAVENOUS AS NEEDED
OUTPATIENT
Start: 2023-03-13

## 2023-02-23 RX ORDER — ALBUTEROL SULFATE 0.83 MG/ML
2.5 SOLUTION RESPIRATORY (INHALATION) AS NEEDED
Start: 2023-02-27

## 2023-02-23 RX ORDER — ALBUTEROL SULFATE 0.83 MG/ML
2.5 SOLUTION RESPIRATORY (INHALATION) AS NEEDED
Start: 2023-03-13

## 2023-02-23 RX ORDER — DIPHENHYDRAMINE HYDROCHLORIDE 50 MG/ML
50 INJECTION, SOLUTION INTRAMUSCULAR; INTRAVENOUS AS NEEDED
Start: 2023-02-27

## 2023-02-23 RX ORDER — DIPHENHYDRAMINE HYDROCHLORIDE 50 MG/ML
50 INJECTION, SOLUTION INTRAMUSCULAR; INTRAVENOUS AS NEEDED
Start: 2023-03-06

## 2023-02-23 RX ORDER — ACETAMINOPHEN 325 MG/1
650 TABLET ORAL AS NEEDED
Start: 2023-03-06

## 2023-02-23 RX ORDER — ONDANSETRON 2 MG/ML
8 INJECTION INTRAMUSCULAR; INTRAVENOUS AS NEEDED
OUTPATIENT
Start: 2023-03-02

## 2023-02-23 RX ORDER — EPINEPHRINE 1 MG/ML
0.3 INJECTION, SOLUTION, CONCENTRATE INTRAVENOUS AS NEEDED
OUTPATIENT
Start: 2023-03-09

## 2023-02-23 RX ORDER — SODIUM CHLORIDE 9 MG/ML
5-40 INJECTION INTRAVENOUS AS NEEDED
OUTPATIENT
Start: 2023-02-27

## 2023-02-23 RX ORDER — SODIUM CHLORIDE 9 MG/ML
5-250 INJECTION, SOLUTION INTRAVENOUS AS NEEDED
OUTPATIENT
Start: 2023-03-02

## 2023-02-23 RX ORDER — SODIUM CHLORIDE 9 MG/ML
5-40 INJECTION INTRAVENOUS AS NEEDED
OUTPATIENT
Start: 2023-03-06

## 2023-02-23 RX ORDER — ONDANSETRON 2 MG/ML
8 INJECTION INTRAMUSCULAR; INTRAVENOUS AS NEEDED
OUTPATIENT
Start: 2023-03-06

## 2023-02-23 RX ORDER — SODIUM CHLORIDE 9 MG/ML
5-40 INJECTION INTRAVENOUS AS NEEDED
OUTPATIENT
Start: 2023-03-09

## 2023-02-23 RX ORDER — ALBUTEROL SULFATE 0.83 MG/ML
2.5 SOLUTION RESPIRATORY (INHALATION) AS NEEDED
Start: 2023-03-02

## 2023-02-23 RX ORDER — SODIUM CHLORIDE 9 MG/ML
5-250 INJECTION, SOLUTION INTRAVENOUS AS NEEDED
OUTPATIENT
Start: 2023-03-09

## 2023-02-23 RX ORDER — DIPHENHYDRAMINE HYDROCHLORIDE 50 MG/ML
50 INJECTION, SOLUTION INTRAMUSCULAR; INTRAVENOUS AS NEEDED
Start: 2023-03-02

## 2023-02-23 RX ORDER — EPINEPHRINE 1 MG/ML
0.3 INJECTION, SOLUTION, CONCENTRATE INTRAVENOUS AS NEEDED
OUTPATIENT
Start: 2023-03-06

## 2023-02-23 RX ORDER — ONDANSETRON 2 MG/ML
8 INJECTION INTRAMUSCULAR; INTRAVENOUS AS NEEDED
OUTPATIENT
Start: 2023-02-27

## 2023-02-23 RX ORDER — SODIUM CHLORIDE 9 MG/ML
5-40 INJECTION INTRAVENOUS AS NEEDED
OUTPATIENT
Start: 2023-03-13

## 2023-02-23 RX ORDER — DIPHENHYDRAMINE HYDROCHLORIDE 50 MG/ML
25 INJECTION, SOLUTION INTRAMUSCULAR; INTRAVENOUS AS NEEDED
Start: 2023-02-27

## 2023-02-23 RX ORDER — SODIUM CHLORIDE 9 MG/ML
5-250 INJECTION, SOLUTION INTRAVENOUS AS NEEDED
OUTPATIENT
Start: 2023-03-13

## 2023-02-23 RX ORDER — DIPHENHYDRAMINE HYDROCHLORIDE 50 MG/ML
50 INJECTION, SOLUTION INTRAMUSCULAR; INTRAVENOUS AS NEEDED
Start: 2023-03-09

## 2023-02-23 RX ORDER — HYDROCORTISONE SODIUM SUCCINATE 100 MG/2ML
100 INJECTION, POWDER, FOR SOLUTION INTRAMUSCULAR; INTRAVENOUS AS NEEDED
OUTPATIENT
Start: 2023-03-06

## 2023-02-23 RX ORDER — SODIUM CHLORIDE 0.9 % (FLUSH) 0.9 %
5-40 SYRINGE (ML) INJECTION AS NEEDED
OUTPATIENT
Start: 2023-02-27

## 2023-02-23 RX ORDER — DIPHENHYDRAMINE HYDROCHLORIDE 50 MG/ML
25 INJECTION, SOLUTION INTRAMUSCULAR; INTRAVENOUS AS NEEDED
Start: 2023-03-02

## 2023-02-23 RX ORDER — ONDANSETRON 2 MG/ML
8 INJECTION INTRAMUSCULAR; INTRAVENOUS AS NEEDED
OUTPATIENT
Start: 2023-03-09

## 2023-02-23 RX ORDER — SODIUM CHLORIDE 0.9 % (FLUSH) 0.9 %
5-40 SYRINGE (ML) INJECTION AS NEEDED
OUTPATIENT
Start: 2023-03-06

## 2023-02-23 RX ORDER — ONDANSETRON 2 MG/ML
8 INJECTION INTRAMUSCULAR; INTRAVENOUS AS NEEDED
OUTPATIENT
Start: 2023-03-13

## 2023-02-23 RX ORDER — DIPHENHYDRAMINE HYDROCHLORIDE 50 MG/ML
25 INJECTION, SOLUTION INTRAMUSCULAR; INTRAVENOUS AS NEEDED
Start: 2023-03-09

## 2023-02-23 RX ORDER — SODIUM CHLORIDE 9 MG/ML
5-250 INJECTION, SOLUTION INTRAVENOUS AS NEEDED
OUTPATIENT
Start: 2023-03-06

## 2023-02-23 RX ORDER — SODIUM CHLORIDE 9 MG/ML
5-40 INJECTION INTRAVENOUS AS NEEDED
OUTPATIENT
Start: 2023-03-02

## 2023-02-23 RX ORDER — EPINEPHRINE 1 MG/ML
0.3 INJECTION, SOLUTION, CONCENTRATE INTRAVENOUS AS NEEDED
OUTPATIENT
Start: 2023-02-27

## 2023-02-23 RX ORDER — DIPHENHYDRAMINE HYDROCHLORIDE 50 MG/ML
25 INJECTION, SOLUTION INTRAMUSCULAR; INTRAVENOUS AS NEEDED
Start: 2023-03-13

## 2023-02-23 RX ORDER — SODIUM CHLORIDE 9 MG/ML
5-250 INJECTION, SOLUTION INTRAVENOUS AS NEEDED
OUTPATIENT
Start: 2023-02-27

## 2023-02-23 RX ORDER — HYDROCORTISONE SODIUM SUCCINATE 100 MG/2ML
100 INJECTION, POWDER, FOR SOLUTION INTRAMUSCULAR; INTRAVENOUS AS NEEDED
OUTPATIENT
Start: 2023-03-09

## 2023-02-23 RX ORDER — SODIUM CHLORIDE 0.9 % (FLUSH) 0.9 %
5-40 SYRINGE (ML) INJECTION AS NEEDED
OUTPATIENT
Start: 2023-03-13

## 2023-02-23 RX ORDER — ALBUTEROL SULFATE 0.83 MG/ML
2.5 SOLUTION RESPIRATORY (INHALATION) AS NEEDED
Start: 2023-03-06

## 2023-02-23 RX ORDER — HYDROCORTISONE SODIUM SUCCINATE 100 MG/2ML
100 INJECTION, POWDER, FOR SOLUTION INTRAMUSCULAR; INTRAVENOUS AS NEEDED
OUTPATIENT
Start: 2023-03-02

## 2023-02-23 RX ORDER — ACETAMINOPHEN 325 MG/1
650 TABLET ORAL AS NEEDED
Start: 2023-03-13

## 2023-02-23 RX ORDER — HYDROCORTISONE SODIUM SUCCINATE 100 MG/2ML
100 INJECTION, POWDER, FOR SOLUTION INTRAMUSCULAR; INTRAVENOUS AS NEEDED
OUTPATIENT
Start: 2023-02-27

## 2023-02-23 RX ORDER — DIPHENHYDRAMINE HYDROCHLORIDE 50 MG/ML
50 INJECTION, SOLUTION INTRAMUSCULAR; INTRAVENOUS AS NEEDED
Start: 2023-03-13

## 2023-02-23 RX ORDER — EPINEPHRINE 1 MG/ML
0.3 INJECTION, SOLUTION, CONCENTRATE INTRAVENOUS AS NEEDED
OUTPATIENT
Start: 2023-03-02

## 2023-02-23 RX ORDER — SODIUM CHLORIDE 0.9 % (FLUSH) 0.9 %
5-40 SYRINGE (ML) INJECTION AS NEEDED
OUTPATIENT
Start: 2023-03-02

## 2023-02-23 RX ORDER — HYDROCORTISONE SODIUM SUCCINATE 100 MG/2ML
100 INJECTION, POWDER, FOR SOLUTION INTRAMUSCULAR; INTRAVENOUS AS NEEDED
OUTPATIENT
Start: 2023-03-13

## 2023-02-23 RX ORDER — ACETAMINOPHEN 325 MG/1
650 TABLET ORAL AS NEEDED
Start: 2023-02-27

## 2023-02-23 RX ORDER — SODIUM CHLORIDE 0.9 % (FLUSH) 0.9 %
5-40 SYRINGE (ML) INJECTION AS NEEDED
OUTPATIENT
Start: 2023-03-09

## 2023-02-23 RX ORDER — ACETAMINOPHEN 325 MG/1
650 TABLET ORAL AS NEEDED
Start: 2023-03-09

## 2023-02-23 RX ORDER — DIPHENHYDRAMINE HYDROCHLORIDE 50 MG/ML
25 INJECTION, SOLUTION INTRAMUSCULAR; INTRAVENOUS AS NEEDED
Start: 2023-03-06

## 2023-02-23 RX ORDER — ALBUTEROL SULFATE 0.83 MG/ML
2.5 SOLUTION RESPIRATORY (INHALATION) AS NEEDED
Start: 2023-03-09

## 2023-02-23 RX ORDER — ACETAMINOPHEN 325 MG/1
650 TABLET ORAL AS NEEDED
Start: 2023-03-02

## 2023-02-23 NOTE — TELEPHONE ENCOUNTER
02/23/23 4:02 PM Called pt and advised labs show IZAIAH and recommend venofer x 5. She is agreeable.

## 2023-02-23 NOTE — PROGRESS NOTES
[address] [facility] at [facility]  [phone]    Hematology / Oncology Established Visit    Reason for Visit:      Pavel Hart is a 40 y.o. female who is seen for follow up of anemia. History of Present Illness:   Pavel Hart is a 40 y.o. female who comes in for evaluation of anemia. She has irregular and heavy periods, lasting 6-7 days, requiring new pad every 1-2 hours. Patient underwent endometrial biopsy. Was started on oral birth control pills, prenatal vitamins, iron supplements. She received a blood transfusion in May 2019. She sees blood in her stools at times and thinks it is related to constipation and hemorrhoids. The blood is not mixed in with the stool. She has not ever seen a GI doctor. She is not currently taking any stools softeners. She denies ice cravings. She has fatigue, DOVE, but no CP. Interval History: Patient seen for follow up of anemia. Receive iron sucrose x 3 in 11/2022. Reports she is taking iron supplements twice daily and three times daily on weeks of menstrual cycles. She continues to have heavy menstrual bleeding with clots. Reports fatigue. No SOB. Receiving monthly B12 injections with PCP. No melena/hematochezia. PMHx: Anemia, Anxiety, Asthma, Obesity  PSurgHx: Breast reduction 2003, I&D of breast abscess 2012  Shx: No EtOH/tobacco  Fhx: Mother with HTN, HLD. Sister with DM  Review of Systems: A complete review of systems was obtained, negative except as described above.     Physical Exam:     Visit Vitals  /72 (BP 1 Location: Left upper arm, BP Patient Position: Sitting, BP Cuff Size: Large adult)   Pulse 64   Temp 97.9 °F (36.6 °C) (Oral)   Resp 20   Ht 5' 7\" (1.702 m)   Wt 249 lb 3.2 oz (113 kg)   SpO2 98%   BMI 39.03 kg/m²     ECOG PS: 0  General: no distress  Eyes: anicteric sclerae  HENT: oropharynx clear  Neck: supple  Lymphatic: deferred today   Respiratory: CTAB, normal respiratory effort  CV: no peripheral edema  GI: soft, nontender, nondistended, no masses  Skin: no rashes; no ecchymoses; no petechiae      Results:     Lab Results   Component Value Date/Time    WBC 5.0 02/22/2023 12:00 AM    HGB 11.1 02/22/2023 12:00 AM    HCT 34.3 02/22/2023 12:00 AM    PLATELET 879 57/07/4413 12:00 AM    MCV 90 02/22/2023 12:00 AM    ABS. NEUTROPHILS 2.5 02/22/2023 12:00 AM    Hemoglobin (POC) 7.6 07/05/2019 02:16 PM    Hemoglobin (POC) 13.3 12/18/2014 06:34 PM    Hematocrit (POC) 39 12/18/2014 06:34 PM    Hgb, External 11.6 07/27/2020 12:00 AM    Hct, External 33.8 07/27/2020 12:00 AM    Platelet cnt., External 194 07/27/2020 12:00 AM     Lab Results   Component Value Date/Time    Sodium 141 10/13/2022 02:27 PM    Potassium 3.8 10/13/2022 02:27 PM    Chloride 106 10/13/2022 02:27 PM    CO2 28 10/13/2022 02:27 PM    Glucose 82 10/13/2022 02:27 PM    BUN 15 10/13/2022 02:27 PM    Creatinine 0.88 10/13/2022 02:27 PM    GFR est AA >60 08/06/2022 11:52 AM    GFR est non-AA >60 08/06/2022 11:52 AM    Calcium 9.1 10/13/2022 02:27 PM    Sodium (POC) 142 12/18/2014 06:34 PM    Potassium (POC) 3.6 12/18/2014 06:34 PM    Chloride (POC) 104 12/18/2014 06:34 PM    Glucose (POC) 87 12/18/2014 06:34 PM    BUN (POC) 13 12/18/2014 06:34 PM    Creatinine (POC) 1.0 12/18/2014 06:34 PM    Calcium, ionized (POC) 1.16 12/18/2014 06:34 PM     Lab Results   Component Value Date/Time    Bilirubin, total 1.0 10/13/2022 02:27 PM    ALT (SGPT) 24 10/13/2022 02:27 PM    Alk.  phosphatase 58 10/13/2022 02:27 PM    Protein, total 8.5 (H) 10/13/2022 02:27 PM    Albumin 3.8 10/13/2022 02:27 PM    Globulin 4.7 (H) 10/13/2022 02:27 PM     Lab Results   Component Value Date/Time    Iron 37 02/22/2023 12:00 AM    TIBC 400 02/22/2023 12:00 AM    Iron % saturation 9 (LL) 02/22/2023 12:00 AM    Ferritin 11 (L) 02/22/2023 12:00 AM       Lab Results   Component Value Date/Time    Vitamin B12 158 (L) 08/06/2022 11:52 AM    Folate 9.6 05/28/2019 07:07 PM     Lab Results Component Value Date/Time    TSH 1.140 07/06/2022 03:54 PM     Lab Results   Component Value Date/Time    Hep B surface Ag screen Negative 03/13/2020 02:53 PM    Hep C Virus Ab <0.1 07/06/2022 03:54 PM         Imaging:     Radiology report(s) reviewed. Assessment & Plan:   Bi Galindo is a 40 y.o. female comes in for f/u of anemia. 1. Iron deficiency anemia: Previously due to increased demand in pregnancy and currently due to menorrhagia. Required blood transfusion 5/2019 for Hgb 6.8. s/p Injectafer x 2 in early August 2019. Repeat iron studies and Hgb normal in 9/2019, 12/2019. 5/20/20 labs show anemia without iron deficiency - this is likely due to hemodilutional effect of pregnancy. Admitted 8/6-8/7/22 for symptomatic iron deficiency anemia with Hgb 6.8 likely due to menorrhagia. Received 2 x IV venofer infusions and transfused with 1 unit of pRBC. Received iron sucrose x 5 in 9/2022 and x 3 in 11/2022. Now with declining Hgb and iron stores in Feb 2023.    -- Iron sucrose x 5  -- Advised taking iron supplements BID when off menstrual cycle and TID during monthly menstrual cycles. -- Follow up in 12 weeks with repeat labs. 2. Menorrhagia: Pt previously on OCPs and later on Depot. Not currently on OCPs, given she is actively trying for another child. 3. Vitamin B12 deficiency: B12 level as low as 99 in 5/2019. Had neuropathy during pregnancy in 2020. C/p B12 injections 9/2020 - 11/2020. I had encouraged patient to start on B12 supplements multiple times in the past, but pt was noncompliant. B12 level low at 158 on 8/2022 and completed once weekly B12 x 4 at PCP office. Now receiving monthly B12 injections at PCP office. 4. H/o breast mass: Had biopsy with Dr. Nataly Garcia. Pathology showed fibroadenoma. 5. Fatigue:  2/2 IZAIAH and should improve after iron infusions. I appreciate the opportunity to participate in Ms. Ximena Mcgill's care.     I personally saw and evaluated the patient and performed the key components of medical decision making. The history, physical exam, and documentation were performed by Hanna Henderson NP. I reviewed and verified the above documentation and modified it as needed. Given ongoing menorrhagia causing IZAIAH, I recommend iron sucrose x 5 infusions.      Signed By: Meng Beebe MD

## 2023-02-23 NOTE — PROGRESS NOTES
1. Have you been to the ER, urgent care clinic since your last visit? Hospitalized since your last visit? No    2. Have you seen or consulted any other health care providers outside of the [address] [place] [address] since your last visit? Include any pap smears or colon screening.  No        Visit Vitals  /72 (BP 1 Location: Left upper arm, BP Patient Position: Sitting, BP Cuff Size: Large adult)   Pulse 64   Temp 97.9 °F (36.6 °C) (Oral)   Resp 20   Ht 5' 7\" (1.702 m)   Wt 249 lb 3.2 oz (113 kg)   SpO2 98%   BMI 39.03 kg/m²            Chief Complaint   Patient presents with    Follow-up    Anemia

## 2023-02-24 ENCOUNTER — TELEPHONE (OUTPATIENT)
Dept: ONCOLOGY | Age: 38
End: 2023-02-24

## 2023-03-01 NOTE — PROGRESS NOTES
Magaly Aguiar is a 40 y.o. female presents for pelvic pain    No chief complaint on file. No LMP recorded. Birth Control: none. Last Pap: 02/24/2021 normal/HPV neg    The patient is reporting having:  pelvic pain  for 4-5 months-seen by PCP and pt states she had ultrasound and was told it was scar tissue. States abdomen is bloated and hard  She reports the symptoms are is unchanged. Aggravating factors include states she only has a good BM during her menstrual period  And alleviating factors include motrin at times    TV ULTRASOUND PERFORMED. UTERUS IS ANTEVERTED, NORMAL IN SIZE AND ECHOGENICITY. THE ENDOMETRIUM APPEARS HETEROGENEOUS, IRREGULAR WITH BLOODFLOW AND MEASURES 22-23MM IN  THICKNESS. A POLYP OR MASS CANNOT BE RULED OUT. RIGHT OVARY APPEARS WITHIN NORMAL LIMITS. LEFT OVARY APPEARS TO HAVE A COMPLEX CYST WITH BLOODFLOW IN THE PERIPHERY THAT MEASURES 28 X  22 X 25MM. NO FREE FLUID SEEN IN THE CDS. 1. Have you been to the ER, urgent care clinic, or hospitalized since your last visit? Yes Reason for visit: needed blood transfusion per pt    2. Have you seen or consulted any other health care providers outside of the [address] [place] [address] since your last visit?  No    Examination chaperoned by Zoraida Phelan RN

## 2023-03-02 ENCOUNTER — OFFICE VISIT (OUTPATIENT)
Dept: OBGYN CLINIC | Age: 38
End: 2023-03-02

## 2023-03-02 VITALS — DIASTOLIC BLOOD PRESSURE: 72 MMHG | WEIGHT: 250.2 LBS | SYSTOLIC BLOOD PRESSURE: 106 MMHG | BODY MASS INDEX: 39.19 KG/M2

## 2023-03-02 DIAGNOSIS — K59.00 CONSTIPATION, UNSPECIFIED CONSTIPATION TYPE: ICD-10-CM

## 2023-03-02 DIAGNOSIS — R10.2 PELVIC PAIN IN FEMALE: Primary | ICD-10-CM

## 2023-03-02 DIAGNOSIS — R30.0 DYSURIA: ICD-10-CM

## 2023-03-02 LAB
BILIRUB UR QL STRIP: NEGATIVE
GLUCOSE UR-MCNC: NEGATIVE MG/DL
KETONES P FAST UR STRIP-MCNC: NEGATIVE MG/DL
PH UR STRIP: 5 [PH] (ref 4.6–8)
PROT UR QL STRIP: NEGATIVE
SP GR UR STRIP: 1 (ref 1–1.03)
UA UROBILINOGEN AMB POC: NORMAL (ref 0.2–1)
URINALYSIS CLARITY POC: NORMAL
URINALYSIS COLOR POC: YELLOW
URINE BLOOD POC: NEGATIVE
URINE LEUKOCYTES POC: NEGATIVE
URINE NITRITES POC: NEGATIVE

## 2023-03-02 PROCEDURE — 81002 URINALYSIS NONAUTO W/O SCOPE: CPT | Performed by: OBSTETRICS & GYNECOLOGY

## 2023-03-02 PROCEDURE — 99213 OFFICE O/P EST LOW 20 MIN: CPT | Performed by: OBSTETRICS & GYNECOLOGY

## 2023-03-02 NOTE — PROGRESS NOTES
OB/GYN Problem VIsit    HPI  Guerline Hall is a ,  40 y.o. female who presents for a problem visit. Patient's last menstrual period was 2023 (exact date). Birth Control: none. Last Pap: 2021 normal/HPV neg     The patient is reporting having:  pelvic pain  for 4-5 months-seen by PCP and pt states she had ultrasound and was told it was scar tissue. States abdomen is bloated and hard. She reports the symptoms are is unchanged. Aggravating factors include states she only has a good BM during her menstrual period  And alleviating factors include motrin at times    Pt reports having bowel movements only every 2-3 days, and when she does have bowel movements they are painful and hard. She has not used any Miralax or other stool softener. She also reports intermittent sharp pain the lower part of her abdomen and has had a couple of months now of dysuria and urinary hesitancy. TV ULTRASOUND PERFORMED. UTERUS IS ANTEVERTED, NORMAL IN SIZE AND ECHOGENICITY. THE ENDOMETRIUM APPEARS HETEROGENEOUS, IRREGULAR WITH BLOODFLOW AND MEASURES 22-23MM IN  THICKNESS. A POLYP OR MASS CANNOT BE RULED OUT. RIGHT OVARY APPEARS WITHIN NORMAL LIMITS. LEFT OVARY APPEARS TO HAVE A COMPLEX CYST WITH BLOODFLOW IN THE PERIPHERY THAT MEASURES 28 X  22 X 25MM. NO FREE FLUID SEEN IN THE CDS.     Past Medical History:   Diagnosis Date    Abnormal Pap smear of cervix 2019    Negative/+HPV    Anemia     Anxiety     ASCUS (atypical squamous cells of undetermined significance) on Pap smear 2012    Asthma     extrinsic as a child     Breast disorder     Morbid obesity ([facility] [address]      Past Surgical History:   Procedure Laterality Date    HX BREAST BIOPSY Bilateral     abscesses removed  and     HX BREAST REDUCTION      HX OTHER SURGICAL      abscess removed from left breast    GA UNLISTED PROCEDURE BREAST  12    Incision and Drainage Left Breast Abscess     Social History     Occupational History Not on file   Tobacco Use    Smoking status: Never    Smokeless tobacco: Never   Vaping Use    Vaping Use: Never used   Substance and Sexual Activity    Alcohol use: Never    Drug use: Never    Sexual activity: Yes     Partners: Male     Birth control/protection: None     Family History   Problem Relation Age of Onset    Hypertension Mother     Elevated Lipids Mother     Diabetes Sister        Allergies   Allergen Reactions    Bactrim [Sulfamethoprim Ds] Nausea Only    Zofran [Ondansetron Hcl (Pf)] Rash     Prior to Admission medications    Medication Sig Start Date End Date Taking? Authorizing Provider   ibuprofen (MOTRIN) 800 mg tablet Take 1 Tablet by mouth every eight (8) hours as needed for Pain. 12/9/22  Yes Monik Montoya NP   ferrous sulfate (IRON) 325 mg (65 mg iron) EC tablet Take one tablet by mouth in the morning and in the evening by mouth 7/25/22  Yes Monik Montoya NP   nitrofurantoin, macrocrystal-monohydrate, (Macrobid) 100 mg capsule Take 1 Capsule by mouth two (2) times a day for 7 days.  3/7/23 3/14/23  Frannie Doan MD        Review of Systems: History obtained from the patient  Constitutional: negative for weight loss, fever, night sweats  Breast: negative for breast lumps, nipple discharge, galactorrhea  GI: positive for abdominal pain and constipation, negative for change in bowel habits, black or bloody stools  : positive for dysuria, negative for frequency, hematuria, vaginal discharge  MSK: negative for back pain, joint pain, muscle pain  Skin: negative for itching, rash, hives  Psych: negative for anxiety, depression, change in mood      Objective:  Visit Vitals  /72   Wt 250 lb 3.2 oz (113.5 kg)   LMP 02/20/2023 (Exact Date)   BMI 39.19 kg/m²       Physical Exam:   PHYSICAL EXAMINATION    Constitutional  Appearance: well-nourished, well developed, alert, in no acute distress      Gastrointestinal  Abdominal Examination: abdomen tender to palpation in the suprapubic and LLQ and RLQ, normal bowel sounds, no masses present  Liver and spleen: no hepatomegaly present, spleen not palpable  Hernias: no hernias identified    Genitourinary  External Genitalia: normal appearance for age, no discharge present, no tenderness present, no inflammatory lesions present, no masses present, no atrophy present  Vagina: normal vaginal vault without central or paravaginal defects, no discharge present, no inflammatory lesions present, no masses present  Bladder: non-tender to palpation  Urethra: appears normal  Cervix: normal   Uterus: normal size, shape and consistency  Adnexa: no adnexal tenderness present, no adnexal masses present  Perineum: perineum within normal limits, no evidence of trauma, no rashes or skin lesions present  Anus: anus within normal limits, no hemorrhoids present  Inguinal Lymph Nodes: no lymphadenopathy present    Skin  General Inspection: no rash, no lesions identified    Neurologic/Psychiatric  Mental Status:  Orientation: grossly oriented to person, place and time  Mood and Affect: mood normal, affect appropriate          ASSESSMENT:    ICD-10-CM ICD-9-CM    1. Pelvic pain in female  R10.2 625.9       2. Dysuria  R30.0 788. 1 CULTURE, URINE      CULTURE, URINE      AMB POC URINALYSIS DIP STICK MANUAL W/O MICRO      3. Constipation, unspecified constipation type  K59.00 564.00           PLAN:  Orders Placed This Encounter    CULTURE, URINE     Standing Status:   Future     Number of Occurrences:   1     Standing Expiration Date:   3/2/2024    SPECIMEN STATUS REPORT    AMB POC URINALYSIS DIP STICK MANUAL W/O MICRO     Abdominal pain: associated with bloating and constipation. No n/v/d.  - Encouraging plenty of fluids  - Begin Miralax 1-2 times daily - titrate to effect - one BM daily  - Can add Colace as well    Dysuria: suprapubic tenderness, burning dysuria, and hesitancy. Had an E coli UTI in October of last year.   - U/A is not suggestive of UTI - supportive care only  - Encouraging plenty of fluids as above    RTO prn if symptoms persist or worsen. Instructions given to pt. Handouts given to pt.

## 2023-03-06 LAB
BACTERIA UR CULT: ABNORMAL
SPECIMEN STATUS REPORT, ROLRST: NORMAL

## 2023-03-07 RX ORDER — NITROFURANTOIN 25; 75 MG/1; MG/1
100 CAPSULE ORAL 2 TIMES DAILY
Qty: 14 CAPSULE | Refills: 0 | Status: SHIPPED | OUTPATIENT
Start: 2023-03-07 | End: 2023-03-14

## 2023-03-09 ENCOUNTER — CLINICAL SUPPORT (OUTPATIENT)
Dept: FAMILY MEDICINE CLINIC | Age: 38
End: 2023-03-09
Payer: MEDICAID

## 2023-03-09 DIAGNOSIS — E53.8 VITAMIN B12 DEFICIENCY: Primary | ICD-10-CM

## 2023-03-09 PROCEDURE — 96372 THER/PROPH/DIAG INJ SC/IM: CPT | Performed by: NURSE PRACTITIONER

## 2023-03-09 RX ADMIN — CYANOCOBALAMIN 1000 MCG: 1000 INJECTION, SOLUTION INTRAMUSCULAR; SUBCUTANEOUS at 10:12

## 2023-03-14 ENCOUNTER — HOSPITAL ENCOUNTER (OUTPATIENT)
Dept: INFUSION THERAPY | Age: 38
Discharge: HOME | End: 2023-03-14
Payer: MEDICAID

## 2023-03-14 VITALS
TEMPERATURE: 97.8 F | RESPIRATION RATE: 18 BRPM | HEART RATE: 60 BPM | BODY MASS INDEX: 39.19 KG/M2 | DIASTOLIC BLOOD PRESSURE: 68 MMHG | OXYGEN SATURATION: 99 % | SYSTOLIC BLOOD PRESSURE: 103 MMHG | HEIGHT: 67 IN

## 2023-03-14 DIAGNOSIS — D50.0 IRON DEFICIENCY ANEMIA DUE TO CHRONIC BLOOD LOSS: Primary | ICD-10-CM

## 2023-03-14 DIAGNOSIS — D50.9 IRON DEFICIENCY ANEMIA, UNSPECIFIED IRON DEFICIENCY ANEMIA TYPE: ICD-10-CM

## 2023-03-14 PROCEDURE — 74011250636 HC RX REV CODE- 250/636: Performed by: NURSE PRACTITIONER

## 2023-03-14 PROCEDURE — 74011000258 HC RX REV CODE- 258: Performed by: NURSE PRACTITIONER

## 2023-03-14 PROCEDURE — 96374 THER/PROPH/DIAG INJ IV PUSH: CPT

## 2023-03-14 RX ORDER — SODIUM CHLORIDE 9 MG/ML
5-250 INJECTION, SOLUTION INTRAVENOUS AS NEEDED
Status: DISCONTINUED | OUTPATIENT
Start: 2023-03-14 | End: 2023-03-15 | Stop reason: HOSPADM

## 2023-03-14 RX ADMIN — IRON SUCROSE 200 MG: 20 INJECTION, SOLUTION INTRAVENOUS at 15:23

## 2023-03-14 RX ADMIN — SODIUM CHLORIDE 25 ML/HR: 9 INJECTION, SOLUTION INTRAVENOUS at 15:20

## 2023-03-14 NOTE — PROGRESS NOTES
Outpatient [facility]fusion Center Short Visit Progress Note    Ms. Zafar Anna admitted to [facility] for Venofer ambulatory in stable condition. Assessment completed. No new concerns voiced. LAC 24G PIV placed without difficulty with+blood return. No lab order for today treatment. Vitals are within parameter for the treatment. Vital Signs:  Patient Vitals for the past 12 hrs:   Temp Pulse Resp BP SpO2   03/14/23 1557 97.8 °F (36.6 °C) 60 18 103/68 99 %   03/14/23 1436 97.9 °F (36.6 °C) 60 18 101/68 100 %       Medications:  Medications Administered       0.9% sodium chloride infusion       Admin Date  03/14/2023 Action  New Bag Dose  25 mL/hr Rate  25 mL/hr Route  IntraVENous Administered By  Marquis Novak RN              iron sucrose (VENOFER) 200 mg in 0.9% sodium chloride 100 mL, overfill volume 10 mL IVPB       Admin Date  03/14/2023 Action  New Bag Dose  200 mg Rate  360 mL/hr Route  IntraVENous Administered By  Marquis Novak RN                      AVS declined. Patient tolerated treatment well. PIV flushed and removed per  protocol. Patient discharged from [place] [address] ambulatory in no distress at 1600. Patient aware of next appointment.     Gina Cutler RN  March 14, 2023    Future Appointments   Date Time Provider Mor Julien   3/16/2023  3:00 PM SS INF1 CH4 <2H [facility]   3/21/2023  3:00 PM SS INF1 CH4 <2H [facility]   3/23/2023  3:00 PM SS INF1 CH4 <2H [facility]   3/28/2023  3:00 PM SS INF1 CH4 <2H [facility]   4/27/2023 10:30 AM Ary Yuan MD [facility] BS AMB   3/7/2024  9:00 AM Hillary Mejia MD [facility] BS AMB

## 2023-03-16 ENCOUNTER — HOSPITAL ENCOUNTER (OUTPATIENT)
Dept: INFUSION THERAPY | Age: 38
Discharge: HOME | End: 2023-03-16
Payer: MEDICAID

## 2023-03-16 VITALS
DIASTOLIC BLOOD PRESSURE: 64 MMHG | HEART RATE: 61 BPM | SYSTOLIC BLOOD PRESSURE: 95 MMHG | TEMPERATURE: 97.8 F | BODY MASS INDEX: 39.19 KG/M2 | OXYGEN SATURATION: 99 % | HEIGHT: 67 IN | RESPIRATION RATE: 16 BRPM

## 2023-03-16 DIAGNOSIS — D50.9 IRON DEFICIENCY ANEMIA, UNSPECIFIED IRON DEFICIENCY ANEMIA TYPE: ICD-10-CM

## 2023-03-16 DIAGNOSIS — D50.0 IRON DEFICIENCY ANEMIA DUE TO CHRONIC BLOOD LOSS: Primary | ICD-10-CM

## 2023-03-16 PROCEDURE — 74011250636 HC RX REV CODE- 250/636: Performed by: NURSE PRACTITIONER

## 2023-03-16 PROCEDURE — 96365 THER/PROPH/DIAG IV INF INIT: CPT

## 2023-03-16 PROCEDURE — 74011000258 HC RX REV CODE- 258: Performed by: NURSE PRACTITIONER

## 2023-03-16 RX ORDER — SODIUM CHLORIDE 9 MG/ML
5-250 INJECTION, SOLUTION INTRAVENOUS AS NEEDED
Status: DISCONTINUED | OUTPATIENT
Start: 2023-03-16 | End: 2023-03-17 | Stop reason: HOSPADM

## 2023-03-16 RX ORDER — SODIUM CHLORIDE 9 MG/ML
5-40 INJECTION INTRAVENOUS AS NEEDED
Status: DISCONTINUED | OUTPATIENT
Start: 2023-03-16 | End: 2023-03-17 | Stop reason: HOSPADM

## 2023-03-16 RX ORDER — SODIUM CHLORIDE 0.9 % (FLUSH) 0.9 %
5-40 SYRINGE (ML) INJECTION AS NEEDED
Status: DISCONTINUED | OUTPATIENT
Start: 2023-03-16 | End: 2023-03-17 | Stop reason: HOSPADM

## 2023-03-16 RX ADMIN — IRON SUCROSE 200 MG: 20 INJECTION, SOLUTION INTRAVENOUS at 15:49

## 2023-03-16 RX ADMIN — SODIUM CHLORIDE 25 ML/HR: 9 INJECTION, SOLUTION INTRAVENOUS at 15:49

## 2023-03-16 NOTE — PROGRESS NOTES
Outpatient Infusion Center Progress Note        Date: 2023    Name: Gary Schrader    MRN: [id number]         : 1985    Ms. Ruth Ring Arrived ambulatory and in no distress for Iron Sucrose Regimen. Assessment was completed, no acute issues at this time, no new complaints voiced. 22 gauge peripheral IV was placed in the left AC without difficulty, positive for blood return. Ms. Debi Jarquin vitals were reviewed. Patient Vitals for the past 12 hrs:   Temp Pulse Resp BP SpO2   23 1615 97.8 °F (36.6 °C) 61 16 95/64 99 %   23 1516 97.8 °F (36.6 °C) (!) 52 16 99/63 100 %          Medications received:  Medications Administered       0.9% sodium chloride infusion       Admin Date  2023 Action  New Bag Dose  25 mL/hr Rate  25 mL/hr Route  IntraVENous Administered By  Bharath Rouse RN              iron sucrose (VENOFER) 200 mg in 0.9% sodium chloride 100 mL, overfill volume 10 mL IVPB       Admin Date  2023 Action  New Bag Dose  200 mg Rate  360 mL/hr Route  IntraVENous Administered By  Bahrath Rouse RN                     Ms. Ruth Ring tolerated treatment well and was discharged from [place] [address] in stable condition at 1620. She is to return on  2023 at 1500 for her next appointment.     Natali Steven RN  2023    Future Appointments:  Future Appointments   Date Time Provider Mor Hernandezi   3/21/2023  3:00 PM SS INF1 CH4 <2H [facility]   3/23/2023  3:00 PM SS INF1 CH4 <2H [facility]   3/28/2023  3:00 PM SS INF1 CH4 <2H [facility]   2023 10:30 AM Americo Riojas MD [facility] BS AMB   3/7/2024  9:00 AM Jemma Strickland MD [facility] BS AMB

## 2023-03-21 ENCOUNTER — HOSPITAL ENCOUNTER (OUTPATIENT)
Dept: INFUSION THERAPY | Age: 38
Discharge: HOME | End: 2023-03-21
Payer: MEDICAID

## 2023-03-21 VITALS
RESPIRATION RATE: 18 BRPM | HEART RATE: 68 BPM | OXYGEN SATURATION: 100 % | DIASTOLIC BLOOD PRESSURE: 66 MMHG | TEMPERATURE: 98.8 F | SYSTOLIC BLOOD PRESSURE: 104 MMHG

## 2023-03-21 DIAGNOSIS — D50.0 IRON DEFICIENCY ANEMIA DUE TO CHRONIC BLOOD LOSS: Primary | ICD-10-CM

## 2023-03-21 DIAGNOSIS — D50.9 IRON DEFICIENCY ANEMIA, UNSPECIFIED IRON DEFICIENCY ANEMIA TYPE: ICD-10-CM

## 2023-03-21 PROCEDURE — 74011000258 HC RX REV CODE- 258: Performed by: NURSE PRACTITIONER

## 2023-03-21 PROCEDURE — 74011250636 HC RX REV CODE- 250/636: Performed by: NURSE PRACTITIONER

## 2023-03-21 PROCEDURE — 96365 THER/PROPH/DIAG IV INF INIT: CPT

## 2023-03-21 RX ORDER — SODIUM CHLORIDE 9 MG/ML
5-250 INJECTION, SOLUTION INTRAVENOUS AS NEEDED
Status: DISCONTINUED | OUTPATIENT
Start: 2023-03-21 | End: 2023-03-22 | Stop reason: HOSPADM

## 2023-03-21 RX ADMIN — IRON SUCROSE 200 MG: 20 INJECTION, SOLUTION INTRAVENOUS at 15:13

## 2023-03-21 NOTE — PROGRESS NOTES
[address] March 21, 2023       RE: Janes Resendiz      To Whom It May Concern,    This is to certify that Janes Resendiz has been medically occupied at the [address]  on 03/21/2023. Please feel free to contact my office if you have any questions or concerns. Thank you for your assistance in this matter.       Sincerely,  Luther Rogers RN      3/21/2023

## 2023-03-21 NOTE — PROGRESS NOTES
[facility] Progress Note    Date: 2023    Name: Bessy Fleming    MRN: [id number]         : 1985    Ms. Ester Salgado Arrived ambulatory and in no distress for Venofer Infusion. Assessment was completed, no acute issues at this time, no new complaints voiced. 24 G PIV established to left arm, + blood return. Ms. Cheng President vitals were reviewed. Visit Vitals  /66   Pulse 68   Temp 98.8 °F (37.1 °C)   Resp 18   SpO2 100%       Medications:  Medications Administered       iron sucrose (VENOFER) 200 mg in 0.9% sodium chloride 100 mL, overfill volume 10 mL IVPB       Admin Date  2023 Action  New Bag Dose  200 mg Rate  360 mL/hr Route  IntraVENous Administered By  Dalila Sheth, RAFFI                    Ms. Ester Salgado tolerated treatment well and was discharged from [place] [address] in stable condition. PIV flushed & removed. She is aware of future appointments.     Future Appointments   Date Time Provider Mor Julien   3/23/2023  3:00 PM SS INF1 CH4 <2H [facility]   3/28/2023  3:00 PM SS INF1 CH4 <2H [facility]   2023 10:30 AM Sarita Burr MD [facility] BS AMB   3/7/2024  9:00 AM Juanita Sen MD [facility] BS AMB       Geraldo Conti RN  2023

## 2023-03-23 ENCOUNTER — HOSPITAL ENCOUNTER (OUTPATIENT)
Dept: INFUSION THERAPY | Age: 38
Discharge: HOME | End: 2023-03-23
Payer: MEDICAID

## 2023-03-23 VITALS
DIASTOLIC BLOOD PRESSURE: 58 MMHG | WEIGHT: 247.8 LBS | BODY MASS INDEX: 38.89 KG/M2 | HEIGHT: 67 IN | RESPIRATION RATE: 20 BRPM | SYSTOLIC BLOOD PRESSURE: 108 MMHG | OXYGEN SATURATION: 97 % | HEART RATE: 71 BPM | TEMPERATURE: 98.7 F

## 2023-03-23 DIAGNOSIS — D50.0 IRON DEFICIENCY ANEMIA DUE TO CHRONIC BLOOD LOSS: Primary | ICD-10-CM

## 2023-03-23 DIAGNOSIS — D50.9 IRON DEFICIENCY ANEMIA, UNSPECIFIED IRON DEFICIENCY ANEMIA TYPE: ICD-10-CM

## 2023-03-23 PROCEDURE — 74011000250 HC RX REV CODE- 250: Performed by: NURSE PRACTITIONER

## 2023-03-23 PROCEDURE — 74011000258 HC RX REV CODE- 258: Performed by: NURSE PRACTITIONER

## 2023-03-23 PROCEDURE — 74011250636 HC RX REV CODE- 250/636: Performed by: NURSE PRACTITIONER

## 2023-03-23 PROCEDURE — 96365 THER/PROPH/DIAG IV INF INIT: CPT

## 2023-03-23 RX ORDER — SODIUM CHLORIDE 9 MG/ML
5-250 INJECTION, SOLUTION INTRAVENOUS AS NEEDED
Status: DISCONTINUED | OUTPATIENT
Start: 2023-03-23 | End: 2023-03-24 | Stop reason: HOSPADM

## 2023-03-23 RX ORDER — SODIUM CHLORIDE 9 MG/ML
5-40 INJECTION INTRAVENOUS AS NEEDED
Status: DISCONTINUED | OUTPATIENT
Start: 2023-03-23 | End: 2023-03-24 | Stop reason: HOSPADM

## 2023-03-23 RX ORDER — SODIUM CHLORIDE 0.9 % (FLUSH) 0.9 %
5-40 SYRINGE (ML) INJECTION AS NEEDED
Status: DISCONTINUED | OUTPATIENT
Start: 2023-03-23 | End: 2023-03-24 | Stop reason: HOSPADM

## 2023-03-23 RX ADMIN — IRON SUCROSE 200 MG: 20 INJECTION, SOLUTION INTRAVENOUS at 15:20

## 2023-03-23 RX ADMIN — Medication 10 ML: at 15:50

## 2023-03-23 RX ADMIN — SODIUM CHLORIDE 25 ML/HR: 9 INJECTION, SOLUTION INTRAVENOUS at 15:19

## 2023-03-23 NOTE — PROGRESS NOTES
[facility] Progress Note    Date: 2023    Name: Quincy Mei    MRN: [id number]         : 1985      Ms. Cecy Mims Arrived ambulatory and in no distress for C1D11 Venofer Infusion. Assessment was completed, no acute issues at this time, no new complaints voiced. 24 G PIV established to left arm, + blood return. Ms. Livia Gee vitals were reviewed. Patient Vitals for the past 24 hrs:   Temp Pulse Resp BP SpO2   23 1546 98.7 °F (37.1 °C) 71 20 (!) 108/58 --   23 1502 98.6 °F (37 °C) 76 20 (!) 103/56 97 %              Medications:  Medications Administered       0.9% sodium chloride infusion       Admin Date  2023 Action  New Bag Dose  25 mL/hr Rate  25 mL/hr Route  IntraVENous Administered By  Fifi Tapia RN              0.9% sodium chloride injection 5-40 mL       Admin Date  2023 Action  Given Dose  10 mL Route  IntraVENous Administered By  Fifi Tapia RN              iron sucrose (VENOFER) 200 mg in 0.9% sodium chloride 100 mL, overfill volume 10 mL IVPB       Admin Date  2023 Action  New Bag Dose  200 mg Rate  360 mL/hr Route  IntraVENous Administered By  Fifi Tapia RN                     Ms. Cecy Mims tolerated treatment well and was discharged from [place] [address] in stable condition at 1550. PIV flushed & removed. She is to return on  for her next appointment.     Jana Luo RN  2023

## 2023-03-28 ENCOUNTER — HOSPITAL ENCOUNTER (OUTPATIENT)
Dept: INFUSION THERAPY | Age: 38
Discharge: HOME | End: 2023-03-28
Payer: MEDICAID

## 2023-03-28 VITALS
RESPIRATION RATE: 10 BRPM | OXYGEN SATURATION: 98 % | SYSTOLIC BLOOD PRESSURE: 96 MMHG | TEMPERATURE: 97.8 F | BODY MASS INDEX: 38.45 KG/M2 | HEART RATE: 69 BPM | WEIGHT: 245 LBS | HEIGHT: 67 IN | DIASTOLIC BLOOD PRESSURE: 61 MMHG

## 2023-03-28 DIAGNOSIS — D50.9 IRON DEFICIENCY ANEMIA, UNSPECIFIED IRON DEFICIENCY ANEMIA TYPE: ICD-10-CM

## 2023-03-28 DIAGNOSIS — D50.0 IRON DEFICIENCY ANEMIA DUE TO CHRONIC BLOOD LOSS: Primary | ICD-10-CM

## 2023-03-28 PROCEDURE — 74011250636 HC RX REV CODE- 250/636: Performed by: NURSE PRACTITIONER

## 2023-03-28 PROCEDURE — 74011000258 HC RX REV CODE- 258: Performed by: NURSE PRACTITIONER

## 2023-03-28 PROCEDURE — 96365 THER/PROPH/DIAG IV INF INIT: CPT

## 2023-03-28 RX ORDER — SODIUM CHLORIDE 9 MG/ML
5-250 INJECTION, SOLUTION INTRAVENOUS AS NEEDED
Status: DISCONTINUED | OUTPATIENT
Start: 2023-03-28 | End: 2023-03-29 | Stop reason: HOSPADM

## 2023-03-28 RX ADMIN — SODIUM CHLORIDE 25 ML/HR: 9 INJECTION, SOLUTION INTRAVENOUS at 15:12

## 2023-03-28 RX ADMIN — IRON SUCROSE 200 MG: 20 INJECTION, SOLUTION INTRAVENOUS at 15:12

## 2023-03-28 NOTE — PROGRESS NOTES
[facility] Progress Note    Date: 2023    Name: Cindy Naik    MRN: [id number]         : 1985    Ms. Rebecca Meza Arrived ambulatory and in no distress for Venofer Infusion. Assessment was completed, no acute issues at this time, no new complaints voiced. 24 G PIV established to left arm, + blood return. Ms. Bam Carey vitals were reviewed. Visit Vitals  BP 96/61   Pulse 69   Temp 97.8 °F (36.6 °C)   Resp 10   Ht 5' 7\" (1.702 m)   Wt 111.1 kg (245 lb)   SpO2 98%   BMI 38.37 kg/m²         Medications:  Medications Administered       0.9% sodium chloride infusion       Admin Date  2023 Action  New Bag Dose  25 mL/hr Rate  25 mL/hr Route  IntraVENous Administered By  Aliza Villafuerte RN              iron sucrose (VENOFER) 200 mg in 0.9% sodium chloride 100 mL, overfill volume 10 mL IVPB       Admin Date  2023 Action  New Bag Dose  200 mg Rate  360 mL/hr Route  IntraVENous Administered By  Aliza Villafuerte RN                     Ms. Rebecca Meza tolerated treatment well and was discharged from [place] [address] in stable condition. PIV flushed & removed. She is to follow up for her next appointment.     Charlotte Bateman RN  2023

## 2023-04-22 DIAGNOSIS — D50.0 IRON DEFICIENCY ANEMIA DUE TO CHRONIC BLOOD LOSS: Primary | ICD-10-CM

## 2023-04-24 DIAGNOSIS — D50.0 IRON DEFICIENCY ANEMIA DUE TO CHRONIC BLOOD LOSS: Primary | ICD-10-CM

## 2023-04-25 ENCOUNTER — PATIENT MESSAGE (OUTPATIENT)
Dept: ONCOLOGY | Age: 38
End: 2023-04-25

## 2023-04-26 LAB
BASOPHILS # BLD AUTO: 0 X10E3/UL (ref 0–0.2)
BASOPHILS NFR BLD AUTO: 0 %
EOSINOPHIL # BLD AUTO: 0.2 X10E3/UL (ref 0–0.4)
EOSINOPHIL NFR BLD AUTO: 5 %
ERYTHROCYTE [DISTWIDTH] IN BLOOD BY AUTOMATED COUNT: 14.8 % (ref 11.7–15.4)
FERRITIN SERPL-MCNC: 87 NG/ML (ref 15–150)
HCT VFR BLD AUTO: 36.3 % (ref 34–46.6)
HGB BLD-MCNC: 12 G/DL (ref 11.1–15.9)
IMM GRANULOCYTES # BLD AUTO: 0 X10E3/UL (ref 0–0.1)
IMM GRANULOCYTES NFR BLD AUTO: 0 %
IRON SATN MFR SERPL: 17 % (ref 15–55)
IRON SERPL-MCNC: 55 UG/DL (ref 27–159)
LYMPHOCYTES # BLD AUTO: 1.5 X10E3/UL (ref 0.7–3.1)
LYMPHOCYTES NFR BLD AUTO: 31 %
MCH RBC QN AUTO: 30.1 PG (ref 26.6–33)
MCHC RBC AUTO-ENTMCNC: 33.1 G/DL (ref 31.5–35.7)
MCV RBC AUTO: 91 FL (ref 79–97)
MONOCYTES # BLD AUTO: 0.4 X10E3/UL (ref 0.1–0.9)
MONOCYTES NFR BLD AUTO: 7 %
NEUTROPHILS # BLD AUTO: 2.8 X10E3/UL (ref 1.4–7)
NEUTROPHILS NFR BLD AUTO: 57 %
PLATELET # BLD AUTO: 222 X10E3/UL (ref 150–450)
RBC # BLD AUTO: 3.99 X10E6/UL (ref 3.77–5.28)
TIBC SERPL-MCNC: 324 UG/DL (ref 250–450)
UIBC SERPL-MCNC: 269 UG/DL (ref 131–425)
VIT B12 SERPL-MCNC: 261 PG/ML (ref 232–1245)
WBC # BLD AUTO: 4.9 X10E3/UL (ref 3.4–10.8)

## 2024-01-15 ENCOUNTER — TELEPHONE (OUTPATIENT)
Age: 39
End: 2024-01-15

## 2024-01-15 DIAGNOSIS — D50.0 IRON DEFICIENCY ANEMIA DUE TO CHRONIC BLOOD LOSS: ICD-10-CM

## 2024-01-15 NOTE — TELEPHONE ENCOUNTER
01/15/24 10:42 AM Patient arrived to clinic with complaints of weakness, chills, and positional dizziness. Patient states she has history of chills when her iron levels are low. Denies fevers and denies SOB at present time. Patient also shared she recently had menstrual cycle, has history of menorrhagia. Patient also taking iron three times daily. Offered appointment for 02/05; however, patient states that she cannot wait this long given above symptoms. Discussed that this was soonest available appointment at this time. Advised that patient have labs drawn now. Discussed that this nurse would also update PADMAJA Post, and Dr. Paulino of above and call patient back with further recommendations. Patient made aware that provider may wait for lab results to come back before advising further. Patient voiced understanding and states she will go to [facility] lab today. Scheduled appointment for 02/05 at 9:30 AM.

## 2024-01-16 LAB
BASOPHILS # BLD: 0 K/UL (ref 0–0.1)
BASOPHILS NFR BLD: 1 % (ref 0–1)
DIFFERENTIAL METHOD BLD: ABNORMAL
EOSINOPHIL # BLD: 0.4 K/UL (ref 0–0.4)
EOSINOPHIL NFR BLD: 9 % (ref 0–7)
ERYTHROCYTE [DISTWIDTH] IN BLOOD BY AUTOMATED COUNT: 17 % (ref 11.5–14.5)
FERRITIN SERPL-MCNC: 2 NG/ML (ref 8–252)
HCT VFR BLD AUTO: 24.7 % (ref 35–47)
HGB BLD-MCNC: 7.2 G/DL (ref 11.5–16)
IMM GRANULOCYTES # BLD AUTO: 0 K/UL (ref 0–0.04)
IMM GRANULOCYTES NFR BLD AUTO: 0 % (ref 0–0.5)
IRON SATN MFR SERPL: 3 % (ref 20–50)
IRON SERPL-MCNC: 14 UG/DL (ref 35–150)
LYMPHOCYTES # BLD: 1.4 K/UL (ref 0.8–3.5)
LYMPHOCYTES NFR BLD: 30 % (ref 12–49)
MCH RBC QN AUTO: 20.7 PG (ref 26–34)
MCHC RBC AUTO-ENTMCNC: 29.1 G/DL (ref 30–36.5)
MCV RBC AUTO: 71 FL (ref 80–99)
MONOCYTES # BLD: 0.5 K/UL (ref 0–1)
MONOCYTES NFR BLD: 10 % (ref 5–13)
NEUTS SEG # BLD: 2.4 K/UL (ref 1.8–8)
NEUTS SEG NFR BLD: 50 % (ref 32–75)
NRBC # BLD: 0 K/UL (ref 0–0.01)
NRBC BLD-RTO: 0 PER 100 WBC
PLATELET # BLD AUTO: 258 K/UL (ref 150–400)
PMV BLD AUTO: 10.8 FL (ref 8.9–12.9)
RBC # BLD AUTO: 3.48 M/UL (ref 3.8–5.2)
TIBC SERPL-MCNC: 482 UG/DL (ref 250–450)
WBC # BLD AUTO: 4.7 K/UL (ref 3.6–11)

## 2024-01-16 RX ORDER — ACETAMINOPHEN 325 MG/1
650 TABLET ORAL
OUTPATIENT
Start: 2024-01-23

## 2024-01-16 RX ORDER — SODIUM CHLORIDE 9 MG/ML
INJECTION, SOLUTION INTRAVENOUS CONTINUOUS
OUTPATIENT
Start: 2024-01-23

## 2024-01-16 RX ORDER — ALBUTEROL SULFATE 90 UG/1
4 AEROSOL, METERED RESPIRATORY (INHALATION) PRN
OUTPATIENT
Start: 2024-01-23

## 2024-01-16 RX ORDER — ONDANSETRON 2 MG/ML
8 INJECTION INTRAMUSCULAR; INTRAVENOUS
OUTPATIENT
Start: 2024-01-23

## 2024-01-16 RX ORDER — SODIUM CHLORIDE 9 MG/ML
5-250 INJECTION, SOLUTION INTRAVENOUS PRN
OUTPATIENT
Start: 2024-01-23

## 2024-01-16 RX ORDER — HEPARIN 100 UNIT/ML
500 SYRINGE INTRAVENOUS PRN
OUTPATIENT
Start: 2024-01-23

## 2024-01-16 RX ORDER — DIPHENHYDRAMINE HYDROCHLORIDE 50 MG/ML
50 INJECTION INTRAMUSCULAR; INTRAVENOUS
OUTPATIENT
Start: 2024-01-23

## 2024-01-16 RX ORDER — SODIUM CHLORIDE 0.9 % (FLUSH) 0.9 %
5-40 SYRINGE (ML) INJECTION PRN
OUTPATIENT
Start: 2024-01-23

## 2024-01-16 RX ORDER — EPINEPHRINE 1 MG/ML
0.3 INJECTION, SOLUTION, CONCENTRATE INTRAVENOUS PRN
OUTPATIENT
Start: 2024-01-23

## 2024-01-23 ENCOUNTER — HOSPITAL ENCOUNTER (OUTPATIENT)
Facility: HOSPITAL | Age: 39
Setting detail: INFUSION SERIES
Discharge: HOME | End: 2024-01-23
Payer: MEDICAID

## 2024-01-23 VITALS
BODY MASS INDEX: 40.02 KG/M2 | WEIGHT: 255 LBS | HEART RATE: 80 BPM | OXYGEN SATURATION: 97 % | DIASTOLIC BLOOD PRESSURE: 70 MMHG | RESPIRATION RATE: 17 BRPM | HEIGHT: 67 IN | TEMPERATURE: 98.2 F | SYSTOLIC BLOOD PRESSURE: 105 MMHG

## 2024-01-23 DIAGNOSIS — D50.0 IRON DEFICIENCY ANEMIA DUE TO CHRONIC BLOOD LOSS: Primary | ICD-10-CM

## 2024-01-23 PROCEDURE — 96374 THER/PROPH/DIAG INJ IV PUSH: CPT

## 2024-01-23 PROCEDURE — 2580000003 HC RX 258: Performed by: NURSE PRACTITIONER

## 2024-01-23 PROCEDURE — 6360000002 HC RX W HCPCS: Performed by: NURSE PRACTITIONER

## 2024-01-23 RX ORDER — ALBUTEROL SULFATE 90 UG/1
4 AEROSOL, METERED RESPIRATORY (INHALATION) PRN
Status: CANCELLED | OUTPATIENT
Start: 2024-01-25

## 2024-01-23 RX ORDER — ONDANSETRON 2 MG/ML
8 INJECTION INTRAMUSCULAR; INTRAVENOUS
Status: CANCELLED | OUTPATIENT
Start: 2024-01-25

## 2024-01-23 RX ORDER — EPINEPHRINE 1 MG/ML
0.3 INJECTION, SOLUTION INTRAMUSCULAR; SUBCUTANEOUS PRN
Status: CANCELLED | OUTPATIENT
Start: 2024-01-25

## 2024-01-23 RX ORDER — DIPHENHYDRAMINE HYDROCHLORIDE 50 MG/ML
50 INJECTION INTRAMUSCULAR; INTRAVENOUS
Status: CANCELLED | OUTPATIENT
Start: 2024-01-25

## 2024-01-23 RX ORDER — SODIUM CHLORIDE 9 MG/ML
5-250 INJECTION, SOLUTION INTRAVENOUS PRN
Status: DISCONTINUED | OUTPATIENT
Start: 2024-01-23 | End: 2024-01-24 | Stop reason: HOSPADM

## 2024-01-23 RX ORDER — SODIUM CHLORIDE 9 MG/ML
5-250 INJECTION, SOLUTION INTRAVENOUS PRN
Status: CANCELLED | OUTPATIENT
Start: 2024-01-25

## 2024-01-23 RX ORDER — FAMOTIDINE 10 MG/ML
20 INJECTION, SOLUTION INTRAVENOUS
Status: CANCELLED | OUTPATIENT
Start: 2024-01-25

## 2024-01-23 RX ORDER — SODIUM CHLORIDE 0.9 % (FLUSH) 0.9 %
5-40 SYRINGE (ML) INJECTION PRN
Status: CANCELLED | OUTPATIENT
Start: 2024-01-25

## 2024-01-23 RX ORDER — HEPARIN 100 UNIT/ML
500 SYRINGE INTRAVENOUS PRN
Status: CANCELLED | OUTPATIENT
Start: 2024-01-25

## 2024-01-23 RX ORDER — SODIUM CHLORIDE 9 MG/ML
INJECTION, SOLUTION INTRAVENOUS CONTINUOUS
Status: CANCELLED | OUTPATIENT
Start: 2024-01-25

## 2024-01-23 RX ORDER — ACETAMINOPHEN 325 MG/1
650 TABLET ORAL
Status: CANCELLED | OUTPATIENT
Start: 2024-01-25

## 2024-01-23 RX ADMIN — SODIUM CHLORIDE 200 MG: 9 INJECTION, SOLUTION INTRAVENOUS at 13:30

## 2024-01-23 RX ADMIN — SODIUM CHLORIDE 25 ML/HR: 9 INJECTION, SOLUTION INTRAVENOUS at 13:24

## 2024-01-23 ASSESSMENT — PAIN SCALES - GENERAL: PAINLEVEL_OUTOF10: 0

## 2024-01-23 NOTE — PROGRESS NOTES
Outpatient Infusion Center Progress Note        Date: 2024    Name: Vicki Cm    MRN: [id number]         : 1985    Ms. Cm Arrived ambulatory and in no distress for Venofer (1 of 5) Regimen.  Assessment was completed, no acute issues at this time, no new complaints voiced.  LAC 24G PIV placed with +blood return. Criteria met for today treatment.       Ms. Cm's vitals were reviewed.  Patient Vitals for the past 12 hrs:   Temp Pulse Resp BP SpO2   24 1350 -- 80 -- 105/70 --   24 1300 98.2 °F (36.8 °C) 73 17 97/63 97 %           Medications received:  Medications Administered         0.9 % sodium chloride infusion Admin Date  2024 Action  New Bag Dose  25 mL/hr Rate  25 mL/hr Route  IntraVENous Administered By  Yael Ferguson RN        iron sucrose (VENOFER) 200 mg in sodium chloride 0.9 % 100 mL IVPB Admin Date  2024 Action  New Bag Dose  200 mg Rate  480 mL/hr Route  IntraVENous Administered By  Yael Ferguson RN             Ms. Cm tolerated treatment well and was discharged [facility] in stable condition at 1355. PIV flushed and removed per protocol. She is to return on  2024 at 1530 for her next appointment.    Yael Ferguson RN  2024    Future Appointments:  Future Appointments   Date Time Provider Department Center   2024  3:30 PM SS INF3 CH4 <2H [facility]   2024  1:00 PM SS INF1 CH4 <2H [facility]   2024  1:00 PM SS INF3 CH4 <2H [facility]   2024  9:30 AM Melody Paulino MD [facility] BS AMB   2024 10:00 AM SS INF1 CH4 <2H [facility]   3/7/2024  9:00 AM Ashley De Oliveira MD [facility] BS AMB

## 2024-01-25 ENCOUNTER — HOSPITAL ENCOUNTER (OUTPATIENT)
Facility: HOSPITAL | Age: 39
Setting detail: INFUSION SERIES
Discharge: HOME | End: 2024-01-25
Payer: MEDICAID

## 2024-01-25 VITALS
TEMPERATURE: 98 F | RESPIRATION RATE: 18 BRPM | OXYGEN SATURATION: 97 % | SYSTOLIC BLOOD PRESSURE: 106 MMHG | DIASTOLIC BLOOD PRESSURE: 72 MMHG | BODY MASS INDEX: 40.02 KG/M2 | WEIGHT: 255.5 LBS | HEART RATE: 72 BPM

## 2024-01-25 DIAGNOSIS — D50.0 IRON DEFICIENCY ANEMIA DUE TO CHRONIC BLOOD LOSS: Primary | ICD-10-CM

## 2024-01-25 PROCEDURE — 2580000003 HC RX 258: Performed by: NURSE PRACTITIONER

## 2024-01-25 PROCEDURE — 6360000002 HC RX W HCPCS: Performed by: NURSE PRACTITIONER

## 2024-01-25 PROCEDURE — 96374 THER/PROPH/DIAG INJ IV PUSH: CPT

## 2024-01-25 RX ORDER — SODIUM CHLORIDE 0.9 % (FLUSH) 0.9 %
5-40 SYRINGE (ML) INJECTION PRN
Status: DISCONTINUED | OUTPATIENT
Start: 2024-01-25 | End: 2024-01-26 | Stop reason: HOSPADM

## 2024-01-25 RX ORDER — ALBUTEROL SULFATE 90 UG/1
4 AEROSOL, METERED RESPIRATORY (INHALATION) PRN
Status: CANCELLED | OUTPATIENT
Start: 2024-01-27

## 2024-01-25 RX ORDER — ACETAMINOPHEN 325 MG/1
650 TABLET ORAL
Status: CANCELLED | OUTPATIENT
Start: 2024-01-27

## 2024-01-25 RX ORDER — SODIUM CHLORIDE 0.9 % (FLUSH) 0.9 %
5-40 SYRINGE (ML) INJECTION PRN
Status: CANCELLED | OUTPATIENT
Start: 2024-01-27

## 2024-01-25 RX ORDER — SODIUM CHLORIDE 9 MG/ML
5-250 INJECTION, SOLUTION INTRAVENOUS PRN
Status: CANCELLED | OUTPATIENT
Start: 2024-01-27

## 2024-01-25 RX ORDER — SODIUM CHLORIDE 9 MG/ML
5-250 INJECTION, SOLUTION INTRAVENOUS PRN
Status: DISCONTINUED | OUTPATIENT
Start: 2024-01-25 | End: 2024-01-26 | Stop reason: HOSPADM

## 2024-01-25 RX ORDER — FAMOTIDINE 10 MG/ML
20 INJECTION, SOLUTION INTRAVENOUS
Status: CANCELLED | OUTPATIENT
Start: 2024-01-27

## 2024-01-25 RX ORDER — EPINEPHRINE 1 MG/ML
0.3 INJECTION, SOLUTION INTRAMUSCULAR; SUBCUTANEOUS PRN
Status: CANCELLED | OUTPATIENT
Start: 2024-01-27

## 2024-01-25 RX ORDER — ONDANSETRON 2 MG/ML
8 INJECTION INTRAMUSCULAR; INTRAVENOUS
Status: CANCELLED | OUTPATIENT
Start: 2024-01-27

## 2024-01-25 RX ORDER — DIPHENHYDRAMINE HYDROCHLORIDE 50 MG/ML
50 INJECTION INTRAMUSCULAR; INTRAVENOUS
Status: CANCELLED | OUTPATIENT
Start: 2024-01-27

## 2024-01-25 RX ORDER — HEPARIN 100 UNIT/ML
500 SYRINGE INTRAVENOUS PRN
Status: CANCELLED | OUTPATIENT
Start: 2024-01-27

## 2024-01-25 RX ORDER — SODIUM CHLORIDE 9 MG/ML
INJECTION, SOLUTION INTRAVENOUS CONTINUOUS
Status: CANCELLED | OUTPATIENT
Start: 2024-01-27

## 2024-01-25 RX ADMIN — SODIUM CHLORIDE 200 MG: 9 INJECTION, SOLUTION INTRAVENOUS at 15:56

## 2024-01-25 RX ADMIN — SODIUM CHLORIDE 25 ML/HR: 9 INJECTION, SOLUTION INTRAVENOUS at 15:50

## 2024-01-25 ASSESSMENT — PAIN SCALES - GENERAL: PAINLEVEL_OUTOF10: 0

## 2024-01-25 NOTE — PROGRESS NOTES
Pt arrived to [facility] for Venofer 2 of 5 in stable condition. No acute concerns voiced.  PIV established to left ac with positive blood return.  NS started at KVO.       Vitals:    01/25/24 1614   BP: 106/72   Pulse: 72   Resp:    Temp:    SpO2:      Medications Administered         0.9 % sodium chloride infusion Admin Date  01/25/2024 Action  New Bag Dose  25 mL/hr Rate  25 mL/hr Route  IntraVENous Administered By  Parul Rai, RN        iron sucrose (VENOFER) 200 mg in sodium chloride 0.9 % 100 mL IVPB Admin Date  01/25/2024 Action  New Bag Dose  200 mg Rate  480 mL/hr Route  IntraVENous Administered By  Parul Rai, RN        Pt declined post infusion observation.       PIV flushed and removed per protocol. Pt tolerated treatment well. D/Cd from [facility] in no distress.  Future Appointments   Date Time Provider Department Center   1/30/2024  1:00 PM SS INF1 CH4 <2H [facility]   2/1/2024  1:00 PM [facility]   2/5/2024  9:30 AM Melody Paulino MD [facility] BS AMB   2/5/2024 10:00 AM [facility]   3/7/2024  9:00 AM Ashley De Oliveira MD [facility] BS AMB

## 2024-01-30 ENCOUNTER — HOSPITAL ENCOUNTER (OUTPATIENT)
Facility: HOSPITAL | Age: 39
Setting detail: INFUSION SERIES
Discharge: HOME | End: 2024-01-30
Payer: MEDICAID

## 2024-01-30 VITALS
TEMPERATURE: 97.6 F | DIASTOLIC BLOOD PRESSURE: 70 MMHG | RESPIRATION RATE: 17 BRPM | SYSTOLIC BLOOD PRESSURE: 101 MMHG | HEART RATE: 71 BPM | OXYGEN SATURATION: 96 %

## 2024-01-30 DIAGNOSIS — D50.0 IRON DEFICIENCY ANEMIA DUE TO CHRONIC BLOOD LOSS: Primary | ICD-10-CM

## 2024-01-30 PROCEDURE — 96374 THER/PROPH/DIAG INJ IV PUSH: CPT

## 2024-01-30 PROCEDURE — 2580000003 HC RX 258: Performed by: NURSE PRACTITIONER

## 2024-01-30 PROCEDURE — 6360000002 HC RX W HCPCS: Performed by: NURSE PRACTITIONER

## 2024-01-30 RX ORDER — ACETAMINOPHEN 325 MG/1
650 TABLET ORAL
Status: CANCELLED | OUTPATIENT
Start: 2024-01-31

## 2024-01-30 RX ORDER — FAMOTIDINE 10 MG/ML
20 INJECTION, SOLUTION INTRAVENOUS
Status: CANCELLED | OUTPATIENT
Start: 2024-01-31

## 2024-01-30 RX ORDER — HEPARIN 100 UNIT/ML
500 SYRINGE INTRAVENOUS PRN
Status: CANCELLED | OUTPATIENT
Start: 2024-01-31

## 2024-01-30 RX ORDER — ONDANSETRON 2 MG/ML
8 INJECTION INTRAMUSCULAR; INTRAVENOUS
Status: CANCELLED | OUTPATIENT
Start: 2024-01-31

## 2024-01-30 RX ORDER — ALBUTEROL SULFATE 90 UG/1
4 AEROSOL, METERED RESPIRATORY (INHALATION) PRN
Status: CANCELLED | OUTPATIENT
Start: 2024-01-31

## 2024-01-30 RX ORDER — EPINEPHRINE 1 MG/ML
0.3 INJECTION, SOLUTION INTRAMUSCULAR; SUBCUTANEOUS PRN
Status: CANCELLED | OUTPATIENT
Start: 2024-01-31

## 2024-01-30 RX ORDER — SODIUM CHLORIDE 0.9 % (FLUSH) 0.9 %
5-40 SYRINGE (ML) INJECTION PRN
Status: CANCELLED | OUTPATIENT
Start: 2024-01-31

## 2024-01-30 RX ORDER — DIPHENHYDRAMINE HYDROCHLORIDE 50 MG/ML
50 INJECTION INTRAMUSCULAR; INTRAVENOUS
Status: CANCELLED | OUTPATIENT
Start: 2024-01-31

## 2024-01-30 RX ORDER — SODIUM CHLORIDE 9 MG/ML
5-250 INJECTION, SOLUTION INTRAVENOUS PRN
Status: CANCELLED | OUTPATIENT
Start: 2024-01-31

## 2024-01-30 RX ORDER — SODIUM CHLORIDE 9 MG/ML
INJECTION, SOLUTION INTRAVENOUS CONTINUOUS
Status: CANCELLED | OUTPATIENT
Start: 2024-01-31

## 2024-01-30 RX ORDER — SODIUM CHLORIDE 9 MG/ML
5-250 INJECTION, SOLUTION INTRAVENOUS PRN
Status: DISCONTINUED | OUTPATIENT
Start: 2024-01-30 | End: 2024-01-31 | Stop reason: HOSPADM

## 2024-01-30 RX ADMIN — SODIUM CHLORIDE 25 ML/HR: 9 INJECTION, SOLUTION INTRAVENOUS at 13:09

## 2024-01-30 RX ADMIN — SODIUM CHLORIDE 200 MG: 9 INJECTION, SOLUTION INTRAVENOUS at 13:26

## 2024-01-30 ASSESSMENT — PAIN SCALES - GENERAL: PAINLEVEL_OUTOF10: 0

## 2024-01-30 NOTE — PROGRESS NOTES
[facility] Progress Note    Date: January 30, 2024        1300: Pt arrived ambulatory to \[facility]\"" for venofer 3/5 in stable condition.  Assessment completed. PIV placed to Right AC with positive blood return. Criteria for treatment was met.    Patient Vitals for the past 12 hrs:   Temp Pulse Resp BP SpO2   01/30/24 1344 -- -- -- 101/70 --   01/30/24 1301 97.6 °F (36.4 °C) 71 17 105/68 96 %         Medications Administered         0.9 % sodium chloride infusion Admin Date  01/30/2024 Action  New Bag Dose  25 mL/hr Rate  25 mL/hr Route  IntraVENous Administered By  Donna Hollis, BRANDON        iron sucrose (VENOFER) 200 mg in sodium chloride 0.9 % 100 mL IVPB Admin Date  01/30/2024 Action  New Bag Dose  200 mg Rate  480 mL/hr Route  IntraVENous Administered By  Donna Hollis, BRANDON               Ms. Cm tolerated the infusion, and had no complaints.    PIV flushed and removed. 2x2 and coban placed    Ms. Cm was discharged from [facility] in stable condition. Patient is aware of next scheduled \[facility]\"" appointment 2/1/24 at 1:00 PM.         Future Appointments   Date Time Provider Department Center   2/1/2024  1:00 PM [facility]   2/5/2024  9:30 AM Melody Paulino MD [facility] BS [facility]   2/5/2024 10:00 AM [facility]   3/7/2024  9:00 AM Ashley De Oliveira MD [facility]         Donna Hollis, RN, RN  January 30, 2024

## 2024-02-01 ENCOUNTER — HOSPITAL ENCOUNTER (OUTPATIENT)
Facility: HOSPITAL | Age: 39
Setting detail: INFUSION SERIES
Discharge: HOME | End: 2024-02-01
Payer: MEDICAID

## 2024-02-01 VITALS
RESPIRATION RATE: 18 BRPM | SYSTOLIC BLOOD PRESSURE: 101 MMHG | DIASTOLIC BLOOD PRESSURE: 67 MMHG | TEMPERATURE: 98.3 F | OXYGEN SATURATION: 98 % | HEART RATE: 75 BPM

## 2024-02-01 DIAGNOSIS — D50.0 IRON DEFICIENCY ANEMIA DUE TO CHRONIC BLOOD LOSS: Primary | ICD-10-CM

## 2024-02-01 PROCEDURE — 6360000002 HC RX W HCPCS: Performed by: NURSE PRACTITIONER

## 2024-02-01 PROCEDURE — 96374 THER/PROPH/DIAG INJ IV PUSH: CPT

## 2024-02-01 PROCEDURE — 2580000003 HC RX 258: Performed by: NURSE PRACTITIONER

## 2024-02-01 RX ORDER — HEPARIN 100 UNIT/ML
500 SYRINGE INTRAVENOUS PRN
OUTPATIENT
Start: 2024-02-03

## 2024-02-01 RX ORDER — FAMOTIDINE 10 MG/ML
20 INJECTION, SOLUTION INTRAVENOUS
OUTPATIENT
Start: 2024-02-03

## 2024-02-01 RX ORDER — ONDANSETRON 2 MG/ML
8 INJECTION INTRAMUSCULAR; INTRAVENOUS
OUTPATIENT
Start: 2024-02-03

## 2024-02-01 RX ORDER — EPINEPHRINE 1 MG/ML
0.3 INJECTION, SOLUTION INTRAMUSCULAR; SUBCUTANEOUS PRN
OUTPATIENT
Start: 2024-02-03

## 2024-02-01 RX ORDER — SODIUM CHLORIDE 9 MG/ML
5-250 INJECTION, SOLUTION INTRAVENOUS PRN
Status: DISCONTINUED | OUTPATIENT
Start: 2024-02-01 | End: 2024-02-02 | Stop reason: HOSPADM

## 2024-02-01 RX ORDER — SODIUM CHLORIDE 9 MG/ML
INJECTION, SOLUTION INTRAVENOUS CONTINUOUS
OUTPATIENT
Start: 2024-02-03

## 2024-02-01 RX ORDER — SODIUM CHLORIDE 9 MG/ML
5-250 INJECTION, SOLUTION INTRAVENOUS PRN
OUTPATIENT
Start: 2024-02-03

## 2024-02-01 RX ORDER — ACETAMINOPHEN 325 MG/1
650 TABLET ORAL
OUTPATIENT
Start: 2024-02-03

## 2024-02-01 RX ORDER — ALBUTEROL SULFATE 90 UG/1
4 AEROSOL, METERED RESPIRATORY (INHALATION) PRN
OUTPATIENT
Start: 2024-02-03

## 2024-02-01 RX ORDER — DIPHENHYDRAMINE HYDROCHLORIDE 50 MG/ML
50 INJECTION INTRAMUSCULAR; INTRAVENOUS
OUTPATIENT
Start: 2024-02-03

## 2024-02-01 RX ORDER — SODIUM CHLORIDE 0.9 % (FLUSH) 0.9 %
5-40 SYRINGE (ML) INJECTION PRN
OUTPATIENT
Start: 2024-02-03

## 2024-02-01 RX ADMIN — SODIUM CHLORIDE 25 ML/HR: 9 INJECTION, SOLUTION INTRAVENOUS at 13:58

## 2024-02-01 RX ADMIN — SODIUM CHLORIDE 200 MG: 9 INJECTION, SOLUTION INTRAVENOUS at 13:58

## 2024-02-01 ASSESSMENT — PAIN SCALES - GENERAL: PAINLEVEL_OUTOF10: 0

## 2024-02-01 NOTE — PROGRESS NOTES
[facility] Progress Note    Date: February 1, 2024        1300: Ms. Cm arrived ambulatory and in no distress for Venofer Infusion. Pt is teary today as she just received bad news about her 's health. Assessment was completed, pt reports a headache today that she believes is due to stress. No other concerns or acute issues at this time. 24g PIV established to right arm without difficulty, positive blood return noted.      Ms. Cm's vitals were reviewed.  Patient Vitals for the past 12 hrs:   Temp Pulse Resp BP SpO2   02/01/24 1307 98.3 °F (36.8 °C) 69 18 108/76 98 %           Medications given.    Medications Administered         0.9 % sodium chloride infusion Admin Date  02/01/2024 Action  New Bag Dose  25 mL/hr Rate  25 mL/hr Route  IntraVENous Administered By  Yue Hidalgo, BRANDON        iron sucrose (VENOFER) 200 mg in sodium chloride 0.9 % 100 mL IVPB Admin Date  02/01/2024 Action  New Bag Dose  200 mg Rate  480 mL/hr Route  IntraVENous Administered By  Yue Hidalgo, RN                1427: Patient tolerated treatment well and was discharged from [facility] in stable condition.  PIV flushed and removed. Patient is aware of next scheduled [facility] appointment on 02/05/24.      Yue Hidalgo RN  February 1, 2024

## 2024-02-05 ENCOUNTER — OFFICE VISIT (OUTPATIENT)
Age: 39
End: 2024-02-05
Payer: MEDICAID

## 2024-02-05 ENCOUNTER — HOSPITAL ENCOUNTER (OUTPATIENT)
Facility: HOSPITAL | Age: 39
Setting detail: INFUSION SERIES
Discharge: HOME | End: 2024-02-05
Payer: MEDICAID

## 2024-02-05 VITALS
OXYGEN SATURATION: 98 % | SYSTOLIC BLOOD PRESSURE: 102 MMHG | HEART RATE: 63 BPM | DIASTOLIC BLOOD PRESSURE: 70 MMHG | TEMPERATURE: 97.6 F | BODY MASS INDEX: 40.18 KG/M2 | RESPIRATION RATE: 18 BRPM | WEIGHT: 256 LBS | HEIGHT: 67 IN

## 2024-02-05 VITALS
RESPIRATION RATE: 18 BRPM | DIASTOLIC BLOOD PRESSURE: 85 MMHG | HEART RATE: 60 BPM | SYSTOLIC BLOOD PRESSURE: 126 MMHG | TEMPERATURE: 97.7 F | OXYGEN SATURATION: 93 %

## 2024-02-05 DIAGNOSIS — N92.1 MENORRHAGIA WITH IRREGULAR CYCLE: ICD-10-CM

## 2024-02-05 DIAGNOSIS — Z86.2 HISTORY OF ANEMIA DUE TO VITAMIN B12 DEFICIENCY: ICD-10-CM

## 2024-02-05 DIAGNOSIS — D50.0 IRON DEFICIENCY ANEMIA DUE TO CHRONIC BLOOD LOSS: Primary | ICD-10-CM

## 2024-02-05 DIAGNOSIS — R53.83 OTHER FATIGUE: ICD-10-CM

## 2024-02-05 DIAGNOSIS — D50.0 IRON DEFICIENCY ANEMIA SECONDARY TO BLOOD LOSS (CHRONIC): Primary | ICD-10-CM

## 2024-02-05 LAB
ERYTHROCYTE [DISTWIDTH] IN BLOOD BY AUTOMATED COUNT: 27.7 % (ref 11.5–14.5)
HCT VFR BLD AUTO: 27.7 % (ref 35–47)
HGB BLD-MCNC: 8.2 G/DL (ref 11.5–16)
MCH RBC QN AUTO: 22.6 PG (ref 26–34)
MCHC RBC AUTO-ENTMCNC: 29.6 G/DL (ref 30–36.5)
MCV RBC AUTO: 76.3 FL (ref 80–99)
NRBC # BLD: 0 K/UL (ref 0–0.01)
NRBC BLD-RTO: 0 PER 100 WBC
PLATELET # BLD AUTO: 266 K/UL (ref 150–400)
PMV BLD AUTO: 10.2 FL (ref 8.9–12.9)
RBC # BLD AUTO: 3.63 M/UL (ref 3.8–5.2)
WBC # BLD AUTO: 4.7 K/UL (ref 3.6–11)

## 2024-02-05 PROCEDURE — 96374 THER/PROPH/DIAG INJ IV PUSH: CPT

## 2024-02-05 PROCEDURE — 6360000002 HC RX W HCPCS: Performed by: NURSE PRACTITIONER

## 2024-02-05 PROCEDURE — 36415 COLL VENOUS BLD VENIPUNCTURE: CPT

## 2024-02-05 PROCEDURE — 99213 OFFICE O/P EST LOW 20 MIN: CPT | Performed by: INTERNAL MEDICINE

## 2024-02-05 PROCEDURE — 85027 COMPLETE CBC AUTOMATED: CPT

## 2024-02-05 PROCEDURE — 2580000003 HC RX 258: Performed by: NURSE PRACTITIONER

## 2024-02-05 RX ORDER — FAMOTIDINE 10 MG/ML
20 INJECTION, SOLUTION INTRAVENOUS
OUTPATIENT
Start: 2024-02-05

## 2024-02-05 RX ORDER — EPINEPHRINE 1 MG/ML
0.3 INJECTION, SOLUTION INTRAMUSCULAR; SUBCUTANEOUS PRN
OUTPATIENT
Start: 2024-02-05

## 2024-02-05 RX ORDER — ONDANSETRON 2 MG/ML
8 INJECTION INTRAMUSCULAR; INTRAVENOUS
OUTPATIENT
Start: 2024-02-05

## 2024-02-05 RX ORDER — SODIUM CHLORIDE 9 MG/ML
5-250 INJECTION, SOLUTION INTRAVENOUS PRN
Status: DISCONTINUED | OUTPATIENT
Start: 2024-02-05 | End: 2024-02-06 | Stop reason: HOSPADM

## 2024-02-05 RX ORDER — ACETAMINOPHEN 325 MG/1
650 TABLET ORAL
OUTPATIENT
Start: 2024-02-05

## 2024-02-05 RX ORDER — SODIUM CHLORIDE 9 MG/ML
5-250 INJECTION, SOLUTION INTRAVENOUS PRN
OUTPATIENT
Start: 2024-02-05

## 2024-02-05 RX ORDER — SODIUM CHLORIDE 9 MG/ML
INJECTION, SOLUTION INTRAVENOUS CONTINUOUS
OUTPATIENT
Start: 2024-02-05

## 2024-02-05 RX ORDER — HEPARIN 100 UNIT/ML
500 SYRINGE INTRAVENOUS PRN
OUTPATIENT
Start: 2024-02-05

## 2024-02-05 RX ORDER — DIPHENHYDRAMINE HYDROCHLORIDE 50 MG/ML
50 INJECTION INTRAMUSCULAR; INTRAVENOUS
OUTPATIENT
Start: 2024-02-05

## 2024-02-05 RX ORDER — SODIUM CHLORIDE 0.9 % (FLUSH) 0.9 %
5-40 SYRINGE (ML) INJECTION PRN
OUTPATIENT
Start: 2024-02-05

## 2024-02-05 RX ORDER — ALBUTEROL SULFATE 90 UG/1
4 AEROSOL, METERED RESPIRATORY (INHALATION) PRN
OUTPATIENT
Start: 2024-02-05

## 2024-02-05 RX ADMIN — SODIUM CHLORIDE 200 MG: 9 INJECTION, SOLUTION INTRAVENOUS at 11:11

## 2024-02-05 RX ADMIN — SODIUM CHLORIDE 25 ML/HR: 9 INJECTION, SOLUTION INTRAVENOUS at 10:36

## 2024-02-05 ASSESSMENT — PAIN SCALES - GENERAL: PAINLEVEL_OUTOF10: 0

## 2024-02-05 ASSESSMENT — PATIENT HEALTH QUESTIONNAIRE - PHQ9
SUM OF ALL RESPONSES TO PHQ9 QUESTIONS 1 & 2: 0
SUM OF ALL RESPONSES TO PHQ QUESTIONS 1-9: 0
1. LITTLE INTEREST OR PLEASURE IN DOING THINGS: 0
SUM OF ALL RESPONSES TO PHQ QUESTIONS 1-9: 0
2. FEELING DOWN, DEPRESSED OR HOPELESS: 0

## 2024-02-05 NOTE — PROGRESS NOTES
[facility] Progress Note    Date: 2024    Name: Vicki Cm    MRN: [id number]         : 1985    Ms. Cm Arrived ambulatory and in no distress for Venofer Infusion.  Assessment was completed, no acute issues at this time, no new complaints voiced.  24 G PIV established to Left arm, + blood return. Labs drawn and sent for processing.    Ms. Cm's vitals were reviewed.  Vitals:    24 1020   BP: 102/74   Pulse: 60   Resp: 18   Temp: 97.7 °F (36.5 °C)   SpO2: 93%       Lab results were obtained and reviewed.  Recent Results (from the past 12 hour(s))   CBC    Collection Time: 24 10:28 AM   Result Value Ref Range    WBC 4.7 3.6 - 11.0 K/uL    RBC 3.63 (L) 3.80 - 5.20 M/uL    Hemoglobin 8.2 (L) 11.5 - 16.0 g/dL    Hematocrit 27.7 (L) 35.0 - 47.0 %    MCV 76.3 (L) 80.0 - 99.0 FL    MCH 22.6 (L) 26.0 - 34.0 PG    MCHC 29.6 (L) 30.0 - 36.5 g/dL    RDW 27.7 (H) 11.5 - 14.5 %    Platelets 266 150 - 400 K/uL    MPV 10.2 8.9 - 12.9 FL    Nucleated RBCs 0.0 0  WBC    nRBC 0.00 0.00 - 0.01 K/uL       Medications:  Medications Administered         0.9 % sodium chloride infusion Admin Date  2024 Action  New Bag Dose  25 mL/hr Rate  25 mL/hr Route  IntraVENous Administered By  Heather Maynard, BRANDON        iron sucrose (VENOFER) 200 mg in sodium chloride 0.9 % 100 mL IVPB Admin Date  2024 Action  New Bag Dose  200 mg Rate  480 mL/hr Route  IntraVENous Administered By  Heather Maynard, BRANDON             Ms. Cm tolerated treatment well and was discharged from [facility] in stable condition.   PIV flushed & removed. Patient is aware of future appointments.    Future Appointments   Date Time Provider Department Center   3/7/2024  9:00 AM Ashley De Oliveira MD [facility] BS AMB       Heather Maynard RN  2024

## 2024-02-05 NOTE — PROGRESS NOTES
[facility] [place]  Medical Oncology at [place]  [phone]    Hematology / Oncology Established Visit    Reason for Visit:   Vicki Cm is a 38 y.o. female who is seen for follow up of anemia.     History of Present Illness:   Vicki Cm is a 38 y.o. female who is seen for anemia. She received a blood transfusion in May 2019 for anemia related to menorrhagia. Received iron sucrose x 3 in 11/2022.    Reports she is taking iron supplements twice daily and three times daily on weeks of menstrual cycles. Feeling fatigued. She is currently having her menstrual cycle, which continues to be heavy. Has had 4 of 5 planned Venofer doses in the past 2 weeks and is scheduled for 5th dose today. Reports fatigue. No SOB. States she has not had a B12 injection with her PCP since summer 2023. No melena/hematochezia.        PMHx: Anemia, Anxiety, Asthma, Obesity  PSurgHx: Breast reduction 2003, I&D of breast abscess 2012  Shx: No EtOH/tobacco  Fhx: Mother with HTN, HLD. Sister with DM  Review of Systems: A complete review of systems was obtained, negative except as described above.    Physical Exam:     Visit Vitals  /70   Pulse 63   Temp 97.6 °F (36.4 °C) (Temporal)   Resp 18   Ht 1.702 m (5' 7\")   Wt 116.1 kg (256 lb)   SpO2 98%   BMI 40.10 kg/m²     ECOG PS: 0  General: no distress  Eyes: anicteric sclerae  HENT: oropharynx clear  Neck: supple  Lymphatic: deferred today   Respiratory: CTAB, normal respiratory effort  CV: no peripheral edema  GI: soft, nontender, nondistended, no masses  Skin: no rashes; no ecchymoses; no petechiae      Results:     Lab Results   Component Value Date    WBC 4.7 01/15/2024    HGB 7.2 (L) 01/15/2024    HCT 24.7 (L) 01/15/2024     01/15/2024    MCV 71.0 (L) 01/15/2024    NEUTROABS 2.4 01/15/2024    HGBPOC 7.6 07/05/2019     Lab Results   Component Value Date     10/13/2022    K 3.8 10/13/2022     10/13/2022    CO2 28 10/13/2022    GLUCOSE

## 2024-02-05 NOTE — PROGRESS NOTES
Chief Complaint   Patient presents with    Follow-up           Vitals:    02/05/24 0939   BP: 102/70   Pulse: 63   Resp: 18   Temp: 97.6 °F (36.4 °C)   SpO2: 98%            1. Have you been to the ER, urgent care clinic since your last visit?  Hospitalized since your last visit?  No  2. Have you seen or consulted any other health care providers outside of the [facility] since your last visit?  Include any pap smears or colon screening. No

## 2024-03-07 ENCOUNTER — OFFICE VISIT (OUTPATIENT)
Age: 39
End: 2024-03-07
Payer: MEDICAID

## 2024-03-07 VITALS — WEIGHT: 253 LBS | SYSTOLIC BLOOD PRESSURE: 98 MMHG | DIASTOLIC BLOOD PRESSURE: 65 MMHG | BODY MASS INDEX: 39.63 KG/M2

## 2024-03-07 DIAGNOSIS — Z11.51 SCREENING FOR HUMAN PAPILLOMAVIRUS (HPV): ICD-10-CM

## 2024-03-07 DIAGNOSIS — D50.0 IRON DEFICIENCY ANEMIA DUE TO CHRONIC BLOOD LOSS: ICD-10-CM

## 2024-03-07 DIAGNOSIS — Z01.419 ENCOUNTER FOR GYNECOLOGICAL EXAMINATION: Primary | ICD-10-CM

## 2024-03-07 DIAGNOSIS — N63.13 BREAST LUMP ON RIGHT SIDE AT 7 O'CLOCK POSITION: ICD-10-CM

## 2024-03-07 DIAGNOSIS — N92.0 MENORRHAGIA WITH REGULAR CYCLE: ICD-10-CM

## 2024-03-07 PROCEDURE — 99395 PREV VISIT EST AGE 18-39: CPT | Performed by: OBSTETRICS & GYNECOLOGY

## 2024-03-07 RX ORDER — TRANEXAMIC ACID 650 MG/1
TABLET ORAL
Qty: 30 TABLET | Refills: 12 | Status: SHIPPED | OUTPATIENT
Start: 2024-03-07

## 2024-03-07 SDOH — ECONOMIC STABILITY: HOUSING INSECURITY
IN THE LAST 12 MONTHS, WAS THERE A TIME WHEN YOU DID NOT HAVE A STEADY PLACE TO SLEEP OR SLEPT IN A SHELTER (INCLUDING NOW)?: NO

## 2024-03-07 SDOH — ECONOMIC STABILITY: INCOME INSECURITY: HOW HARD IS IT FOR YOU TO PAY FOR THE VERY BASICS LIKE FOOD, HOUSING, MEDICAL CARE, AND HEATING?: NOT HARD AT ALL

## 2024-03-07 SDOH — ECONOMIC STABILITY: FOOD INSECURITY: WITHIN THE PAST 12 MONTHS, THE FOOD YOU BOUGHT JUST DIDN'T LAST AND YOU DIDN'T HAVE MONEY TO GET MORE.: NEVER TRUE

## 2024-03-07 SDOH — ECONOMIC STABILITY: FOOD INSECURITY: WITHIN THE PAST 12 MONTHS, YOU WORRIED THAT YOUR FOOD WOULD RUN OUT BEFORE YOU GOT MONEY TO BUY MORE.: NEVER TRUE

## 2024-03-07 NOTE — PROGRESS NOTES
Vicki Cm is a 38 y.o. female returns for an annual exam     Chief Complaint   Patient presents with    Annual Exam       Patient's last menstrual period was 03/01/2024 (exact date).  Her periods are heavy in flow and often irregular with no apparent pattern.  She does not dysmenorrhea.  Problems: problems - bloating after eating  Birth Control: none; trying to get pregnant  Last Pap:2/24/2021 NILM/ HPV-   She does not have a history of CHARLY 2, 3 or cervical cancer.   With regard to the Gardisil vaccine, she is older than the FDA approved age to receive it      Examination chaperoned by Sakina Lino LPN.  
tablet Take by mouth every 8 hours as needed       No current facility-administered medications for this visit.     Allergies: Sulfa antibiotics and Ondansetron   Tobacco History:  reports that she has never smoked. She has never used smokeless tobacco.  Alcohol Abuse:  reports no history of alcohol use.  Drug Abuse:  reports no history of drug use.    Patient Active Problem List   Diagnosis    Morbid obesity (HCC)    Vitamin B12 deficiency    Anxiety    Anemia    Hx of breast reduction, elective    Asthma    Pregnancy    Hypokalemia    Syncope    Supervision of high risk primigravida in first trimester in patient 35 years or older at time of delivery    Elevated cholesterol    Eczema    Family history of diabetes mellitus    Family history of thyroid disorder    Iron deficiency anemia    Generalized abdominal pain       Review of Systems - History obtained from the patient  Constitutional: negative for weight loss, fever, night sweats  HEENT: negative for hearing loss, earache, congestion, snoring, sorethroat  CV: negative for chest pain, palpitations, edema  Resp: negative for cough, shortness of breath, wheezing  GI: negative for change in bowel habits, abdominal pain, black or bloody stools  : negative for frequency, dysuria, hematuria, vaginal discharge  MSK: negative for back pain, joint pain, muscle pain  Breast: negative for breast lumps, nipple discharge, galactorrhea  Skin :negative for itching, rash, hives  Neuro: negative for dizziness, headache, confusion, weakness  Psych: negative for anxiety, depression, change in mood  Heme/lymph: negative for bleeding, bruising, pallor    Physical Exam    BP 98/65   Wt 114.8 kg (253 lb)   LMP 03/01/2024 (Exact Date)   BMI 39.63 kg/m²     Constitutional  Appearance: well-nourished, well developed, alert, in no acute distress    HENT  Head and Face: appears normal    Neck  Inspection/Palpation: normal appearance, no masses or tenderness  Lymph Nodes: no

## 2024-03-11 ENCOUNTER — OFFICE VISIT (OUTPATIENT)
Age: 39
End: 2024-03-11
Payer: MEDICAID

## 2024-03-11 DIAGNOSIS — N63.13 MASS OF LOWER OUTER QUADRANT OF RIGHT BREAST: Primary | ICD-10-CM

## 2024-03-11 PROCEDURE — 76642 ULTRASOUND BREAST LIMITED: CPT | Performed by: SURGERY

## 2024-03-11 PROCEDURE — 99203 OFFICE O/P NEW LOW 30 MIN: CPT | Performed by: SURGERY

## 2024-03-11 NOTE — PROGRESS NOTES
HISTORY OF PRESENT ILLNESS  Vicki Cm is a 38 y.o. female     HPI NEW patient consult, previously seen in the past by Dr. Pickens referred back to [facility] by Dr. Ashley De Oliveira for RIGHT breast lump.  The lump was discovered by Dr. De Oliveira, the patient reports that it is painful.      Family history -    Maternal aunt pancreatic cancer at age 50 and is .   Paternal Cousin - breast cancer dx 45.     Mammogram Result (most recent):  KELLY DIGITAL SCREEN W OR WO CAD BILATERAL 2019    Narrative  This is a summary report. The complete report is available in the patient's medical record. If you cannot access the medical record, please contact the sending organization for a detailed fax or copy.    STUDY: Bilateral digital screening mammogram    INDICATION:  Screening.    COMPARISON: Mammogram and ultrasound 2017.    BREAST COMPOSITION:  The breasts are almost entirely fatty.    FINDINGS: Bilateral digital screening mammography was performed and is  interpreted in conjunction with a computer assisted detection (CAD) system.  There is no significant change in benign-appearing lobulated masses of the right  breast and asymmetry of the left breast. No suspicious masses or calcifications  are identified. There has been no significant change.    Impression  IMPRESSION:  BI-RADS 2: Benign. No mammographic evidence of malignancy.    RECOMMENDATIONS:  Next screening mammogram is recommended in one year.    The patient will be notified of these results.        Review of Systems      Physical Exam       ASSESSMENT and PLAN  {Assessment and Plan Chronic Disease:[id number]}    
MG tablet Take by mouth every 8 hours as needed      tranexamic acid (LYSTEDA) 650 MG TABS tablet Take 2 tabs TID up to 5 days per month as needed for heavy bleeding. (Patient not taking: Reported on 3/11/2024) 30 tablet 12     No current facility-administered medications on file prior to visit.       Allergies   Allergen Reactions    Sulfa Antibiotics Nausea Only    Ondansetron Rash             Review of Systems        Physical Exam  Exam conducted with a chaperone present.   Constitutional:       Appearance: She is not diaphoretic.   HENT:      Head: Normocephalic and atraumatic.      Right Ear: External ear normal.      Left Ear: External ear normal.   Eyes:      General: No scleral icterus.        Right eye: No discharge.         Left eye: No discharge.      Pupils: Pupils are equal, round, and reactive to light.   Cardiovascular:      Rate and Rhythm: Normal rate and regular rhythm.   Pulmonary:      Effort: Pulmonary effort is normal. No respiratory distress.      Breath sounds: Normal breath sounds. No stridor.   Chest:       Abdominal:      General: There is no distension.      Palpations: Abdomen is soft. There is no mass.      Tenderness: There is no abdominal tenderness.   Musculoskeletal:         General: Normal range of motion.      Cervical back: Normal range of motion and neck supple.   Lymphadenopathy:      Cervical: No cervical adenopathy.   Skin:     General: Skin is warm.   Neurological:      Mental Status: She is alert and oriented to person, place, and time.   Psychiatric:         Behavior: Behavior normal.         Thought Content: Thought content normal.         Judgment: Judgment normal.       Imaging & Procedures  BREAST ULTRASOUND  Indication: RIGHT breast mass   Technique: The area was scanned using a high-frequency linear-array near-field transducer  Findings: 1.77 x 1.19 x 2.42 cm hypoechoic mass with probable biopsy clip at 8:00  Impression: mass   Disposition: Order diagnostic

## 2024-03-16 LAB
CYTOLOGIST CVX/VAG CYTO: NORMAL
CYTOLOGY CVX/VAG DOC CYTO: NORMAL
CYTOLOGY CVX/VAG DOC THIN PREP: NORMAL
DX ICD CODE: NORMAL
HPV GENOTYPE REFLEX: NORMAL
HPV I/H RISK 4 DNA CVX QL PROBE+SIG AMP: NEGATIVE
Lab: NORMAL
Lab: NORMAL
OTHER STN SPEC: NORMAL
STAT OF ADQ CVX/VAG CYTO-IMP: NORMAL

## 2024-03-29 ENCOUNTER — HOSPITAL ENCOUNTER (OUTPATIENT)
Facility: HOSPITAL | Age: 39
End: 2024-03-29
Payer: MEDICAID

## 2024-03-29 VITALS — BODY MASS INDEX: 39.71 KG/M2 | HEIGHT: 67 IN | WEIGHT: 253 LBS

## 2024-03-29 DIAGNOSIS — N63.13 MASS OF LOWER OUTER QUADRANT OF RIGHT BREAST: ICD-10-CM

## 2024-03-29 PROCEDURE — G0279 TOMOSYNTHESIS, MAMMO: HCPCS

## 2024-05-14 ENCOUNTER — TELEPHONE (OUTPATIENT)
Age: 39
End: 2024-05-14

## 2024-07-24 ENCOUNTER — TELEPHONE (OUTPATIENT)
Age: 39
End: 2024-07-24

## 2024-07-26 ENCOUNTER — TELEPHONE (OUTPATIENT)
Age: 39
End: 2024-07-26

## 2024-07-26 NOTE — TELEPHONE ENCOUNTER
Called patient to see if she wants to come early to see Sejal on aug 1 or 2. Ask her to get labs done beforehand. But mail box has not be set up yet

## 2024-08-06 ENCOUNTER — OFFICE VISIT (OUTPATIENT)
Age: 39
End: 2024-08-06
Payer: MEDICAID

## 2024-08-06 VITALS
DIASTOLIC BLOOD PRESSURE: 71 MMHG | TEMPERATURE: 97.6 F | HEIGHT: 67 IN | WEIGHT: 250 LBS | OXYGEN SATURATION: 99 % | BODY MASS INDEX: 39.24 KG/M2 | HEART RATE: 72 BPM | RESPIRATION RATE: 18 BRPM | SYSTOLIC BLOOD PRESSURE: 108 MMHG

## 2024-08-06 DIAGNOSIS — D50.0 IRON DEFICIENCY ANEMIA DUE TO CHRONIC BLOOD LOSS: Primary | ICD-10-CM

## 2024-08-06 DIAGNOSIS — E53.8 VITAMIN B12 DEFICIENCY: ICD-10-CM

## 2024-08-06 DIAGNOSIS — K62.5 RECTAL BLEEDING: ICD-10-CM

## 2024-08-06 DIAGNOSIS — R53.83 OTHER FATIGUE: ICD-10-CM

## 2024-08-06 DIAGNOSIS — K59.03 DRUG-INDUCED CONSTIPATION: ICD-10-CM

## 2024-08-06 DIAGNOSIS — D50.0 IRON DEFICIENCY ANEMIA DUE TO CHRONIC BLOOD LOSS: ICD-10-CM

## 2024-08-06 LAB
FERRITIN SERPL-MCNC: 2 NG/ML (ref 8–252)
FOLATE SERPL-MCNC: 3.5 NG/ML (ref 5–21)
IRON SATN MFR SERPL: 4 % (ref 20–50)
IRON SERPL-MCNC: 16 UG/DL (ref 35–150)
TIBC SERPL-MCNC: 436 UG/DL (ref 250–450)
VIT B12 SERPL-MCNC: 214 PG/ML (ref 193–986)

## 2024-08-06 PROCEDURE — 99213 OFFICE O/P EST LOW 20 MIN: CPT | Performed by: NURSE PRACTITIONER

## 2024-08-06 PROCEDURE — 99213 OFFICE O/P EST LOW 20 MIN: CPT | Performed by: INTERNAL MEDICINE

## 2024-08-06 RX ORDER — DOCUSATE SODIUM 100 MG/1
100 CAPSULE, LIQUID FILLED ORAL DAILY PRN
Qty: 90 CAPSULE | Refills: 1 | Status: SHIPPED | OUTPATIENT
Start: 2024-08-06

## 2024-08-06 NOTE — PROGRESS NOTES
Chief Complaint   Patient presents with    Follow-up           Vitals:    08/06/24 1343   BP: 108/71   Pulse: 72   Resp: 18   Temp: 97.6 °F (36.4 °C)   SpO2: 99%            1. Have you been to the ER, urgent care clinic since your last visit?  Hospitalized since your last visit?  No  2. Have you seen or consulted any other health care providers outside of the [facility] since your last visit?  Include any pap smears or colon screening. No

## 2024-08-06 NOTE — PROGRESS NOTES
[facility] [place]  Medical Oncology at [place]  [phone]    Hematology / Oncology Established Visit    Reason for Visit:   Vicki Cm is a 38 y.o. female who is seen for follow up of anemia and B12 deficiency.     History of Present Illness:   Vicki Cm is a 38 y.o. female who is seen for anemia. Received iron sucrose x 5 last Feb 2024. Taking iron supplements three times daily with no missed doses. Reports moderate fatigue. No SOB. Has a BM every 3 days, and will see a small amount of bright red blood when she strains to have a bowel movement. Has continued menorrhagia. She is currently having her menstrual cycle. Not taking oral B12 and her last B12 injection was in 2023. She was getting injections with PCP, but she retired. Looking for new PCP. Has fear of needles and does not want to administer B12 at home.     PMHx: Anemia, Anxiety, Asthma, Obesity  PSurgHx: Breast reduction 2003, I&D of breast abscess 2012  Shx: No EtOH/tobacco  Fhx: Mother with HTN, HLD. Sister with DM  Review of Systems: A complete review of systems was obtained, negative except as described above.    Physical Exam:     Visit Vitals  /71 (Site: Right Upper Arm, Position: Sitting, Cuff Size: Medium Adult)   Pulse 72   Temp 97.6 °F (36.4 °C) (Temporal)   Resp 18   Ht 1.702 m (5' 7\")   Wt 113.4 kg (250 lb)   SpO2 99%   BMI 39.16 kg/m²     ECOG PS: 0  General: no distress  Eyes: anicteric sclerae  HENT: oropharynx clear  Neck: supple  Lymphatic: deferred today   Respiratory: CTAB, normal respiratory effort  CV: no peripheral edema  GI: soft, nontender, nondistended, no masses  Skin: no rashes; no ecchymoses; no petechiae      Results:     Lab Results   Component Value Date    WBC 4.7 02/05/2024    HGB 8.2 (L) 02/05/2024    HCT 27.7 (L) 02/05/2024     02/05/2024    MCV 76.3 (L) 02/05/2024    NEUTROABS 2.4 01/15/2024    HGBPOC 7.6 07/05/2019     Lab Results   Component Value Date

## 2024-08-07 ENCOUNTER — TELEPHONE (OUTPATIENT)
Age: 39
End: 2024-08-07

## 2024-08-07 DIAGNOSIS — E53.8 FOLATE DEFICIENCY: Primary | ICD-10-CM

## 2024-08-07 DIAGNOSIS — E53.8 VITAMIN B12 DEFICIENCY: ICD-10-CM

## 2024-08-07 RX ORDER — SODIUM CHLORIDE 0.9 % (FLUSH) 0.9 %
5-40 SYRINGE (ML) INJECTION PRN
OUTPATIENT
Start: 2024-08-12

## 2024-08-07 RX ORDER — ACETAMINOPHEN 325 MG/1
650 TABLET ORAL
OUTPATIENT
Start: 2024-08-12

## 2024-08-07 RX ORDER — ONDANSETRON 2 MG/ML
8 INJECTION INTRAMUSCULAR; INTRAVENOUS
OUTPATIENT
Start: 2024-08-12

## 2024-08-07 RX ORDER — FAMOTIDINE 10 MG/ML
20 INJECTION, SOLUTION INTRAVENOUS
OUTPATIENT
Start: 2024-08-12

## 2024-08-07 RX ORDER — LANOLIN ALCOHOL/MO/W.PET/CERES
400 CREAM (GRAM) TOPICAL DAILY
Qty: 90 TABLET | Refills: 1 | Status: SHIPPED | OUTPATIENT
Start: 2024-08-07

## 2024-08-07 RX ORDER — SODIUM CHLORIDE 9 MG/ML
5-250 INJECTION, SOLUTION INTRAVENOUS PRN
OUTPATIENT
Start: 2024-08-12

## 2024-08-07 RX ORDER — ALBUTEROL SULFATE 90 UG/1
4 AEROSOL, METERED RESPIRATORY (INHALATION) PRN
OUTPATIENT
Start: 2024-08-12

## 2024-08-07 RX ORDER — DIPHENHYDRAMINE HYDROCHLORIDE 50 MG/ML
50 INJECTION INTRAMUSCULAR; INTRAVENOUS
OUTPATIENT
Start: 2024-08-12

## 2024-08-07 RX ORDER — EPINEPHRINE 1 MG/ML
0.3 INJECTION, SOLUTION, CONCENTRATE INTRAVENOUS PRN
OUTPATIENT
Start: 2024-08-12

## 2024-08-07 RX ORDER — SODIUM CHLORIDE 9 MG/ML
INJECTION, SOLUTION INTRAVENOUS CONTINUOUS
OUTPATIENT
Start: 2024-08-12

## 2024-08-07 RX ORDER — HEPARIN SODIUM (PORCINE) LOCK FLUSH IV SOLN 100 UNIT/ML 100 UNIT/ML
500 SOLUTION INTRAVENOUS PRN
OUTPATIENT
Start: 2024-08-12

## 2024-08-07 NOTE — TELEPHONE ENCOUNTER
Called patient to get her set up for venofer x 5 infusions at least 48 hours apart. Her mail box was not be set up yet

## 2024-08-12 LAB — METHYLMALONATE SERPL-SCNC: 115 NMOL/L (ref 0–378)

## 2024-08-23 ENCOUNTER — HOSPITAL ENCOUNTER (OUTPATIENT)
Facility: HOSPITAL | Age: 39
Setting detail: INFUSION SERIES
Discharge: HOME | End: 2024-08-23
Payer: MEDICAID

## 2024-08-23 VITALS
BODY MASS INDEX: 39.15 KG/M2 | HEIGHT: 67 IN | OXYGEN SATURATION: 97 % | DIASTOLIC BLOOD PRESSURE: 68 MMHG | SYSTOLIC BLOOD PRESSURE: 111 MMHG | HEART RATE: 67 BPM | TEMPERATURE: 97.7 F | RESPIRATION RATE: 18 BRPM

## 2024-08-23 DIAGNOSIS — D50.0 IRON DEFICIENCY ANEMIA DUE TO CHRONIC BLOOD LOSS: Primary | ICD-10-CM

## 2024-08-23 LAB
BASOPHILS # BLD: 0.1 K/UL (ref 0–0.1)
BASOPHILS NFR BLD: 1 % (ref 0–1)
DIFFERENTIAL METHOD BLD: ABNORMAL
EOSINOPHIL # BLD: 0.5 K/UL (ref 0–0.4)
EOSINOPHIL NFR BLD: 8 % (ref 0–7)
ERYTHROCYTE [DISTWIDTH] IN BLOOD BY AUTOMATED COUNT: 17.2 % (ref 11.5–14.5)
HCT VFR BLD AUTO: 24.2 % (ref 35–47)
HGB BLD-MCNC: 7.3 G/DL (ref 11.5–16)
IMM GRANULOCYTES # BLD AUTO: 0 K/UL (ref 0–0.04)
IMM GRANULOCYTES NFR BLD AUTO: 0 % (ref 0–0.5)
LYMPHOCYTES # BLD: 1.9 K/UL (ref 0.8–3.5)
LYMPHOCYTES NFR BLD: 34 % (ref 12–49)
MCH RBC QN AUTO: 20.7 PG (ref 26–34)
MCHC RBC AUTO-ENTMCNC: 30.2 G/DL (ref 30–36.5)
MCV RBC AUTO: 68.6 FL (ref 80–99)
MONOCYTES # BLD: 0.6 K/UL (ref 0–1)
MONOCYTES NFR BLD: 11 % (ref 5–13)
NEUTS SEG # BLD: 2.6 K/UL (ref 1.8–8)
NEUTS SEG NFR BLD: 46 % (ref 32–75)
NRBC # BLD: 0 K/UL (ref 0–0.01)
NRBC BLD-RTO: 0 PER 100 WBC
PLATELET # BLD AUTO: 249 K/UL (ref 150–400)
PMV BLD AUTO: 10 FL (ref 8.9–12.9)
RBC # BLD AUTO: 3.53 M/UL (ref 3.8–5.2)
RBC MORPH BLD: ABNORMAL
WBC # BLD AUTO: 5.7 K/UL (ref 3.6–11)

## 2024-08-23 PROCEDURE — 36415 COLL VENOUS BLD VENIPUNCTURE: CPT

## 2024-08-23 PROCEDURE — 96374 THER/PROPH/DIAG INJ IV PUSH: CPT

## 2024-08-23 PROCEDURE — 2580000003 HC RX 258: Performed by: NURSE PRACTITIONER

## 2024-08-23 PROCEDURE — 85025 COMPLETE CBC W/AUTO DIFF WBC: CPT

## 2024-08-23 PROCEDURE — 6360000002 HC RX W HCPCS: Performed by: NURSE PRACTITIONER

## 2024-08-23 RX ORDER — ONDANSETRON 2 MG/ML
8 INJECTION INTRAMUSCULAR; INTRAVENOUS
Status: CANCELLED | OUTPATIENT
Start: 2024-08-25

## 2024-08-23 RX ORDER — SODIUM CHLORIDE 0.9 % (FLUSH) 0.9 %
5-40 SYRINGE (ML) INJECTION PRN
Status: CANCELLED | OUTPATIENT
Start: 2024-08-25

## 2024-08-23 RX ORDER — EPINEPHRINE 1 MG/ML
0.3 INJECTION, SOLUTION INTRAMUSCULAR; SUBCUTANEOUS PRN
Status: CANCELLED | OUTPATIENT
Start: 2024-08-25

## 2024-08-23 RX ORDER — SODIUM CHLORIDE 9 MG/ML
5-250 INJECTION, SOLUTION INTRAVENOUS PRN
Status: CANCELLED | OUTPATIENT
Start: 2024-08-25

## 2024-08-23 RX ORDER — ACETAMINOPHEN 325 MG/1
650 TABLET ORAL
Status: CANCELLED | OUTPATIENT
Start: 2024-08-25

## 2024-08-23 RX ORDER — ALBUTEROL SULFATE 90 UG/1
4 AEROSOL, METERED RESPIRATORY (INHALATION) PRN
Status: CANCELLED | OUTPATIENT
Start: 2024-08-25

## 2024-08-23 RX ORDER — SODIUM CHLORIDE 9 MG/ML
INJECTION, SOLUTION INTRAVENOUS CONTINUOUS
Status: CANCELLED | OUTPATIENT
Start: 2024-08-25

## 2024-08-23 RX ORDER — DIPHENHYDRAMINE HYDROCHLORIDE 50 MG/ML
50 INJECTION INTRAMUSCULAR; INTRAVENOUS
Status: CANCELLED | OUTPATIENT
Start: 2024-08-25

## 2024-08-23 RX ORDER — SODIUM CHLORIDE 9 MG/ML
5-250 INJECTION, SOLUTION INTRAVENOUS PRN
Status: DISCONTINUED | OUTPATIENT
Start: 2024-08-23 | End: 2024-08-24 | Stop reason: HOSPADM

## 2024-08-23 RX ORDER — FAMOTIDINE 10 MG/ML
20 INJECTION, SOLUTION INTRAVENOUS
Status: CANCELLED | OUTPATIENT
Start: 2024-08-25

## 2024-08-23 RX ORDER — HEPARIN 100 UNIT/ML
500 SYRINGE INTRAVENOUS PRN
Status: CANCELLED | OUTPATIENT
Start: 2024-08-25

## 2024-08-23 RX ADMIN — SODIUM CHLORIDE 50 ML/HR: 9 INJECTION, SOLUTION INTRAVENOUS at 14:58

## 2024-08-23 RX ADMIN — SODIUM CHLORIDE 200 MG: 9 INJECTION, SOLUTION INTRAVENOUS at 15:26

## 2024-08-23 ASSESSMENT — PAIN SCALES - GENERAL: PAINLEVEL_OUTOF10: 0

## 2024-08-23 NOTE — PROGRESS NOTES
\[facility]\"" Progress Note    Date: 2024    Name: Vicki Cm    MRN: [id number]         : 1985    Ms. Cm Arrived ambulatory and in no distress for Venofer Infusion.  Assessment was completed, no acute issues at this time, no new complaints voiced.  24 G PIV established to Left arm, + blood return. Labs drawn and sent for processing per orders. Patient states she has had this medication in the past without any known adverse effects.     Ms. Cm's vitals were reviewed.  Vitals:    24 1545   BP: 111/68   Pulse: 67   Resp: 18   Temp:    SpO2:        Lab results were obtained and reviewed.  Recent Results (from the past 12 hour(s))   CBC with Auto Differential    Collection Time: 24  2:49 PM   Result Value Ref Range    WBC 5.7 3.6 - 11.0 K/uL    RBC 3.53 (L) 3.80 - 5.20 M/uL    Hemoglobin 7.3 (L) 11.5 - 16.0 g/dL    Hematocrit 24.2 (L) 35.0 - 47.0 %    MCV 68.6 (L) 80.0 - 99.0 FL    MCH 20.7 (L) 26.0 - 34.0 PG    MCHC 30.2 30.0 - 36.5 g/dL    RDW 17.2 (H) 11.5 - 14.5 %    Platelets 249 150 - 400 K/uL    MPV 10.0 8.9 - 12.9 FL    Nucleated RBCs 0.0 0  WBC    nRBC 0.00 0.00 - 0.01 K/uL    Neutrophils % PENDING %    Lymphocytes % PENDING %    Monocytes % PENDING %    Eosinophils % PENDING %    Basophils % PENDING %    Immature Granulocytes % PENDING %    Neutrophils Absolute PENDING K/UL    Lymphocytes Absolute PENDING K/UL    Monocytes Absolute PENDING K/UL    Eosinophils Absolute PENDING K/UL    Basophils Absolute PENDING K/UL    Immature Granulocytes Absolute PENDING K/UL    Differential Type PENDING        Medications:  Medications Administered         0.9 % sodium chloride infusion Admin Date  2024 Action  New Bag Dose  50 mL/hr Rate  50 mL/hr Route  IntraVENous Documented By  Heather Maynard, RN        iron sucrose (VENOFER) 200 mg in sodium chloride 0.9 % 100 mL IVPB Admin Date  2024 Action  New Bag Dose  200 mg Rate  480 mL/hr Route  IntraVENous Documented

## 2024-08-26 ENCOUNTER — HOSPITAL ENCOUNTER (OUTPATIENT)
Facility: HOSPITAL | Age: 39
Setting detail: INFUSION SERIES
Discharge: HOME | End: 2024-08-26
Payer: MEDICAID

## 2024-08-26 VITALS
HEIGHT: 67 IN | DIASTOLIC BLOOD PRESSURE: 60 MMHG | BODY MASS INDEX: 38.84 KG/M2 | WEIGHT: 247.5 LBS | OXYGEN SATURATION: 100 % | RESPIRATION RATE: 18 BRPM | HEART RATE: 62 BPM | SYSTOLIC BLOOD PRESSURE: 106 MMHG | TEMPERATURE: 97.5 F

## 2024-08-26 DIAGNOSIS — D50.0 IRON DEFICIENCY ANEMIA DUE TO CHRONIC BLOOD LOSS: Primary | ICD-10-CM

## 2024-08-26 PROCEDURE — 2580000003 HC RX 258: Performed by: NURSE PRACTITIONER

## 2024-08-26 PROCEDURE — 6360000002 HC RX W HCPCS: Performed by: NURSE PRACTITIONER

## 2024-08-26 PROCEDURE — 96374 THER/PROPH/DIAG INJ IV PUSH: CPT

## 2024-08-26 RX ORDER — EPINEPHRINE 1 MG/ML
0.3 INJECTION, SOLUTION INTRAMUSCULAR; SUBCUTANEOUS PRN
Status: CANCELLED | OUTPATIENT
Start: 2024-08-27

## 2024-08-26 RX ORDER — SODIUM CHLORIDE 9 MG/ML
5-250 INJECTION, SOLUTION INTRAVENOUS PRN
Status: CANCELLED | OUTPATIENT
Start: 2024-08-27

## 2024-08-26 RX ORDER — FAMOTIDINE 10 MG/ML
20 INJECTION, SOLUTION INTRAVENOUS
Status: CANCELLED | OUTPATIENT
Start: 2024-08-27

## 2024-08-26 RX ORDER — SODIUM CHLORIDE 9 MG/ML
INJECTION, SOLUTION INTRAVENOUS CONTINUOUS
Status: CANCELLED | OUTPATIENT
Start: 2024-08-27

## 2024-08-26 RX ORDER — DIPHENHYDRAMINE HYDROCHLORIDE 50 MG/ML
50 INJECTION INTRAMUSCULAR; INTRAVENOUS
Status: CANCELLED | OUTPATIENT
Start: 2024-08-27

## 2024-08-26 RX ORDER — ONDANSETRON 2 MG/ML
8 INJECTION INTRAMUSCULAR; INTRAVENOUS
Status: CANCELLED | OUTPATIENT
Start: 2024-08-27

## 2024-08-26 RX ORDER — SODIUM CHLORIDE 0.9 % (FLUSH) 0.9 %
5-40 SYRINGE (ML) INJECTION PRN
Status: CANCELLED | OUTPATIENT
Start: 2024-08-27

## 2024-08-26 RX ORDER — SODIUM CHLORIDE 9 MG/ML
5-250 INJECTION, SOLUTION INTRAVENOUS PRN
Status: DISCONTINUED | OUTPATIENT
Start: 2024-08-26 | End: 2024-08-27 | Stop reason: HOSPADM

## 2024-08-26 RX ORDER — HEPARIN 100 UNIT/ML
500 SYRINGE INTRAVENOUS PRN
Status: CANCELLED | OUTPATIENT
Start: 2024-08-27

## 2024-08-26 RX ORDER — ACETAMINOPHEN 325 MG/1
650 TABLET ORAL
Status: CANCELLED | OUTPATIENT
Start: 2024-08-27

## 2024-08-26 RX ORDER — ALBUTEROL SULFATE 90 UG/1
4 AEROSOL, METERED RESPIRATORY (INHALATION) PRN
Status: CANCELLED | OUTPATIENT
Start: 2024-08-27

## 2024-08-26 RX ADMIN — SODIUM CHLORIDE 25 ML/HR: 9 INJECTION, SOLUTION INTRAVENOUS at 08:45

## 2024-08-26 RX ADMIN — SODIUM CHLORIDE 200 MG: 9 INJECTION, SOLUTION INTRAVENOUS at 08:47

## 2024-08-26 ASSESSMENT — PAIN SCALES - GENERAL: PAINLEVEL_OUTOF10: 0

## 2024-08-26 NOTE — PROGRESS NOTES
[facility] Progress Note    Date: 2024    Name: Vicki Cm    MRN: [id number]         : 1985    Ms. Cm Arrived ambulatory and in no distress for Venofer Regimen.  Assessment was completed, no acute issues at this time, no new complaints voiced.  Peripheral IV 24 g established in left arm with positive blood return.     Patient states she tolerated this medication prior. Politely declined to stay post treatment observation for monitoring.     Ms. Cm's vitals were reviewed.  Vitals:    24 0805   BP: 119/76   Pulse: 66   Resp: 18   Temp: 97.8 °F (36.6 °C)   SpO2: 100%       Medications:  Medications Administered         0.9 % sodium chloride infusion Admin Date  2024 Action  New Bag Dose  25 mL/hr Rate  25 mL/hr Route  IntraVENous Documented By  Bonnie Robbins, RN        iron sucrose (VENOFER) 200 mg in sodium chloride 0.9 % 100 mL IVPB Admin Date  2024 Action  New Bag Dose  200 mg Rate  440 mL/hr Route  IntraVENous Documented By  Bonnie Robbins, RN               Ms. Cm tolerated treatment well and [facility]. PIV taken out with no issues, pressure applied, wrapped in 2x2 gauze and coban. She is to return on     Future Appointments   Date Time Provider Department Center   2024  7:30 AM SS [facility] 1 [facility]   2024  8:30 AM SS FA[facility] 2 [facility] [place]   2024  1:30 PM SS FASTTRACK 1 [facility] [place]   2024  1:30 PM Melody Paulino MD [facility] BS AMB   3/13/2025  1:30 PM Ashley De Oliveira MD [facility] BS AMB   4/3/2025  7:45 AM [facility] 1 [facility]

## 2024-08-29 ENCOUNTER — HOSPITAL ENCOUNTER (OUTPATIENT)
Facility: HOSPITAL | Age: 39
Setting detail: INFUSION SERIES
Discharge: HOME | End: 2024-08-29
Payer: MEDICAID

## 2024-08-29 VITALS
DIASTOLIC BLOOD PRESSURE: 62 MMHG | TEMPERATURE: 98.4 F | HEIGHT: 67 IN | OXYGEN SATURATION: 99 % | SYSTOLIC BLOOD PRESSURE: 109 MMHG | HEART RATE: 61 BPM | WEIGHT: 246.6 LBS | RESPIRATION RATE: 18 BRPM | BODY MASS INDEX: 38.71 KG/M2

## 2024-08-29 DIAGNOSIS — D50.0 IRON DEFICIENCY ANEMIA DUE TO CHRONIC BLOOD LOSS: Primary | ICD-10-CM

## 2024-08-29 PROCEDURE — 6360000002 HC RX W HCPCS: Performed by: NURSE PRACTITIONER

## 2024-08-29 PROCEDURE — 2580000003 HC RX 258: Performed by: NURSE PRACTITIONER

## 2024-08-29 PROCEDURE — 96374 THER/PROPH/DIAG INJ IV PUSH: CPT

## 2024-08-29 RX ORDER — HEPARIN 100 UNIT/ML
500 SYRINGE INTRAVENOUS PRN
OUTPATIENT
Start: 2024-08-30

## 2024-08-29 RX ORDER — FAMOTIDINE 10 MG/ML
20 INJECTION, SOLUTION INTRAVENOUS
OUTPATIENT
Start: 2024-08-30

## 2024-08-29 RX ORDER — SODIUM CHLORIDE 9 MG/ML
5-250 INJECTION, SOLUTION INTRAVENOUS PRN
OUTPATIENT
Start: 2024-08-30

## 2024-08-29 RX ORDER — DIPHENHYDRAMINE HYDROCHLORIDE 50 MG/ML
50 INJECTION INTRAMUSCULAR; INTRAVENOUS
OUTPATIENT
Start: 2024-08-30

## 2024-08-29 RX ORDER — SODIUM CHLORIDE 9 MG/ML
INJECTION, SOLUTION INTRAVENOUS CONTINUOUS
OUTPATIENT
Start: 2024-08-30

## 2024-08-29 RX ORDER — SODIUM CHLORIDE 0.9 % (FLUSH) 0.9 %
5-40 SYRINGE (ML) INJECTION PRN
OUTPATIENT
Start: 2024-08-30

## 2024-08-29 RX ORDER — SODIUM CHLORIDE 9 MG/ML
5-250 INJECTION, SOLUTION INTRAVENOUS PRN
Status: DISCONTINUED | OUTPATIENT
Start: 2024-08-29 | End: 2024-08-30 | Stop reason: HOSPADM

## 2024-08-29 RX ORDER — ALBUTEROL SULFATE 90 UG/1
4 AEROSOL, METERED RESPIRATORY (INHALATION) PRN
OUTPATIENT
Start: 2024-08-30

## 2024-08-29 RX ORDER — ACETAMINOPHEN 325 MG/1
650 TABLET ORAL
OUTPATIENT
Start: 2024-08-30

## 2024-08-29 RX ORDER — EPINEPHRINE 1 MG/ML
0.3 INJECTION, SOLUTION INTRAMUSCULAR; SUBCUTANEOUS PRN
OUTPATIENT
Start: 2024-08-30

## 2024-08-29 RX ORDER — ONDANSETRON 2 MG/ML
8 INJECTION INTRAMUSCULAR; INTRAVENOUS
OUTPATIENT
Start: 2024-08-30

## 2024-08-29 RX ADMIN — SODIUM CHLORIDE 50 ML/HR: 9 INJECTION, SOLUTION INTRAVENOUS at 08:07

## 2024-08-29 RX ADMIN — SODIUM CHLORIDE 200 MG: 9 INJECTION, SOLUTION INTRAVENOUS at 08:06

## 2024-08-29 ASSESSMENT — PAIN SCALES - GENERAL: PAINLEVEL_OUTOF10: 0

## 2024-08-29 NOTE — PROGRESS NOTES
[facility] Chemo Progress Note    Date: August 29, 2024    0730am: Ms. Cm Arrived ambulatory and in stable condition to the [facility] for  Venofer Infusion.  Assessment was completed, no new complaints voiced. 24G PIV placed to right arm with positive blood return. Criteria for treatment was met.    Ms. Cm's vitals were reviewed.  Vitals:    08/29/24 0730 08/29/24 0824   BP: 101/62 109/62   Pulse: 54 61   Resp: 18 18   Temp: 98.4 °F (36.9 °C)    TempSrc: Temporal    SpO2: 99% 99%   Weight: 111.9 kg (246 lb 9.6 oz)    Height: 1.702 m (5' 7\")       Medications:  Medications Administered         0.9 % sodium chloride infusion Admin Date  08/29/2024 Action  New Bag Dose  50 mL/hr Rate  50 mL/hr Route  IntraVENous Documented By  Brunilda Moreno RN        iron sucrose (VENOFER) 200 mg in sodium chloride 0.9 % 100 mL IVPB Admin Date  08/29/2024 Action  New Bag Dose  200 mg Rate  480 mL/hr Route  IntraVENous Documented By  Brunilda Moreno, BRANDON           Patient tolerated treatment well.  PIV maintained blood return throughout treatment. PIV removed and 2x2 with coban placed. Patient was discharged in stable condition. Patient is aware of next scheduled [facility] appointment on September 4, 2024 at 0830am.    Future Appointments   Date Time Provider Department Center   9/4/2024  8:30 AM SS FASTTRACK 2 [facility]   9/9/2024  1:30 PM SS FASTTRACK 1 [facility]   12/12/2024  1:30 PM Melody Paulino MD [facility] BS AMB   3/13/2025  1:30 PM Ashley De Oliveira MD [facility] BS AMB   4/3/2025  7:45 AM [facility]     Brunilda Moreno RN  August 29, 2024

## 2024-09-04 ENCOUNTER — HOSPITAL ENCOUNTER (OUTPATIENT)
Facility: HOSPITAL | Age: 39
Setting detail: INFUSION SERIES
Discharge: HOME | End: 2024-09-04
Payer: MEDICAID

## 2024-09-04 VITALS
SYSTOLIC BLOOD PRESSURE: 103 MMHG | HEART RATE: 67 BPM | TEMPERATURE: 97.5 F | DIASTOLIC BLOOD PRESSURE: 65 MMHG | RESPIRATION RATE: 18 BRPM

## 2024-09-04 DIAGNOSIS — D50.0 IRON DEFICIENCY ANEMIA DUE TO CHRONIC BLOOD LOSS: Primary | ICD-10-CM

## 2024-09-04 PROCEDURE — 2580000003 HC RX 258: Performed by: NURSE PRACTITIONER

## 2024-09-04 PROCEDURE — 96374 THER/PROPH/DIAG INJ IV PUSH: CPT

## 2024-09-04 PROCEDURE — 6360000002 HC RX W HCPCS: Performed by: NURSE PRACTITIONER

## 2024-09-04 RX ORDER — ONDANSETRON 2 MG/ML
8 INJECTION INTRAMUSCULAR; INTRAVENOUS
Status: CANCELLED | OUTPATIENT
Start: 2024-09-06

## 2024-09-04 RX ORDER — SODIUM CHLORIDE 0.9 % (FLUSH) 0.9 %
5-40 SYRINGE (ML) INJECTION PRN
Status: CANCELLED | OUTPATIENT
Start: 2024-09-06

## 2024-09-04 RX ORDER — DIPHENHYDRAMINE HYDROCHLORIDE 50 MG/ML
50 INJECTION INTRAMUSCULAR; INTRAVENOUS
Status: CANCELLED | OUTPATIENT
Start: 2024-09-06

## 2024-09-04 RX ORDER — SODIUM CHLORIDE 9 MG/ML
INJECTION, SOLUTION INTRAVENOUS CONTINUOUS
Status: CANCELLED | OUTPATIENT
Start: 2024-09-06

## 2024-09-04 RX ORDER — SODIUM CHLORIDE 9 MG/ML
5-250 INJECTION, SOLUTION INTRAVENOUS PRN
Status: DISCONTINUED | OUTPATIENT
Start: 2024-09-04 | End: 2024-09-05 | Stop reason: HOSPADM

## 2024-09-04 RX ORDER — ALBUTEROL SULFATE 90 UG/1
4 AEROSOL, METERED RESPIRATORY (INHALATION) PRN
Status: CANCELLED | OUTPATIENT
Start: 2024-09-06

## 2024-09-04 RX ORDER — FAMOTIDINE 10 MG/ML
20 INJECTION, SOLUTION INTRAVENOUS
Status: CANCELLED | OUTPATIENT
Start: 2024-09-06

## 2024-09-04 RX ORDER — SODIUM CHLORIDE 9 MG/ML
5-250 INJECTION, SOLUTION INTRAVENOUS PRN
Status: CANCELLED | OUTPATIENT
Start: 2024-09-06

## 2024-09-04 RX ORDER — HEPARIN 100 UNIT/ML
500 SYRINGE INTRAVENOUS PRN
Status: CANCELLED | OUTPATIENT
Start: 2024-09-06

## 2024-09-04 RX ORDER — ACETAMINOPHEN 325 MG/1
650 TABLET ORAL
Status: CANCELLED | OUTPATIENT
Start: 2024-09-06

## 2024-09-04 RX ORDER — EPINEPHRINE 1 MG/ML
0.3 INJECTION, SOLUTION INTRAMUSCULAR; SUBCUTANEOUS PRN
Status: CANCELLED | OUTPATIENT
Start: 2024-09-06

## 2024-09-04 RX ADMIN — SODIUM CHLORIDE 200 MG: 9 INJECTION, SOLUTION INTRAVENOUS at 09:12

## 2024-09-04 ASSESSMENT — PAIN SCALES - GENERAL: PAINLEVEL_OUTOF10: 2

## 2024-09-04 ASSESSMENT — PAIN DESCRIPTION - LOCATION: LOCATION: ABDOMEN

## 2024-09-04 NOTE — PROGRESS NOTES
[facility] Progress Note    Date: September 4, 2024        0840: Pt arrived ambulatory to [facility] for Venofer 4/5 in stable condition.  Assessment completed. PIV placed to right AC with positive blood return.     Criteria for treatment was met.    Patient Vitals for the past 12 hrs:   Temp Pulse Resp BP   09/04/24 0841 97.5 °F (36.4 °C) 67 18 103/65       Medications Administered         iron sucrose (VENOFER) 200 mg in sodium chloride 0.9 % 100 mL IVPB Admin Date  09/04/2024 Action  New Bag Dose  200 mg Rate  480 mL/hr Route  IntraVENous Documented By  Chloe Galan, RN             0940:  Tolerated treatment well, no adverse reactions noted. PIV flushed and removed. 2x2 and coban placed. D/Cd from [facility] ambulatory and in no distress.  Patient is aware of next scheduled [facility] appointment on 9/9/24.    Future Appointments   Date Time Provider Department Center   9/9/2024  7:30 AM SS [facility] 1 [facility]   12/12/2024  1:30 PM Melody Paulino MD [facility] BS AMB   3/13/2025  1:30 PM Ashley De Oliveira MD [facility] BS AMB   4/3/2025  7:45 AM [facility] 1 [facility]       Chloe Galan RN  September 4, 2024

## 2024-09-09 ENCOUNTER — HOSPITAL ENCOUNTER (OUTPATIENT)
Facility: HOSPITAL | Age: 39
Setting detail: INFUSION SERIES
Discharge: HOME | End: 2024-09-09
Payer: MEDICAID

## 2024-09-09 VITALS
SYSTOLIC BLOOD PRESSURE: 101 MMHG | HEART RATE: 54 BPM | HEIGHT: 67 IN | WEIGHT: 246.5 LBS | OXYGEN SATURATION: 97 % | DIASTOLIC BLOOD PRESSURE: 66 MMHG | RESPIRATION RATE: 18 BRPM | TEMPERATURE: 97.8 F | BODY MASS INDEX: 38.69 KG/M2

## 2024-09-09 DIAGNOSIS — D50.0 IRON DEFICIENCY ANEMIA DUE TO CHRONIC BLOOD LOSS: Primary | ICD-10-CM

## 2024-09-09 PROCEDURE — 96365 THER/PROPH/DIAG IV INF INIT: CPT

## 2024-09-09 PROCEDURE — 2580000003 HC RX 258: Performed by: NURSE PRACTITIONER

## 2024-09-09 PROCEDURE — 6360000002 HC RX W HCPCS: Performed by: NURSE PRACTITIONER

## 2024-09-09 RX ORDER — DIPHENHYDRAMINE HYDROCHLORIDE 50 MG/ML
50 INJECTION INTRAMUSCULAR; INTRAVENOUS
OUTPATIENT
Start: 2024-09-10

## 2024-09-09 RX ORDER — SODIUM CHLORIDE 9 MG/ML
INJECTION, SOLUTION INTRAVENOUS CONTINUOUS
OUTPATIENT
Start: 2024-09-10

## 2024-09-09 RX ORDER — HEPARIN 100 UNIT/ML
500 SYRINGE INTRAVENOUS PRN
OUTPATIENT
Start: 2024-09-10

## 2024-09-09 RX ORDER — FAMOTIDINE 10 MG/ML
20 INJECTION, SOLUTION INTRAVENOUS
OUTPATIENT
Start: 2024-09-10

## 2024-09-09 RX ORDER — SODIUM CHLORIDE 0.9 % (FLUSH) 0.9 %
5-40 SYRINGE (ML) INJECTION PRN
OUTPATIENT
Start: 2024-09-10

## 2024-09-09 RX ORDER — ONDANSETRON 2 MG/ML
8 INJECTION INTRAMUSCULAR; INTRAVENOUS
OUTPATIENT
Start: 2024-09-10

## 2024-09-09 RX ORDER — SODIUM CHLORIDE 9 MG/ML
5-250 INJECTION, SOLUTION INTRAVENOUS PRN
Status: DISCONTINUED | OUTPATIENT
Start: 2024-09-09 | End: 2024-09-10 | Stop reason: HOSPADM

## 2024-09-09 RX ORDER — EPINEPHRINE 1 MG/ML
0.3 INJECTION, SOLUTION INTRAMUSCULAR; SUBCUTANEOUS PRN
OUTPATIENT
Start: 2024-09-10

## 2024-09-09 RX ORDER — SODIUM CHLORIDE 9 MG/ML
5-250 INJECTION, SOLUTION INTRAVENOUS PRN
OUTPATIENT
Start: 2024-09-10

## 2024-09-09 RX ORDER — ACETAMINOPHEN 325 MG/1
650 TABLET ORAL
OUTPATIENT
Start: 2024-09-10

## 2024-09-09 RX ORDER — ALBUTEROL SULFATE 90 UG/1
4 INHALANT RESPIRATORY (INHALATION) PRN
OUTPATIENT
Start: 2024-09-10

## 2024-09-09 RX ADMIN — SODIUM CHLORIDE 200 MG: 9 INJECTION, SOLUTION INTRAVENOUS at 08:40

## 2024-09-09 RX ADMIN — SODIUM CHLORIDE 25 ML/HR: 9 INJECTION, SOLUTION INTRAVENOUS at 08:23

## 2024-09-09 ASSESSMENT — PAIN SCALES - GENERAL: PAINLEVEL_OUTOF10: 0

## 2024-12-09 ENCOUNTER — TELEPHONE (OUTPATIENT)
Age: 39
End: 2024-12-09

## 2024-12-09 NOTE — TELEPHONE ENCOUNTER
Reached out to PT to r/s appointment from 12/12 to 12/13 due to Vinita being out of office on 12/12. Phone number is out of service. [employer] message sent to PT requesting she call our office to r/s.

## 2024-12-09 NOTE — TELEPHONE ENCOUNTER
Called pt, phone was off.   Called alterant contact(partent)  Pt parent answered and said he would have the pt give us a call to reschedule her apt from 12/12 to 12/13

## 2024-12-10 DIAGNOSIS — D50.0 IRON DEFICIENCY ANEMIA DUE TO CHRONIC BLOOD LOSS: ICD-10-CM

## 2024-12-10 DIAGNOSIS — E53.8 VITAMIN B12 DEFICIENCY: ICD-10-CM

## 2024-12-12 ENCOUNTER — OFFICE VISIT (OUTPATIENT)
Age: 39
End: 2024-12-12
Payer: MEDICAID

## 2024-12-12 ENCOUNTER — CLINICAL DOCUMENTATION (OUTPATIENT)
Age: 39
End: 2024-12-12

## 2024-12-12 VITALS
OXYGEN SATURATION: 100 % | HEIGHT: 67 IN | RESPIRATION RATE: 16 BRPM | BODY MASS INDEX: 37.98 KG/M2 | TEMPERATURE: 98 F | HEART RATE: 75 BPM | DIASTOLIC BLOOD PRESSURE: 92 MMHG | SYSTOLIC BLOOD PRESSURE: 132 MMHG | WEIGHT: 242 LBS

## 2024-12-12 DIAGNOSIS — D50.0 IRON DEFICIENCY ANEMIA DUE TO CHRONIC BLOOD LOSS: Primary | ICD-10-CM

## 2024-12-12 DIAGNOSIS — R53.83 OTHER FATIGUE: ICD-10-CM

## 2024-12-12 DIAGNOSIS — K59.03 DRUG-INDUCED CONSTIPATION: ICD-10-CM

## 2024-12-12 DIAGNOSIS — E53.8 VITAMIN B12 DEFICIENCY: ICD-10-CM

## 2024-12-12 LAB
FERRITIN SERPL-MCNC: 5 NG/ML (ref 15–150)
IRON SATN MFR SERPL: 5 % (ref 15–55)
IRON SERPL-MCNC: 19 UG/DL (ref 27–159)
TIBC SERPL-MCNC: 384 UG/DL (ref 250–450)
UIBC SERPL-MCNC: 365 UG/DL (ref 131–425)
VIT B12 SERPL-MCNC: 278 PG/ML (ref 232–1245)

## 2024-12-12 PROCEDURE — 99213 OFFICE O/P EST LOW 20 MIN: CPT | Performed by: INTERNAL MEDICINE

## 2024-12-12 NOTE — PROGRESS NOTES
Chief Complaint   Patient presents with    Follow-up           Vitals:    12/12/24 1355   BP: (!) 132/92   Pulse: 75   Resp: 16   Temp: 98 °F (36.7 °C)   SpO2: 100%            1. Have you been to the ER, urgent care clinic since your last visit?  Hospitalized since your last visit?  No  2. Have you seen or consulted any other health care providers outside of the [facility] since your last visit?  Include any pap smears or colon screening. No

## 2024-12-12 NOTE — PROGRESS NOTES
[facility]  Oncology Social Work Encounter    [] Med-Onc [facility] [x] Med-Onc [facility] [] Med-Onc [facility] [] Rad-Onc [facility] [] Rad-Onc [facility] [] Rad-Onc [facility] [] Rad-Onc [facility] [] Breast Center [] CGO      Patient: Vicki Cm    Encounter Type:    [] Initial SW Encounter  [] Patient Initiated  [x] Referral  [] Distress/PHQ Screening  [] Other:      Concern(s)/Barrier(s) to Care: housing insecurity     Narrative:  referral received from Kori Nuñez LPN. Met with patient and she tells me she received and eviction notice today. Patient reports she is working two jobs and in school. Her  receives disability. Due to his recent health issues she has to take unpaid time off to help care for him. She is overwhelmed. Provided active listening. Provided her with a housing assistance resource. Visit was brief since patient needed to leave to meet her son at the bus.     Referral/Handouts:    Lodging/housing/Environmental Concerns referral    Plan:   Housing Resource Sheet given to patient    Keri Lomas LCSW  Clinical Social Work Medical Oncology   [facility] [place] [facility]  [address]  [place]  [address]  W: [phone]  F: [phone]

## 2024-12-12 NOTE — PROGRESS NOTES
[facility] [place]  Medical Oncology at [place]  [phone]    Hematology / Oncology Established Visit    Reason for Visit:   Vicki Cm is a 39 y.o. female who is seen for follow up of anemia and B12 deficiency.     History of Present Illness:   Vicki Cm is a 39 y.o. female who is seen for anemia.     Pt had EGD done in fall 2024 which was notable for acid and ulcers, attributed to Aleve. Was started on PPI which she is taking daily. She has to reschedule colonoscopy due to poor prep. She continues on iron supplements 2-3 times a day. Has heavy periods. Only occasional red blood per rectum related to straining/constipation. Is taking oral B12 daily.     PMHx: Anemia, Anxiety, Asthma, Obesity  PSurgHx: Breast reduction 2003, I&D of breast abscess 2012  Shx: No EtOH/tobacco  Fhx: Mother with HTN, HLD. Sister with DM  Review of Systems: A complete review of systems was obtained, negative except as described above.    Physical Exam:     Visit Vitals  BP (!) 132/92 (Site: Left Upper Arm, Position: Sitting, Cuff Size: Medium Adult)   Pulse 75   Temp 98 °F (36.7 °C) (Temporal)   Resp 16   Ht 1.702 m (5' 7\")   Wt 109.8 kg (242 lb)   SpO2 100%   BMI 37.90 kg/m²     ECOG PS: 0  General: no distress  Eyes: anicteric sclerae  HENT: oropharynx clear  Neck: supple  Lymphatic: deferred today   Respiratory: CTAB, normal respiratory effort  CV: no peripheral edema  GI: soft, nontender, nondistended, no masses  Skin: no rashes; no ecchymoses; no petechiae      Results:     Lab Results   Component Value Date    WBC 5.7 08/23/2024    HGB 7.3 (L) 08/23/2024    HCT 24.2 (L) 08/23/2024     08/23/2024    MCV 68.6 (L) 08/23/2024    NEUTROABS 2.6 08/23/2024    HGBPOC 7.6 07/05/2019     Lab Results   Component Value Date     10/13/2022    K 3.8 10/13/2022     10/13/2022    CO2 28 10/13/2022    GLUCOSE 82 10/13/2022    BUN 15 10/13/2022    CREATININE 0.88 10/13/2022    LABGLOM >60

## 2024-12-14 LAB
BASOPHILS # BLD AUTO: 0 X10E3/UL (ref 0–0.2)
BASOPHILS NFR BLD AUTO: 1 %
EOSINOPHIL # BLD AUTO: 0.3 X10E3/UL (ref 0–0.4)
EOSINOPHIL NFR BLD AUTO: 8 %
ERYTHROCYTE [DISTWIDTH] IN BLOOD BY AUTOMATED COUNT: 15.8 % (ref 11.7–15.4)
HCT VFR BLD AUTO: 28.2 % (ref 34–46.6)
HGB BLD-MCNC: 8.2 G/DL (ref 11.1–15.9)
IMM GRANULOCYTES # BLD AUTO: 0 X10E3/UL (ref 0–0.1)
IMM GRANULOCYTES NFR BLD AUTO: 1 %
LYMPHOCYTES # BLD AUTO: 1.5 X10E3/UL (ref 0.7–3.1)
LYMPHOCYTES NFR BLD AUTO: 36 %
MCH RBC QN AUTO: 24.7 PG (ref 26.6–33)
MCHC RBC AUTO-ENTMCNC: 29.1 G/DL (ref 31.5–35.7)
MCV RBC AUTO: 85 FL (ref 79–97)
MONOCYTES # BLD AUTO: 0.2 X10E3/UL (ref 0.1–0.9)
MONOCYTES NFR BLD AUTO: 6 %
NEUTROPHILS # BLD AUTO: 2.1 X10E3/UL (ref 1.4–7)
NEUTROPHILS NFR BLD AUTO: 48 %
PLATELET # BLD AUTO: 254 X10E3/UL (ref 150–450)
RBC # BLD AUTO: 3.32 X10E6/UL (ref 3.77–5.28)
WBC # BLD AUTO: 4.2 X10E3/UL (ref 3.4–10.8)

## 2024-12-17 ENCOUNTER — TELEPHONE (OUTPATIENT)
Age: 39
End: 2024-12-17

## 2024-12-17 DIAGNOSIS — D50.0 IRON DEFICIENCY ANEMIA DUE TO CHRONIC BLOOD LOSS: Primary | ICD-10-CM

## 2024-12-17 RX ORDER — SODIUM CHLORIDE 9 MG/ML
5-250 INJECTION, SOLUTION INTRAVENOUS PRN
OUTPATIENT
Start: 2024-12-17

## 2024-12-17 RX ORDER — FAMOTIDINE 10 MG/ML
20 INJECTION, SOLUTION INTRAVENOUS
OUTPATIENT
Start: 2024-12-17

## 2024-12-17 RX ORDER — DIPHENHYDRAMINE HYDROCHLORIDE 50 MG/ML
50 INJECTION INTRAMUSCULAR; INTRAVENOUS
OUTPATIENT
Start: 2024-12-17

## 2024-12-17 RX ORDER — HYDROCORTISONE SODIUM SUCCINATE 100 MG/2ML
100 INJECTION INTRAMUSCULAR; INTRAVENOUS
OUTPATIENT
Start: 2024-12-17

## 2024-12-17 RX ORDER — EPINEPHRINE 1 MG/ML
0.3 INJECTION, SOLUTION, CONCENTRATE INTRAVENOUS PRN
OUTPATIENT
Start: 2024-12-17

## 2024-12-17 RX ORDER — HEPARIN SODIUM (PORCINE) LOCK FLUSH IV SOLN 100 UNIT/ML 100 UNIT/ML
500 SOLUTION INTRAVENOUS PRN
OUTPATIENT
Start: 2024-12-17

## 2024-12-17 RX ORDER — ONDANSETRON 2 MG/ML
8 INJECTION INTRAMUSCULAR; INTRAVENOUS
OUTPATIENT
Start: 2024-12-17

## 2024-12-17 RX ORDER — SODIUM CHLORIDE 0.9 % (FLUSH) 0.9 %
5-40 SYRINGE (ML) INJECTION PRN
OUTPATIENT
Start: 2024-12-17

## 2024-12-17 RX ORDER — ALBUTEROL SULFATE 90 UG/1
4 INHALANT RESPIRATORY (INHALATION) PRN
OUTPATIENT
Start: 2024-12-17

## 2024-12-17 RX ORDER — ACETAMINOPHEN 325 MG/1
650 TABLET ORAL
OUTPATIENT
Start: 2024-12-17

## 2024-12-17 RX ORDER — SODIUM CHLORIDE 9 MG/ML
INJECTION, SOLUTION INTRAVENOUS CONTINUOUS
OUTPATIENT
Start: 2024-12-17

## 2024-12-17 NOTE — TELEPHONE ENCOUNTER
12/17/24 11:45 AM Attempted to call patient via home/mobile number listed but recording indicated that the number dialed is not accepting calls at this time. Sent [employer] message to patient and requested that she please respond to confirm receiving message. Will then proceed with scheduling IV iron.

## 2024-12-17 NOTE — TELEPHONE ENCOUNTER
CBC resulted from 12/10 and shows Hgb 8.2.  Patient needs to receive additional IV Venofer as discussed at recent MD visit. Orders placed.

## 2024-12-27 ENCOUNTER — HOSPITAL ENCOUNTER (OUTPATIENT)
Facility: HOSPITAL | Age: 39
Setting detail: INFUSION SERIES
End: 2024-12-27

## 2025-01-03 ENCOUNTER — HOSPITAL ENCOUNTER (OUTPATIENT)
Facility: HOSPITAL | Age: 40
Setting detail: INFUSION SERIES
Discharge: HOME | End: 2025-01-03
Payer: MEDICAID

## 2025-01-03 VITALS
HEART RATE: 76 BPM | DIASTOLIC BLOOD PRESSURE: 60 MMHG | RESPIRATION RATE: 18 BRPM | TEMPERATURE: 98 F | SYSTOLIC BLOOD PRESSURE: 105 MMHG | OXYGEN SATURATION: 98 %

## 2025-01-03 DIAGNOSIS — D50.0 IRON DEFICIENCY ANEMIA DUE TO CHRONIC BLOOD LOSS: Primary | ICD-10-CM

## 2025-01-03 LAB
HCT VFR BLD AUTO: 25.8 % (ref 35–47)
HGB BLD-MCNC: 7.8 G/DL (ref 11.5–16)

## 2025-01-03 PROCEDURE — 85014 HEMATOCRIT: CPT

## 2025-01-03 PROCEDURE — 6360000002 HC RX W HCPCS: Performed by: INTERNAL MEDICINE

## 2025-01-03 PROCEDURE — 96374 THER/PROPH/DIAG INJ IV PUSH: CPT

## 2025-01-03 PROCEDURE — 85018 HEMOGLOBIN: CPT

## 2025-01-03 PROCEDURE — 36415 COLL VENOUS BLD VENIPUNCTURE: CPT

## 2025-01-03 RX ORDER — ONDANSETRON 2 MG/ML
8 INJECTION INTRAMUSCULAR; INTRAVENOUS
OUTPATIENT
Start: 2025-01-05

## 2025-01-03 RX ORDER — ACETAMINOPHEN 325 MG/1
650 TABLET ORAL
OUTPATIENT
Start: 2025-01-05

## 2025-01-03 RX ORDER — SODIUM CHLORIDE 0.9 % (FLUSH) 0.9 %
5-40 SYRINGE (ML) INJECTION PRN
OUTPATIENT
Start: 2025-01-05

## 2025-01-03 RX ORDER — SODIUM CHLORIDE 9 MG/ML
5-250 INJECTION, SOLUTION INTRAVENOUS PRN
OUTPATIENT
Start: 2025-01-05

## 2025-01-03 RX ORDER — SODIUM CHLORIDE 9 MG/ML
INJECTION, SOLUTION INTRAVENOUS CONTINUOUS
OUTPATIENT
Start: 2025-01-05

## 2025-01-03 RX ORDER — HEPARIN 100 UNIT/ML
500 SYRINGE INTRAVENOUS PRN
OUTPATIENT
Start: 2025-01-05

## 2025-01-03 RX ORDER — ALBUTEROL SULFATE 90 UG/1
4 INHALANT RESPIRATORY (INHALATION) PRN
OUTPATIENT
Start: 2025-01-05

## 2025-01-03 RX ORDER — EPINEPHRINE 1 MG/ML
0.3 INJECTION, SOLUTION INTRAMUSCULAR; SUBCUTANEOUS PRN
OUTPATIENT
Start: 2025-01-05

## 2025-01-03 RX ORDER — HYDROCORTISONE SODIUM SUCCINATE 100 MG/2ML
100 INJECTION INTRAMUSCULAR; INTRAVENOUS
OUTPATIENT
Start: 2025-01-05

## 2025-01-03 RX ORDER — FAMOTIDINE 10 MG/ML
20 INJECTION, SOLUTION INTRAVENOUS
OUTPATIENT
Start: 2025-01-05

## 2025-01-03 RX ORDER — DIPHENHYDRAMINE HYDROCHLORIDE 50 MG/ML
50 INJECTION INTRAMUSCULAR; INTRAVENOUS
OUTPATIENT
Start: 2025-01-05

## 2025-01-03 RX ADMIN — IRON SUCROSE 200 MG: 20 INJECTION, SOLUTION INTRAVENOUS at 16:27

## 2025-01-03 ASSESSMENT — PAIN SCALES - GENERAL: PAINLEVEL_OUTOF10: 0

## 2025-01-03 NOTE — PROGRESS NOTES
Outpatient Infusion Center Progress Note        Date: 25    Name: Vicki Cm    MRN: [id number]         : 1985    MD: Melody Paulino MD       Ms. Cm admitted to [facility] for Day  of Venofer ambulatory in stable condition. Assessment completed, no acute issues at this time. No new concerns voiced.  24 gauge peripheral IV obtained in the LAC without difficulty, line flushed and capped. Labs drawn peripherally and sent for processing. Labs pending in [facility].      ** Patient has received this medication in the past. Patient reports no previous reactions to the medication(s).         Vitals:    25 1618 25 1641   BP: 108/64 105/60   Pulse: 82 76   Resp: 16 18   Temp: 98 °F (36.7 °C) 98 °F (36.7 °C)   TempSrc: Temporal Temporal   SpO2: 98% 98%           Medications:  MEDICATIONS GIVEN:  Medications Administered         iron sucrose (VENOFER) injection 200 mg Admin Date  2025 Action  Given Dose  200 mg Route  IntraVENous Documented By  Hany Stoll RN                Post-Infusion Vitals:  Vitals:    25 1641   BP: 105/60   Pulse: 76   Resp: 18   Temp: 98 °F (36.7 °C)   SpO2: 98%           Pt tolerated treatment well, no adverse reactions noted. PIV maintained positive blood return throughout treatment, flushed with positive blood return at conclusion and removed and wrapped in coban per protocol. D/c home ambulatory in no distress. Patient is aware of next appointment on 2025 @ 1600 at the [facility] location.        Future Appointments:  Future Appointments   Date Time Provider Department Center   2025  4:00 PM SS FASTTRACK 2 [facility]   2025  4:00 PM SS FASTTRACK 2 [facility]   1/10/2025  4:00 PM SS FASTTRACK 2 [facility]   2025  2:00 PM SS FASTTRACK 3 [facility]   3/13/2025  1:30 PM Ashley De Oliveira MD [facility] BS AMB   3/13/2025  2:15 PM Vinita Colin, APRN - NP ON[facility] BS AMB   4/3/2025  7:45 AM [facility] 1 [facility] [place]

## 2025-01-06 ENCOUNTER — HOSPITAL ENCOUNTER (OUTPATIENT)
Facility: HOSPITAL | Age: 40
Setting detail: INFUSION SERIES
Discharge: HOME | End: 2025-01-06
Payer: MEDICAID

## 2025-01-06 VITALS
BODY MASS INDEX: 36.84 KG/M2 | WEIGHT: 234.7 LBS | HEIGHT: 67 IN | RESPIRATION RATE: 18 BRPM | HEART RATE: 71 BPM | OXYGEN SATURATION: 99 % | SYSTOLIC BLOOD PRESSURE: 114 MMHG | DIASTOLIC BLOOD PRESSURE: 72 MMHG | TEMPERATURE: 97.5 F

## 2025-01-06 DIAGNOSIS — D50.0 IRON DEFICIENCY ANEMIA DUE TO CHRONIC BLOOD LOSS: Primary | ICD-10-CM

## 2025-01-06 LAB
HCT VFR BLD AUTO: 25 % (ref 35–47)
HGB BLD-MCNC: 7.5 G/DL (ref 11.5–16)

## 2025-01-06 PROCEDURE — 6360000002 HC RX W HCPCS: Performed by: INTERNAL MEDICINE

## 2025-01-06 PROCEDURE — 96374 THER/PROPH/DIAG INJ IV PUSH: CPT

## 2025-01-06 PROCEDURE — 36415 COLL VENOUS BLD VENIPUNCTURE: CPT

## 2025-01-06 PROCEDURE — 85018 HEMOGLOBIN: CPT

## 2025-01-06 PROCEDURE — 85014 HEMATOCRIT: CPT

## 2025-01-06 RX ORDER — FAMOTIDINE 10 MG/ML
20 INJECTION, SOLUTION INTRAVENOUS
Status: CANCELLED | OUTPATIENT
Start: 2025-01-07

## 2025-01-06 RX ORDER — HEPARIN 100 UNIT/ML
500 SYRINGE INTRAVENOUS PRN
Status: CANCELLED | OUTPATIENT
Start: 2025-01-07

## 2025-01-06 RX ORDER — HYDROCORTISONE SODIUM SUCCINATE 100 MG/2ML
100 INJECTION INTRAMUSCULAR; INTRAVENOUS
Status: CANCELLED | OUTPATIENT
Start: 2025-01-07

## 2025-01-06 RX ORDER — ACETAMINOPHEN 325 MG/1
650 TABLET ORAL
Status: CANCELLED | OUTPATIENT
Start: 2025-01-07

## 2025-01-06 RX ORDER — SODIUM CHLORIDE 9 MG/ML
5-250 INJECTION, SOLUTION INTRAVENOUS PRN
Status: CANCELLED | OUTPATIENT
Start: 2025-01-07

## 2025-01-06 RX ORDER — ONDANSETRON 2 MG/ML
8 INJECTION INTRAMUSCULAR; INTRAVENOUS
Status: CANCELLED | OUTPATIENT
Start: 2025-01-07

## 2025-01-06 RX ORDER — SODIUM CHLORIDE 9 MG/ML
INJECTION, SOLUTION INTRAVENOUS CONTINUOUS
Status: CANCELLED | OUTPATIENT
Start: 2025-01-07

## 2025-01-06 RX ORDER — DIPHENHYDRAMINE HYDROCHLORIDE 50 MG/ML
50 INJECTION INTRAMUSCULAR; INTRAVENOUS
Status: CANCELLED | OUTPATIENT
Start: 2025-01-07

## 2025-01-06 RX ORDER — EPINEPHRINE 1 MG/ML
0.3 INJECTION, SOLUTION INTRAMUSCULAR; SUBCUTANEOUS PRN
Status: CANCELLED | OUTPATIENT
Start: 2025-01-07

## 2025-01-06 RX ORDER — SODIUM CHLORIDE 0.9 % (FLUSH) 0.9 %
5-40 SYRINGE (ML) INJECTION PRN
Status: CANCELLED | OUTPATIENT
Start: 2025-01-07

## 2025-01-06 RX ORDER — ALBUTEROL SULFATE 90 UG/1
4 INHALANT RESPIRATORY (INHALATION) PRN
Status: CANCELLED | OUTPATIENT
Start: 2025-01-07

## 2025-01-06 RX ORDER — SODIUM CHLORIDE 9 MG/ML
5-250 INJECTION, SOLUTION INTRAVENOUS PRN
Status: DISCONTINUED | OUTPATIENT
Start: 2025-01-06 | End: 2025-01-07 | Stop reason: HOSPADM

## 2025-01-06 RX ADMIN — IRON SUCROSE 200 MG: 20 INJECTION, SOLUTION INTRAVENOUS at 14:35

## 2025-01-06 ASSESSMENT — PAIN SCALES - GENERAL: PAINLEVEL_OUTOF10: 0

## 2025-01-06 NOTE — PROGRESS NOTES
Outpatient Infusion Center Progress Note        Date: 25    Name: Vicki Cm    MRN: [id number]         : 1985    MD: Melody Paulino MD       Ms. Cm admitted to [facility] for Day 2 of Venofer ambulatory in stable condition. Assessment completed. No new concerns voiced.  24 gauge peripheral IV obtained in the left AC without difficulty, line flushed and capped Labs drawn and sent for processing.      ** Patient has received this medication in the past. Patient reports no previous reactions to the medication(s).       Vitals:    25 1420   BP: 114/72   Pulse: 71   Resp: 18   Temp: 97.5 °F (36.4 °C)   TempSrc: Temporal   SpO2: 99%   Weight: 106.5 kg (234 lb 11.2 oz)   Height: 1.702 m (5' 7\")           Results for orders placed or performed during the hospital encounter of 25   Hemoglobin and Hematocrit   Result Value Ref Range    Hemoglobin 7.5 (L) 11.5 - 16.0 g/dL    Hematocrit 25.0 (L) 35.0 - 47.0 %             Medications:  MEDICATIONS GIVEN:  Medications Administered         iron sucrose (VENOFER) injection 200 mg Admin Date  2025 Action  Given Dose  200 mg Route  IntraVENous Documented By  Shaina Melissa, BRANDON                Pt tolerated treatment well. PIV maintained positive blood return throughout treatment, flushed with positive blood return at conclusion and removed and wrapped in coban per protocol. D/c home ambulatory in no distress. Patient is aware of next appointment on 25 @ 1600 at the [facility] location.        Future Appointments:  Future Appointments   Date Time Provider Department Center   2025  4:00 PM SS FASTTRACK 2 [facility]   2025  4:00 PM SS FASTTRACK 2 [facility]   1/10/2025  4:00 PM SS FASTTRACK 2 [facility]   2025  2:00 PM SS FASTTRACK 3 [facility]   3/13/2025  1:30 PM Ashley De Oliveira MD [facility]SR[facility] BS AMB   3/13/2025  2:15 PM Vinita Colin, APRN - NP [facility] BS AMB   4/3/2025  [facility]

## 2025-01-08 ENCOUNTER — HOSPITAL ENCOUNTER (OUTPATIENT)
Facility: HOSPITAL | Age: 40
Setting detail: INFUSION SERIES
Discharge: HOME | End: 2025-01-08
Payer: MEDICAID

## 2025-01-08 VITALS
HEART RATE: 59 BPM | TEMPERATURE: 97.4 F | OXYGEN SATURATION: 98 % | RESPIRATION RATE: 18 BRPM | DIASTOLIC BLOOD PRESSURE: 69 MMHG | SYSTOLIC BLOOD PRESSURE: 107 MMHG

## 2025-01-08 DIAGNOSIS — D50.0 IRON DEFICIENCY ANEMIA DUE TO CHRONIC BLOOD LOSS: Primary | ICD-10-CM

## 2025-01-08 LAB
HCT VFR BLD AUTO: 26 % (ref 35–47)
HGB BLD-MCNC: 7.7 G/DL (ref 11.5–16)

## 2025-01-08 PROCEDURE — 6360000002 HC RX W HCPCS: Performed by: INTERNAL MEDICINE

## 2025-01-08 PROCEDURE — 85018 HEMOGLOBIN: CPT

## 2025-01-08 PROCEDURE — 85014 HEMATOCRIT: CPT

## 2025-01-08 PROCEDURE — 96374 THER/PROPH/DIAG INJ IV PUSH: CPT

## 2025-01-08 PROCEDURE — 36415 COLL VENOUS BLD VENIPUNCTURE: CPT

## 2025-01-08 RX ORDER — SODIUM CHLORIDE 9 MG/ML
5-250 INJECTION, SOLUTION INTRAVENOUS PRN
Status: CANCELLED | OUTPATIENT
Start: 2025-01-10

## 2025-01-08 RX ORDER — DIPHENHYDRAMINE HYDROCHLORIDE 50 MG/ML
50 INJECTION INTRAMUSCULAR; INTRAVENOUS
Status: CANCELLED | OUTPATIENT
Start: 2025-01-10

## 2025-01-08 RX ORDER — HEPARIN 100 UNIT/ML
500 SYRINGE INTRAVENOUS PRN
Status: CANCELLED | OUTPATIENT
Start: 2025-01-10

## 2025-01-08 RX ORDER — SODIUM CHLORIDE 9 MG/ML
5-250 INJECTION, SOLUTION INTRAVENOUS PRN
Status: DISCONTINUED | OUTPATIENT
Start: 2025-01-08 | End: 2025-01-09 | Stop reason: HOSPADM

## 2025-01-08 RX ORDER — EPINEPHRINE 1 MG/ML
0.3 INJECTION, SOLUTION INTRAMUSCULAR; SUBCUTANEOUS PRN
Status: CANCELLED | OUTPATIENT
Start: 2025-01-10

## 2025-01-08 RX ORDER — ALBUTEROL SULFATE 90 UG/1
4 INHALANT RESPIRATORY (INHALATION) PRN
Status: CANCELLED | OUTPATIENT
Start: 2025-01-10

## 2025-01-08 RX ORDER — FAMOTIDINE 10 MG/ML
20 INJECTION, SOLUTION INTRAVENOUS
Status: CANCELLED | OUTPATIENT
Start: 2025-01-10

## 2025-01-08 RX ORDER — SODIUM CHLORIDE 9 MG/ML
INJECTION, SOLUTION INTRAVENOUS CONTINUOUS
Status: CANCELLED | OUTPATIENT
Start: 2025-01-10

## 2025-01-08 RX ORDER — ONDANSETRON 2 MG/ML
8 INJECTION INTRAMUSCULAR; INTRAVENOUS
Status: CANCELLED | OUTPATIENT
Start: 2025-01-10

## 2025-01-08 RX ORDER — HYDROCORTISONE SODIUM SUCCINATE 100 MG/2ML
100 INJECTION INTRAMUSCULAR; INTRAVENOUS
Status: CANCELLED | OUTPATIENT
Start: 2025-01-10

## 2025-01-08 RX ORDER — SODIUM CHLORIDE 0.9 % (FLUSH) 0.9 %
5-40 SYRINGE (ML) INJECTION PRN
Status: CANCELLED | OUTPATIENT
Start: 2025-01-10

## 2025-01-08 RX ORDER — ACETAMINOPHEN 325 MG/1
650 TABLET ORAL
Status: CANCELLED | OUTPATIENT
Start: 2025-01-10

## 2025-01-08 RX ADMIN — IRON SUCROSE 200 MG: 20 INJECTION, SOLUTION INTRAVENOUS at 16:18

## 2025-01-08 ASSESSMENT — PAIN SCALES - GENERAL: PAINLEVEL_OUTOF10: 0

## 2025-01-08 NOTE — PROGRESS NOTES
[facility] Progress Note    Date: 2025    Name: Vicki Cm    MRN: [id number]         : 1985    Ms. Cm Arrived ambulatory and in no distress for Day 3/5 Venofer Infusion.  Assessment was completed, no acute issues at this time, no new complaints voiced.  24 G PIV established to right arm, + blood return. Labs drawn and sent for processing.    Ms. Cm's vitals were reviewed.  Vitals:    25 1625   BP: 107/69   Pulse: 59   Resp:    Temp:    SpO2:        Medications:  Medications Administered         iron sucrose (VENOFER) injection 200 mg Admin Date  2025 Action  Given Dose  200 mg Route  IntraVENous Documented By  Patricia Osei, RN            Ms. Cm tolerated treatment well and was discharged from Outpatient Infusion Center in stable condition.   PIV flushed & removed. Patient is aware of future appointments.    Future Appointments   Date Time Provider Department Center   1/10/2025  4:00 PM SS FASTTRACK 2 [facility]   2025  2:00 PM SS FASTTRACK 3 [facility]   3/13/2025  1:30 PM Ashley De Oliveira MD [facility] BS AMB   3/13/2025  2:15 PM Vinita Colin APRN - NP [facility] BS AMB   4/3/2025  7:45 AM [facility]       Patricia Osei RN  2025

## 2025-01-10 ENCOUNTER — HOSPITAL ENCOUNTER (OUTPATIENT)
Facility: HOSPITAL | Age: 40
Setting detail: INFUSION SERIES
End: 2025-01-10

## 2025-01-13 ENCOUNTER — HOSPITAL ENCOUNTER (OUTPATIENT)
Facility: HOSPITAL | Age: 40
Setting detail: INFUSION SERIES
End: 2025-01-13
Payer: MEDICAID

## 2025-01-14 ENCOUNTER — HOSPITAL ENCOUNTER (OUTPATIENT)
Facility: HOSPITAL | Age: 40
Setting detail: INFUSION SERIES
Discharge: HOME | End: 2025-01-14
Payer: MEDICAID

## 2025-01-14 VITALS
TEMPERATURE: 97.8 F | SYSTOLIC BLOOD PRESSURE: 109 MMHG | OXYGEN SATURATION: 94 % | DIASTOLIC BLOOD PRESSURE: 84 MMHG | HEART RATE: 60 BPM | BODY MASS INDEX: 36.75 KG/M2 | HEIGHT: 67 IN | RESPIRATION RATE: 18 BRPM

## 2025-01-14 DIAGNOSIS — D50.0 IRON DEFICIENCY ANEMIA DUE TO CHRONIC BLOOD LOSS: Primary | ICD-10-CM

## 2025-01-14 LAB
HCT VFR BLD AUTO: 28.2 % (ref 35–47)
HGB BLD-MCNC: 8.5 G/DL (ref 11.5–16)

## 2025-01-14 PROCEDURE — 85014 HEMATOCRIT: CPT

## 2025-01-14 PROCEDURE — 6360000002 HC RX W HCPCS: Performed by: INTERNAL MEDICINE

## 2025-01-14 PROCEDURE — 96374 THER/PROPH/DIAG INJ IV PUSH: CPT

## 2025-01-14 PROCEDURE — 36415 COLL VENOUS BLD VENIPUNCTURE: CPT

## 2025-01-14 PROCEDURE — 85018 HEMOGLOBIN: CPT

## 2025-01-14 RX ORDER — DIPHENHYDRAMINE HYDROCHLORIDE 50 MG/ML
50 INJECTION INTRAMUSCULAR; INTRAVENOUS
Status: CANCELLED | OUTPATIENT
Start: 2025-01-16

## 2025-01-14 RX ORDER — HYDROCORTISONE SODIUM SUCCINATE 100 MG/2ML
100 INJECTION INTRAMUSCULAR; INTRAVENOUS
Status: CANCELLED | OUTPATIENT
Start: 2025-01-16

## 2025-01-14 RX ORDER — ACETAMINOPHEN 325 MG/1
650 TABLET ORAL
Status: CANCELLED | OUTPATIENT
Start: 2025-01-16

## 2025-01-14 RX ORDER — ONDANSETRON 2 MG/ML
8 INJECTION INTRAMUSCULAR; INTRAVENOUS
Status: CANCELLED | OUTPATIENT
Start: 2025-01-16

## 2025-01-14 RX ORDER — SODIUM CHLORIDE 9 MG/ML
5-250 INJECTION, SOLUTION INTRAVENOUS PRN
Status: CANCELLED | OUTPATIENT
Start: 2025-01-16

## 2025-01-14 RX ORDER — SODIUM CHLORIDE 0.9 % (FLUSH) 0.9 %
5-40 SYRINGE (ML) INJECTION PRN
Status: CANCELLED | OUTPATIENT
Start: 2025-01-16

## 2025-01-14 RX ORDER — HEPARIN 100 UNIT/ML
500 SYRINGE INTRAVENOUS PRN
Status: CANCELLED | OUTPATIENT
Start: 2025-01-16

## 2025-01-14 RX ORDER — EPINEPHRINE 1 MG/ML
0.3 INJECTION, SOLUTION INTRAMUSCULAR; SUBCUTANEOUS PRN
Status: CANCELLED | OUTPATIENT
Start: 2025-01-16

## 2025-01-14 RX ORDER — FAMOTIDINE 10 MG/ML
20 INJECTION, SOLUTION INTRAVENOUS
Status: CANCELLED | OUTPATIENT
Start: 2025-01-16

## 2025-01-14 RX ORDER — SODIUM CHLORIDE 9 MG/ML
INJECTION, SOLUTION INTRAVENOUS CONTINUOUS
Status: CANCELLED | OUTPATIENT
Start: 2025-01-16

## 2025-01-14 RX ORDER — ALBUTEROL SULFATE 90 UG/1
4 INHALANT RESPIRATORY (INHALATION) PRN
Status: CANCELLED | OUTPATIENT
Start: 2025-01-16

## 2025-01-14 RX ADMIN — IRON SUCROSE 200 MG: 20 INJECTION, SOLUTION INTRAVENOUS at 16:27

## 2025-01-14 ASSESSMENT — PAIN SCALES - GENERAL: PAINLEVEL_OUTOF10: 0

## 2025-01-14 NOTE — PROGRESS NOTES
[facility] Progress Note    Date: 2025    Name: Vicki Cm    MRN: [id number]         : 1985    Ms. Cm Arrived ambulatory and in no distress for Venofer Infusion.  Assessment was completed, no acute issues at this time, no new complaints voiced.  24 G PIV established to left arm, + blood return. Labs drawn and sent for processing.    Ms. Cm's vitals were reviewed.  Vitals:    25 1639   BP: 109/84   Pulse:    Resp:    Temp:    SpO2:        Lab results were obtained and reviewed.  Recent Results (from the past 12 hour(s))   Hemoglobin and Hematocrit    Collection Time: 25  4:19 PM   Result Value Ref Range    Hemoglobin 8.5 (L) 11.5 - 16.0 g/dL    Hematocrit 28.2 (L) 35.0 - 47.0 %       Medications:  Medications Administered         iron sucrose (VENOFER) injection 200 mg Admin Date  2025 Action  Given Dose  200 mg Route  IntraVENous Documented By  Heather Maynard, RN             Ms. Cm tolerated treatment well and was discharged from Outpatient Infusion Center in stable condition.   PIV flushed & removed. Patient is aware of future appointments.    Future Appointments   Date Time Provider Department Center   2025  4:00 PM SS FASTTRACK 2 [facility]   3/13/2025  1:30 PM Ashley De Oliveira MD [facility] BS [facility]   3/13/2025  2:15 PM Vinita Colin, APRN - NP [facility] BS [facility]   4/3/2025  7:45 AM [address]       Heather Maynard, BRANDON  2025

## 2025-01-15 ENCOUNTER — HOSPITAL ENCOUNTER (OUTPATIENT)
Facility: HOSPITAL | Age: 40
Setting detail: INFUSION SERIES
End: 2025-01-15
Payer: MEDICAID

## 2025-01-20 ENCOUNTER — HOSPITAL ENCOUNTER (OUTPATIENT)
Facility: HOSPITAL | Age: 40
Setting detail: INFUSION SERIES
Discharge: HOME | End: 2025-01-20
Payer: MEDICAID

## 2025-01-20 VITALS
OXYGEN SATURATION: 96 % | HEART RATE: 65 BPM | TEMPERATURE: 97.7 F | RESPIRATION RATE: 18 BRPM | DIASTOLIC BLOOD PRESSURE: 70 MMHG | SYSTOLIC BLOOD PRESSURE: 112 MMHG

## 2025-01-20 DIAGNOSIS — D50.0 IRON DEFICIENCY ANEMIA DUE TO CHRONIC BLOOD LOSS: Primary | ICD-10-CM

## 2025-01-20 LAB
HCT VFR BLD AUTO: 29.9 % (ref 35–47)
HGB BLD-MCNC: 9.4 G/DL (ref 11.5–16)

## 2025-01-20 PROCEDURE — 6360000002 HC RX W HCPCS: Performed by: INTERNAL MEDICINE

## 2025-01-20 PROCEDURE — 85018 HEMOGLOBIN: CPT

## 2025-01-20 PROCEDURE — 96374 THER/PROPH/DIAG INJ IV PUSH: CPT

## 2025-01-20 PROCEDURE — 36415 COLL VENOUS BLD VENIPUNCTURE: CPT

## 2025-01-20 PROCEDURE — 85014 HEMATOCRIT: CPT

## 2025-01-20 RX ORDER — SODIUM CHLORIDE 9 MG/ML
5-250 INJECTION, SOLUTION INTRAVENOUS PRN
Status: DISCONTINUED | OUTPATIENT
Start: 2025-01-20 | End: 2025-01-21 | Stop reason: HOSPADM

## 2025-01-20 RX ORDER — ACETAMINOPHEN 325 MG/1
650 TABLET ORAL
OUTPATIENT
Start: 2025-01-20

## 2025-01-20 RX ORDER — EPINEPHRINE 1 MG/ML
0.3 INJECTION, SOLUTION INTRAMUSCULAR; SUBCUTANEOUS PRN
OUTPATIENT
Start: 2025-01-20

## 2025-01-20 RX ORDER — SODIUM CHLORIDE 9 MG/ML
5-250 INJECTION, SOLUTION INTRAVENOUS PRN
OUTPATIENT
Start: 2025-01-20

## 2025-01-20 RX ORDER — HYDROCORTISONE SODIUM SUCCINATE 100 MG/2ML
100 INJECTION INTRAMUSCULAR; INTRAVENOUS
OUTPATIENT
Start: 2025-01-20

## 2025-01-20 RX ORDER — ONDANSETRON 2 MG/ML
8 INJECTION INTRAMUSCULAR; INTRAVENOUS
OUTPATIENT
Start: 2025-01-20

## 2025-01-20 RX ORDER — FAMOTIDINE 10 MG/ML
20 INJECTION, SOLUTION INTRAVENOUS
OUTPATIENT
Start: 2025-01-20

## 2025-01-20 RX ORDER — SODIUM CHLORIDE 0.9 % (FLUSH) 0.9 %
5-40 SYRINGE (ML) INJECTION PRN
OUTPATIENT
Start: 2025-01-20

## 2025-01-20 RX ORDER — DIPHENHYDRAMINE HYDROCHLORIDE 50 MG/ML
50 INJECTION INTRAMUSCULAR; INTRAVENOUS
OUTPATIENT
Start: 2025-01-20

## 2025-01-20 RX ORDER — HEPARIN 100 UNIT/ML
500 SYRINGE INTRAVENOUS PRN
OUTPATIENT
Start: 2025-01-20

## 2025-01-20 RX ORDER — SODIUM CHLORIDE 9 MG/ML
INJECTION, SOLUTION INTRAVENOUS CONTINUOUS
OUTPATIENT
Start: 2025-01-20

## 2025-01-20 RX ORDER — ALBUTEROL SULFATE 90 UG/1
4 INHALANT RESPIRATORY (INHALATION) PRN
OUTPATIENT
Start: 2025-01-20

## 2025-01-20 RX ADMIN — IRON SUCROSE 200 MG: 20 INJECTION, SOLUTION INTRAVENOUS at 16:13

## 2025-01-20 ASSESSMENT — PAIN SCALES - GENERAL: PAINLEVEL_OUTOF10: 0

## 2025-01-20 NOTE — PROGRESS NOTES
Outpatient Infusion Center Short Visit Progress Note    Ms. Cm admitted to \[facility]\"" for Venofer ambulatory in stable condition. Assessment completed. No new concerns voiced.     Vital Signs:  /70   Pulse 65   Temp 97.7 °F (36.5 °C) (Temporal)   Resp 18   SpO2 96%       24G PIV placed with positive blood return.           Medications:  Medications Administered         iron sucrose (VENOFER) injection 200 mg Admin Date  01/20/2025 Action  Given Dose  200 mg Route  IntraVENous Documented By  Med Palmer, RN                  Patient tolerated treatment well. PIV removed.   Patient discharged from Outpatient Infusion Center ambulatory in no distress. Patient aware of next appointment.    Med Palmer RN  January 20, 2025    Future Appointments   Date Time Provider Department Center   3/13/2025  1:30 PM Ashley De Oliveira MD [facility] BS [facility]   3/13/2025  2:15 PM Vinita Colin APRN - NP [facility] BS AMB   4/3/2025  7:45 AM [address]

## 2025-02-13 NOTE — PROGRESS NOTES
Vicki Cm is a 39 y.o. female returns for an annual exam     Chief Complaint   Patient presents with    Annual Exam     Patient's last menstrual period was 02/24/2025 (approximate).   Her periods are heavy in flow and often irregular with no apparent pattern.  She has dysmenorrhea.  Problems: no problems  Birth Control: none.  Last Pap: Normal, HPV Negative obtained 3/7/24  She does not have a history of CHARLY 2, 3 or cervical cancer.   With regard to the Gardisil vaccine, she is unsure      1. Have you been to the ER, urgent care clinic, or hospitalized since your last visit? No    2. Have you seen or consulted any other health care providers outside of the [facility] since your last visit? No    Examination chaperoned by Asiya Guan LPN.

## 2025-03-13 ENCOUNTER — OFFICE VISIT (OUTPATIENT)
Age: 40
End: 2025-03-13
Payer: MEDICAID

## 2025-03-13 VITALS
DIASTOLIC BLOOD PRESSURE: 77 MMHG | SYSTOLIC BLOOD PRESSURE: 114 MMHG | WEIGHT: 235.4 LBS | BODY MASS INDEX: 36.95 KG/M2 | HEART RATE: 66 BPM | HEIGHT: 67 IN

## 2025-03-13 VITALS
RESPIRATION RATE: 16 BRPM | HEART RATE: 63 BPM | WEIGHT: 235 LBS | DIASTOLIC BLOOD PRESSURE: 73 MMHG | OXYGEN SATURATION: 99 % | SYSTOLIC BLOOD PRESSURE: 112 MMHG | TEMPERATURE: 97.8 F | BODY MASS INDEX: 36.88 KG/M2 | HEIGHT: 67 IN

## 2025-03-13 DIAGNOSIS — Z01.419 ENCOUNTER FOR GYNECOLOGICAL EXAMINATION (GENERAL) (ROUTINE) WITHOUT ABNORMAL FINDINGS: Primary | ICD-10-CM

## 2025-03-13 DIAGNOSIS — E53.8 VITAMIN B12 DEFICIENCY: ICD-10-CM

## 2025-03-13 DIAGNOSIS — D50.0 IRON DEFICIENCY ANEMIA DUE TO CHRONIC BLOOD LOSS: Primary | ICD-10-CM

## 2025-03-13 DIAGNOSIS — K59.03 DRUG-INDUCED CONSTIPATION: ICD-10-CM

## 2025-03-13 DIAGNOSIS — Z87.42 HISTORY OF IRREGULAR MENSTRUAL CYCLES: ICD-10-CM

## 2025-03-13 DIAGNOSIS — N92.1 MENORRHAGIA WITH IRREGULAR CYCLE: ICD-10-CM

## 2025-03-13 PROCEDURE — 99459 PELVIC EXAMINATION: CPT | Performed by: OBSTETRICS & GYNECOLOGY

## 2025-03-13 PROCEDURE — 99214 OFFICE O/P EST MOD 30 MIN: CPT | Performed by: NURSE PRACTITIONER

## 2025-03-13 PROCEDURE — 99395 PREV VISIT EST AGE 18-39: CPT | Performed by: OBSTETRICS & GYNECOLOGY

## 2025-03-13 NOTE — PROGRESS NOTES
Vicki Cm is a ,  39 y.o. female Black /  whose Patient's last menstrual period was 2025 (approximate).  was on 2025 who presents for her annual checkup. She is having no problems. Also having bladder spasms      Menstrual status:    Her periods are heavy in flow, irregular    She denies dysmenorrhea.        Contraception:    The current method of family planning is none    Sexual history:    She  reports being sexually active and has had partner(s) who are male. She reports using the following method of birth control/protection: None.      Pap and Mammogram History:  Last Pap: Normal, HPV Negative obtained 3/7/24  She does not have a history of CHARLY 2, 3 or cervical cancer.   With regard to the Gardisil vaccine, she is unsure      The patient does have a family history of breast cancer.  Family History   Problem Relation Age of Onset    Elevated Lipids Mother     Hypertension Mother     Diabetes Sister     Breast Cancer Paternal Cousin 45       Past Medical History:   Diagnosis Date    Abnormal Pap smear of cervix 2019    Negative/+HPV    Anemia     Anxiety     ASCUS (atypical squamous cells of undetermined significance) on Pap smear 2012    Asthma     extrinsic as a child     Breast disorder     Morbid obesity      Past Surgical History:   Procedure Laterality Date    BREAST BIOPSY Bilateral     abscesses removed  and     BREAST REDUCTION SURGERY      BREAST SURGERY  12    Incision and Drainage Left Breast Abscess    OTHER SURGICAL HISTORY      abscess removed from left breast       Current Outpatient Medications   Medication Sig Dispense Refill    folic acid (FOLVITE) 400 MCG tablet Take 1 tablet by mouth daily 90 tablet 1    vitamin B-12 1000 MCG tablet Take 1 tablet by mouth daily 90 tablet 1    docusate sodium (COLACE) 100 MG capsule Take 1 capsule by mouth daily as needed for Constipation (constipation) 90 capsule 1    tranexamic acid (LYSTEDA)

## 2025-03-13 NOTE — PROGRESS NOTES
Vicki Cm is a 39 y.o. female follow up for         1. Have you been to the ER, urgent care clinic since your last visit?  Hospitalized since your last visit? No    2. Have you seen or consulted any other health care providers outside of the [facility] since your last visit?  Include any pap smears or colon screening. OB/GYN, Gastroenterologist

## 2025-03-13 NOTE — PROGRESS NOTES
[facility] [place]  Medical Oncology at [place]  [phone]    Hematology / Oncology Established Visit    Reason for Visit:   Vicki Cm is a 39 y.o. female who is seen for follow up of anemia and B12 deficiency.     History of Present Illness:   Vicki Cm is a 39 y.o. female who is seen for anemia.     Pt had EGD done in fall 2024 which was notable for acid and ulcers, attributed to Aleve. Was started on PPI which she is taking daily. She has to reschedule colonoscopy due to poor prep. She continues on iron supplements 2-3 times a day. Has heavy periods. Only occasional red blood per rectum related to straining/constipation. Is taking oral B12 daily.     Interval History: Here today for follow up of her anemia. Seen by Gyn for well visit today. Plans to see reproductive endocrinology to help with conceiving.   Has not had repeat colonoscopy yet as she refuses to retry GoLytely prep and is trying to get pill prep covered. Periods remain heavy, lasting 3-7 days, with lots of clots.   Denies ROCK, lightheadedness.  No PICA. Continues taking iron twice daily.  Last B12 shot summer 2023. Has not set up with PCP since that time.      She is unaccompanied today.       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       PMHx: Anemia, Anxiety, Asthma, Obesity  PSurgHx: Breast reduction 2003, I&D of breast abscess 2012  Shx: No EtOH/tobacco  Fhx: Mother with HTN, HLD. Sister with DM    Review of Systems: A complete review of systems was obtained, negative except as described above.    Physical Exam:   Visit Vitals  /73   Pulse 63   Temp 97.8 °F (36.6 °C) (Temporal)   Resp 16   Ht 1.702 m (5' 7\")   Wt 106.6 kg (235 lb)   LMP 02/24/2025 (Approximate)   SpO2 99%   BMI 36.81 kg/m²     ECOG PS: 0  General: no distress  Eyes: anicteric sclerae  HENT: oropharynx clear  Neck: supple  Lymphatic: deferred today   Respiratory: CTAB, normal respiratory effort  CV: no peripheral

## 2025-03-14 LAB
BASOPHILS # BLD AUTO: 0 X10E3/UL (ref 0–0.2)
BASOPHILS NFR BLD AUTO: 1 %
EOSINOPHIL # BLD AUTO: 0.5 X10E3/UL (ref 0–0.4)
EOSINOPHIL NFR BLD AUTO: 8 %
ERYTHROCYTE [DISTWIDTH] IN BLOOD BY AUTOMATED COUNT: 18.8 % (ref 11.7–15.4)
FERRITIN SERPL-MCNC: 11 NG/ML (ref 15–150)
HCT VFR BLD AUTO: 32.8 % (ref 34–46.6)
HGB BLD-MCNC: 10.4 G/DL (ref 11.1–15.9)
IMM GRANULOCYTES # BLD AUTO: 0 X10E3/UL (ref 0–0.1)
IMM GRANULOCYTES NFR BLD AUTO: 0 %
IRON SATN MFR SERPL: 11 % (ref 15–55)
IRON SERPL-MCNC: 45 UG/DL (ref 27–159)
LYMPHOCYTES # BLD AUTO: 1.7 X10E3/UL (ref 0.7–3.1)
LYMPHOCYTES NFR BLD AUTO: 28 %
MCH RBC QN AUTO: 26.4 PG (ref 26.6–33)
MCHC RBC AUTO-ENTMCNC: 31.7 G/DL (ref 31.5–35.7)
MCV RBC AUTO: 83 FL (ref 79–97)
MONOCYTES # BLD AUTO: 0.6 X10E3/UL (ref 0.1–0.9)
MONOCYTES NFR BLD AUTO: 11 %
NEUTROPHILS # BLD AUTO: 3.1 X10E3/UL (ref 1.4–7)
NEUTROPHILS NFR BLD AUTO: 52 %
PLATELET # BLD AUTO: 273 X10E3/UL (ref 150–450)
RBC # BLD AUTO: 3.94 X10E6/UL (ref 3.77–5.28)
TIBC SERPL-MCNC: 411 UG/DL (ref 250–450)
UIBC SERPL-MCNC: 366 UG/DL (ref 131–425)
VIT B12 SERPL-MCNC: 270 PG/ML (ref 232–1245)
WBC # BLD AUTO: 5.9 X10E3/UL (ref 3.4–10.8)

## 2025-03-25 ENCOUNTER — RESULTS FOLLOW-UP (OUTPATIENT)
Age: 40
End: 2025-03-25

## 2025-03-25 RX ORDER — SODIUM CHLORIDE 9 MG/ML
INJECTION, SOLUTION INTRAVENOUS CONTINUOUS
OUTPATIENT
Start: 2025-04-01

## 2025-03-25 RX ORDER — ACETAMINOPHEN 325 MG/1
650 TABLET ORAL
OUTPATIENT
Start: 2025-04-01

## 2025-03-25 RX ORDER — FAMOTIDINE 10 MG/ML
20 INJECTION, SOLUTION INTRAVENOUS
OUTPATIENT
Start: 2025-04-01

## 2025-03-25 RX ORDER — HEPARIN SODIUM (PORCINE) LOCK FLUSH IV SOLN 100 UNIT/ML 100 UNIT/ML
500 SOLUTION INTRAVENOUS PRN
OUTPATIENT
Start: 2025-04-01

## 2025-03-25 RX ORDER — ONDANSETRON 2 MG/ML
8 INJECTION INTRAMUSCULAR; INTRAVENOUS
OUTPATIENT
Start: 2025-04-01

## 2025-03-25 RX ORDER — EPINEPHRINE 1 MG/ML
0.3 INJECTION, SOLUTION, CONCENTRATE INTRAVENOUS PRN
OUTPATIENT
Start: 2025-04-01

## 2025-03-25 RX ORDER — HYDROCORTISONE SODIUM SUCCINATE 100 MG/2ML
100 INJECTION INTRAMUSCULAR; INTRAVENOUS
OUTPATIENT
Start: 2025-04-01

## 2025-03-25 RX ORDER — DIPHENHYDRAMINE HYDROCHLORIDE 50 MG/ML
50 INJECTION, SOLUTION INTRAMUSCULAR; INTRAVENOUS
OUTPATIENT
Start: 2025-04-01

## 2025-03-25 RX ORDER — SODIUM CHLORIDE 0.9 % (FLUSH) 0.9 %
5-40 SYRINGE (ML) INJECTION PRN
OUTPATIENT
Start: 2025-04-01

## 2025-03-25 RX ORDER — ALBUTEROL SULFATE 90 UG/1
4 INHALANT RESPIRATORY (INHALATION) PRN
OUTPATIENT
Start: 2025-04-01

## 2025-03-25 RX ORDER — SODIUM CHLORIDE 9 MG/ML
5-250 INJECTION, SOLUTION INTRAVENOUS PRN
OUTPATIENT
Start: 2025-04-01

## 2025-04-03 ENCOUNTER — HOSPITAL ENCOUNTER (OUTPATIENT)
Facility: HOSPITAL | Age: 40
Discharge: HOME | End: 2025-04-03
Payer: MEDICAID

## 2025-04-03 VITALS — BODY MASS INDEX: 36.81 KG/M2 | WEIGHT: 235 LBS

## 2025-04-03 DIAGNOSIS — Z12.31 VISIT FOR SCREENING MAMMOGRAM: ICD-10-CM

## 2025-04-03 PROCEDURE — 77063 BREAST TOMOSYNTHESIS BI: CPT

## 2025-04-04 ENCOUNTER — TELEPHONE (OUTPATIENT)
Age: 40
End: 2025-04-04

## 2025-04-11 ENCOUNTER — HOSPITAL ENCOUNTER (OUTPATIENT)
Facility: HOSPITAL | Age: 40
Setting detail: INFUSION SERIES
Discharge: HOME | End: 2025-04-11
Payer: MEDICAID

## 2025-04-11 VITALS
RESPIRATION RATE: 16 BRPM | TEMPERATURE: 98.7 F | HEART RATE: 62 BPM | DIASTOLIC BLOOD PRESSURE: 65 MMHG | BODY MASS INDEX: 36.34 KG/M2 | HEIGHT: 67 IN | OXYGEN SATURATION: 100 % | WEIGHT: 231.5 LBS | SYSTOLIC BLOOD PRESSURE: 101 MMHG

## 2025-04-11 DIAGNOSIS — D50.0 IRON DEFICIENCY ANEMIA DUE TO CHRONIC BLOOD LOSS: Primary | ICD-10-CM

## 2025-04-11 PROCEDURE — 2500000003 HC RX 250 WO HCPCS: Performed by: NURSE PRACTITIONER

## 2025-04-11 PROCEDURE — 96374 THER/PROPH/DIAG INJ IV PUSH: CPT

## 2025-04-11 PROCEDURE — 6360000002 HC RX W HCPCS: Performed by: NURSE PRACTITIONER

## 2025-04-11 RX ORDER — EPINEPHRINE 1 MG/ML
0.3 INJECTION, SOLUTION INTRAMUSCULAR; SUBCUTANEOUS PRN
Status: CANCELLED | OUTPATIENT
Start: 2025-04-13

## 2025-04-11 RX ORDER — FAMOTIDINE 10 MG/ML
20 INJECTION, SOLUTION INTRAVENOUS
Status: CANCELLED | OUTPATIENT
Start: 2025-04-13

## 2025-04-11 RX ORDER — ALBUTEROL SULFATE 90 UG/1
4 INHALANT RESPIRATORY (INHALATION) PRN
Status: CANCELLED | OUTPATIENT
Start: 2025-04-13

## 2025-04-11 RX ORDER — HYDROCORTISONE SODIUM SUCCINATE 100 MG/2ML
100 INJECTION INTRAMUSCULAR; INTRAVENOUS
Status: CANCELLED | OUTPATIENT
Start: 2025-04-13

## 2025-04-11 RX ORDER — SODIUM CHLORIDE 9 MG/ML
INJECTION, SOLUTION INTRAVENOUS CONTINUOUS
Status: CANCELLED | OUTPATIENT
Start: 2025-04-13

## 2025-04-11 RX ORDER — ACETAMINOPHEN 325 MG/1
650 TABLET ORAL
Status: CANCELLED | OUTPATIENT
Start: 2025-04-13

## 2025-04-11 RX ORDER — SODIUM CHLORIDE 9 MG/ML
5-250 INJECTION, SOLUTION INTRAVENOUS PRN
Status: CANCELLED | OUTPATIENT
Start: 2025-04-13

## 2025-04-11 RX ORDER — SODIUM CHLORIDE 0.9 % (FLUSH) 0.9 %
5-40 SYRINGE (ML) INJECTION PRN
Status: CANCELLED | OUTPATIENT
Start: 2025-04-13

## 2025-04-11 RX ORDER — DIPHENHYDRAMINE HYDROCHLORIDE 50 MG/ML
50 INJECTION, SOLUTION INTRAMUSCULAR; INTRAVENOUS
Status: CANCELLED | OUTPATIENT
Start: 2025-04-13

## 2025-04-11 RX ORDER — ONDANSETRON 2 MG/ML
8 INJECTION INTRAMUSCULAR; INTRAVENOUS
Status: CANCELLED | OUTPATIENT
Start: 2025-04-13

## 2025-04-11 RX ORDER — HEPARIN 100 UNIT/ML
500 SYRINGE INTRAVENOUS PRN
Status: CANCELLED | OUTPATIENT
Start: 2025-04-13

## 2025-04-11 RX ORDER — SODIUM CHLORIDE 0.9 % (FLUSH) 0.9 %
5-40 SYRINGE (ML) INJECTION PRN
Status: DISCONTINUED | OUTPATIENT
Start: 2025-04-11 | End: 2025-04-12 | Stop reason: HOSPADM

## 2025-04-11 RX ADMIN — Medication 10 ML: at 11:50

## 2025-04-11 RX ADMIN — IRON SUCROSE 200 MG: 20 INJECTION, SOLUTION INTRAVENOUS at 11:56

## 2025-04-11 ASSESSMENT — PAIN SCALES - GENERAL: PAINLEVEL_OUTOF10: 0

## 2025-04-11 NOTE — PROGRESS NOTES
[facility] Progress Note    Date: 2025    Name: Vicki Cm    MRN: [id number]         : 1985    Ms. Cm arrived ambulatory and in no distress for #1/5 Venofer Push.  Assessment was completed, no acute issues at this time, no new complaints voiced.  24 G PIV established to left arm, + blood return.     Patient has received this medication many times in the past with no complaints.    Ms. Cm's vitals were reviewed.  Vitals:    25 1135 25 1208   BP: 117/76 101/65   Pulse: 60 62   Resp: 16 16   Temp: 98 °F (36.7 °C) 98.7 °F (37.1 °C)   TempSrc: Temporal Temporal   SpO2: 100%    Weight: 105 kg (231 lb 8 oz)    Height: 1.702 m (5' 7\")           Medications:  Medications Administered         iron sucrose (VENOFER) injection 200 mg Admin Date  2025 Action  Given Dose  200 mg Route  IntraVENous Documented By  Himanshu Adame RN        sodium chloride flush 0.9 % injection 5-40 mL Admin Date  2025 Action  Given Dose  10 mL Route  IntraVENous Documented By  Himanshu Adame RN             Ms. Cm tolerated treatment well and was discharged from Outpatient Infusion Center in stable condition.  PIV flushed & removed. She is aware of the next scheduled appointment.  Patient politely declined to stay for 30 minute monitoring.    AVS declined    HIMANSHU ADAME RN  2025    Future Appointments   Date Time Provider Department Center   2025  8:00 AM SS CHEMO CHAIR 12 [facility]   2025  8:00 AM SS CHEMO CHAIR 28 [facility]   2025  8:30 AM SS CHEMO CHAIR 26 [facility]   2025  8:00 AM SS CHEMO CHAIR 6 [facility]   2025  9:00 AM Melody Paulino MD ON[facility] BS AMB

## 2025-04-14 ENCOUNTER — HOSPITAL ENCOUNTER (OUTPATIENT)
Facility: HOSPITAL | Age: 40
Setting detail: INFUSION SERIES
Discharge: HOME | End: 2025-04-14
Payer: MEDICAID

## 2025-04-14 ENCOUNTER — HOSPITAL ENCOUNTER (OUTPATIENT)
Facility: HOSPITAL | Age: 40
Setting detail: INFUSION SERIES
End: 2025-04-14
Payer: MEDICAID

## 2025-04-14 VITALS
OXYGEN SATURATION: 98 % | TEMPERATURE: 97.6 F | RESPIRATION RATE: 16 BRPM | BODY MASS INDEX: 36.13 KG/M2 | HEART RATE: 59 BPM | WEIGHT: 230.2 LBS | DIASTOLIC BLOOD PRESSURE: 70 MMHG | SYSTOLIC BLOOD PRESSURE: 104 MMHG | HEIGHT: 67 IN

## 2025-04-14 DIAGNOSIS — D50.0 IRON DEFICIENCY ANEMIA DUE TO CHRONIC BLOOD LOSS: Primary | ICD-10-CM

## 2025-04-14 PROCEDURE — 96374 THER/PROPH/DIAG INJ IV PUSH: CPT

## 2025-04-14 PROCEDURE — 6360000002 HC RX W HCPCS: Performed by: NURSE PRACTITIONER

## 2025-04-14 RX ORDER — FAMOTIDINE 10 MG/ML
20 INJECTION, SOLUTION INTRAVENOUS
Status: CANCELLED | OUTPATIENT
Start: 2025-04-15

## 2025-04-14 RX ORDER — HYDROCORTISONE SODIUM SUCCINATE 100 MG/2ML
100 INJECTION INTRAMUSCULAR; INTRAVENOUS
Status: CANCELLED | OUTPATIENT
Start: 2025-04-15

## 2025-04-14 RX ORDER — SODIUM CHLORIDE 9 MG/ML
5-250 INJECTION, SOLUTION INTRAVENOUS PRN
Status: CANCELLED | OUTPATIENT
Start: 2025-04-15

## 2025-04-14 RX ORDER — SODIUM CHLORIDE 9 MG/ML
5-250 INJECTION, SOLUTION INTRAVENOUS PRN
Status: DISCONTINUED | OUTPATIENT
Start: 2025-04-14 | End: 2025-04-15 | Stop reason: HOSPADM

## 2025-04-14 RX ORDER — HEPARIN 100 UNIT/ML
500 SYRINGE INTRAVENOUS PRN
Status: CANCELLED | OUTPATIENT
Start: 2025-04-15

## 2025-04-14 RX ORDER — SODIUM CHLORIDE 9 MG/ML
INJECTION, SOLUTION INTRAVENOUS CONTINUOUS
Status: CANCELLED | OUTPATIENT
Start: 2025-04-15

## 2025-04-14 RX ORDER — DIPHENHYDRAMINE HYDROCHLORIDE 50 MG/ML
50 INJECTION, SOLUTION INTRAMUSCULAR; INTRAVENOUS
Status: CANCELLED | OUTPATIENT
Start: 2025-04-15

## 2025-04-14 RX ORDER — ACETAMINOPHEN 325 MG/1
650 TABLET ORAL
Status: CANCELLED | OUTPATIENT
Start: 2025-04-15

## 2025-04-14 RX ORDER — SODIUM CHLORIDE 0.9 % (FLUSH) 0.9 %
5-40 SYRINGE (ML) INJECTION PRN
Status: CANCELLED | OUTPATIENT
Start: 2025-04-15

## 2025-04-14 RX ORDER — EPINEPHRINE 1 MG/ML
0.3 INJECTION, SOLUTION INTRAMUSCULAR; SUBCUTANEOUS PRN
Status: CANCELLED | OUTPATIENT
Start: 2025-04-15

## 2025-04-14 RX ORDER — ONDANSETRON 2 MG/ML
8 INJECTION INTRAMUSCULAR; INTRAVENOUS
Status: CANCELLED | OUTPATIENT
Start: 2025-04-15

## 2025-04-14 RX ORDER — ALBUTEROL SULFATE 90 UG/1
4 INHALANT RESPIRATORY (INHALATION) PRN
Status: CANCELLED | OUTPATIENT
Start: 2025-04-15

## 2025-04-14 RX ADMIN — IRON SUCROSE 200 MG: 20 INJECTION, SOLUTION INTRAVENOUS at 08:07

## 2025-04-14 ASSESSMENT — PAIN SCALES - GENERAL: PAINLEVEL_OUTOF10: 0

## 2025-04-14 NOTE — PROGRESS NOTES
Outpatient Infusion Center Progress Note        Date: 2025    Name: Vicki Cm    MRN: [id number]         : 1985    Ms. Cm Arrived ambulatory and in no distress for Venofer Push 2/5 Regimen.  Assessment was completed, no acute issues at this time, no new complaints voiced.  RAC 24G PIV placed with +blood return.  Criteria are met for today treatment.      Ms. Cm's vitals were reviewed.  Patient Vitals for the past 12 hrs:   Temp Pulse Resp BP SpO2   25 0815 -- 59 16 104/70 98 %   25 0745 97.6 °F (36.4 °C) 55 16 105/69 98 %        Medications received:  Medications Administered         iron sucrose (VENOFER) injection 200 mg Admin Date  2025 Action  Given Dose  200 mg Route  IntraVENous Documented By  Yael Ferguson, RN             Ms. Cm tolerated treatment well and was discharged from Outpatient Infusion Center in stable condition at 0820. PIV flushed and removed per protocol. She is to return on  2025 at 0800 for her next appointment.    aYel Ferguson RN  2025    Future Appointments:  Future Appointments   Date Time Provider Department Center   2025  8:00 AM SS CHEMO CHAIR 6 [facility]   2025  8:30 AM SS CHEMO CHAIR 18 [facility]   2025  8:00 AM SS CHEMO CHAIR 6 [facility]   2025  9:00 AM Melody Paulino MD ON[facility] BS AMB

## 2025-04-16 ENCOUNTER — HOSPITAL ENCOUNTER (OUTPATIENT)
Facility: HOSPITAL | Age: 40
Setting detail: INFUSION SERIES
Discharge: HOME | End: 2025-04-16
Payer: MEDICAID

## 2025-04-16 ENCOUNTER — HOSPITAL ENCOUNTER (OUTPATIENT)
Facility: HOSPITAL | Age: 40
Setting detail: INFUSION SERIES
End: 2025-04-16
Payer: MEDICAID

## 2025-04-16 VITALS
OXYGEN SATURATION: 100 % | WEIGHT: 227.5 LBS | HEIGHT: 67 IN | RESPIRATION RATE: 16 BRPM | BODY MASS INDEX: 35.71 KG/M2 | SYSTOLIC BLOOD PRESSURE: 109 MMHG | TEMPERATURE: 98 F | HEART RATE: 52 BPM | DIASTOLIC BLOOD PRESSURE: 64 MMHG

## 2025-04-16 DIAGNOSIS — D50.0 IRON DEFICIENCY ANEMIA DUE TO CHRONIC BLOOD LOSS: Primary | ICD-10-CM

## 2025-04-16 PROCEDURE — 6360000002 HC RX W HCPCS: Performed by: NURSE PRACTITIONER

## 2025-04-16 PROCEDURE — 96374 THER/PROPH/DIAG INJ IV PUSH: CPT

## 2025-04-16 RX ORDER — DIPHENHYDRAMINE HYDROCHLORIDE 50 MG/ML
50 INJECTION, SOLUTION INTRAMUSCULAR; INTRAVENOUS
Status: CANCELLED | OUTPATIENT
Start: 2025-04-18

## 2025-04-16 RX ORDER — SODIUM CHLORIDE 9 MG/ML
5-250 INJECTION, SOLUTION INTRAVENOUS PRN
Status: DISCONTINUED | OUTPATIENT
Start: 2025-04-16 | End: 2025-04-17 | Stop reason: HOSPADM

## 2025-04-16 RX ORDER — SODIUM CHLORIDE 9 MG/ML
5-250 INJECTION, SOLUTION INTRAVENOUS PRN
Status: CANCELLED | OUTPATIENT
Start: 2025-04-18

## 2025-04-16 RX ORDER — ONDANSETRON 2 MG/ML
8 INJECTION INTRAMUSCULAR; INTRAVENOUS
Status: CANCELLED | OUTPATIENT
Start: 2025-04-18

## 2025-04-16 RX ORDER — SODIUM CHLORIDE 0.9 % (FLUSH) 0.9 %
5-40 SYRINGE (ML) INJECTION PRN
Status: CANCELLED | OUTPATIENT
Start: 2025-04-18

## 2025-04-16 RX ORDER — FAMOTIDINE 10 MG/ML
20 INJECTION, SOLUTION INTRAVENOUS
Status: CANCELLED | OUTPATIENT
Start: 2025-04-18

## 2025-04-16 RX ORDER — EPINEPHRINE 1 MG/ML
0.3 INJECTION, SOLUTION INTRAMUSCULAR; SUBCUTANEOUS PRN
Status: CANCELLED | OUTPATIENT
Start: 2025-04-18

## 2025-04-16 RX ORDER — HYDROCORTISONE SODIUM SUCCINATE 100 MG/2ML
100 INJECTION INTRAMUSCULAR; INTRAVENOUS
Status: CANCELLED | OUTPATIENT
Start: 2025-04-18

## 2025-04-16 RX ORDER — SODIUM CHLORIDE 9 MG/ML
INJECTION, SOLUTION INTRAVENOUS CONTINUOUS
Status: CANCELLED | OUTPATIENT
Start: 2025-04-18

## 2025-04-16 RX ORDER — ACETAMINOPHEN 325 MG/1
650 TABLET ORAL
Status: CANCELLED | OUTPATIENT
Start: 2025-04-18

## 2025-04-16 RX ORDER — HEPARIN 100 UNIT/ML
500 SYRINGE INTRAVENOUS PRN
Status: CANCELLED | OUTPATIENT
Start: 2025-04-18

## 2025-04-16 RX ORDER — ALBUTEROL SULFATE 90 UG/1
4 INHALANT RESPIRATORY (INHALATION) PRN
Status: CANCELLED | OUTPATIENT
Start: 2025-04-18

## 2025-04-16 RX ADMIN — IRON SUCROSE 200 MG: 20 INJECTION, SOLUTION INTRAVENOUS at 08:07

## 2025-04-16 ASSESSMENT — PAIN DESCRIPTION - LOCATION: LOCATION: PELVIS

## 2025-04-16 ASSESSMENT — PAIN DESCRIPTION - PAIN TYPE: TYPE: OTHER (COMMENT)

## 2025-04-16 ASSESSMENT — PAIN DESCRIPTION - DESCRIPTORS: DESCRIPTORS: CRAMPING

## 2025-04-16 ASSESSMENT — PAIN SCALES - GENERAL: PAINLEVEL_OUTOF10: 7

## 2025-04-16 NOTE — PROGRESS NOTES
Outpatient Infusion Center Progress Note        Date: 25    Name: Vicki Cm    MRN: [id number]         : 1985    MD: FARHAD Johnston*       Ms. Cm admitted to [facility] for Day 3/5 of Venofer  ambulatory in stable condition. Assessment completed. No new concerns voiced.  24 gauge peripheral IV obtained in the left AC without difficulty, line flushed and capped No labs ordered for this visit.      ** Patient has received this medication in the past. Patient reports no previous reactions to the medication(s).         Vitals:    25 0745   BP: 109/64   Pulse: 52   Resp: 16   Temp: 98 °F (36.7 °C)   TempSrc: Temporal   SpO2: 100%   Weight: 103.2 kg (227 lb 8 oz)   Height: 1.702 m (5' 7\")             Medications:  MEDICATIONS GIVEN:  Medications Administered         iron sucrose (VENOFER) injection 200 mg Admin Date  2025 Action  Given Dose  200 mg Route  IntraVENous Documented By  Shaina Melissa, BRANDON              Pt tolerated treatment well. PIV maintained positive blood return throughout treatment, flushed with positive blood return at conclusion and removed and wrapped in coban per protocol. D/c home ambulatory in no distress. Patient is aware of next appointment on 25 @ 0830 at the [place] location.        Future Appointments:  Future Appointments   Date Time Provider Department Center   2025  8:30 AM SS CHEMO CHAIR 18 [facility]   2025  8:00 AM SS CHEMO CHAIR 6 [facility]   2025  9:00 AM Melody Paulino MD ON[facility] BS AMB

## 2025-04-18 ENCOUNTER — HOSPITAL ENCOUNTER (OUTPATIENT)
Facility: HOSPITAL | Age: 40
Setting detail: INFUSION SERIES
Discharge: HOME | End: 2025-04-18
Payer: MEDICAID

## 2025-04-18 ENCOUNTER — HOSPITAL ENCOUNTER (OUTPATIENT)
Facility: HOSPITAL | Age: 40
Setting detail: INFUSION SERIES
End: 2025-04-18
Payer: MEDICAID

## 2025-04-18 VITALS
DIASTOLIC BLOOD PRESSURE: 68 MMHG | RESPIRATION RATE: 16 BRPM | SYSTOLIC BLOOD PRESSURE: 102 MMHG | OXYGEN SATURATION: 99 % | TEMPERATURE: 98.6 F | HEART RATE: 53 BPM

## 2025-04-18 DIAGNOSIS — D50.0 IRON DEFICIENCY ANEMIA DUE TO CHRONIC BLOOD LOSS: Primary | ICD-10-CM

## 2025-04-18 PROCEDURE — 6360000002 HC RX W HCPCS: Performed by: NURSE PRACTITIONER

## 2025-04-18 PROCEDURE — 96374 THER/PROPH/DIAG INJ IV PUSH: CPT

## 2025-04-18 RX ORDER — SODIUM CHLORIDE 9 MG/ML
5-250 INJECTION, SOLUTION INTRAVENOUS PRN
Status: CANCELLED | OUTPATIENT
Start: 2025-04-20

## 2025-04-18 RX ORDER — ONDANSETRON 2 MG/ML
8 INJECTION INTRAMUSCULAR; INTRAVENOUS
Status: CANCELLED | OUTPATIENT
Start: 2025-04-20

## 2025-04-18 RX ORDER — EPINEPHRINE 1 MG/ML
0.3 INJECTION, SOLUTION INTRAMUSCULAR; SUBCUTANEOUS PRN
Status: CANCELLED | OUTPATIENT
Start: 2025-04-20

## 2025-04-18 RX ORDER — SODIUM CHLORIDE 9 MG/ML
INJECTION, SOLUTION INTRAVENOUS CONTINUOUS
Status: CANCELLED | OUTPATIENT
Start: 2025-04-20

## 2025-04-18 RX ORDER — SODIUM CHLORIDE 9 MG/ML
5-250 INJECTION, SOLUTION INTRAVENOUS PRN
Status: DISCONTINUED | OUTPATIENT
Start: 2025-04-18 | End: 2025-04-19 | Stop reason: HOSPADM

## 2025-04-18 RX ORDER — ACETAMINOPHEN 325 MG/1
650 TABLET ORAL
Status: CANCELLED | OUTPATIENT
Start: 2025-04-20

## 2025-04-18 RX ORDER — HYDROCORTISONE SODIUM SUCCINATE 100 MG/2ML
100 INJECTION INTRAMUSCULAR; INTRAVENOUS
Status: CANCELLED | OUTPATIENT
Start: 2025-04-20

## 2025-04-18 RX ORDER — DIPHENHYDRAMINE HYDROCHLORIDE 50 MG/ML
50 INJECTION, SOLUTION INTRAMUSCULAR; INTRAVENOUS
Status: CANCELLED | OUTPATIENT
Start: 2025-04-20

## 2025-04-18 RX ORDER — HEPARIN 100 UNIT/ML
500 SYRINGE INTRAVENOUS PRN
Status: CANCELLED | OUTPATIENT
Start: 2025-04-20

## 2025-04-18 RX ORDER — ALBUTEROL SULFATE 90 UG/1
4 INHALANT RESPIRATORY (INHALATION) PRN
Status: CANCELLED | OUTPATIENT
Start: 2025-04-20

## 2025-04-18 RX ORDER — SODIUM CHLORIDE 0.9 % (FLUSH) 0.9 %
5-40 SYRINGE (ML) INJECTION PRN
Status: CANCELLED | OUTPATIENT
Start: 2025-04-20

## 2025-04-18 RX ORDER — FAMOTIDINE 10 MG/ML
20 INJECTION, SOLUTION INTRAVENOUS
Status: CANCELLED | OUTPATIENT
Start: 2025-04-20

## 2025-04-18 RX ADMIN — IRON SUCROSE 200 MG: 20 INJECTION, SOLUTION INTRAVENOUS at 09:17

## 2025-04-18 ASSESSMENT — PAIN SCALES - GENERAL: PAINLEVEL_OUTOF10: 0

## 2025-04-18 NOTE — PROGRESS NOTES
[facility] Progress Note    Date: 2025    Name: Vicki Cm    MRN: [id number]         : 1985    Ms. Cm Arrived ambulatory and in no distress for Venofer .  Assessment was completed, no acute issues at this time, no new complaints voiced.  24 G PIV established to left AC with  + blood return.     Ms. Cm's vitals were reviewed.  Vitals:    25 0915   BP: 102/68   Pulse: 53   Resp:    Temp:    SpO2:            Medications:  Medications Administered         iron sucrose (VENOFER) injection 200 mg Admin Date  2025 Action  Given Dose  200 mg Route  IntraVENous Documented By  Maris Kline, RN              Ms. Cm tolerated treatment well and was discharged from Outpatient Infusion Center in stable condition.   24G PIV flushed & removed. 2x2 gauze and coban applied. Patient is aware of future appointments.    Future Appointments   Date Time Provider Department Center   2025  8:00 AM SS CHEMO CHAIR 6 [facility]   2025  9:00 AM Melody Paulino MD ONCSF BS [facility]       Maris Kline RN  2025

## 2025-04-21 ENCOUNTER — HOSPITAL ENCOUNTER (OUTPATIENT)
Facility: HOSPITAL | Age: 40
Setting detail: INFUSION SERIES
Discharge: HOME | End: 2025-04-21
Payer: MEDICAID

## 2025-04-21 ENCOUNTER — APPOINTMENT (OUTPATIENT)
Facility: HOSPITAL | Age: 40
End: 2025-04-21
Payer: MEDICAID

## 2025-04-21 VITALS
BODY MASS INDEX: 36.15 KG/M2 | WEIGHT: 230.3 LBS | OXYGEN SATURATION: 98 % | SYSTOLIC BLOOD PRESSURE: 94 MMHG | HEART RATE: 60 BPM | RESPIRATION RATE: 16 BRPM | TEMPERATURE: 97.8 F | HEIGHT: 67 IN | DIASTOLIC BLOOD PRESSURE: 63 MMHG

## 2025-04-21 DIAGNOSIS — D50.0 IRON DEFICIENCY ANEMIA DUE TO CHRONIC BLOOD LOSS: Primary | ICD-10-CM

## 2025-04-21 PROCEDURE — 6360000002 HC RX W HCPCS: Performed by: NURSE PRACTITIONER

## 2025-04-21 PROCEDURE — 2580000003 HC RX 258: Performed by: NURSE PRACTITIONER

## 2025-04-21 PROCEDURE — 96374 THER/PROPH/DIAG INJ IV PUSH: CPT

## 2025-04-21 RX ORDER — ONDANSETRON 2 MG/ML
8 INJECTION INTRAMUSCULAR; INTRAVENOUS
OUTPATIENT
Start: 2025-04-22

## 2025-04-21 RX ORDER — FAMOTIDINE 10 MG/ML
20 INJECTION, SOLUTION INTRAVENOUS
OUTPATIENT
Start: 2025-04-22

## 2025-04-21 RX ORDER — SODIUM CHLORIDE 9 MG/ML
5-250 INJECTION, SOLUTION INTRAVENOUS PRN
OUTPATIENT
Start: 2025-04-22

## 2025-04-21 RX ORDER — HEPARIN 100 UNIT/ML
500 SYRINGE INTRAVENOUS PRN
OUTPATIENT
Start: 2025-04-22

## 2025-04-21 RX ORDER — ACETAMINOPHEN 325 MG/1
650 TABLET ORAL
OUTPATIENT
Start: 2025-04-22

## 2025-04-21 RX ORDER — SODIUM CHLORIDE 9 MG/ML
INJECTION, SOLUTION INTRAVENOUS CONTINUOUS
OUTPATIENT
Start: 2025-04-22

## 2025-04-21 RX ORDER — HYDROCORTISONE SODIUM SUCCINATE 100 MG/2ML
100 INJECTION INTRAMUSCULAR; INTRAVENOUS
OUTPATIENT
Start: 2025-04-22

## 2025-04-21 RX ORDER — SODIUM CHLORIDE 9 MG/ML
5-250 INJECTION, SOLUTION INTRAVENOUS PRN
Status: DISCONTINUED | OUTPATIENT
Start: 2025-04-21 | End: 2025-04-22 | Stop reason: HOSPADM

## 2025-04-21 RX ORDER — EPINEPHRINE 1 MG/ML
0.3 INJECTION, SOLUTION INTRAMUSCULAR; SUBCUTANEOUS PRN
OUTPATIENT
Start: 2025-04-22

## 2025-04-21 RX ORDER — DIPHENHYDRAMINE HYDROCHLORIDE 50 MG/ML
50 INJECTION, SOLUTION INTRAMUSCULAR; INTRAVENOUS
OUTPATIENT
Start: 2025-04-22

## 2025-04-21 RX ORDER — SODIUM CHLORIDE 0.9 % (FLUSH) 0.9 %
5-40 SYRINGE (ML) INJECTION PRN
OUTPATIENT
Start: 2025-04-22

## 2025-04-21 RX ORDER — ALBUTEROL SULFATE 90 UG/1
4 INHALANT RESPIRATORY (INHALATION) PRN
OUTPATIENT
Start: 2025-04-22

## 2025-04-21 RX ADMIN — IRON SUCROSE 200 MG: 20 INJECTION, SOLUTION INTRAVENOUS at 08:09

## 2025-04-21 RX ADMIN — SODIUM CHLORIDE 25 ML/HR: 0.9 INJECTION, SOLUTION INTRAVENOUS at 08:08

## 2025-04-21 ASSESSMENT — PAIN SCALES - GENERAL: PAINLEVEL_OUTOF10: 0

## 2025-04-21 NOTE — PROGRESS NOTES
Outpatient Infusion Center Progress Note        Date: 2025    Name: Vicki Cm    MRN: [id number]         : 1985    Ms. Cm Arrived ambulatory and in no distress for Venofer 5/5 Regimen.  Assessment was completed, no acute issues at this time, no new complaints voiced.  RAC 24G PIV placed with +blood return. Criteria are met for today treatment.      Ms. Cm's vitals were reviewed.  Patient Vitals for the past 12 hrs:   Temp Pulse Resp BP SpO2   25 0832 97.8 °F (36.6 °C) 60 16 94/63 98 %   25 0752 97 °F (36.1 °C) 57 16 104/71 100 %        Medications received:  Medications Administered         0.9 % sodium chloride infusion Admin Date  2025 Action  New Bag Dose  25 mL/hr Rate  25 mL/hr Route  IntraVENous Documented By  Yael Ferguson RN        iron sucrose (VENOFER) injection 200 mg Admin Date  2025 Action  Given Dose  200 mg Rate   Route  IntraVENous Documented By  Yael Ferguson RN             Ms. Cm tolerated treatment well and was discharged from Outpatient Infusion Center in stable condition at 0835. PIV maintained +blood throughout the infusion, flushed and removed per protocol. She is aware of her next appointment.    Yael Ferguson RN  2025    Future Appointments:  Future Appointments   Date Time Provider Department Center   2025  9:00 AM Melody Paulino MD ON[facility] BS AMB

## 2025-06-17 ENCOUNTER — TELEPHONE (OUTPATIENT)
Age: 40
End: 2025-06-17

## 2025-07-11 ENCOUNTER — OFFICE VISIT (OUTPATIENT)
Age: 40
End: 2025-07-11
Payer: MEDICAID

## 2025-07-11 VITALS
WEIGHT: 231.4 LBS | OXYGEN SATURATION: 99 % | BODY MASS INDEX: 36.32 KG/M2 | TEMPERATURE: 97.9 F | RESPIRATION RATE: 16 BRPM | SYSTOLIC BLOOD PRESSURE: 109 MMHG | DIASTOLIC BLOOD PRESSURE: 75 MMHG | HEIGHT: 67 IN | HEART RATE: 53 BPM

## 2025-07-11 DIAGNOSIS — D50.0 IRON DEFICIENCY ANEMIA DUE TO CHRONIC BLOOD LOSS: Primary | ICD-10-CM

## 2025-07-11 DIAGNOSIS — L30.8 OTHER ECZEMA: ICD-10-CM

## 2025-07-11 DIAGNOSIS — E66.01 MORBID OBESITY (HCC): ICD-10-CM

## 2025-07-11 DIAGNOSIS — K59.03 DRUG-INDUCED CONSTIPATION: ICD-10-CM

## 2025-07-11 DIAGNOSIS — E53.8 VITAMIN B12 DEFICIENCY: ICD-10-CM

## 2025-07-11 DIAGNOSIS — R53.83 OTHER FATIGUE: ICD-10-CM

## 2025-07-11 DIAGNOSIS — D50.0 IRON DEFICIENCY ANEMIA DUE TO CHRONIC BLOOD LOSS: ICD-10-CM

## 2025-07-11 DIAGNOSIS — N92.1 MENORRHAGIA WITH IRREGULAR CYCLE: ICD-10-CM

## 2025-07-11 PROCEDURE — 99215 OFFICE O/P EST HI 40 MIN: CPT | Performed by: INTERNAL MEDICINE

## 2025-07-11 RX ORDER — ACETAMINOPHEN 325 MG/1
650 TABLET ORAL
OUTPATIENT
Start: 2025-07-17

## 2025-07-11 RX ORDER — SODIUM CHLORIDE 0.9 % (FLUSH) 0.9 %
5-40 SYRINGE (ML) INJECTION PRN
OUTPATIENT
Start: 2025-07-17

## 2025-07-11 RX ORDER — SODIUM CHLORIDE 9 MG/ML
INJECTION, SOLUTION INTRAVENOUS PRN
OUTPATIENT
Start: 2025-07-17

## 2025-07-11 RX ORDER — DIPHENHYDRAMINE HYDROCHLORIDE 50 MG/ML
50 INJECTION, SOLUTION INTRAMUSCULAR; INTRAVENOUS
OUTPATIENT
Start: 2025-07-17

## 2025-07-11 RX ORDER — HYDROCORTISONE SODIUM SUCCINATE 100 MG/2ML
100 INJECTION INTRAMUSCULAR; INTRAVENOUS
OUTPATIENT
Start: 2025-07-17

## 2025-07-11 RX ORDER — SODIUM CHLORIDE 9 MG/ML
5-250 INJECTION, SOLUTION INTRAVENOUS PRN
OUTPATIENT
Start: 2025-07-17

## 2025-07-11 RX ORDER — FAMOTIDINE 10 MG/ML
20 INJECTION, SOLUTION INTRAVENOUS
OUTPATIENT
Start: 2025-07-17

## 2025-07-11 RX ORDER — ALBUTEROL SULFATE 90 UG/1
4 INHALANT RESPIRATORY (INHALATION) PRN
OUTPATIENT
Start: 2025-07-17

## 2025-07-11 RX ORDER — EPINEPHRINE 1 MG/ML
0.3 INJECTION, SOLUTION, CONCENTRATE INTRAVENOUS PRN
OUTPATIENT
Start: 2025-07-17

## 2025-07-11 RX ORDER — HEPARIN SODIUM (PORCINE) LOCK FLUSH IV SOLN 100 UNIT/ML 100 UNIT/ML
500 SOLUTION INTRAVENOUS PRN
OUTPATIENT
Start: 2025-07-17

## 2025-07-11 NOTE — PROGRESS NOTES
Vicki Cm is a 39 y.o. female follow up for         1. Have you been to the ER, urgent care clinic since your last visit?  Hospitalized since your last visit?{No    2. Have you seen or consulted any other health care providers outside of the [facility] since your last visit?  Include any pap smears or colon screening. No  
masses  Skin: no rashes; no ecchymoses; no petechiae      Results:     Lab Results   Component Value Date    WBC 5.9 03/13/2025    HGB 10.4 (L) 03/13/2025    HCT 32.8 (L) 03/13/2025     03/13/2025    MCV 83 03/13/2025    NEUTROABS 3.1 03/13/2025    HGBPOC 7.6 07/05/2019     Lab Results   Component Value Date     10/13/2022    K 3.8 10/13/2022     10/13/2022    CO2 28 10/13/2022    GLUCOSE 82 10/13/2022    BUN 15 10/13/2022    CREATININE 0.88 10/13/2022    LABGLOM >60 10/13/2022    CALCIUM 9.1 10/13/2022    MG 2.1 05/29/2019     Lab Results   Component Value Date    BILITOT 1.0 10/13/2022    ALT 24 10/13/2022    AST 16 10/13/2022    ALKPHOS 58 10/13/2022    GLOB 4.7 (H) 10/13/2022     Lab Results   Component Value Date    RETICCTPCT 1.7 05/28/2019    IRON 45 03/13/2025    TIBC 411 03/13/2025    IRONPERSAT 11 (L) 03/13/2025    FERRITIN 11 (L) 03/13/2025    JGNLPLFL75 270 03/13/2025    FOLATE 3.5 (L) 08/06/2024     08/06/2024     05/28/2019    HAPGB 175 05/28/2019    PCTDBS NEG 08/06/2022    HEPCAB <0.1 07/06/2022    [id number] Non Reactive 03/13/2020     Lab Results   Component Value Date    DDIMER <0.17 09/20/2014    LIPASE 131 03/16/2020    TSH 1.140 07/06/2022    OCCULTBLDFEC NEGATIVE 05/29/2019         Imaging:     Radiology report(s) reviewed.    Assessment & Plan:   Vicki Cm is a 39 y.o. female comes in for f/u of anemia.    1. Iron deficiency anemia:   Has h/o anemia in pregnancy as well as in relation to menorrhagia. Last received Venofer x 5 in Feb 2024. She is taking iron supplements TID. Reports ongoing menorrhagia, with occasional BRPR when straining to have a BM. EGD in fall 2024 reportedly showed NSAID related ulcers. No recent labs for review.   -- Labs today pending: CBC, ferritin, iron panel, B12  -- Continue iron supplements 1-2 times daily. Continue stool softeners for constipation.   -- If anemia is recurring - IV Venofer 200mg x 5 doses  -- Encouraged patient

## 2025-07-12 LAB
BASOPHILS # BLD AUTO: 0 X10E3/UL (ref 0–0.2)
BASOPHILS NFR BLD AUTO: 1 %
EOSINOPHIL # BLD AUTO: 0.3 X10E3/UL (ref 0–0.4)
EOSINOPHIL NFR BLD AUTO: 8 %
ERYTHROCYTE [DISTWIDTH] IN BLOOD BY AUTOMATED COUNT: 16.2 % (ref 11.7–15.4)
FERRITIN SERPL-MCNC: 13 NG/ML (ref 15–150)
HCT VFR BLD AUTO: 32.9 % (ref 34–46.6)
HGB BLD-MCNC: 10.3 G/DL (ref 11.1–15.9)
IMM GRANULOCYTES # BLD AUTO: 0 X10E3/UL (ref 0–0.1)
IMM GRANULOCYTES NFR BLD AUTO: 0 %
IRON SATN MFR SERPL: 9 % (ref 15–55)
IRON SERPL-MCNC: 33 UG/DL (ref 27–159)
LYMPHOCYTES # BLD AUTO: 1.4 X10E3/UL (ref 0.7–3.1)
LYMPHOCYTES NFR BLD AUTO: 32 %
MCH RBC QN AUTO: 28.3 PG (ref 26.6–33)
MCHC RBC AUTO-ENTMCNC: 31.3 G/DL (ref 31.5–35.7)
MCV RBC AUTO: 90 FL (ref 79–97)
MONOCYTES # BLD AUTO: 0.3 X10E3/UL (ref 0.1–0.9)
MONOCYTES NFR BLD AUTO: 6 %
NEUTROPHILS # BLD AUTO: 2.4 X10E3/UL (ref 1.4–7)
NEUTROPHILS NFR BLD AUTO: 53 %
PLATELET # BLD AUTO: 284 X10E3/UL (ref 150–450)
RBC # BLD AUTO: 3.64 X10E6/UL (ref 3.77–5.28)
TIBC SERPL-MCNC: 367 UG/DL (ref 250–450)
UIBC SERPL-MCNC: 334 UG/DL (ref 131–425)
VIT B12 SERPL-MCNC: 265 PG/ML (ref 232–1245)
WBC # BLD AUTO: 4.4 X10E3/UL (ref 3.4–10.8)

## 2025-07-14 ENCOUNTER — RESULTS FOLLOW-UP (OUTPATIENT)
Age: 40
End: 2025-07-14

## 2025-07-15 LAB — METHYLMALONATE SERPL-SCNC: 148 NMOL/L (ref 0–378)

## 2025-07-25 ENCOUNTER — HOSPITAL ENCOUNTER (OUTPATIENT)
Facility: HOSPITAL | Age: 40
Setting detail: INFUSION SERIES
Discharge: HOME | End: 2025-07-25
Payer: MEDICAID

## 2025-07-25 VITALS
SYSTOLIC BLOOD PRESSURE: 110 MMHG | HEIGHT: 67 IN | HEART RATE: 63 BPM | TEMPERATURE: 97.9 F | DIASTOLIC BLOOD PRESSURE: 78 MMHG | OXYGEN SATURATION: 100 % | RESPIRATION RATE: 16 BRPM | WEIGHT: 228.2 LBS | BODY MASS INDEX: 35.82 KG/M2

## 2025-07-25 DIAGNOSIS — D50.0 IRON DEFICIENCY ANEMIA DUE TO CHRONIC BLOOD LOSS: Primary | ICD-10-CM

## 2025-07-25 PROCEDURE — 96374 THER/PROPH/DIAG INJ IV PUSH: CPT

## 2025-07-25 PROCEDURE — 6360000002 HC RX W HCPCS: Performed by: NURSE PRACTITIONER

## 2025-07-25 PROCEDURE — 2580000003 HC RX 258: Performed by: NURSE PRACTITIONER

## 2025-07-25 RX ORDER — ALBUTEROL SULFATE 90 UG/1
4 INHALANT RESPIRATORY (INHALATION) PRN
Status: CANCELLED | OUTPATIENT
Start: 2025-07-27

## 2025-07-25 RX ORDER — ACETAMINOPHEN 325 MG/1
650 TABLET ORAL
Status: CANCELLED | OUTPATIENT
Start: 2025-07-27

## 2025-07-25 RX ORDER — SODIUM CHLORIDE 9 MG/ML
5-250 INJECTION, SOLUTION INTRAVENOUS PRN
Status: CANCELLED | OUTPATIENT
Start: 2025-07-27

## 2025-07-25 RX ORDER — HEPARIN 100 UNIT/ML
500 SYRINGE INTRAVENOUS PRN
Status: CANCELLED | OUTPATIENT
Start: 2025-07-27

## 2025-07-25 RX ORDER — DIPHENHYDRAMINE HYDROCHLORIDE 50 MG/ML
50 INJECTION, SOLUTION INTRAMUSCULAR; INTRAVENOUS
Status: CANCELLED | OUTPATIENT
Start: 2025-07-27

## 2025-07-25 RX ORDER — SODIUM CHLORIDE 9 MG/ML
INJECTION, SOLUTION INTRAVENOUS PRN
Status: CANCELLED | OUTPATIENT
Start: 2025-07-27

## 2025-07-25 RX ORDER — HYDROCORTISONE SODIUM SUCCINATE 100 MG/2ML
100 INJECTION INTRAMUSCULAR; INTRAVENOUS
Status: CANCELLED | OUTPATIENT
Start: 2025-07-27

## 2025-07-25 RX ORDER — EPINEPHRINE 1 MG/ML
0.3 INJECTION, SOLUTION INTRAMUSCULAR; SUBCUTANEOUS PRN
Status: CANCELLED | OUTPATIENT
Start: 2025-07-27

## 2025-07-25 RX ORDER — SODIUM CHLORIDE 0.9 % (FLUSH) 0.9 %
5-40 SYRINGE (ML) INJECTION PRN
Status: CANCELLED | OUTPATIENT
Start: 2025-07-27

## 2025-07-25 RX ORDER — SODIUM CHLORIDE 9 MG/ML
5-250 INJECTION, SOLUTION INTRAVENOUS PRN
Status: DISCONTINUED | OUTPATIENT
Start: 2025-07-25 | End: 2025-07-26 | Stop reason: HOSPADM

## 2025-07-25 RX ADMIN — IRON SUCROSE 200 MG: 20 INJECTION, SOLUTION INTRAVENOUS at 13:29

## 2025-07-25 ASSESSMENT — PAIN SCALES - GENERAL: PAINLEVEL_OUTOF10: 0

## 2025-07-25 NOTE — PROGRESS NOTES
[facility] Progress Note        Date: 25    Name: Vicki Cm    MRN: [id number]         : 1985    MD: FARHAD Johnston*       Ms. Cm admitted to \[facility]\"" for Day  of Venofer ambulatory in stable condition. Assessment completed. No new concerns voiced. 24 gauge peripheral IV obtained in the left ac without difficulty, line flushed and capped No labs ordered for this visit.      ** Patient has received this medication in the past. Patient reports no previous reactions to the medication(s).         Vitals:    25 1259   BP: 110/78   Pulse: 63   Resp: 16   Temp: 97.9 °F (36.6 °C)   TempSrc: Temporal   SpO2: 100%   Weight: 103.5 kg (228 lb 3.2 oz)   Height: 1.702 m (5' 7\")           Medications:  MEDICATIONS GIVEN:  Medications Administered         iron sucrose (VENOFER) 200 mg in sodium chloride 0.9 % 100 mL IVPB Admin Date  2025 Action  New Bag Dose  200 mg Rate  480 mL/hr Route  IntraVENous Documented By  Shaina Melissa RN                Post-Infusion Vitals:  Vitals:    25 1259   BP: 110/78   Pulse: 63   Resp: 16   Temp: 97.9 °F (36.6 °C)   SpO2: 100%             Pt tolerated treatment well. PIV maintained positive blood return throughout treatment, flushed with positive blood return at conclusion and removed and wrapped in coban per protocol. D/c home ambulatory in no distress. Patient is aware of next appointment on 25 @ 1300 at the [place] location.        Future Appointments:  Future Appointments   Date Time Provider Department Center   2025  1:00 PM SS CHEMO CHAIR 8 [facility]   2025  1:00 PM SS CHEMO CHAIR 5 [facility]   8/15/2025  1:00 PM SS CHEMO CHAIR 13 [facility]   2025  1:00 PM SS CHEMO CHAIR 20 [facility]   10/16/2025  9:15 AM Vinita Colin, FARHAD - NP [facility]

## 2025-08-01 ENCOUNTER — HOSPITAL ENCOUNTER (OUTPATIENT)
Facility: HOSPITAL | Age: 40
Setting detail: INFUSION SERIES
Discharge: HOME | End: 2025-08-01
Payer: MEDICAID

## 2025-08-01 VITALS
HEIGHT: 67 IN | WEIGHT: 232.7 LBS | DIASTOLIC BLOOD PRESSURE: 69 MMHG | OXYGEN SATURATION: 100 % | BODY MASS INDEX: 36.52 KG/M2 | SYSTOLIC BLOOD PRESSURE: 109 MMHG | HEART RATE: 61 BPM | TEMPERATURE: 98.4 F | RESPIRATION RATE: 18 BRPM

## 2025-08-01 DIAGNOSIS — D50.0 IRON DEFICIENCY ANEMIA DUE TO CHRONIC BLOOD LOSS: Primary | ICD-10-CM

## 2025-08-01 PROCEDURE — 6360000002 HC RX W HCPCS: Performed by: NURSE PRACTITIONER

## 2025-08-01 PROCEDURE — 2580000003 HC RX 258: Performed by: NURSE PRACTITIONER

## 2025-08-01 PROCEDURE — 96374 THER/PROPH/DIAG INJ IV PUSH: CPT

## 2025-08-01 RX ORDER — DIPHENHYDRAMINE HYDROCHLORIDE 50 MG/ML
50 INJECTION, SOLUTION INTRAMUSCULAR; INTRAVENOUS
OUTPATIENT
Start: 2025-08-02

## 2025-08-01 RX ORDER — SODIUM CHLORIDE 0.9 % (FLUSH) 0.9 %
5-40 SYRINGE (ML) INJECTION PRN
OUTPATIENT
Start: 2025-08-02

## 2025-08-01 RX ORDER — SODIUM CHLORIDE 9 MG/ML
5-250 INJECTION, SOLUTION INTRAVENOUS PRN
Status: DISCONTINUED | OUTPATIENT
Start: 2025-08-01 | End: 2025-08-02 | Stop reason: HOSPADM

## 2025-08-01 RX ORDER — HYDROCORTISONE SODIUM SUCCINATE 100 MG/2ML
100 INJECTION INTRAMUSCULAR; INTRAVENOUS
OUTPATIENT
Start: 2025-08-02

## 2025-08-01 RX ORDER — SODIUM CHLORIDE 9 MG/ML
INJECTION, SOLUTION INTRAVENOUS PRN
OUTPATIENT
Start: 2025-08-02

## 2025-08-01 RX ORDER — HEPARIN 100 UNIT/ML
500 SYRINGE INTRAVENOUS PRN
OUTPATIENT
Start: 2025-08-02

## 2025-08-01 RX ORDER — EPINEPHRINE 1 MG/ML
0.3 INJECTION, SOLUTION INTRAMUSCULAR; SUBCUTANEOUS PRN
OUTPATIENT
Start: 2025-08-02

## 2025-08-01 RX ORDER — SODIUM CHLORIDE 9 MG/ML
5-250 INJECTION, SOLUTION INTRAVENOUS PRN
OUTPATIENT
Start: 2025-08-02

## 2025-08-01 RX ORDER — ACETAMINOPHEN 325 MG/1
650 TABLET ORAL
OUTPATIENT
Start: 2025-08-02

## 2025-08-01 RX ORDER — ALBUTEROL SULFATE 90 UG/1
4 INHALANT RESPIRATORY (INHALATION) PRN
OUTPATIENT
Start: 2025-08-02

## 2025-08-01 RX ADMIN — IRON SUCROSE 200 MG: 20 INJECTION, SOLUTION INTRAVENOUS at 13:36

## 2025-08-01 RX ADMIN — SODIUM CHLORIDE 25 ML/HR: 0.9 INJECTION, SOLUTION INTRAVENOUS at 13:30

## 2025-08-01 ASSESSMENT — PAIN SCALES - GENERAL: PAINLEVEL_OUTOF10: 0

## 2025-08-01 NOTE — PROGRESS NOTES
[facility] Progress Note    Date: August 1, 2025        1300: Pt arrived ambulatory to [facility] for venofer in stable condition.  Assessment completed. 24G PIV placed to right arm with positive blood return.    Criteria for treatment was met.    Patient Vitals for the past 12 hrs:   Temp Pulse Resp BP SpO2   08/01/25 1345 -- 61 -- 109/69 --   08/01/25 1315 98.4 °F (36.9 °C) 55 18 109/70 100 %         Medications Administered         0.9 % sodium chloride infusion Admin Date  08/01/2025 Action  New Bag Dose  25 mL/hr Rate  25 mL/hr Route  IntraVENous Documented By  Mario Cabrera, BRANDON        iron sucrose (VENOFER) 200 mg in sodium chloride 0.9 % 100 mL IVPB Admin Date  08/01/2025 Action  New Bag Dose  200 mg Rate  480 mL/hr Route  IntraVENous Documented By  Mario Cabrera, RN               Ms. Cm tolerated the infusion, and had no complaints.    PIV flushed and removed. 2x2 and coban placed    Ms. Cm was [facility] in stable condition. Patient is aware of next scheduled [facility] appointment 8/8/25 at 1300.         Future Appointments   Date Time Provider Department Center   8/8/2025  1:00 [facility]   8/15/2025  1:00 PM [facility]   8/22/2025  1:00 PM [facility]   10/16/2025  9:15 AM Vinita Colin, APRN - NP [facility]         MARIO CABRERA, RN, RN  August 1, 2025

## 2025-08-08 ENCOUNTER — HOSPITAL ENCOUNTER (OUTPATIENT)
Facility: HOSPITAL | Age: 40
Setting detail: INFUSION SERIES
Discharge: HOME | End: 2025-08-08
Payer: MEDICAID

## 2025-08-08 VITALS
SYSTOLIC BLOOD PRESSURE: 104 MMHG | HEIGHT: 67 IN | RESPIRATION RATE: 16 BRPM | TEMPERATURE: 98.8 F | DIASTOLIC BLOOD PRESSURE: 68 MMHG | BODY MASS INDEX: 36.76 KG/M2 | HEART RATE: 60 BPM | OXYGEN SATURATION: 96 % | WEIGHT: 234.2 LBS

## 2025-08-08 DIAGNOSIS — D50.0 IRON DEFICIENCY ANEMIA DUE TO CHRONIC BLOOD LOSS: Primary | ICD-10-CM

## 2025-08-08 PROCEDURE — 96374 THER/PROPH/DIAG INJ IV PUSH: CPT

## 2025-08-08 PROCEDURE — 2500000003 HC RX 250 WO HCPCS: Performed by: NURSE PRACTITIONER

## 2025-08-08 PROCEDURE — 2580000003 HC RX 258: Performed by: NURSE PRACTITIONER

## 2025-08-08 PROCEDURE — 6360000002 HC RX W HCPCS: Performed by: NURSE PRACTITIONER

## 2025-08-08 RX ORDER — SODIUM CHLORIDE 9 MG/ML
INJECTION, SOLUTION INTRAVENOUS PRN
Status: CANCELLED | OUTPATIENT
Start: 2025-08-09

## 2025-08-08 RX ORDER — ACETAMINOPHEN 325 MG/1
650 TABLET ORAL
Status: CANCELLED | OUTPATIENT
Start: 2025-08-09

## 2025-08-08 RX ORDER — EPINEPHRINE 1 MG/ML
0.3 INJECTION, SOLUTION INTRAMUSCULAR; SUBCUTANEOUS PRN
Status: CANCELLED | OUTPATIENT
Start: 2025-08-09

## 2025-08-08 RX ORDER — HYDROCORTISONE SODIUM SUCCINATE 100 MG/2ML
100 INJECTION INTRAMUSCULAR; INTRAVENOUS
Status: CANCELLED | OUTPATIENT
Start: 2025-08-09

## 2025-08-08 RX ORDER — SODIUM CHLORIDE 0.9 % (FLUSH) 0.9 %
5-40 SYRINGE (ML) INJECTION PRN
Status: CANCELLED | OUTPATIENT
Start: 2025-08-09

## 2025-08-08 RX ORDER — HEPARIN 100 UNIT/ML
500 SYRINGE INTRAVENOUS PRN
Status: CANCELLED | OUTPATIENT
Start: 2025-08-09

## 2025-08-08 RX ORDER — SODIUM CHLORIDE 9 MG/ML
5-250 INJECTION, SOLUTION INTRAVENOUS PRN
Status: DISCONTINUED | OUTPATIENT
Start: 2025-08-08 | End: 2025-08-09 | Stop reason: HOSPADM

## 2025-08-08 RX ORDER — SODIUM CHLORIDE 9 MG/ML
5-250 INJECTION, SOLUTION INTRAVENOUS PRN
Status: CANCELLED | OUTPATIENT
Start: 2025-08-09

## 2025-08-08 RX ORDER — SODIUM CHLORIDE 0.9 % (FLUSH) 0.9 %
5-40 SYRINGE (ML) INJECTION PRN
Status: DISCONTINUED | OUTPATIENT
Start: 2025-08-08 | End: 2025-08-09 | Stop reason: HOSPADM

## 2025-08-08 RX ORDER — DIPHENHYDRAMINE HYDROCHLORIDE 50 MG/ML
50 INJECTION, SOLUTION INTRAMUSCULAR; INTRAVENOUS
Status: CANCELLED | OUTPATIENT
Start: 2025-08-09

## 2025-08-08 RX ORDER — ALBUTEROL SULFATE 90 UG/1
4 INHALANT RESPIRATORY (INHALATION) PRN
Status: CANCELLED | OUTPATIENT
Start: 2025-08-09

## 2025-08-08 RX ADMIN — Medication 10 ML: at 13:35

## 2025-08-08 RX ADMIN — IRON SUCROSE 200 MG: 20 INJECTION, SOLUTION INTRAVENOUS at 13:37

## 2025-08-08 RX ADMIN — SODIUM CHLORIDE 25 ML/HR: 9 INJECTION, SOLUTION INTRAVENOUS at 13:35

## 2025-08-08 ASSESSMENT — PAIN SCALES - GENERAL: PAINLEVEL_OUTOF10: 0

## 2025-08-15 ENCOUNTER — HOSPITAL ENCOUNTER (OUTPATIENT)
Facility: HOSPITAL | Age: 40
Setting detail: INFUSION SERIES
Discharge: HOME | End: 2025-08-15
Payer: MEDICAID

## 2025-08-15 VITALS
HEART RATE: 71 BPM | RESPIRATION RATE: 16 BRPM | TEMPERATURE: 97.8 F | OXYGEN SATURATION: 98 % | DIASTOLIC BLOOD PRESSURE: 74 MMHG | SYSTOLIC BLOOD PRESSURE: 111 MMHG

## 2025-08-15 DIAGNOSIS — D50.0 IRON DEFICIENCY ANEMIA DUE TO CHRONIC BLOOD LOSS: Primary | ICD-10-CM

## 2025-08-15 PROCEDURE — 6360000002 HC RX W HCPCS: Performed by: NURSE PRACTITIONER

## 2025-08-15 PROCEDURE — 96374 THER/PROPH/DIAG INJ IV PUSH: CPT

## 2025-08-15 PROCEDURE — 2580000003 HC RX 258: Performed by: NURSE PRACTITIONER

## 2025-08-15 RX ORDER — SODIUM CHLORIDE 0.9 % (FLUSH) 0.9 %
5-40 SYRINGE (ML) INJECTION PRN
Status: CANCELLED | OUTPATIENT
Start: 2025-08-16

## 2025-08-15 RX ORDER — ACETAMINOPHEN 325 MG/1
650 TABLET ORAL
Status: CANCELLED | OUTPATIENT
Start: 2025-08-16

## 2025-08-15 RX ORDER — EPINEPHRINE 1 MG/ML
0.3 INJECTION, SOLUTION INTRAMUSCULAR; SUBCUTANEOUS PRN
Status: CANCELLED | OUTPATIENT
Start: 2025-08-16

## 2025-08-15 RX ORDER — SODIUM CHLORIDE 9 MG/ML
5-250 INJECTION, SOLUTION INTRAVENOUS PRN
Status: CANCELLED | OUTPATIENT
Start: 2025-08-16

## 2025-08-15 RX ORDER — HYDROCORTISONE SODIUM SUCCINATE 100 MG/2ML
100 INJECTION INTRAMUSCULAR; INTRAVENOUS
Status: CANCELLED | OUTPATIENT
Start: 2025-08-16

## 2025-08-15 RX ORDER — SODIUM CHLORIDE 9 MG/ML
INJECTION, SOLUTION INTRAVENOUS PRN
Status: CANCELLED | OUTPATIENT
Start: 2025-08-16

## 2025-08-15 RX ORDER — HEPARIN 100 UNIT/ML
500 SYRINGE INTRAVENOUS PRN
Status: CANCELLED | OUTPATIENT
Start: 2025-08-16

## 2025-08-15 RX ORDER — DIPHENHYDRAMINE HYDROCHLORIDE 50 MG/ML
50 INJECTION, SOLUTION INTRAMUSCULAR; INTRAVENOUS
Status: CANCELLED | OUTPATIENT
Start: 2025-08-16

## 2025-08-15 RX ORDER — ALBUTEROL SULFATE 90 UG/1
4 INHALANT RESPIRATORY (INHALATION) PRN
Status: CANCELLED | OUTPATIENT
Start: 2025-08-16

## 2025-08-15 RX ORDER — SODIUM CHLORIDE 9 MG/ML
5-250 INJECTION, SOLUTION INTRAVENOUS PRN
Status: DISCONTINUED | OUTPATIENT
Start: 2025-08-15 | End: 2025-08-16 | Stop reason: HOSPADM

## 2025-08-15 RX ADMIN — SODIUM CHLORIDE 25 ML/HR: 9 INJECTION, SOLUTION INTRAVENOUS at 13:07

## 2025-08-15 RX ADMIN — IRON SUCROSE 200 MG: 20 INJECTION, SOLUTION INTRAVENOUS at 13:31

## 2025-08-22 ENCOUNTER — HOSPITAL ENCOUNTER (OUTPATIENT)
Facility: HOSPITAL | Age: 40
Setting detail: INFUSION SERIES
Discharge: HOME | End: 2025-08-22
Payer: MEDICAID

## 2025-08-22 ENCOUNTER — HOSPITAL ENCOUNTER (OUTPATIENT)
Facility: HOSPITAL | Age: 40
Setting detail: INFUSION SERIES
End: 2025-08-22
Payer: MEDICAID

## 2025-08-22 VITALS
SYSTOLIC BLOOD PRESSURE: 108 MMHG | RESPIRATION RATE: 18 BRPM | BODY MASS INDEX: 36.27 KG/M2 | OXYGEN SATURATION: 97 % | HEART RATE: 56 BPM | HEIGHT: 67 IN | TEMPERATURE: 98 F | WEIGHT: 231.1 LBS | DIASTOLIC BLOOD PRESSURE: 66 MMHG

## 2025-08-22 DIAGNOSIS — D50.0 IRON DEFICIENCY ANEMIA DUE TO CHRONIC BLOOD LOSS: Primary | ICD-10-CM

## 2025-08-22 PROCEDURE — 96374 THER/PROPH/DIAG INJ IV PUSH: CPT

## 2025-08-22 PROCEDURE — 2580000003 HC RX 258: Performed by: NURSE PRACTITIONER

## 2025-08-22 PROCEDURE — 6360000002 HC RX W HCPCS: Performed by: NURSE PRACTITIONER

## 2025-08-22 RX ORDER — SODIUM CHLORIDE 9 MG/ML
INJECTION, SOLUTION INTRAVENOUS PRN
OUTPATIENT
Start: 2025-08-23

## 2025-08-22 RX ORDER — SODIUM CHLORIDE 9 MG/ML
5-250 INJECTION, SOLUTION INTRAVENOUS PRN
OUTPATIENT
Start: 2025-08-23

## 2025-08-22 RX ORDER — ALBUTEROL SULFATE 90 UG/1
4 INHALANT RESPIRATORY (INHALATION) PRN
OUTPATIENT
Start: 2025-08-23

## 2025-08-22 RX ORDER — SODIUM CHLORIDE 9 MG/ML
5-250 INJECTION, SOLUTION INTRAVENOUS PRN
Status: DISCONTINUED | OUTPATIENT
Start: 2025-08-22 | End: 2025-08-23 | Stop reason: HOSPADM

## 2025-08-22 RX ORDER — SODIUM CHLORIDE 0.9 % (FLUSH) 0.9 %
5-40 SYRINGE (ML) INJECTION PRN
OUTPATIENT
Start: 2025-08-23

## 2025-08-22 RX ORDER — HEPARIN 100 UNIT/ML
500 SYRINGE INTRAVENOUS PRN
OUTPATIENT
Start: 2025-08-23

## 2025-08-22 RX ORDER — ACETAMINOPHEN 325 MG/1
650 TABLET ORAL
OUTPATIENT
Start: 2025-08-23

## 2025-08-22 RX ORDER — DIPHENHYDRAMINE HYDROCHLORIDE 50 MG/ML
50 INJECTION, SOLUTION INTRAMUSCULAR; INTRAVENOUS
OUTPATIENT
Start: 2025-08-23

## 2025-08-22 RX ORDER — HYDROCORTISONE SODIUM SUCCINATE 100 MG/2ML
100 INJECTION INTRAMUSCULAR; INTRAVENOUS
OUTPATIENT
Start: 2025-08-23

## 2025-08-22 RX ORDER — EPINEPHRINE 1 MG/ML
0.3 INJECTION, SOLUTION INTRAMUSCULAR; SUBCUTANEOUS PRN
OUTPATIENT
Start: 2025-08-23

## 2025-08-22 RX ADMIN — SODIUM CHLORIDE 10 ML/HR: 9 INJECTION, SOLUTION INTRAVENOUS at 10:59

## 2025-08-22 RX ADMIN — IRON SUCROSE 200 MG: 20 INJECTION, SOLUTION INTRAVENOUS at 10:59

## 2025-08-22 ASSESSMENT — PAIN SCALES - GENERAL: PAINLEVEL_OUTOF10: 0

## 2025-08-24 ENCOUNTER — APPOINTMENT (OUTPATIENT)
Facility: HOSPITAL | Age: 40
End: 2025-08-24
Payer: MEDICAID

## 2025-08-24 ENCOUNTER — HOSPITAL ENCOUNTER (EMERGENCY)
Facility: HOSPITAL | Age: 40
Discharge: HOME | End: 2025-08-24
Attending: EMERGENCY MEDICINE
Payer: MEDICAID

## 2025-08-24 DIAGNOSIS — M79.10 MUSCULAR PAIN: ICD-10-CM

## 2025-08-24 DIAGNOSIS — R31.9 URINARY TRACT INFECTION WITH HEMATURIA, SITE UNSPECIFIED: ICD-10-CM

## 2025-08-24 DIAGNOSIS — N39.0 URINARY TRACT INFECTION WITH HEMATURIA, SITE UNSPECIFIED: ICD-10-CM
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
## 2025-08-24 LAB
ALBUMIN SERPL-MCNC: 4 G/DL (ref 3.5–5.2)
ALBUMIN/GLOB SERPL: 1.1 (ref 1.1–2.2)
ALP SERPL-CCNC: 59 U/L (ref 35–104)
ALT SERPL-CCNC: 18 U/L (ref 10–35)
ANION GAP SERPL CALC-SCNC: 10 MMOL/L (ref 2–14)
APPEARANCE UR: ABNORMAL
AST SERPL-CCNC: 29 U/L (ref 10–35)
BACTERIA URNS QL MICRO: ABNORMAL /HPF
BASOPHILS # BLD: 0.01 K/UL (ref 0–0.1)
BASOPHILS NFR BLD: 0.2 % (ref 0–1)
BILIRUB SERPL-MCNC: 1.3 MG/DL (ref 0–1.2)
BILIRUB UR QL: NEGATIVE
BUN SERPL-MCNC: 10 MG/DL (ref 6–20)
BUN/CREAT SERPL: 14 (ref 12–20)
CALCIUM SERPL-MCNC: 9 MG/DL (ref 8.6–10)
CHLORIDE SERPL-SCNC: 106 MMOL/L (ref 98–107)
CO2 SERPL-SCNC: 23 MMOL/L (ref 20–29)
COLOR UR: ABNORMAL
COMMENT:: NORMAL
CREAT SERPL-MCNC: 0.75 MG/DL (ref 0.6–1)
DIFFERENTIAL METHOD BLD: ABNORMAL
EOSINOPHIL # BLD: 0.15 K/UL (ref 0–0.4)
EOSINOPHIL NFR BLD: 3.1 % (ref 0–7)
EPITH CASTS URNS QL MICRO: ABNORMAL /LPF
ERYTHROCYTE [DISTWIDTH] IN BLOOD BY AUTOMATED COUNT: 15.9 % (ref 11.5–14.5)
GLOBULIN SER CALC-MCNC: 3.7 G/DL (ref 2–4)
GLUCOSE SERPL-MCNC: 86 MG/DL (ref 65–100)
GLUCOSE UR STRIP.AUTO-MCNC: NEGATIVE MG/DL
HCG UR QL: NEGATIVE
HCT VFR BLD AUTO: 31.9 % (ref 35–47)
HGB BLD-MCNC: 10.3 G/DL (ref 11.5–16)
HGB UR QL STRIP: ABNORMAL
HYALINE CASTS URNS QL MICRO: ABNORMAL /LPF (ref 0–5)
IMM GRANULOCYTES # BLD AUTO: 0.01 K/UL (ref 0–0.04)
IMM GRANULOCYTES NFR BLD AUTO: 0.2 % (ref 0–0.5)
KETONES UR QL STRIP.AUTO: NEGATIVE MG/DL
LEUKOCYTE ESTERASE UR QL STRIP.AUTO: ABNORMAL
LYMPHOCYTES # BLD: 0.83 K/UL (ref 0.8–3.5)
LYMPHOCYTES NFR BLD: 17.4 % (ref 12–49)
MCH RBC QN AUTO: 28.6 PG (ref 26–34)
MCHC RBC AUTO-ENTMCNC: 32.3 G/DL (ref 30–36.5)
MCV RBC AUTO: 88.6 FL (ref 80–99)
MONOCYTES # BLD: 0.29 K/UL (ref 0–1)
MONOCYTES NFR BLD: 6.1 % (ref 5–13)
NEUTS SEG # BLD: 3.48 K/UL (ref 1.8–8)
NEUTS SEG NFR BLD: 73 % (ref 32–75)
NITRITE UR QL STRIP.AUTO: POSITIVE
NRBC # BLD: 0 K/UL (ref 0–0.01)
NRBC BLD-RTO: 0 PER 100 WBC
PH UR STRIP: 8 (ref 5–8)
PLATELET # BLD AUTO: 233 K/UL (ref 150–400)
PMV BLD AUTO: 9.8 FL (ref 8.9–12.9)
POTASSIUM SERPL-SCNC: 3.7 MMOL/L (ref 3.5–5.1)
PROT SERPL-MCNC: 7.7 G/DL (ref 6.4–8.3)
PROT UR STRIP-MCNC: 30 MG/DL
RBC # BLD AUTO: 3.6 M/UL (ref 3.8–5.2)
RBC #/AREA URNS HPF: ABNORMAL /HPF (ref 0–5)
SODIUM SERPL-SCNC: 139 MMOL/L (ref 136–145)
SP GR UR REFRACTOMETRY: 1.02 (ref 1–1.03)
SPECIMEN HOLD: NORMAL
SPECIMEN HOLD: NORMAL
UROBILINOGEN UR QL STRIP.AUTO: 1 EU/DL (ref 0.2–1)
WBC # BLD AUTO: 4.8 K/UL (ref 3.6–11)
WBC URNS QL MICRO: ABNORMAL /HPF (ref 0–4)

## 2025-08-24 PROCEDURE — 80053 COMPREHEN METABOLIC PANEL: CPT

## 2025-08-24 PROCEDURE — 81001 URINALYSIS AUTO W/SCOPE: CPT

## 2025-08-24 PROCEDURE — 99283 EMERGENCY DEPT VISIT LOW MDM: CPT

## 2025-08-24 PROCEDURE — 81025 URINE PREGNANCY TEST: CPT

## 2025-08-24 PROCEDURE — 73110 X-RAY EXAM OF WRIST: CPT

## 2025-08-24 PROCEDURE — 74177 CT ABD & PELVIS W/CONTRAST: CPT

## 2025-08-24 PROCEDURE — 85025 COMPLETE CBC W/AUTO DIFF WBC: CPT

## 2025-08-24 PROCEDURE — 71046 X-RAY EXAM CHEST 2 VIEWS: CPT

## 2025-08-24 PROCEDURE — 72125 CT NECK SPINE W/O DYE: CPT

## 2025-08-24 PROCEDURE — 6360000004 HC RX CONTRAST MEDICATION: Performed by: EMERGENCY MEDICINE

## 2025-08-24 PROCEDURE — 70450 CT HEAD/BRAIN W/O DYE: CPT

## 2025-08-24 RX ORDER — CEPHALEXIN 500 MG/1
500 CAPSULE ORAL 3 TIMES DAILY
Qty: 21 CAPSULE | Refills: 0 | Status: SHIPPED | OUTPATIENT
Start: 2025-08-24 | End: 2025-08-28

## 2025-08-24 RX ORDER — KETOROLAC TROMETHAMINE 30 MG/ML
15 INJECTION, SOLUTION INTRAMUSCULAR; INTRAVENOUS ONCE
Status: DISCONTINUED | OUTPATIENT
Start: 2025-08-24 | End: 2025-08-24

## 2025-08-24 RX ORDER — IOPAMIDOL 755 MG/ML
100 INJECTION, SOLUTION INTRAVASCULAR
Status: COMPLETED | OUTPATIENT
Start: 2025-08-24 | End: 2025-08-24

## 2025-08-24 RX ORDER — CYCLOBENZAPRINE HCL 10 MG
10 TABLET ORAL 3 TIMES DAILY PRN
Qty: 15 TABLET | Refills: 0 | Status: SHIPPED | OUTPATIENT
Start: 2025-08-24 | End: 2025-09-03

## 2025-08-24 RX ADMIN — IOPAMIDOL 100 ML: 755 INJECTION, SOLUTION INTRAVENOUS at 12:40

## 2025-08-28 ENCOUNTER — HOSPITAL ENCOUNTER (EMERGENCY)
Facility: HOSPITAL | Age: 40
Discharge: HOME | End: 2025-08-28
Payer: OTHER MISCELLANEOUS

## 2025-08-28 VITALS
DIASTOLIC BLOOD PRESSURE: 94 MMHG | BODY MASS INDEX: 36.26 KG/M2 | OXYGEN SATURATION: 100 % | SYSTOLIC BLOOD PRESSURE: 136 MMHG | RESPIRATION RATE: 18 BRPM | WEIGHT: 231 LBS | HEART RATE: 64 BPM | HEIGHT: 67 IN | TEMPERATURE: 97.9 F

## 2025-08-28 DIAGNOSIS — R03.0 ELEVATED BLOOD PRESSURE READING: ICD-10-CM
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
## 2025-08-28 DIAGNOSIS — N30.00 ACUTE CYSTITIS WITHOUT HEMATURIA: ICD-10-CM

## 2025-08-28 DIAGNOSIS — S20.01XA: Primary | ICD-10-CM

## 2025-08-28 PROCEDURE — 99283 EMERGENCY DEPT VISIT LOW MDM: CPT

## 2025-08-28 RX ORDER — CEPHALEXIN 500 MG/1
500 CAPSULE ORAL 3 TIMES DAILY
Qty: 21 CAPSULE | Refills: 0 | Status: SHIPPED | OUTPATIENT
Start: 2025-08-28 | End: 2025-09-04

## 2025-08-28 RX ORDER — IBUPROFEN 600 MG/1
600 TABLET, FILM COATED ORAL EVERY 8 HOURS PRN
Qty: 20 TABLET | Refills: 0 | Status: SHIPPED | OUTPATIENT
Start: 2025-08-28

## 2025-08-28 ASSESSMENT — PAIN DESCRIPTION - FREQUENCY: FREQUENCY: INTERMITTENT

## 2025-08-28 ASSESSMENT — VISUAL ACUITY: OU: 1

## 2025-08-28 ASSESSMENT — PAIN SCALES - GENERAL: PAINLEVEL_OUTOF10: 9

## 2025-08-28 ASSESSMENT — PAIN DESCRIPTION - PAIN TYPE: TYPE: ACUTE PAIN

## 2025-08-28 ASSESSMENT — PAIN DESCRIPTION - ORIENTATION: ORIENTATION: RIGHT

## 2025-08-28 ASSESSMENT — PAIN DESCRIPTION - ONSET: ONSET: ON-GOING

## 2025-08-28 ASSESSMENT — PAIN DESCRIPTION - LOCATION: LOCATION: BREAST

## 2025-08-28 ASSESSMENT — PAIN - FUNCTIONAL ASSESSMENT
PAIN_FUNCTIONAL_ASSESSMENT: 0-10
PAIN_FUNCTIONAL_ASSESSMENT: PREVENTS OR INTERFERES SOME ACTIVE ACTIVITIES AND ADLS

## 2025-08-28 ASSESSMENT — PAIN DESCRIPTION - DESCRIPTORS: DESCRIPTORS: ACHING

## (undated) DEVICE — CATH FOLEY 16F LUBRI-SIL IC --

## (undated) DEVICE — TRAXI PANNICULUS RETRACTOR WITH RETENTUS TECHNOLOGY (BMI 30-50): Brand: TRAXI® PANNICULUS RETRACTOR

## (undated) DEVICE — TELFA ADHESIVE ISLAND DRESSING: Brand: TELFA

## (undated) DEVICE — STRIP,CLOSURE,WOUND,MEDI-STRIP,1/2X4: Brand: MEDLINE

## (undated) DEVICE — REM POLYHESIVE ADULT PATIENT RETURN ELECTRODE: Brand: VALLEYLAB

## (undated) DEVICE — POOLE SUCTION INSTRUMENT WITH REMOVABLE SHEATH: Brand: POOLE

## (undated) DEVICE — HANDLE LT SNAP ON ULT DURABLE LENS FOR TRUMPF ALC DISPOSABLE

## (undated) DEVICE — STERILE POLYISOPRENE POWDER-FREE SURGICAL GLOVES WITH EMOLLIENT COATING: Brand: PROTEXIS

## (undated) DEVICE — SUTURE PLN GUT SZ 2-0 L27IN ABSRB YELLOWISH TAN L70MM XLH 53T

## (undated) DEVICE — SUTURE MCRYL SZ 0 L36IN ABSRB UD L36MM CT-1 1/2 CIR Y946H

## (undated) DEVICE — STRAP,POSITIONING,KNEE/BODY,FOAM,4X60": Brand: MEDLINE

## (undated) DEVICE — EXTRA LARGE, DISPOSABLE C-SECTION PROTECTOR - RETRACTOR: Brand: ALEXIS ® O C-SECTION PROTECTOR - RETRACTOR

## (undated) DEVICE — SUTURE STRATAFIX SYMMETRIC PDS + SZ 1 L18IN ABSRB VLT L48MM SXPP1A400

## (undated) DEVICE — SYRINGE IRRIG 60ML SFT PLIABLE BLB EZ TO GRP 1 HND USE W/

## (undated) DEVICE — STAPLER SKIN SQ 30 ABSRB STPL DISP INSORB

## (undated) DEVICE — 3M™ MEDIPORE™ H SOFT CLOTH SURGICAL TAPE, 2863, 3 IN X 10 YD, 12/CASE: Brand: 3M™ MEDIPORE™

## (undated) DEVICE — SOLUTION IV 1000ML 0.9% SOD CHL

## (undated) DEVICE — BLADE ASSEMB CLP HAIR FINE --

## (undated) DEVICE — ROCKER SWITCH PENCIL HOLSTER: Brand: VALLEYLAB

## (undated) DEVICE — STAPLER SKIN H3.9MM WIRE DIA0.58MM CRWN 6.9MM 35 STPL ROT

## (undated) DEVICE — MASTISOL ADHESIVE LIQ 2/3ML

## (undated) DEVICE — STERILE LATEX POWDER-FREE SURGICAL GLOVESWITH NITRILE COATING: Brand: PROTEXIS

## (undated) DEVICE — C-SECTION II-LF: Brand: MEDLINE INDUSTRIES, INC.

## (undated) DEVICE — PAD,ABDOMINAL,5"X9",ST,LF,25/BX: Brand: MEDLINE INDUSTRIES, INC.

## (undated) DEVICE — 3000CC GUARDIAN II: Brand: GUARDIAN

## (undated) DEVICE — PICO 7 10CM X 30CM: Brand: PICO™ 7

## (undated) DEVICE — GARMENT,MEDLINE,DVT,INT,CALF,MED, GEN2: Brand: MEDLINE

## (undated) DEVICE — TRAY,URINE METER,100% SILICONE,16FR10ML: Brand: MEDLINE

## (undated) DEVICE — SOLUTION IRRIG 1000ML H2O STRL BLT

## (undated) DEVICE — STERILE POLYISOPRENE POWDER-FREE SURGICAL GLOVES: Brand: PROTEXIS

## (undated) DEVICE — TIP CLEANER: Brand: VALLEYLAB

## (undated) DEVICE — SPONGE LAP 18X18IN STRL -- 5/PK

## (undated) DEVICE — TOWEL,OR,DSP,ST,BLUE,STD,2/PK,40PK/CS: Brand: MEDLINE

## (undated) DEVICE — TRAY PREP DRY W/ PREM GLV 2 APPL 6 SPNG 2 UNDPD 1 OVERWRAP

## (undated) DEVICE — SOLIDIFIER FLUID 3000 CC ABSORB

## (undated) DEVICE — (D)PREP SKN CHLRAPRP APPL 26ML -- CONVERT TO ITEM 371833